# Patient Record
Sex: MALE | URBAN - METROPOLITAN AREA
[De-identification: names, ages, dates, MRNs, and addresses within clinical notes are randomized per-mention and may not be internally consistent; named-entity substitution may affect disease eponyms.]

---

## 2017-02-01 PROBLEM — IMO0002 UNCONTROLLED TYPE 2 DIABETES WITH NEUROPATHY: Status: ACTIVE | Noted: 2017-02-01

## 2017-02-01 PROBLEM — E11.65 UNCONTROLLED TYPE 2 DIABETES WITH NEUROPATHY (HCC): Status: ACTIVE | Noted: 2017-02-01

## 2017-02-01 PROBLEM — E11.40 UNCONTROLLED TYPE 2 DIABETES WITH NEUROPATHY (HCC): Status: ACTIVE | Noted: 2017-02-01

## 2017-02-09 PROCEDURE — 82570 ASSAY OF URINE CREATININE: CPT | Performed by: INTERNAL MEDICINE

## 2017-02-09 PROCEDURE — 82043 UR ALBUMIN QUANTITATIVE: CPT | Performed by: INTERNAL MEDICINE

## 2017-09-06 PROCEDURE — 82570 ASSAY OF URINE CREATININE: CPT | Performed by: INTERNAL MEDICINE

## 2017-09-06 PROCEDURE — 82043 UR ALBUMIN QUANTITATIVE: CPT | Performed by: INTERNAL MEDICINE

## 2017-09-07 PROBLEM — E11.29 CONTROLLED TYPE 2 DIABETES MELLITUS WITH MICROALBUMINURIA, WITHOUT LONG-TERM CURRENT USE OF INSULIN (HCC): Status: ACTIVE | Noted: 2017-02-01

## 2017-09-07 PROBLEM — IMO0002 UNCONTROLLED TYPE 2 DIABETES MELLITUS WITH MICROALBUMINURIA, WITHOUT LONG-TERM CURRENT USE OF INSULIN: Status: ACTIVE | Noted: 2017-02-01

## 2017-09-07 PROBLEM — R80.9 CONTROLLED TYPE 2 DIABETES MELLITUS WITH MICROALBUMINURIA, WITHOUT LONG-TERM CURRENT USE OF INSULIN (HCC): Status: ACTIVE | Noted: 2017-02-01

## 2017-09-07 PROBLEM — R80.9 UNCONTROLLED TYPE 2 DIABETES MELLITUS WITH MICROALBUMINURIA, WITHOUT LONG-TERM CURRENT USE OF INSULIN (HCC): Status: ACTIVE | Noted: 2017-02-01

## 2017-09-07 PROBLEM — R80.9 CONTROLLED TYPE 2 DIABETES MELLITUS WITH MICROALBUMINURIA, WITHOUT LONG-TERM CURRENT USE OF INSULIN: Status: ACTIVE | Noted: 2017-02-01

## 2017-09-07 PROBLEM — E11.29 CONTROLLED TYPE 2 DIABETES MELLITUS WITH MICROALBUMINURIA, WITHOUT LONG-TERM CURRENT USE OF INSULIN: Status: ACTIVE | Noted: 2017-02-01

## 2017-09-07 PROBLEM — E11.65 UNCONTROLLED TYPE 2 DIABETES MELLITUS WITH MICROALBUMINURIA, WITHOUT LONG-TERM CURRENT USE OF INSULIN (HCC): Status: ACTIVE | Noted: 2017-02-01

## 2017-09-07 PROBLEM — E11.29 UNCONTROLLED TYPE 2 DIABETES MELLITUS WITH MICROALBUMINURIA, WITHOUT LONG-TERM CURRENT USE OF INSULIN (HCC): Status: ACTIVE | Noted: 2017-02-01

## 2018-03-13 PROBLEM — E11.42 DIABETIC POLYNEUROPATHY ASSOCIATED WITH TYPE 2 DIABETES MELLITUS (HCC): Status: ACTIVE | Noted: 2018-03-13

## 2018-03-13 PROCEDURE — 81001 URINALYSIS AUTO W/SCOPE: CPT | Performed by: INTERNAL MEDICINE

## 2018-03-13 PROCEDURE — 82570 ASSAY OF URINE CREATININE: CPT | Performed by: INTERNAL MEDICINE

## 2018-03-13 PROCEDURE — 82043 UR ALBUMIN QUANTITATIVE: CPT | Performed by: INTERNAL MEDICINE

## 2018-03-14 PROBLEM — E55.9 VITAMIN D DEFICIENCY: Status: ACTIVE | Noted: 2018-03-14

## 2018-09-17 PROBLEM — E11.65 UNCONTROLLED TYPE 2 DIABETES MELLITUS WITH MICROALBUMINURIA, WITHOUT LONG-TERM CURRENT USE OF INSULIN (HCC): Status: RESOLVED | Noted: 2017-02-01 | Resolved: 2018-09-17

## 2018-09-17 PROBLEM — E11.29 UNCONTROLLED TYPE 2 DIABETES MELLITUS WITH MICROALBUMINURIA, WITHOUT LONG-TERM CURRENT USE OF INSULIN (HCC): Status: RESOLVED | Noted: 2017-02-01 | Resolved: 2018-09-17

## 2018-09-17 PROBLEM — IMO0002 UNCONTROLLED TYPE 2 DIABETES MELLITUS WITH MICROALBUMINURIA, WITHOUT LONG-TERM CURRENT USE OF INSULIN: Status: RESOLVED | Noted: 2017-02-01 | Resolved: 2018-09-17

## 2018-09-17 PROBLEM — R80.9 UNCONTROLLED TYPE 2 DIABETES MELLITUS WITH MICROALBUMINURIA, WITHOUT LONG-TERM CURRENT USE OF INSULIN (HCC): Status: RESOLVED | Noted: 2017-02-01 | Resolved: 2018-09-17

## 2018-09-18 PROCEDURE — 81001 URINALYSIS AUTO W/SCOPE: CPT | Performed by: INTERNAL MEDICINE

## 2018-09-18 PROCEDURE — 82043 UR ALBUMIN QUANTITATIVE: CPT | Performed by: INTERNAL MEDICINE

## 2018-09-18 PROCEDURE — 82570 ASSAY OF URINE CREATININE: CPT | Performed by: INTERNAL MEDICINE

## 2018-09-25 PROBLEM — E11.65: Status: ACTIVE | Noted: 2018-09-25

## 2018-11-19 PROBLEM — I10 HYPERTENSION, ESSENTIAL, BENIGN: Status: ACTIVE | Noted: 2018-11-19

## 2018-11-19 PROBLEM — E11.29 MICROALBUMINURIA DUE TO TYPE 2 DIABETES MELLITUS (HCC): Status: ACTIVE | Noted: 2018-11-19

## 2018-11-19 PROBLEM — R80.9 MICROALBUMINURIA DUE TO TYPE 2 DIABETES MELLITUS (HCC): Status: ACTIVE | Noted: 2018-11-19

## 2018-11-19 PROBLEM — IMO0002 TYPE 2 DIABETES, UNCONTROLLED, WITH NEUROPATHY: Status: ACTIVE | Noted: 2017-02-01

## 2018-11-19 PROBLEM — Z79.4 LONG-TERM INSULIN USE (HCC): Status: ACTIVE | Noted: 2018-11-19

## 2018-11-19 PROBLEM — Z79.4 UNCONTROLLED TYPE 2 DIABETES MELLITUS WITH HYPERGLYCEMIA, WITH LONG-TERM CURRENT USE OF INSULIN (HCC): Status: ACTIVE | Noted: 2018-09-25

## 2018-11-19 PROBLEM — E78.1 HYPERTRIGLYCERIDEMIA: Status: ACTIVE | Noted: 2018-11-19

## 2018-11-19 PROBLEM — E11.42 DIABETIC POLYNEUROPATHY ASSOCIATED WITH TYPE 2 DIABETES MELLITUS (HCC): Status: RESOLVED | Noted: 2018-03-13 | Resolved: 2018-11-19

## 2018-11-19 PROBLEM — E11.65 UNCONTROLLED TYPE 2 DIABETES MELLITUS WITH HYPERGLYCEMIA, WITH LONG-TERM CURRENT USE OF INSULIN (HCC): Status: ACTIVE | Noted: 2018-09-25

## 2018-11-19 PROBLEM — E66.9 OBESITY (BMI 30-39.9): Status: ACTIVE | Noted: 2018-11-19

## 2018-11-19 PROBLEM — E11.29 MICROALBUMINURIA DUE TO TYPE 2 DIABETES MELLITUS: Status: ACTIVE | Noted: 2018-11-19

## 2018-11-19 PROBLEM — R80.9 MICROALBUMINURIA DUE TO TYPE 2 DIABETES MELLITUS: Status: ACTIVE | Noted: 2018-11-19

## 2018-11-19 PROBLEM — E11.65 TYPE 2 DIABETES, UNCONTROLLED, WITH NEUROPATHY (HCC): Status: ACTIVE | Noted: 2017-02-01

## 2018-11-19 PROBLEM — E11.40 TYPE 2 DIABETES, UNCONTROLLED, WITH NEUROPATHY (HCC): Status: ACTIVE | Noted: 2017-02-01

## 2019-01-23 PROCEDURE — 82570 ASSAY OF URINE CREATININE: CPT | Performed by: INTERNAL MEDICINE

## 2019-01-23 PROCEDURE — 82043 UR ALBUMIN QUANTITATIVE: CPT | Performed by: INTERNAL MEDICINE

## 2019-01-23 PROCEDURE — 83721 ASSAY OF BLOOD LIPOPROTEIN: CPT | Performed by: INTERNAL MEDICINE

## 2019-03-18 PROBLEM — E78.1 HYPERTRIGLYCERIDEMIA: Status: RESOLVED | Noted: 2018-11-19 | Resolved: 2019-03-18

## 2019-03-18 PROCEDURE — 84681 ASSAY OF C-PEPTIDE: CPT | Performed by: INTERNAL MEDICINE

## 2019-04-13 ENCOUNTER — WALK IN (OUTPATIENT)
Dept: URGENT CARE | Age: 49
End: 2019-04-13

## 2019-04-13 VITALS
DIASTOLIC BLOOD PRESSURE: 78 MMHG | HEART RATE: 90 BPM | SYSTOLIC BLOOD PRESSURE: 118 MMHG | TEMPERATURE: 98.1 F | OXYGEN SATURATION: 98 %

## 2019-04-13 DIAGNOSIS — J01.80 OTHER ACUTE SINUSITIS, RECURRENCE NOT SPECIFIED: Primary | ICD-10-CM

## 2019-04-13 PROCEDURE — 99203 OFFICE O/P NEW LOW 30 MIN: CPT | Performed by: NURSE PRACTITIONER

## 2019-04-14 ASSESSMENT — ENCOUNTER SYMPTOMS
SINUS PAIN: 1
CONSTITUTIONAL NEGATIVE: 1
SORE THROAT: 0
EYES NEGATIVE: 1
ALLERGIC/IMMUNOLOGIC NEGATIVE: 1
COUGH: 1
SINUS PRESSURE: 1

## 2019-04-29 PROBLEM — F32.A DEPRESSION, UNSPECIFIED DEPRESSION TYPE: Status: ACTIVE | Noted: 2019-04-29

## 2019-07-31 PROBLEM — L97.521 ULCERATED, FOOT, LEFT, LIMITED TO BREAKDOWN OF SKIN (HCC): Status: ACTIVE | Noted: 2019-07-31

## 2019-10-07 PROBLEM — E78.1 HYPERTRIGLYCERIDEMIA: Status: ACTIVE | Noted: 2019-10-07

## 2019-10-08 ENCOUNTER — HOSPITAL ENCOUNTER (INPATIENT)
Facility: HOSPITAL | Age: 49
LOS: 7 days | Discharge: HOME HEALTH | DRG: 629 | End: 2019-10-15
Attending: EMERGENCY MEDICINE | Admitting: HOSPITALIST
Payer: COMMERCIAL

## 2019-10-08 ENCOUNTER — APPOINTMENT (OUTPATIENT)
Dept: GENERAL RADIOLOGY | Facility: HOSPITAL | Age: 49
DRG: 629 | End: 2019-10-08
Attending: EMERGENCY MEDICINE
Payer: COMMERCIAL

## 2019-10-08 DIAGNOSIS — Z79.4 LONG-TERM INSULIN USE (HCC): ICD-10-CM

## 2019-10-08 DIAGNOSIS — E11.65 UNCONTROLLED TYPE 2 DIABETES MELLITUS WITH HYPERGLYCEMIA, WITH LONG-TERM CURRENT USE OF INSULIN (HCC): ICD-10-CM

## 2019-10-08 DIAGNOSIS — E11.65 TYPE 2 DIABETES, UNCONTROLLED, WITH NEUROPATHY (HCC): ICD-10-CM

## 2019-10-08 DIAGNOSIS — L97.511 DIABETIC ULCER OF OTHER PART OF RIGHT FOOT ASSOCIATED WITH TYPE 2 DIABETES MELLITUS, LIMITED TO BREAKDOWN OF SKIN (HCC): Primary | ICD-10-CM

## 2019-10-08 DIAGNOSIS — Z79.4 UNCONTROLLED TYPE 2 DIABETES MELLITUS WITH HYPERGLYCEMIA, WITH LONG-TERM CURRENT USE OF INSULIN (HCC): ICD-10-CM

## 2019-10-08 DIAGNOSIS — E11.621 DIABETIC ULCER OF OTHER PART OF RIGHT FOOT ASSOCIATED WITH TYPE 2 DIABETES MELLITUS, LIMITED TO BREAKDOWN OF SKIN (HCC): Primary | ICD-10-CM

## 2019-10-08 DIAGNOSIS — E11.628 CELLULITIS IN DIABETIC FOOT (HCC): ICD-10-CM

## 2019-10-08 DIAGNOSIS — L03.119 CELLULITIS IN DIABETIC FOOT (HCC): ICD-10-CM

## 2019-10-08 DIAGNOSIS — E11.40 TYPE 2 DIABETES, UNCONTROLLED, WITH NEUROPATHY (HCC): ICD-10-CM

## 2019-10-08 PROBLEM — L97.509 DIABETIC FOOT ULCER (HCC): Status: ACTIVE | Noted: 2019-10-08

## 2019-10-08 PROCEDURE — 73630 X-RAY EXAM OF FOOT: CPT | Performed by: EMERGENCY MEDICINE

## 2019-10-08 PROCEDURE — 87040 BLOOD CULTURE FOR BACTERIA: CPT | Performed by: NURSE PRACTITIONER

## 2019-10-08 RX ORDER — GABAPENTIN 300 MG/1
300 CAPSULE ORAL 2 TIMES DAILY
Status: DISCONTINUED | OUTPATIENT
Start: 2019-10-08 | End: 2019-10-15

## 2019-10-08 RX ORDER — ROSUVASTATIN CALCIUM 10 MG/1
10 TABLET, COATED ORAL NIGHTLY
Status: DISCONTINUED | OUTPATIENT
Start: 2019-10-08 | End: 2019-10-15

## 2019-10-08 RX ORDER — MORPHINE SULFATE 2 MG/ML
2 INJECTION, SOLUTION INTRAMUSCULAR; INTRAVENOUS EVERY 2 HOUR PRN
Status: DISCONTINUED | OUTPATIENT
Start: 2019-10-08 | End: 2019-10-15

## 2019-10-08 RX ORDER — DEXTROSE MONOHYDRATE 25 G/50ML
50 INJECTION, SOLUTION INTRAVENOUS AS NEEDED
Status: DISCONTINUED | OUTPATIENT
Start: 2019-10-08 | End: 2019-10-15

## 2019-10-08 RX ORDER — HEPARIN SODIUM 5000 [USP'U]/ML
5000 INJECTION, SOLUTION INTRAVENOUS; SUBCUTANEOUS EVERY 12 HOURS SCHEDULED
Status: DISCONTINUED | OUTPATIENT
Start: 2019-10-08 | End: 2019-10-10

## 2019-10-08 RX ORDER — ONDANSETRON 2 MG/ML
4 INJECTION INTRAMUSCULAR; INTRAVENOUS EVERY 6 HOURS PRN
Status: DISCONTINUED | OUTPATIENT
Start: 2019-10-08 | End: 2019-10-15

## 2019-10-08 RX ORDER — SODIUM CHLORIDE 9 MG/ML
INJECTION, SOLUTION INTRAVENOUS CONTINUOUS
Status: DISCONTINUED | OUTPATIENT
Start: 2019-10-08 | End: 2019-10-11

## 2019-10-08 RX ORDER — METOCLOPRAMIDE HYDROCHLORIDE 5 MG/ML
10 INJECTION INTRAMUSCULAR; INTRAVENOUS EVERY 8 HOURS PRN
Status: DISCONTINUED | OUTPATIENT
Start: 2019-10-08 | End: 2019-10-15

## 2019-10-08 RX ORDER — SODIUM CHLORIDE 9 MG/ML
INJECTION, SOLUTION INTRAVENOUS CONTINUOUS
Status: ACTIVE | OUTPATIENT
Start: 2019-10-08 | End: 2019-10-09

## 2019-10-08 RX ORDER — SULFAMETHOXAZOLE AND TRIMETHOPRIM 800; 160 MG/1; MG/1
1 TABLET ORAL 2 TIMES DAILY
Status: ON HOLD | COMMUNITY
End: 2019-10-15

## 2019-10-08 RX ORDER — ACETAMINOPHEN 325 MG/1
650 TABLET ORAL EVERY 6 HOURS PRN
Status: DISCONTINUED | OUTPATIENT
Start: 2019-10-08 | End: 2019-10-15

## 2019-10-08 RX ORDER — SODIUM CHLORIDE 0.9 % (FLUSH) 0.9 %
3 SYRINGE (ML) INJECTION AS NEEDED
Status: DISCONTINUED | OUTPATIENT
Start: 2019-10-08 | End: 2019-10-15

## 2019-10-08 RX ORDER — MORPHINE SULFATE 2 MG/ML
1 INJECTION, SOLUTION INTRAMUSCULAR; INTRAVENOUS EVERY 2 HOUR PRN
Status: DISCONTINUED | OUTPATIENT
Start: 2019-10-08 | End: 2019-10-15

## 2019-10-08 RX ORDER — BUPROPION HYDROCHLORIDE 150 MG/1
300 TABLET ORAL DAILY
Status: DISCONTINUED | OUTPATIENT
Start: 2019-10-08 | End: 2019-10-15

## 2019-10-08 RX ORDER — MORPHINE SULFATE 4 MG/ML
4 INJECTION, SOLUTION INTRAMUSCULAR; INTRAVENOUS EVERY 2 HOUR PRN
Status: DISCONTINUED | OUTPATIENT
Start: 2019-10-08 | End: 2019-10-15

## 2019-10-09 ENCOUNTER — APPOINTMENT (OUTPATIENT)
Dept: MRI IMAGING | Facility: HOSPITAL | Age: 49
DRG: 629 | End: 2019-10-09
Attending: PODIATRIST
Payer: COMMERCIAL

## 2019-10-09 PROCEDURE — 99253 IP/OBS CNSLTJ NEW/EST LOW 45: CPT | Performed by: PODIATRIST

## 2019-10-09 PROCEDURE — 73718 MRI LOWER EXTREMITY W/O DYE: CPT | Performed by: PODIATRIST

## 2019-10-09 NOTE — PROGRESS NOTES
[facility] Dosing [facility]    Initial Pharmacokinetic Consult for Vancomycin Dosing     No Mathis is a 51year old male who is being treated for cellulitis. Pharmacy has been asked to dose Vancomycin by Dr. Arely Kan    He has No Known Allergies.

## 2019-10-09 NOTE — ED NOTES
Orders for admission, patient is aware of plan and ready to go upstairs.  Any questions, please call ED TEE Martinez  at extension [phone]

## 2019-10-09 NOTE — PLAN OF CARE
Problem: Patient Centered Care  Goal: Patient preferences are identified and integrated in the patient's plan of care  Description  Interventions:  - What would you like us to know as we care for you?  \"I live with my wife and child\"  - Provide timely, influences on pain and pain management  - Manage/alleviate anxiety  - Utilize distraction and/or relaxation techniques  - Monitor for opioid side effects  - Notify MD/LIP if interventions unsuccessful or patient reports new pain  - Anticipate increased boby appropriate  - Instruct patient on fluid and nutrition restrictions as appropriate  Outcome: Progressing     Problem: SKIN/TISSUE INTEGRITY - ADULT  Goal: Incision(s), wounds(s) or drain site(s) healing without S/S of infection  Description  INTERVENTIONS:

## 2019-10-09 NOTE — CONSULTS
Kisha Whitley is a 51year old male. Patient presents with:  Diabetic Foot Care      HPI:    At wound care for left foot  Developed new lesion on rt    REVIEW OF SYSTEMS:   A comprehensive 11 point review of systems was completed.   Pertinent positives Results   Component Value Date    WBC 14.5 10/09/2019    HGB 12.0 10/09/2019    HCT 35.5 10/09/2019    .0 10/09/2019    CREATSERUM 1.39 10/09/2019    BUN 28 10/09/2019     10/09/2019    K 4.3 10/09/2019     10/09/2019    CO2 24.0 10/09/2 Ratio 20.1 (H) 10.0 - 20.0    Calcium, Total 9.5 8.5 - 10.1 mg/dL    Calculated Osmolality 287 275 - 295 mOsm/kg    GFR, Non- 60 >=60    GFR, -American 69 >=60   HEMOGLOBIN A1C   Result Value Ref Range    HgbA1C 7.5 (H) <5.7 %    Est 29.1 26.0 - 34.0 pg    MCHC 33.6 31.0 - 37.0 g/dL    RDW-SD 38.9 35.1 - 46.3 fL    RDW 12.1 11.0 - 15.0 %    .0 150.0 - 450.0 10(3)uL    Neutrophil Absolute Prelim 8.55 (H) 1.50 - 7.70 x10 (3) uL    Neutrophil Absolute 8.55 (H) 1.50 - 7.70 x10(3) uL

## 2019-10-09 NOTE — CONSULTS
[facility]    Report of Consultation    Hannah Sera Patient Status:  Inpatient    1970 MRN [id number]   Location [facility] 5SW/SE Attending Kristi Oropeza MD   Hosp Day # 1 PCP Radha Mccollum MD     Date of Admission: reports that he has quit smoking. His smoking use included cigarettes. He has a 39.00 pack-year smoking history. He has never used smokeless tobacco. He reports that he drinks about 1.0 standard drinks of alcohol per week.  He reports that he does not extending towards the fourth webspace the ulceration has a clean base is very superficial and underneath the fourth metatarsal head area.   On the right foot the turgor is markedly decreased because of cellulitis there is increased erythema and edema extend neuropathy ([facility] [address]     Vitamin D deficiency     Uncontrolled type 2 diabetes mellitus with hyperglycemia, with long-term current use of insulin (HCC)     Microalbuminuria due to type 2 diabetes mellitus (HCC)     Hypertension, essential, benign     Obesity (

## 2019-10-09 NOTE — WOUND PROGRESS NOTE
WOUND CARE NOTE    History:  Past Medical History:   Diagnosis Date   • Depression    • Diabetes ([facility] [address]    • Diabetic neuropathy [facility]    • Hearing impairment    • Hyperlipidemia LDL goal <100    • Hypertension    • Hypertriglyceridemia    • Obesity (BMI Right lateral 5th toe with open draining diabetic ulcer noted moderate amount if purulent exudate, the pt's right foot and ankle are edematous. His feet are warm with palpable pedal and post tibial pulses noted.  The right foot wound was cleansed and dresse

## 2019-10-09 NOTE — ED PROVIDER NOTES
Patient Seen in: [facility] Emergency Department    History   Patient presents with:  Diabetic Foot Care    Stated Complaint: Diabetic Ulcer     HPI    46-year-old male with past medical history of dyslipidemia, hypertension, diabetes complicated by 1000 MG Oral Tab,  Take 1 tablet with breakfast and dinner   CHOLECALCIFEROL 5000 units Oral Tab,  Take 1 tablet daily   Fenofibrate 54 MG Oral Tab,  Take 1 tablet (54 mg total) by mouth daily.        Family History   Problem Relation Age of Onset   • Diabe Cooperative. Nursing note and vitals reviewed.         ED Course     Labs Reviewed   BASIC METABOLIC PANEL (8) - Abnormal; Notable for the following components:       Result Value    Glucose 190 (*)     BUN 36 (*)     Creatinine 1.62 (*)     BUN/CREA Ratio TISSUES: Soft tissue inflammation along the dorsal plantar aspects of the foot centered over the metatarsophalangeal joints. EFFUSION: None visible. OTHER: Negative. CONCLUSION:  1.   Soft tissue inflammation along the dorsal and plantar aspects o

## 2019-10-09 NOTE — ED NOTES
Pt received IV flagyl this afternoon at [address] walk in clinic for foot ulcer. Taking PO bactrium and flagyl.

## 2019-10-09 NOTE — ED NOTES
Pt alert and interactive. C/o diabetic foot ulcer since June.  Was seen at wound clinic today and was told he needed to be admitted to [facility], but patient refused to be admitted there, wanted to be admitted here at [address] to right foot draining s

## 2019-10-09 NOTE — PLAN OF CARE
Problem: Patient Centered Care  Goal: Patient preferences are identified and integrated in the patient's plan of care  Description  Interventions:  - What would you like us to know as we care for you?  My wife's 50th birthday party in on Saturday and I wo and evaluate response  - Consider cultural and social influences on pain and pain management  - Manage/alleviate anxiety  - Utilize distraction and/or relaxation techniques  - Monitor for opioid side effects  - Notify MD/LIP if interventions unsuccessful o measures as available)  - Encourage oral intake as appropriate  - Instruct patient on fluid and nutrition restrictions as appropriate  Outcome: Progressing     Problem: SKIN/TISSUE INTEGRITY - ADULT  Goal: Incision(s), wounds(s) or drain site(s) healing wi

## 2019-10-09 NOTE — ED INITIAL ASSESSMENT (HPI)
C/o of R diabetic foot ulcer since June 2019 for which he has been receiving treatments, states he has had pyrexia for about a week and then his foot has been hot, erythemic, and swollen

## 2019-10-09 NOTE — H&P
[facility] Hospitalist Team  History and Physical  Admit Date:  10/8/19    ASSESSMENT / PLAN:   50 yo male with hx HL, HTN, depression/anger, DM type II with neuropathy, right foot ulcer/wound/Cellulits -[facility] who presents with fever, worsening dariusz foot, he developed blister. He has been followed in the wound clinic at [facility]. He was was on clindamycin about 1 month ago and recently placed on bactrim and flagyl do to wound cx and drainage. For the last week he has been having fevers with tmax 101. 7 times daily.  Disp:  Rfl:    TOUJEO MAX SOLOSTAR 300 UNIT/ML Subcutaneous Solution Pen-injector Take 100 Units with breakfast and 50 Units at bedtime Disp: 18 pen Rfl: 1   lisinopril 5 MG Oral Tab TAKE 1 TABLET(5 MG) BY MOUTH DAILY (Patient taking different 11.6* 11.0* 12.0*   MCV 86.0 86.5 86.2    423.0 527.0*       Recent Labs   Lab 10/08/19  1541 10/08/19  2129 10/09/19  0550    136 137   K 4.97 4.0 4.3   CL 98 104 105   CO2 21.7* 23.0 24.0   BUN 36.0* 36* 28*   CREATSERUM 1.70* 1.62* 1.39* total)  -wound cx pending  -wound care  -vanco and zosyn x 1, defer to ID   -podiatry eval, MRI today    SHANNON  -baseline Cr 0.9-1.1 with recent outpt Cr 1.4.  Was on bactrim  -admission Cr 1.7-->1.39  -hold ACE  -UA, urine lytes  -IVF  -follow closely with a

## 2019-10-10 PROCEDURE — 99231 SBSQ HOSP IP/OBS SF/LOW 25: CPT | Performed by: PODIATRIST

## 2019-10-10 NOTE — CONSULTS
[facility]    PATIENT'S NAME: Carlosaster Sanjeev   ATTENDING PHYSICIAN: Delisa Velazquez MD   CONSULTING PHYSICIAN: Joesph Marquez.  Inderjit Kee MD   PATIENT ACCOUNT#:   [id number]    LOCATION:  [address] [place] [address] 53 #:   [id number]       DATE OF BI results of his MRI. He says there was an arterial Doppler done recently, but I do not see any results in [facility]. This will need to be checked out. If not, I would suggest an arterial Doppler.   I would anticipate IV antibiotics as an outpatient, and we a

## 2019-10-10 NOTE — PAYOR COMM NOTE
--------------  ADMISSION REVIEW     Payor: [facility]  Subscriber #:  [id number]  Authorization Number: [id number]    Admit date: 10/8/19  Admit time: 2306       Admitting Physician: Pavan Koehler MD  Attending Physician:  Warren Majano MD  Primary gabapentin 300 MG Oral Cap,  Take 1 capsule (300 mg total) by mouth 2 (two) times daily. FREESTYLE JOSE 14 DAY SENSOR Does not apply Misc,  1 each by Does not apply route every 14 (fourteen) days.  Dispense 1 sensor every 14 days   cephALEXin 500 MG Ora Temp 99.8 °F (37.7 °C)   Temp src    SpO2 98 %   O2 Device        Current:/80   Pulse 101   Temp 99.8 °F (37.7 °C)   Resp 18   Ht 190.5 cm (6' 3\")   Wt 120.2 kg   SpO2 98%   BMI 33.12 kg/m²          Physical Exam   Constitutional: No distress.    MARYCHUY Please view results for these tests on the individual orders.    RAINBOW DRAW BLUE   RAINBOW DRAW LAVENDER   RAINBOW DRAW LIGHT GREEN   RAINBOW DRAW GOLD   BLOOD CULTURE   BLOOD CULTURE   AEROBIC BACTERIAL CULTURE     Xr Foot, Complete (min 3 Views), Right Evaluation for right lateral foot wound in diabetic patient with worsening of same now with constitutional complaints - given aforementioned, will admit for IV abx and ongoing management. [facility] PCP Dr. Mitch Ang, graciously admitted to coverage Dr. Nataly Kauffman.  [facility] podi -baseline Cr 0.9-1.1 with recent outpt Cr 1.4.  Was on bactrim  -admission Cr 1.7-->1.39  -hold ACE  -UA, urine lytes  -IVF  -follow closely with abx    DM Type II with Neuropathy  -HgbA1C 10/7/19 7.3  -home regimen: metformin, toujeo 100 units am and 50 un GENERAL: no apparent distress, overweight  NEUROLOGIC: A/A;  Ox3  SKIN: no rashes  HEENT: normocephalic, normal nose, pharynx and TM's, sclera anicteric, conjunctiva normal  NECK: supple   RESPIRATORY: normal expansion; non labored, CTA   CARDIOVASCULAR: re metFORMIN HCl 1000 MG Oral Tab Take 1 tablet with breakfast and dinner (Patient taking differently: Take 1,000 mg by mouth.  Take 1 tablet with breakfast and HS ) Disp: 180 tablet Rfl: 3   CHOLECALCIFEROL 5000 units Oral Tab Take 1 tablet daily Disp:  Rfl: CONCLUSION:  1. Soft tissue inflammation along the dorsal and plantar aspects of the foot centered over the metatarsophalangeal joints suggesting cellulitis given history of wound in this region. No radiographic evidence for osteomyelitis.  2.  Nondisplac Tasia Flores MD  [facility] Hospitalist  10/9/2019  3:15 PM        Electronically signed by Mena Casarez MD on 10/9/2019  3:15 PM       Consults signed by Melina Mccoy MD at 10/9/2019  5:17 PM     Author: Melina Mccoy MD Service: Infectious D NECK:  Supple, no masses, no lymphadenopathy. LUNGS:  Clear to auscultation b/l, no rhonchi, rales, or wheezes. CARDIO: RRR C0/F6, no rubs, clicks, heaves, or murmurs. GI:  Soft NT/ND, BS present, No masses , rebound, no HSM.   EXTREMITIES:  No edema, no   Lactic Acid 1.6 0.4 - 2.0 mmol/L   CBC, PLATELET; NO DIFFERENTIAL   Result Value Ref Range     WBC 14.5 (H) 4.0 - 11.0 x10(3) uL     RBC 4.12 (L) 4.30 - 5.70 x10(6)uL     HGB 12.0 (L) 13.0 - 17.5 g/dL     HCT 35.5 (L) 39.0 - 53.0 %     MCV 86.2 80.0 - 10 Result Value Ref Range     POC Glucose  90 70 - 99   POCT GLUCOSE   Result Value Ref Range     POC Glucose  151 (H) 70 - 99   RAINBOW DRAW BLUE   Result Value Ref Range     Hold Blue Auto Resulted     RAINBOW DRAW LAVENDER   Result Value Ref Range     Hold PATIENT ACCOUNT#:   [de-identified]    LOCATION:  [facility] [address] RECORD #:   [id number]       YOB: 1970  ADMISSION DATE:       10/08/2019      CONSULT DATE:  10/09/2019     REPORT OF CONSULTATION     HISTORY OF PRESENT ILLNESS:  This IMPRESSION:  A diabetic foot infection. The current treatment is Zosyn and vancomycin pending cultures. We await the results of his MRI. He says there was an arterial Doppler done recently, but I do not see any results in [facility].   This will need to be c 10/9/2019 1113 New Bag (none) Intravenous Annette Paniagua, RN      Vancomycin HCl (VANCOCIN) 1,750 mg in sodium chloride 0.9% 500 mL IVPB     Date Action Dose Route User    10/9/2019 2230 New Bag 1750 mg Intravenous Maine Alfaro RN    10/9/2019 1112 New

## 2019-10-10 NOTE — PROGRESS NOTES
[facility] AND [facility] Infectious Disease  Progress Note    Vy Tilley Patient Status:  Inpatient    1970 MRN [id number]   Location [facility] 5SW/SE Attending Demetrius Gaxiola MD   Hosp Day # 2 PCP Sylvia Gutierrez MD     Subjective:  Chio Barber degraded examination. Antibiotics Reviewed:  Zosyn  Vancomycin    Assessment and Plan:    1.   Complicated R diabetic foot infection with deep wound  - MRI with evidence of fracture and OM  - Cultures with e.coli  - Podiatry following and ortho to give

## 2019-10-10 NOTE — PROGRESS NOTES
10/10/19  0849   BP: 139/62   Pulse: 91   Resp: 18   Temp: 98.8 °F (37.1 °C)   Patient was seen resting comfortably in bed. I came in today to discuss the MRI results.   He is not experiencing any fever or chills    Objective: Reviewed the following MRI r something like a rear foot fusion in order to prevent inversion. Had a lengthy discussion invited questions answered them all to the best of my ability. Patient understood. Plan surgery on Saturday.

## 2019-10-10 NOTE — PROGRESS NOTES
Vascular Access Notes:  Patient is entertaining visitors at this point; requesting to schedule PICC insertion tomorrow per [facility] RN.

## 2019-10-10 NOTE — PROGRESS NOTES
[facility] Hospitalist Progress Note      Assessment/Plan:     52 yo male with hx HL, HTN, depression/anger, DM type II with neuropathy, right foot ulcer/wound/Cellulits -[facility] who presents with fever, worsening foot drainage and pain, ID and pod OBJECTIVE:  Temp:  [98.3 °F (36.8 °C)-99.1 °F (37.3 °C)] 99.1 °F (37.3 °C)  Pulse:  [85-97] 91  Resp:  [18] 18  BP: (125-152)/(57-70) 152/70    Intake/Output:    Intake/Output Summary (Last 24 hours) at 10/10/2019 1747  Last data filed at 10/10/2019 06 TID CC     • sodium chloride 50 mL/hr at 10/10/19 0915     dextrose, Glucose-Vitamin C, glucose, Normal Saline Flush, acetaminophen, morphINE sulfate **OR** morphINE sulfate **OR** morphINE sulfate, ondansetron HCl, Metoclopramide HCl

## 2019-10-10 NOTE — PLAN OF CARE
Problem: Patient Centered Care  Goal: Patient preferences are identified and integrated in the patient's plan of care  Description  Interventions:  - What would you like us to know as we care for you?  Pt from home with family  - Provide timely, complete, on pain and pain management  - Manage/alleviate anxiety  - Utilize distraction and/or relaxation techniques  - Monitor for opioid side effects  - Notify MD/LIP if interventions unsuccessful or patient reports new pain  - Anticipate increased pain with acti Instruct patient on fluid and nutrition restrictions as appropriate  Outcome: Progressing     Problem: SKIN/TISSUE INTEGRITY - ADULT  Goal: Incision(s), wounds(s) or drain site(s) healing without S/S of infection  Description  INTERVENTIONS:  - Assess and

## 2019-10-10 NOTE — PLAN OF CARE
Problem: Patient Centered Care  Goal: Patient preferences are identified and integrated in the patient's plan of care  Description  Interventions:  - What would you like us to know as we care for you?   - Provide timely, complete, and accurate informatio pain goal  Description  INTERVENTIONS:  - Encourage pt to monitor pain and request assistance  - Assess pain using appropriate pain scale  - Administer analgesics based on type and severity of pain and evaluate response  - Implement non-pharmacological vlad appropriate  10/9/2019 2057 by Yuliya Herrera RN  Outcome: Progressing  10/9/2019 2043 by Yuliya Herrera RN  Outcome: Progressing  Goal: Hemodynamic stability and optimal renal function maintained  Description  INTERVENTIONS:  - Monitor labs and assess for sig

## 2019-10-10 NOTE — CM/SW NOTE
SW received MDO for Advanced Directives. SW met w/ pt to discuss eventual discharge needs. SW explained and provided forms for HCPOA.  Per pt request, copy of POLST form given to discuss w/ MD.     Akilah Patiño will follow up on 10/11 to witness & complete HCPOA

## 2019-10-11 ENCOUNTER — APPOINTMENT (OUTPATIENT)
Dept: PICC SERVICES | Facility: HOSPITAL | Age: 49
DRG: 629 | End: 2019-10-11
Attending: INTERNAL MEDICINE
Payer: COMMERCIAL

## 2019-10-11 PROCEDURE — 02HV33Z INSERTION OF INFUSION DEVICE INTO SUPERIOR VENA CAVA, PERCUTANEOUS APPROACH: ICD-10-PCS | Performed by: HOSPITALIST

## 2019-10-11 PROCEDURE — 99231 SBSQ HOSP IP/OBS SF/LOW 25: CPT | Performed by: PODIATRIST

## 2019-10-11 RX ORDER — MAGNESIUM OXIDE 400 MG (241.3 MG MAGNESIUM) TABLET
400 TABLET ONCE
Status: COMPLETED | OUTPATIENT
Start: 2019-10-11 | End: 2019-10-11

## 2019-10-11 RX ORDER — LIDOCAINE HYDROCHLORIDE 10 MG/ML
0.5 INJECTION, SOLUTION INFILTRATION; PERINEURAL ONCE AS NEEDED
Status: ACTIVE | OUTPATIENT
Start: 2019-10-11 | End: 2019-10-11

## 2019-10-11 RX ORDER — SODIUM CHLORIDE 0.9 % (FLUSH) 0.9 %
10 SYRINGE (ML) INJECTION AS NEEDED
Status: DISCONTINUED | OUTPATIENT
Start: 2019-10-11 | End: 2019-10-15

## 2019-10-11 NOTE — PROGRESS NOTES
[facility] Hospitalist Team  Progress Note    Miko New Patient Status:  Inpatient    1970 MRN [id number]   Location [facility] 5SW/SE Attending Yusef Sun, *   Hosp Day # 3 PCP Juana Parker MD     CC: Follow Up  PCP: Juana Parker MD regarding plan of care were discussed with patient. Patient agrees with plan as detailed above.  Discussed plan of care with Dr. Dulce Valentin RN, NP   [facility]  Pager [phone]   Answering Service number: [phone] 4.5 g in dextrose 5 % 100 mL ADD-vantage 4.5 g Intravenous Q8H   Vancomycin HCl (VANCOCIN) 1,750 mg in sodium chloride 0.9% 500 mL IVPB 1,750 mg Intravenous Q12H   insulin detemir (LEVEMIR) 100 UNIT/ML flextouch 20 Units 20 Units Subcutaneous Nightly   buP foot at the level of the 5th metatarsal base, which is fractured. Marrow signal changes at the base of the 5th metatarsal compatible with osteomyelitis.   Favor reactive edema  throughout the proximal and mid shaft of the 5th metatarsal versus early osteom

## 2019-10-11 NOTE — PROGRESS NOTES
[facility] AND [facility] Infectious Disease  Progress Note    Martir Herrera Patient Status:  Inpatient    1970 MRN [id number]   Location [facility] 5SW/SE Attending Iain Baez, *   Hosp Day # 3 PCP Ivan Brito MD     Subjective:  Zackary Merlin Reviewed:  Zosyn  Vancomycin     Assessment and Plan:     1.   Complicated R diabetic foot infection with deep wound  - MRI with evidence of fracture and OM  - Cultures with e.coli, corynebacterium  - Podiatry following and ortho to give a 2nd opinion marc

## 2019-10-11 NOTE — CONSULTS
[facility]    PATIENT'S NAME: Steve Cardona   ATTENDING PHYSICIAN: Fatuma Wolf.  Mina Shaver MD   CONSULTING PHYSICIAN: Jody Perez MD   PATIENT ACCOUNT#:   [id number]    LOCATION:  [address] [place] [address] #:   [id number]       DATE OF LINDSAY and ulceration plantar lateral foot base of the fifth metatarsal with fracture and marrow changes consistent with osteomyelitis. IMPRESSION:    1. Diabetic foot ulcer. 2.   Poorly controlled diabetes. 3.   Cavovarus feet.       PLAN:  I had a jd

## 2019-10-11 NOTE — HOME CARE LIAISON
Received referral from [facility], for residential home health. Met with patient at the bedside. Patient is agreeable to Residential Home Health services upon discharge. Patient given brochure and liaison card. All questions and concerns were addressed.  Will c

## 2019-10-11 NOTE — CM/SW NOTE
NP informed SW that pt will need IVAB at discharge, pending final orders. SW met w/ pt and discuss IVAB options of outpatient and home teach/train. Pt stated that he prefers to do home teach/train & feels comfortable administering the IVAB.  Pt stated t

## 2019-10-11 NOTE — PROGRESS NOTES
10/11/19  0553   BP: 126/70   Pulse: 85   Resp: 16   Temp: 98.6 °F (37 °C)   Patient was seen resting comfortably in bed.   Awaiting orthopedic consult  Objective: Dressing was changed today again MRI positive for osteomyelitis of the fifth metatarsal base

## 2019-10-11 NOTE — PAYOR COMM NOTE
--------------  CONTINUED STAY REVIEW    Payor: [employer]  Subscriber #:  [id number]  Authorization Number: [id number]    Admit date: 10/8/19  Admit time: 2306       10/11:    ID:  No new issues. Surgery planned for tomorrow.   Just had PICC placed.     O point time we will plan on debridement resection of fifth meta base tomorrow morning.   Will await Dr. Luisana Morales input              ASSESSMENT/PLAN:   50 yo male with hx HL, HTN, depression/anger, DM type II with neuropathy, right foot ulcer/wound/Cellulit Vivek Orozco RN      gabapentin (NEURONTIN) cap 300 mg     Date Action Dose Route User    10/11/2019 0916 Given 300 mg Oral Alexandra Rao RN    10/10/2019 2025 Given 300 mg Oral Reema Warren, RN      Insulin Aspart Pen (NOVOLOG) 100 UNIT/ML flexpen 1-

## 2019-10-12 ENCOUNTER — ANESTHESIA (OUTPATIENT)
Dept: SURGERY | Facility: HOSPITAL | Age: 49
DRG: 629 | End: 2019-10-12
Payer: COMMERCIAL

## 2019-10-12 ENCOUNTER — ANESTHESIA EVENT (OUTPATIENT)
Dept: SURGERY | Facility: HOSPITAL | Age: 49
DRG: 629 | End: 2019-10-12
Payer: COMMERCIAL

## 2019-10-12 ENCOUNTER — APPOINTMENT (OUTPATIENT)
Dept: GENERAL RADIOLOGY | Facility: HOSPITAL | Age: 49
DRG: 629 | End: 2019-10-12
Attending: PODIATRIST
Payer: COMMERCIAL

## 2019-10-12 PROCEDURE — 11044 DBRDMT BONE 1ST 20 SQ CM/<: CPT | Performed by: PODIATRIST

## 2019-10-12 PROCEDURE — 3E0T3BZ INTRODUCTION OF ANESTHETIC AGENT INTO PERIPHERAL NERVES AND PLEXI, PERCUTANEOUS APPROACH: ICD-10-PCS | Performed by: ANESTHESIOLOGY

## 2019-10-12 PROCEDURE — 0QBN0ZZ EXCISION OF RIGHT METATARSAL, OPEN APPROACH: ICD-10-PCS | Performed by: PODIATRIST

## 2019-10-12 PROCEDURE — BQ1LZZZ FLUOROSCOPY OF RIGHT FOOT: ICD-10-PCS | Performed by: PODIATRIST

## 2019-10-12 PROCEDURE — 76000 FLUOROSCOPY <1 HR PHYS/QHP: CPT | Performed by: PODIATRIST

## 2019-10-12 RX ORDER — HYDROCODONE BITARTRATE AND ACETAMINOPHEN 5; 325 MG/1; MG/1
2 TABLET ORAL AS NEEDED
Status: DISCONTINUED | OUTPATIENT
Start: 2019-10-12 | End: 2019-10-12 | Stop reason: HOSPADM

## 2019-10-12 RX ORDER — NALOXONE HYDROCHLORIDE 0.4 MG/ML
80 INJECTION, SOLUTION INTRAMUSCULAR; INTRAVENOUS; SUBCUTANEOUS AS NEEDED
Status: DISCONTINUED | OUTPATIENT
Start: 2019-10-12 | End: 2019-10-12 | Stop reason: HOSPADM

## 2019-10-12 RX ORDER — HYDROMORPHONE HYDROCHLORIDE 1 MG/ML
0.2 INJECTION, SOLUTION INTRAMUSCULAR; INTRAVENOUS; SUBCUTANEOUS EVERY 5 MIN PRN
Status: DISCONTINUED | OUTPATIENT
Start: 2019-10-12 | End: 2019-10-12 | Stop reason: HOSPADM

## 2019-10-12 RX ORDER — SODIUM CHLORIDE 0.9 % (FLUSH) 0.9 %
10 SYRINGE (ML) INJECTION AS NEEDED
Status: DISCONTINUED | OUTPATIENT
Start: 2019-10-12 | End: 2019-10-12 | Stop reason: HOSPADM

## 2019-10-12 RX ORDER — LIDOCAINE HYDROCHLORIDE 10 MG/ML
INJECTION, SOLUTION EPIDURAL; INFILTRATION; INTRACAUDAL; PERINEURAL AS NEEDED
Status: DISCONTINUED | OUTPATIENT
Start: 2019-10-12 | End: 2019-10-12 | Stop reason: SURG

## 2019-10-12 RX ORDER — PROCHLORPERAZINE EDISYLATE 5 MG/ML
5 INJECTION INTRAMUSCULAR; INTRAVENOUS ONCE AS NEEDED
Status: DISCONTINUED | OUTPATIENT
Start: 2019-10-12 | End: 2019-10-12 | Stop reason: HOSPADM

## 2019-10-12 RX ORDER — SODIUM CHLORIDE, SODIUM LACTATE, POTASSIUM CHLORIDE, CALCIUM CHLORIDE 600; 310; 30; 20 MG/100ML; MG/100ML; MG/100ML; MG/100ML
INJECTION, SOLUTION INTRAVENOUS CONTINUOUS
Status: DISCONTINUED | OUTPATIENT
Start: 2019-10-12 | End: 2019-10-12 | Stop reason: HOSPADM

## 2019-10-12 RX ORDER — HALOPERIDOL 5 MG/ML
0.25 INJECTION INTRAMUSCULAR ONCE AS NEEDED
Status: DISCONTINUED | OUTPATIENT
Start: 2019-10-12 | End: 2019-10-12 | Stop reason: HOSPADM

## 2019-10-12 RX ORDER — ONDANSETRON 2 MG/ML
4 INJECTION INTRAMUSCULAR; INTRAVENOUS ONCE AS NEEDED
Status: COMPLETED | OUTPATIENT
Start: 2019-10-12 | End: 2019-10-12

## 2019-10-12 RX ORDER — MORPHINE SULFATE 2 MG/ML
2 INJECTION, SOLUTION INTRAMUSCULAR; INTRAVENOUS EVERY 10 MIN PRN
Status: DISCONTINUED | OUTPATIENT
Start: 2019-10-12 | End: 2019-10-12 | Stop reason: HOSPADM

## 2019-10-12 RX ORDER — ONDANSETRON 2 MG/ML
INJECTION INTRAMUSCULAR; INTRAVENOUS AS NEEDED
Status: DISCONTINUED | OUTPATIENT
Start: 2019-10-12 | End: 2019-10-12 | Stop reason: SURG

## 2019-10-12 RX ORDER — MORPHINE SULFATE 10 MG/ML
6 INJECTION, SOLUTION INTRAMUSCULAR; INTRAVENOUS EVERY 10 MIN PRN
Status: DISCONTINUED | OUTPATIENT
Start: 2019-10-12 | End: 2019-10-12 | Stop reason: HOSPADM

## 2019-10-12 RX ORDER — DEXTROSE MONOHYDRATE 25 G/50ML
50 INJECTION, SOLUTION INTRAVENOUS
Status: DISCONTINUED | OUTPATIENT
Start: 2019-10-12 | End: 2019-10-12 | Stop reason: HOSPADM

## 2019-10-12 RX ORDER — ENOXAPARIN SODIUM 100 MG/ML
40 INJECTION SUBCUTANEOUS DAILY
Status: DISCONTINUED | OUTPATIENT
Start: 2019-10-12 | End: 2019-10-15

## 2019-10-12 RX ORDER — MORPHINE SULFATE 4 MG/ML
4 INJECTION, SOLUTION INTRAMUSCULAR; INTRAVENOUS EVERY 10 MIN PRN
Status: DISCONTINUED | OUTPATIENT
Start: 2019-10-12 | End: 2019-10-12 | Stop reason: HOSPADM

## 2019-10-12 RX ORDER — MIDAZOLAM HYDROCHLORIDE 1 MG/ML
INJECTION INTRAMUSCULAR; INTRAVENOUS AS NEEDED
Status: DISCONTINUED | OUTPATIENT
Start: 2019-10-12 | End: 2019-10-12 | Stop reason: SURG

## 2019-10-12 RX ORDER — SODIUM CHLORIDE, SODIUM LACTATE, POTASSIUM CHLORIDE, CALCIUM CHLORIDE 600; 310; 30; 20 MG/100ML; MG/100ML; MG/100ML; MG/100ML
INJECTION, SOLUTION INTRAVENOUS CONTINUOUS PRN
Status: DISCONTINUED | OUTPATIENT
Start: 2019-10-12 | End: 2019-10-12 | Stop reason: SURG

## 2019-10-12 RX ORDER — HYDROCODONE BITARTRATE AND ACETAMINOPHEN 5; 325 MG/1; MG/1
1 TABLET ORAL AS NEEDED
Status: DISCONTINUED | OUTPATIENT
Start: 2019-10-12 | End: 2019-10-12 | Stop reason: HOSPADM

## 2019-10-12 RX ORDER — PROCHLORPERAZINE EDISYLATE 5 MG/ML
10 INJECTION INTRAMUSCULAR; INTRAVENOUS EVERY 6 HOURS PRN
Status: DISCONTINUED | OUTPATIENT
Start: 2019-10-12 | End: 2019-10-15

## 2019-10-12 RX ORDER — HYDROMORPHONE HYDROCHLORIDE 1 MG/ML
0.4 INJECTION, SOLUTION INTRAMUSCULAR; INTRAVENOUS; SUBCUTANEOUS EVERY 5 MIN PRN
Status: DISCONTINUED | OUTPATIENT
Start: 2019-10-12 | End: 2019-10-12 | Stop reason: HOSPADM

## 2019-10-12 RX ORDER — HYDROMORPHONE HYDROCHLORIDE 1 MG/ML
0.6 INJECTION, SOLUTION INTRAMUSCULAR; INTRAVENOUS; SUBCUTANEOUS EVERY 5 MIN PRN
Status: DISCONTINUED | OUTPATIENT
Start: 2019-10-12 | End: 2019-10-12 | Stop reason: HOSPADM

## 2019-10-12 RX ORDER — LISINOPRIL 5 MG/1
5 TABLET ORAL NIGHTLY
Status: DISCONTINUED | OUTPATIENT
Start: 2019-10-12 | End: 2019-10-15

## 2019-10-12 RX ORDER — MAGNESIUM OXIDE 400 MG (241.3 MG MAGNESIUM) TABLET
400 TABLET ONCE
Status: COMPLETED | OUTPATIENT
Start: 2019-10-12 | End: 2019-10-12

## 2019-10-12 RX ADMIN — SODIUM CHLORIDE, SODIUM LACTATE, POTASSIUM CHLORIDE, CALCIUM CHLORIDE: 600; 310; 30; 20 INJECTION, SOLUTION INTRAVENOUS at 09:10:00

## 2019-10-12 RX ADMIN — ONDANSETRON 4 MG: 2 INJECTION INTRAMUSCULAR; INTRAVENOUS at 09:12:00

## 2019-10-12 RX ADMIN — MIDAZOLAM HYDROCHLORIDE 2 MG: 1 INJECTION INTRAMUSCULAR; INTRAVENOUS at 08:00:00

## 2019-10-12 RX ADMIN — SODIUM CHLORIDE, SODIUM LACTATE, POTASSIUM CHLORIDE, CALCIUM CHLORIDE: 600; 310; 30; 20 INJECTION, SOLUTION INTRAVENOUS at 08:00:00

## 2019-10-12 RX ADMIN — SODIUM CHLORIDE, SODIUM LACTATE, POTASSIUM CHLORIDE, CALCIUM CHLORIDE: 600; 310; 30; 20 INJECTION, SOLUTION INTRAVENOUS at 08:44:00

## 2019-10-12 RX ADMIN — LIDOCAINE HYDROCHLORIDE 50 MG: 10 INJECTION, SOLUTION EPIDURAL; INFILTRATION; INTRACAUDAL; PERINEURAL at 08:06:00

## 2019-10-12 NOTE — PROGRESS NOTES
[facility] AND [facility] Infectious Disease Progress Note    Theo Cloud Patient Status:  Inpatient    1970 MRN [id number]   Location [facility] 5SW/SE Attending Lakeshia Ribera, *   Hosp Day # 4 PCP Lori Del Angel MD     Subjective:  Pt Metoclopramide HCl (REGLAN) injection 10 mg, 10 mg, Intravenous, Q8H PRN    Physical Exam:  General: Alert, orientated x3. Cooperative. No apparent distress.   Vital Signs:  Blood pressure 149/74, pulse 74, temperature 97.3 °F (36.3 °C), temperature sour [phone].      JOHN FRENCH NP  10/12/2019  11:53 AM

## 2019-10-12 NOTE — ANESTHESIA PREPROCEDURE EVALUATION
Anesthesia PreOp Note    HPI:     Vy Tilley is a 51year old male who presents for preoperative consultation requested by: Berenice Cintron DPM    Date of Surgery: 10/8/2019 - 10/12/2019    Procedure(s):  EXTREMITY LOWER IRRIGATION & DEBRIDEMENT Hypertriglyceridemia    • Obesity (BMI 30-39. 9)     BMI 32   • Type 2 diabetes mellitus (HCC)     Dx at age 41   • Visual impairment    • Vitamin D deficiency        Past Surgical History:   Procedure Laterality Date   • WISDOM TEETH REMOVED         metRON days, Disp: 6 each, Rfl: 3, Taking  cephALEXin 500 MG Oral Cap, Take 1 capsule (500 mg total) by mouth 4 (four) times daily. , Disp: 40 capsule, Rfl: 0, Taking  FREESTYLE JOSE 14 DAY READER Does not apply Device, 1 each by Does not apply route every 14 (fo PRN, MD Lebron Villa Hold] acetaminophen (TYLENOL) tab 650 mg, 650 mg, Oral, Q6H PRN, MD Lebron Vlila Hold] morphINE sulfate (PF) 2 MG/ML injection 1 mg, 1 mg, Intravenous, Q2H PRN, Tina Jameson MD    Or  Lebron Castellanos Hold] morphINE sulfate (PF) 2 MG/M Stress: Not on file    Relationships      Social connections:        Talks on phone: Not on file        Gets together: Not on file        Attends [employer] service: Not on file        Active member of club or organization: Not on file        Attends meetin 10/08/2019    PGLU 195 (H) 10/12/2019    CA 9.1 10/12/2019    CA 9.7 10/08/2019          Vital Signs: Body mass index is 33.12 kg/m². height is 1.905 m (6' 3\") and weight is 120.2 kg (265 lb). His oral temperature is 98.6 °F (37 °C).  His blood pressure

## 2019-10-12 NOTE — OPERATIVE REPORT
[facility]    PATIENT'S NAME: Emily Abdalla   ATTENDING PHYSICIAN: Chelsea Hardwick. Rohan Mast MD   OPERATING PHYSICIAN: Karen Subramanian DPM   PATIENT ACCOUNT#:   [de-identified]    LOCATION:  [address] [place] [address] #:   [id number]       DATE OF discolored features and that was a nonunion. Specimens of that bone were sent, taken with rongeur, to Microbiology for aerobic and anaerobic culture and sensitivity.     OPERATIVE TECHNIQUE:  The patient was brought into the operating room with vital signs controlled with electrocautery, ligature as well as Gelfoam with thrombin, total 20,000 units. Compression was applied. Hemostasis was adequate.   Iodoform gauze packing was placed over that into the wound, and the wound edges were loosely reapproximated

## 2019-10-12 NOTE — BRIEF OP NOTE
Pre-Operative Diagnosis: osteomyelitis fifth metatarsal base right foot with cellulitis     Post-Operative Diagnosis: Same      Procedure Performed:   Procedure(s):  debridement of right infected foot wound with removal of 5th metatarsal base right foot

## 2019-10-12 NOTE — ANESTHESIA POSTPROCEDURE EVALUATION
Patient: Fay Gomez    Procedure Summary     Date:  10/12/19 Room / Location:  [address] [place] [address] MAIN OR 06 / [address] [place] [address] OR    Anesthesia Start:  0800 Anesthesia Stop:  6268    Procedure:  EXTREMITY LOWER IRRIGATION & DEBRIDEMENT (Right ) Diagnosis:  (osteomyelitis)

## 2019-10-12 NOTE — PROGRESS NOTES
[facility] dosing service    Follow-up Pharmacokinetic Consult for Vancomycin Dosing     Fay Gomez is a 51year old male who is being treated for cellulitis. Patient is on day 5 of Vancomycin and is currently receiving 1.75 gm IV Q 12 hours.   Go Sensitive      Meropenem <=0.25 Sensitive      Levofloxacin 1 Sensitive      Piperacillin + Tazobactam <=4 Sensitive      Trimethoprim/Sulfa >=320 Resistant        Based on the above:    1.  Continue Vancomycin at 1.75 gm IVPB Q 12 hours (based on Trough of

## 2019-10-12 NOTE — PROGRESS NOTES
[facility] Hospitalist Progress Note     PCP: Blane Monge MD    CC:  Follow up    SUBJECTIVE:  Pt sitting up in bed, family at bedside. Feeling nauseous. Otherwise, no cp/sob/f/c.  No BM    OBJECTIVE:  Temp:  [97.3 °F (36.3 °C)-98.7 °F (37.1 °C)] 97.3 °F (36.3 ° piperacillin-tazobactam  4.5 g Intravenous Q8H   • vancomycin  1,750 mg Intravenous Q12H   • insulin detemir  20 Units Subcutaneous Nightly   • buPROPion HCl ER (XL)  300 mg Oral Daily   • gabapentin  300 mg Oral BID   • Rosuvastatin Calcium  10 mg Oral Ni

## 2019-10-12 NOTE — ANESTHESIA PROCEDURE NOTES
Airway  Urgency: elective      General Information and Staff    Patient location during procedure: OR  Anesthesiologist: Vinayak Lloyd DO  Performed: anesthesiologist     Indications and Patient Condition  Indications for airway management: anesthesia  Se

## 2019-10-13 PROCEDURE — 99231 SBSQ HOSP IP/OBS SF/LOW 25: CPT | Performed by: PODIATRIST

## 2019-10-13 NOTE — PROGRESS NOTES
[facility] dosing service    Follow-up Pharmacokinetic Consult for Vancomycin Dosing     Artemio Becker is a 51year old male who is being treated for osteomyelitis.    Patient is on day 10/8/19 of Vancomycin and is currently receiving 1.75 gm IV Q 12 h Smear 1+ Gram positive cocci in pairs and clusters N/A       Susceptibility    Escherichia coli -  (no method available)     Ampicillin >=32 Resistant      Ampicillin + Sulbactam 8 Sensitive      Cefazolin <=4 Sensitive      Ciprofloxacin 1 Sensitive

## 2019-10-13 NOTE — PLAN OF CARE
S/P I&D of R MT base of foot yesterday. Tmax 99.2. Denies pain. R foot dressing remains cdi. Kept foot elevated on a pillow. Antibx given, as ordered. HS accucheck 349. Dr. Marilyn Callejas, g Hospitalist on call notified.  5 u Novolog as ordered+ Levemir 20 ordered medications to maintain glucose within target range  - Assess barriers to adequate nutritional intake and initiate nutrition consult as needed  - Instruct patient on self management of diabetes  Outcome: Not Progressing

## 2019-10-13 NOTE — PROGRESS NOTES
[facility] Hospitalist Progress Note     PCP: Jaclyn Syed MD    CC:  Follow up    SUBJECTIVE:  Pt sitting up in bed, no pain. No nausea.  Otherwise, no cp/sob/f/c.      OBJECTIVE:  Temp:  [98.2 °F (36.8 °C)-99.2 °F (37.3 °C)] 98.6 °F (37 °C)  Pulse:  [85-95] 91 188* 277*            Meds:     • Insulin Aspart Pen  5 Units Subcutaneous Once   • [START ON 10/14/2019] vancomycin  2,000 mg Intravenous Q12H   • lisinopril  5 mg Oral Nightly   • enoxaparin  40 mg Subcutaneous Daily   • piperacillin-tazobactam  4.5 g Int d/c    Questions/concerns were discussed with patient and/or family by bedside.        Thank Sheree Burkitt, MD    [facility] Hospitalist  Pager: [phone]

## 2019-10-13 NOTE — PROGRESS NOTES
Postoperative day #1   10/13/19  0748   BP: 122/64   Pulse: 91   Resp: 18   Temp: 98.6 °F (37 °C)   Patient resting comfortably in bed no complaints of pain or discomfort no complaints of fever chills had mildly elevated temperature at 99 last evening.   Ov

## 2019-10-14 PROCEDURE — 99231 SBSQ HOSP IP/OBS SF/LOW 25: CPT | Performed by: PODIATRIST

## 2019-10-14 NOTE — PAYOR COMM NOTE
--------------  CONTINUED STAY REVIEW----REQUESTING ADDITIONAL DAYS 10/12, 10/13, AND 10/14      Payor: SAEID FOX  Subscriber #:  [id number]  Authorization Number: [id number]    Admit date: 10/8/19  Admit time: 2306    Admitting Physician: Gayathri Simpson MD INDICATIONS:  This is a 52-year-old male patient who was seen in the hospital a few days ago. He was admitted with a draining sinus on the side of his foot.   X-rays, MRI positive for osteomyelitis, midshaft and distal metatarsal were thought to be reactiv OPERATIVE TECHNIQUE:  The patient was brought into the operating room with vital signs stable, placed in supine position on the operating table. Time-out was taken.   There were no additions, deletions, or concerns reported by either Anesthesia or the surg placed over that into the wound, and the wound edges were loosely reapproximated utilizing 2-0 Prolene suture. The area was dressed with Xeroform, sterile gauze followed by an ABD pad and a Kerlix roll. Four-inch Ace was applied for compression.   The pat WBC 11.2* 14.5* 8.1 9.2 10.3   HGB 11.0* 12.0* 11.8* 12.1* 11.7*   MCV 86.5 86.2 87.9 86.6 85.4   .0 527.0* 454.0* 468.0* 508.0*                 Recent Labs   Lab 10/08/19  2129 10/09/19  0550 10/10/19  0713 10/11/19  0548 10/12/19  0512    13 -s/p debridement/resection metatarsal on 10/11/19. To follow up with ortho in 4-6 weeks. Will need R foot reconstruction.  See ortho note  -pt with neuropathy so no pain     DM Type II with Neuropathy  -HgbA1C 10/7/19 7.3  -home regimen: metformin, toujeo Data Review:       Labs:              Recent Labs   Lab 10/09/19  0550 10/10/19  0713 10/11/19  0548 10/12/19  0512 10/13/19  0505   WBC 14.5* 8.1 9.2 10.3 11.1*   HGB 12.0* 11.8* 12.1* 11.7* 10.3*   MCV 86.2 87.9 86.6 85.4 85.8   .0* 454.0* 468.0* 50 yo male with hx HL, HTN, depression/anger, DM type II with neuropathy, right foot ulcer/wound/Cellulis -[facility] with fever, worsening foot drainage and pain.  Pt with note OM 5th digit based on MRI, and s/p debridement and rese Objective: Dressing was changed today remove sutures iodoform gauze packing showing only hemorrhagic exudate no purulence noted no malodor still a little erythema around the wound. Sutures were removed as well which only 3 retaining sutures.   Inside of th 10/13/2019 2139 Given 20 Units Subcutaneous (Right Upper Arm) Jannet Saunders RN      lisinopril tab 5 mg     Date Action Dose Route User    10/13/2019 2038 Given 5 mg Oral Jannet Saunders RN      Rosuvastatin Calcium (CRESTOR) tab 10 mg     Date Action

## 2019-10-14 NOTE — PROGRESS NOTES
[facility] AND [facility] Infectious Disease  Progress Note    Chris Lora Patient Status:  Inpatient    1970 MRN [id number]   Location [facility] 5SW/SE Attending Sukhjinder Davis, *   Hosp Day # 6 PCP Kirti Hodgson MD     Subjective:  Brittnee Cortes corynebacterium  - Podiatry following, now POD #2 s/p OR with resection of 5 MT  - Still with drainage, orders placed for VAC     2.  H/o L foot wound  - Has been followed by wound care for this     3.  DM II  - Needs continued tight glycemic control     4

## 2019-10-14 NOTE — PROGRESS NOTES
[facility] Hospitalist Progress Note     PCP: Maria Fernanda Perez MD    CC:  Follow up    SUBJECTIVE:    Pt seen earlier, note delayed    Pt sitting up in bed, no pain. No nausea.  Otherwise, no cp/sob/f/c.      OBJECTIVE:  Temp:  [97.6 °F (36.4 °C)-98.7 °F (37.1 °C) 10/14/19  1233   PGLU 277* 214* 316* 216* 272*            Meds:     • Insulin Aspart Pen  5 Units Subcutaneous Once   • vancomycin  2,000 mg Intravenous Q12H   • cefTRIAXone  2 g Intravenous Q24H   • lisinopril  5 mg Oral Nightly   • enoxaparin  40 mg Subc podiatry    Dispo: pending IV abx plan for d/c    Questions/concerns were discussed with patient and/or family by bedside.        Thank Deangelo Jones MD    [facility] Hospitalist  Pager: [phone]

## 2019-10-14 NOTE — PHYSICAL THERAPY NOTE
PHYSICAL THERAPY EVALUATION - INPATIENT     Room Number: 537/537-A  Evaluation Date: 10/14/2019  Type of Evaluation: Initial   Physician Order: PT Eval and Treat    Presenting Problem: Diabetic ulcer of R foot  Reason for Therapy: Mobility Dysfunction and home.  *Trial stairs prior to DC  The patient's Approx Degree of Impairment: 28.97% has been calculated based on documentation in the [facility] '6 clicks' Inpatient Basic Mobility Short Form.   Research supports that patients with this level of impairment may b Regularly Uses: None    Prior Level of [place]: Ind in ADL's and IADL's +driving +working FT    SUBJECTIVE  \"I haven't moved since last Tuesday\"    PHYSICAL THERAPY EXAMINATION     OBJECTIVE  Precautions: Limb alert - right(wound VAC RLE)  Fall Risk Scale): 50.25   CMS Modifier (G-Code): CJ    FUNCTIONAL ABILITY STATUS  Gait Assessment   Gait Assistance: Supervision  Distance (ft): 175ft  Assistive Device: (rolling knee walker)  Pattern: Within Functional Limits  Stoop/Curb Assistance: Not tested

## 2019-10-14 NOTE — PROGRESS NOTES
10/14/19  0729   BP: 139/55   Pulse: 88   Resp: 18   Temp: 97.6 °F (36.4 °C)   This pleasant patient was seen resting comfortably no complaints of pain. No complaints of fever or chills his vital signs have been stable he is been afebrile.     Objective:

## 2019-10-14 NOTE — PROGRESS NOTES
Voicemail left with PT about possibly seeing patient today. He does not feel comfortable trying to get out of bed without seeing them first. I have offered to assist patient to chair multiple times and he has declined. Will reach out to PT again .

## 2019-10-14 NOTE — WOUND PROGRESS NOTE
Pt was seen sitting in bed, agreeable to application of wound vac. Dr. Edie Haro has discussed with pt and wound services, plan for d/c to home with home vac and home health for dressing changes. Pt states he does not need pain meds for wound care.  The right

## 2019-10-14 NOTE — WOUND PROGRESS NOTE
Wound Care Services  Received a call from [facility] regarding the home vac machine, and per [facility] the pt. has a 30% co insurance co pay that he will need to accept and pay prior to the home vac machine delivery. The [facility] rep will call and speak with the pt.

## 2019-10-14 NOTE — PROGRESS NOTES
Yaquelin Stockton is a 51year old male. Patient presents with:  Diabetic Foot Care      HPI:    Looks and feels better    REVIEW OF SYSTEMS:   A comprehensive 11 point review of systems was completed.   Pertinent positives and negatives noted in the the H Dispo  -PICC in place  -anticipate outpatient IV abx  -spoke with pt     If you have any questions or concerns please call [facility] at [phone].          Lab Results   Component Value Date    WBC 11.1 10/13/2019    HGB 10.3 10/13/2019    HCT 31.4 10/13/2 28 (H) 7 - 18 mg/dL    Creatinine 1.39 (H) 0.70 - 1.30 mg/dL    BUN/CREA Ratio 20.1 (H) 10.0 - 20.0    Calcium, Total 9.5 8.5 - 10.1 mg/dL    Calculated Osmolality 287 275 - 295 mOsm/kg    GFR, Non- 60 >=60    GFR, -American 69 >=60 Calculated Osmolality 288 275 - 295 mOsm/kg    GFR, Non- 80 >=60    GFR, -American 92 >=60   MAGNESIUM   Result Value Ref Range    Magnesium 1.6 1.6 - 2.6 mg/dL   VANCOMYCIN TROUGH, SERUM   Result Value Ref Range    Vancomycin Trough Range    POC Glucose  195 (H) 70 - 99   POCT GLUCOSE   Result Value Ref Range    POC Glucose  166 (H) 70 - 99   POCT GLUCOSE   Result Value Ref Range    POC Glucose  205 (H) 70 - 99   POCT GLUCOSE   Result Value Ref Range    POC Glucose  289 (H) 70 - 99 No organisms seen    CBC W/ DIFFERENTIAL   Result Value Ref Range    WBC 11.2 (H) 4.0 - 11.0 x10(3) uL    RBC 3.78 (L) 4.30 - 5.70 x10(6)uL    HGB 11.0 (L) 13.0 - 17.5 g/dL    HCT 32.7 (L) 39.0 - 53.0 %    MCV 86.5 80.0 - 100.0 fL    MCH 29.1 26.0 - 34.0 p 80.0 - 100.0 fL    MCH 28.9 26.0 - 34.0 pg    MCHC 33.4 31.0 - 37.0 g/dL    RDW-SD 38.1 35.1 - 46.3 fL    RDW 11.9 11.0 - 15.0 %    .0 (H) 150.0 - 450.0 10(3)uL    Neutrophil Absolute Prelim 6.45 1.50 - 7.70 x10 (3) uL    Neutrophil Absolute 6.45 1. 7.70 x10 (3) uL    Neutrophil Absolute 8.23 (H) 1.50 - 7.70 x10(3) uL    Lymphocyte Absolute 1.68 1.00 - 4.00 x10(3) uL    Monocyte Absolute 0.87 0.10 - 1.00 x10(3) uL    Eosinophil Absolute 0.19 0.00 - 0.70 x10(3) uL    Basophil Absolute 0.07 0.00 - 0.20

## 2019-10-15 VITALS
HEIGHT: 75 IN | TEMPERATURE: 98 F | DIASTOLIC BLOOD PRESSURE: 66 MMHG | SYSTOLIC BLOOD PRESSURE: 130 MMHG | WEIGHT: 265 LBS | BODY MASS INDEX: 32.95 KG/M2 | HEART RATE: 89 BPM | RESPIRATION RATE: 18 BRPM | OXYGEN SATURATION: 96 %

## 2019-10-15 RX ORDER — ACETAMINOPHEN 325 MG/1
650 TABLET ORAL EVERY 6 HOURS PRN
Qty: 30 TABLET | Refills: 0 | Status: SHIPPED | COMMUNITY
Start: 2019-10-15 | End: 2020-02-25

## 2019-10-15 RX ORDER — INSULIN GLARGINE 300 U/ML
INJECTION, SOLUTION SUBCUTANEOUS
Qty: 18 PEN | Refills: 1 | Status: SHIPPED | OUTPATIENT
Start: 2019-10-15 | End: 2020-06-01

## 2019-10-15 NOTE — CM/SW NOTE
CHEKO received script for Ceftriaxone 2g q24 until 11/23/19. Script sent to [facility]. Leonardo Ling from [facility] stated that pt's home IVAB have been approved and plan for delivery today between 6-9pm. CHEKO notified Yrn Juarez from [facility] of pt's discharge today.

## 2019-10-15 NOTE — PROGRESS NOTES
Patient is dc home. PICC in place. Understands changes for insulin. Patient aware to f/u with pcp, infectious disease, barb, and pierce. Patient aware of picc line care and to keep it dry and clean.  Patient wound vac switched over to home vac by wound

## 2019-10-15 NOTE — DISCHARGE SUMMARY
[facility] Hospitalist Discharge Summary   Patient ID:  Oswaldo Valles  [id number]  20 year old  6/30/1970    Admit date: 10/8/2019  Discharge date: 10/15/2019    Primary Care Physician: Liat Crespo MD   Attending Physician: Gagandeep Weaver MD   Consults:   Eduard Heard with foul odor from his right ankle. He has been doing wound care, aquacel with ABD and gauze dressing.  He has recently started [employer] and has had improved bs    Hospital Course:   52 yo male with hx HL, HTN, depression/anger, DM type II with neurop TYLENOL     sodium chloride 0.9% SOLN 100 mL with cefTRIAXone Sodium 2 g SOLR 2 g        CHANGE how you take these medications    Fenofibrate 54 MG Tabs  Take 1 tablet (54 mg total) by mouth daily.   What changed:  when to take this     lisinopril 5 MG Tabs 300 UNIT/ML Sopn       Important follow up: Follow up with ID  Follow up with Dr Michelle Mckenzie  Follow up with Dr Anselmo Sparks  Follow up with PCP    Disposition: home  Discharged Condition: stable      Additional patient instructions:   Your dosage of insulin as be nondistended, no organomegaly, bowel sounds present  Skin: no rashes or lesions  Neuro: AO*3, motor intact, no sensory deficits  Psyc: appropriate mood and affect

## 2019-10-15 NOTE — PROGRESS NOTES
[facility] Infectious Disease  Progress Note    Neha Killer Patient Status:  Inpatient    1970 MRN [id number]   Location [facility] 5SW/SE Attending Neelam Caceres MD   Hosp Day # 7 PCP Kemar Yee MD     Subjective:  Patient see by wound care for this     3.  DM II  - Needs continued tight glycemic control     4.  Disposition - Stable for d/c from ID standpoint when ok with others. Continue IV ceftriaxone alone x 6 weeks through 11/23/19. Rx on chart for arrangements to be made.

## 2019-10-15 NOTE — PLAN OF CARE
Problem: Patient Centered Care  Goal: Patient preferences are identified and integrated in the patient's plan of care  Description  Interventions:  - What would you like us to know as we care for you?  Pt from home with family  - Provide timely, complete, including labs, urine output, blood pressure (other measures as available)  - Encourage oral intake as appropriate  - Instruct patient on fluid and nutrition restrictions as appropriate  Outcome: Progressing     Problem: SKIN/TISSUE INTEGRITY - ADULT  Goal post-hospital services based on physician/LIP order or complex needs related to functional status, cognitive ability or social support system  Outcome: Progressing

## 2019-10-15 NOTE — WOUND PROGRESS NOTE
Pt seen for wound vac dressing check. Vac is running at 125 mmHg and a patent seal is being detected. There is a small amount of serosanguinous drainage present in the canister. The [facility] home vac has been delivered to pt's room.  Bedside RN instructed on how

## 2019-10-15 NOTE — PLAN OF CARE
Problem: Patient Centered Care  Goal: Patient preferences are identified and integrated in the patient's plan of care  Description  Interventions:  - What would you like us to know as we care for you?  Pt from home with family  - Provide timely, complete, response  - Consider cultural and social influences on pain and pain management  - Manage/alleviate anxiety  - Utilize distraction and/or relaxation techniques  - Monitor for opioid side effects  - Notify MD/LIP if interventions unsuccessful or patient rep volume status, including labs, urine output, blood pressure (other measures as available)  - Encourage oral intake as appropriate  - Instruct patient on fluid and nutrition restrictions as appropriate  Outcome: Progressing     Problem: SKIN/TISSUE INTEGRIT coordinating discharge planning if the patient needs post-hospital services based on physician/LIP order or complex needs related to functional status, cognitive ability or social support system  Outcome: Progressing  Note:   Anticipated discharge today or

## 2019-10-16 ENCOUNTER — TELEPHONE (OUTPATIENT)
Dept: PODIATRY CLINIC | Facility: CLINIC | Age: 49
End: 2019-10-16

## 2019-10-16 NOTE — TELEPHONE ENCOUNTER
Spoke to pt and scheduled appt with Moris in [address] [place] [address] on 10/21/19 at 8:30AM. Pt verbalized understanding.

## 2019-10-16 NOTE — TELEPHONE ENCOUNTER
Pt had surgery on 10/12/19 and was instructed to be seen within one week. Requesting sooner than next available appointment in [address] [place] [address] office.

## 2019-10-16 NOTE — PAYOR COMM NOTE
REF: [id number]  FAXING CLINICAL UPDATE FOR 10/14/19- REQUESTING 1 ADDITIONAL DAYS-  DATE OF DISCHARGE: 10/15/19    10/14/19             Awilda Subramanian DPM   Podiatrist   Podiatry   Progress Notes       Signed     Date of Service:  10/14/2019  9:43 AM Given 300 mg Oral      10/13/2019 2038 Given 300 mg Oral                          Insulin Aspart Pen (NOVOLOG) 100 UNIT/ML flexpen 1-5 Units      Date Action Dose Route      10/14/2019 1235 Given 3 Units Subcutaneous (Left Upper Arm)      10/14/2019 0734 G

## 2019-10-18 ENCOUNTER — TELEPHONE (OUTPATIENT)
Dept: PODIATRY CLINIC | Facility: CLINIC | Age: 49
End: 2019-10-18

## 2019-10-18 NOTE — TELEPHONE ENCOUNTER
Rigo Fajardo is requesting to be called in regards to knowing who changes the patient wound dressings     F: [phone]

## 2019-10-18 NOTE — TELEPHONE ENCOUNTER
Spoke to pt and scheduled appt with Moris for tomorrow at 9:30AM in [facility] site for right foot wound vac. Pt verbalized understanding.

## 2019-10-18 NOTE — TELEPHONE ENCOUNTER
Per Dr. Jolly Mckeon, make sure pt has [facility]. Called [facility]  Madison Valencia. Per Madison Valencia, pt was d/c on 10/15/19 with [facility] and IV abx. Dr. Jolly Mckeon informed of this.

## 2019-10-18 NOTE — TELEPHONE ENCOUNTER
That should be home health otherwise have him come in Saturday when the other wound vvac patient is there

## 2019-10-18 NOTE — TELEPHONE ENCOUNTER
WMN - please advise - pt has f/u appt on 10/21 with you in [facility].  Post op hospital pt.   ___________    Charlie Barry is requesting to be called in regards to knowing who changes the patient wound dressings      F: [phone]

## 2019-10-19 ENCOUNTER — OFFICE VISIT (OUTPATIENT)
Dept: PODIATRY CLINIC | Facility: CLINIC | Age: 49
End: 2019-10-19
Payer: COMMERCIAL

## 2019-10-19 DIAGNOSIS — L97.521 ULCERATED, FOOT, LEFT, LIMITED TO BREAKDOWN OF SKIN (HCC): ICD-10-CM

## 2019-10-19 DIAGNOSIS — Z98.890 POST-OPERATIVE STATE: Primary | ICD-10-CM

## 2019-10-19 DIAGNOSIS — S91.301D OPEN WOUND OF RIGHT FOOT, SUBSEQUENT ENCOUNTER: ICD-10-CM

## 2019-10-19 PROCEDURE — 99213 OFFICE O/P EST LOW 20 MIN: CPT | Performed by: PODIATRIST

## 2019-10-21 NOTE — PROGRESS NOTES
Gaviota Ashby is a 51year old male.  Patient presents with:  Post-Op: rght foot - sx on 10/12/19 - denies any fever, chills and pain only when he gets the dressing change - denies any pain today - BS this AM 81 - last A1C on 10/7/19 for 7.3 - LOV w/ PCP and dinner (Patient taking differently: Take 1,000 mg by mouth.  Take 1 tablet with breakfast and HS ), Disp: 180 tablet, Rfl: 3  CHOLECALCIFEROL 5000 units Oral Tab, Take 1 tablet daily, Disp: , Rfl:   Fenofibrate 54 MG Oral Tab, Take 1 tablet (54 mg total lesions or rashes  RESPIRATORY: denies shortness of breath with exertion  CARDIOVASCULAR: denies chest pain on exertion  GI: denies abdominal pain and denies heartburn  NEURO: denies headaches    EXAM:   There were no vitals taken for this visit.   Physical

## 2019-11-04 ENCOUNTER — OFFICE VISIT (OUTPATIENT)
Dept: PODIATRY CLINIC | Facility: CLINIC | Age: 49
End: 2019-11-04
Payer: COMMERCIAL

## 2019-11-04 DIAGNOSIS — M79.671 FOOT PAIN, RIGHT: ICD-10-CM

## 2019-11-04 DIAGNOSIS — S91.301D OPEN WOUND OF RIGHT FOOT, SUBSEQUENT ENCOUNTER: ICD-10-CM

## 2019-11-04 DIAGNOSIS — E11.65 TYPE 2 DIABETES, UNCONTROLLED, WITH NEUROPATHY (HCC): ICD-10-CM

## 2019-11-04 DIAGNOSIS — E11.40 TYPE 2 DIABETES, UNCONTROLLED, WITH NEUROPATHY (HCC): ICD-10-CM

## 2019-11-04 DIAGNOSIS — Z98.890 POST-OPERATIVE STATE: ICD-10-CM

## 2019-11-04 DIAGNOSIS — L97.521 ULCERATED, FOOT, LEFT, LIMITED TO BREAKDOWN OF SKIN (HCC): Primary | ICD-10-CM

## 2019-11-04 PROCEDURE — 99213 OFFICE O/P EST LOW 20 MIN: CPT | Performed by: PODIATRIST

## 2019-11-04 NOTE — PROGRESS NOTES
Jolie Hahn is a 51year old male. Patient presents with:  Post-Op: LOV 10/19/19. sx 10/12/19, right foot. pt to office wearing wound vac, using crutches, wearing post op shoe, non weightbearing.  pt denies s/s infection, fever, calf pain, purulent herbert PEN NEEDLE RAFA U/F) 32G X 4 MM Does not apply Misc, Inject twice daily, Disp: 200 each, Rfl: 3  buPROPion HCl ER, XL, 300 MG Oral Tablet 24 Hr, Take 1 tablet (300 mg total) by mouth daily. , Disp: 90 tablet, Rfl: 3  metFORMIN HCl 1000 MG Oral Tab, Take 1 t Types: 1 Standard drinks or equivalent per week      Drug use: No    Other Topics      Concerns:        Seat Belt: Yes          REVIEW OF SYSTEMS:   Review of Systems  Today reviewed systens as documented below  GENERAL HEALTH: feels well otherwise  SKIN:

## 2019-11-18 ENCOUNTER — TELEPHONE (OUTPATIENT)
Dept: PODIATRY CLINIC | Facility: CLINIC | Age: 49
End: 2019-11-18

## 2019-11-18 NOTE — TELEPHONE ENCOUNTER
S/w  RN, Chloe Charles, and she states the wound vac is on and wound is closing up nicely, but there is one area of the wound that is 0.4cm x 0.1cm x 0.9cm and the depth didn't change over the weekend.  She wants to know if she can add collagen base to the wou

## 2019-11-25 ENCOUNTER — OFFICE VISIT (OUTPATIENT)
Dept: PODIATRY CLINIC | Facility: CLINIC | Age: 49
End: 2019-11-25
Payer: COMMERCIAL

## 2019-11-25 DIAGNOSIS — Z98.890 POST-OPERATIVE STATE: Primary | ICD-10-CM

## 2019-11-25 DIAGNOSIS — E11.65 TYPE 2 DIABETES, UNCONTROLLED, WITH NEUROPATHY (HCC): ICD-10-CM

## 2019-11-25 DIAGNOSIS — S91.301D OPEN WOUND OF RIGHT FOOT, SUBSEQUENT ENCOUNTER: ICD-10-CM

## 2019-11-25 DIAGNOSIS — E11.40 TYPE 2 DIABETES, UNCONTROLLED, WITH NEUROPATHY (HCC): ICD-10-CM

## 2019-11-25 DIAGNOSIS — M79.671 FOOT PAIN, RIGHT: ICD-10-CM

## 2019-11-25 DIAGNOSIS — L97.521 ULCERATED, FOOT, LEFT, LIMITED TO BREAKDOWN OF SKIN (HCC): ICD-10-CM

## 2019-11-25 PROCEDURE — 99213 OFFICE O/P EST LOW 20 MIN: CPT | Performed by: PODIATRIST

## 2019-11-27 ENCOUNTER — TELEPHONE (OUTPATIENT)
Dept: PODIATRY CLINIC | Facility: CLINIC | Age: 49
End: 2019-11-27

## 2019-11-27 NOTE — TELEPHONE ENCOUNTER
[facility] from [facility] called. Pt has no further approval from the insurance for home care. Pt does have a wound vac. Will need a alternate wound care order.

## 2019-12-01 NOTE — PROGRESS NOTES
Tanisha Bhatia is a 51year old male. Patient presents with:  Post-Op: s/p right foot -- Sx on 10/12/19. No vac on pt. Traveling nurse will replace vac at home tomorrow. Rates pain 0/10. Denies fever or calf pain.  BG was 82 this AM.         HPI:   Fernando Dispense 1 Rose Nixon 1 Device 0   • Insulin Pen Needle (BD PEN NEEDLE RAFA U/F) 32G X 4 MM Does not apply Misc Inject twice daily 200 each 3   • buPROPion HCl ER, XL, 300 MG Oral Tablet 24 Hr Take 1 tablet (300 mg total) by mouth daily.  90 tablet 3   • Types: 1 Standard drinks or equivalent per week      Drug use: No    Other Topics      Concerns:        Seat Belt: Yes          REVIEW OF SYSTEMS:   Review of Systems  Today reviewed systens as documented below  GENERAL HEALTH: feels well otherwise  SKIN:

## 2019-12-09 ENCOUNTER — APPOINTMENT (OUTPATIENT)
Dept: LAB | Facility: HOSPITAL | Age: 49
End: 2019-12-09
Attending: ORTHOPAEDIC SURGERY
Payer: COMMERCIAL

## 2019-12-09 ENCOUNTER — ORDER TRANSCRIPTION (OUTPATIENT)
Dept: WOUND CARE | Facility: HOSPITAL | Age: 49
End: 2019-12-09

## 2019-12-09 DIAGNOSIS — Z01.818 PREOPERATIVE TESTING: ICD-10-CM

## 2019-12-09 DIAGNOSIS — M21.6X1 ACQUIRED EXTERNAL ROTATION OF FOOT, RIGHT: Primary | ICD-10-CM

## 2019-12-09 DIAGNOSIS — E11.65 UNCONTROLLED TYPE 2 DIABETES MELLITUS WITH HYPERGLYCEMIA, WITH LONG-TERM CURRENT USE OF INSULIN (HCC): ICD-10-CM

## 2019-12-09 DIAGNOSIS — E11.621 DIABETIC FOOT ULCER WITH OSTEOMYELITIS (HCC): ICD-10-CM

## 2019-12-09 DIAGNOSIS — E11.69 DIABETIC FOOT ULCER WITH OSTEOMYELITIS (HCC): ICD-10-CM

## 2019-12-09 DIAGNOSIS — Z79.4 UNCONTROLLED TYPE 2 DIABETES MELLITUS WITH HYPERGLYCEMIA, WITH LONG-TERM CURRENT USE OF INSULIN (HCC): ICD-10-CM

## 2019-12-09 DIAGNOSIS — M86.9 DIABETIC FOOT ULCER WITH OSTEOMYELITIS (HCC): ICD-10-CM

## 2019-12-09 DIAGNOSIS — L97.509 DIABETIC FOOT ULCER WITH OSTEOMYELITIS (HCC): ICD-10-CM

## 2019-12-09 DIAGNOSIS — L97.511 RIGHT FOOT ULCER, LIMITED TO BREAKDOWN OF SKIN (HCC): ICD-10-CM

## 2019-12-09 PROCEDURE — 87641 MR-STAPH DNA AMP PROBE: CPT

## 2019-12-09 PROCEDURE — 93010 ELECTROCARDIOGRAM REPORT: CPT | Performed by: ORTHOPAEDIC SURGERY

## 2019-12-09 PROCEDURE — 36415 COLL VENOUS BLD VENIPUNCTURE: CPT

## 2019-12-09 PROCEDURE — 80048 BASIC METABOLIC PNL TOTAL CA: CPT

## 2019-12-09 PROCEDURE — 93005 ELECTROCARDIOGRAM TRACING: CPT

## 2019-12-13 ENCOUNTER — ANESTHESIA EVENT (OUTPATIENT)
Dept: SURGERY | Facility: HOSPITAL | Age: 49
DRG: 502 | End: 2019-12-13
Payer: COMMERCIAL

## 2019-12-13 ENCOUNTER — APPOINTMENT (OUTPATIENT)
Dept: GENERAL RADIOLOGY | Facility: HOSPITAL | Age: 49
DRG: 502 | End: 2019-12-13
Attending: ORTHOPAEDIC SURGERY
Payer: COMMERCIAL

## 2019-12-13 ENCOUNTER — ANESTHESIA (OUTPATIENT)
Dept: SURGERY | Facility: HOSPITAL | Age: 49
DRG: 502 | End: 2019-12-13
Payer: COMMERCIAL

## 2019-12-13 ENCOUNTER — HOSPITAL ENCOUNTER (INPATIENT)
Facility: HOSPITAL | Age: 49
LOS: 3 days | Discharge: HOME HEALTH | DRG: 502 | End: 2019-12-16
Attending: ORTHOPAEDIC SURGERY | Admitting: ORTHOPAEDIC SURGERY
Payer: COMMERCIAL

## 2019-12-13 DIAGNOSIS — E11.649 UNCONTROLLED TYPE 2 DIABETES MELLITUS WITH HYPOGLYCEMIA WITHOUT COMA (HCC): ICD-10-CM

## 2019-12-13 DIAGNOSIS — E11.621 DIABETIC ULCER OF RIGHT GREAT TOE (HCC): ICD-10-CM

## 2019-12-13 DIAGNOSIS — M86.071 ACUTE HEMATOGENOUS OSTEOMYELITIS, RIGHT ANKLE AND FOOT (HCC): ICD-10-CM

## 2019-12-13 DIAGNOSIS — L97.519 DIABETIC ULCER OF RIGHT GREAT TOE (HCC): ICD-10-CM

## 2019-12-13 DIAGNOSIS — M79.671 FOOT PAIN, RIGHT: ICD-10-CM

## 2019-12-13 DIAGNOSIS — Z01.818 PREOPERATIVE TESTING: Primary | ICD-10-CM

## 2019-12-13 DIAGNOSIS — M21.6X1 ACQUIRED EXTERNAL ROTATION OF FOOT, RIGHT: ICD-10-CM

## 2019-12-13 DIAGNOSIS — M79.671 RIGHT FOOT PAIN: ICD-10-CM

## 2019-12-13 PROCEDURE — 0J8Q0ZZ DIVISION OF RIGHT FOOT SUBCUTANEOUS TISSUE AND FASCIA, OPEN APPROACH: ICD-10-PCS | Performed by: ORTHOPAEDIC SURGERY

## 2019-12-13 PROCEDURE — 82962 GLUCOSE BLOOD TEST: CPT

## 2019-12-13 PROCEDURE — 0HDMXZZ EXTRACTION OF RIGHT FOOT SKIN, EXTERNAL APPROACH: ICD-10-PCS | Performed by: ORTHOPAEDIC SURGERY

## 2019-12-13 PROCEDURE — 64445 NJX AA&/STRD SCIATIC NRV IMG: CPT | Performed by: ORTHOPAEDIC SURGERY

## 2019-12-13 PROCEDURE — 0LXV0ZZ TRANSFER RIGHT FOOT TENDON, OPEN APPROACH: ICD-10-PCS | Performed by: ORTHOPAEDIC SURGERY

## 2019-12-13 PROCEDURE — 99212 OFFICE O/P EST SF 10 MIN: CPT

## 2019-12-13 PROCEDURE — 76942 ECHO GUIDE FOR BIOPSY: CPT | Performed by: ORTHOPAEDIC SURGERY

## 2019-12-13 PROCEDURE — 64447 NJX AA&/STRD FEMORAL NRV IMG: CPT | Performed by: ORTHOPAEDIC SURGERY

## 2019-12-13 PROCEDURE — 0L8N0ZZ DIVISION OF RIGHT LOWER LEG TENDON, OPEN APPROACH: ICD-10-PCS | Performed by: ORTHOPAEDIC SURGERY

## 2019-12-13 PROCEDURE — 99152 MOD SED SAME PHYS/QHP 5/>YRS: CPT | Performed by: ORTHOPAEDIC SURGERY

## 2019-12-13 PROCEDURE — 0SNP0ZZ RELEASE RIGHT TOE PHALANGEAL JOINT, OPEN APPROACH: ICD-10-PCS | Performed by: ORTHOPAEDIC SURGERY

## 2019-12-13 PROCEDURE — 76000 FLUOROSCOPY <1 HR PHYS/QHP: CPT | Performed by: ORTHOPAEDIC SURGERY

## 2019-12-13 PROCEDURE — 3E0T3BZ INTRODUCTION OF ANESTHETIC AGENT INTO PERIPHERAL NERVES AND PLEXI, PERCUTANEOUS APPROACH: ICD-10-PCS | Performed by: ANESTHESIOLOGY

## 2019-12-13 PROCEDURE — 0SGH07Z FUSION OF RIGHT TARSAL JOINT WITH AUTOLOGOUS TISSUE SUBSTITUTE, OPEN APPROACH: ICD-10-PCS | Performed by: ORTHOPAEDIC SURGERY

## 2019-12-13 DEVICE — SCREW BN 4MM 6MM 50MM LCP SS: Type: IMPLANTABLE DEVICE | Status: FUNCTIONAL

## 2019-12-13 DEVICE — PROTEIOS XL 10CC: Type: IMPLANTABLE DEVICE | Status: FUNCTIONAL

## 2019-12-13 DEVICE — IMPLANTABLE DEVICE: Type: IMPLANTABLE DEVICE | Status: FUNCTIONAL

## 2019-12-13 DEVICE — ONE (1) PACKAGE CONTAINING 2.5CC
Type: IMPLANTABLE DEVICE | Status: FUNCTIONAL
Brand: OSTEOSELECT PLUS DBM PUTTY 2.5CC

## 2019-12-13 RX ORDER — GLYCOPYRROLATE 0.2 MG/ML
INJECTION INTRAMUSCULAR; INTRAVENOUS AS NEEDED
Status: DISCONTINUED | OUTPATIENT
Start: 2019-12-13 | End: 2019-12-13 | Stop reason: SURG

## 2019-12-13 RX ORDER — HYDROCODONE BITARTRATE AND ACETAMINOPHEN 10; 325 MG/1; MG/1
2 TABLET ORAL EVERY 4 HOURS PRN
Status: DISCONTINUED | OUTPATIENT
Start: 2019-12-13 | End: 2019-12-16

## 2019-12-13 RX ORDER — ROCURONIUM BROMIDE 10 MG/ML
INJECTION, SOLUTION INTRAVENOUS AS NEEDED
Status: DISCONTINUED | OUTPATIENT
Start: 2019-12-13 | End: 2019-12-13 | Stop reason: SURG

## 2019-12-13 RX ORDER — DEXTROSE MONOHYDRATE 25 G/50ML
50 INJECTION, SOLUTION INTRAVENOUS
Status: DISCONTINUED | OUTPATIENT
Start: 2019-12-13 | End: 2019-12-13 | Stop reason: HOSPADM

## 2019-12-13 RX ORDER — MORPHINE SULFATE 4 MG/ML
4 INJECTION, SOLUTION INTRAMUSCULAR; INTRAVENOUS EVERY 10 MIN PRN
Status: DISCONTINUED | OUTPATIENT
Start: 2019-12-13 | End: 2019-12-13 | Stop reason: HOSPADM

## 2019-12-13 RX ORDER — CEFAZOLIN SODIUM/WATER 2 G/20 ML
2 SYRINGE (ML) INTRAVENOUS EVERY 8 HOURS
Status: COMPLETED | OUTPATIENT
Start: 2019-12-13 | End: 2019-12-14

## 2019-12-13 RX ORDER — ONDANSETRON 2 MG/ML
4 INJECTION INTRAMUSCULAR; INTRAVENOUS EVERY 6 HOURS PRN
Status: DISCONTINUED | OUTPATIENT
Start: 2019-12-13 | End: 2019-12-16

## 2019-12-13 RX ORDER — OXYCODONE HYDROCHLORIDE 5 MG/1
5 TABLET ORAL EVERY 4 HOURS PRN
Status: ACTIVE | OUTPATIENT
Start: 2019-12-13 | End: 2019-12-14

## 2019-12-13 RX ORDER — SODIUM CHLORIDE 0.9 % (FLUSH) 0.9 %
10 SYRINGE (ML) INJECTION AS NEEDED
Status: DISCONTINUED | OUTPATIENT
Start: 2019-12-13 | End: 2019-12-16

## 2019-12-13 RX ORDER — OXYCODONE HYDROCHLORIDE 5 MG/1
10 TABLET ORAL EVERY 4 HOURS PRN
Status: ACTIVE | OUTPATIENT
Start: 2019-12-13 | End: 2019-12-14

## 2019-12-13 RX ORDER — MIDAZOLAM HYDROCHLORIDE 1 MG/ML
INJECTION INTRAMUSCULAR; INTRAVENOUS
Status: COMPLETED | OUTPATIENT
Start: 2019-12-13 | End: 2019-12-13

## 2019-12-13 RX ORDER — SODIUM CHLORIDE, SODIUM LACTATE, POTASSIUM CHLORIDE, CALCIUM CHLORIDE 600; 310; 30; 20 MG/100ML; MG/100ML; MG/100ML; MG/100ML
INJECTION, SOLUTION INTRAVENOUS CONTINUOUS
Status: DISCONTINUED | OUTPATIENT
Start: 2019-12-13 | End: 2019-12-13

## 2019-12-13 RX ORDER — DOCUSATE SODIUM 100 MG/1
100 CAPSULE, LIQUID FILLED ORAL 2 TIMES DAILY
Status: DISCONTINUED | OUTPATIENT
Start: 2019-12-13 | End: 2019-12-16

## 2019-12-13 RX ORDER — CYCLOBENZAPRINE HCL 10 MG
10 TABLET ORAL 3 TIMES DAILY PRN
Status: DISCONTINUED | OUTPATIENT
Start: 2019-12-13 | End: 2019-12-16

## 2019-12-13 RX ORDER — MORPHINE SULFATE 15 MG/1
15 TABLET ORAL EVERY 4 HOURS PRN
Status: ACTIVE | OUTPATIENT
Start: 2019-12-13 | End: 2019-12-14

## 2019-12-13 RX ORDER — SODIUM PHOSPHATE, DIBASIC AND SODIUM PHOSPHATE, MONOBASIC 7; 19 G/133ML; G/133ML
1 ENEMA RECTAL ONCE AS NEEDED
Status: DISCONTINUED | OUTPATIENT
Start: 2019-12-13 | End: 2019-12-16

## 2019-12-13 RX ORDER — DEXTROSE MONOHYDRATE 25 G/50ML
50 INJECTION, SOLUTION INTRAVENOUS AS NEEDED
Status: DISCONTINUED | OUTPATIENT
Start: 2019-12-13 | End: 2019-12-16

## 2019-12-13 RX ORDER — MORPHINE SULFATE 2 MG/ML
2 INJECTION, SOLUTION INTRAMUSCULAR; INTRAVENOUS EVERY 2 HOUR PRN
Status: ACTIVE | OUTPATIENT
Start: 2019-12-13 | End: 2019-12-14

## 2019-12-13 RX ORDER — NALOXONE HYDROCHLORIDE 0.4 MG/ML
80 INJECTION, SOLUTION INTRAMUSCULAR; INTRAVENOUS; SUBCUTANEOUS AS NEEDED
Status: DISCONTINUED | OUTPATIENT
Start: 2019-12-13 | End: 2019-12-13 | Stop reason: HOSPADM

## 2019-12-13 RX ORDER — MORPHINE SULFATE 4 MG/ML
2 INJECTION, SOLUTION INTRAMUSCULAR; INTRAVENOUS EVERY 10 MIN PRN
Status: DISCONTINUED | OUTPATIENT
Start: 2019-12-13 | End: 2019-12-13 | Stop reason: HOSPADM

## 2019-12-13 RX ORDER — LIDOCAINE HYDROCHLORIDE 10 MG/ML
INJECTION, SOLUTION EPIDURAL; INFILTRATION; INTRACAUDAL; PERINEURAL AS NEEDED
Status: DISCONTINUED | OUTPATIENT
Start: 2019-12-13 | End: 2019-12-13 | Stop reason: SURG

## 2019-12-13 RX ORDER — GABAPENTIN 300 MG/1
300 CAPSULE ORAL 2 TIMES DAILY
Status: DISCONTINUED | OUTPATIENT
Start: 2019-12-13 | End: 2019-12-16

## 2019-12-13 RX ORDER — NEOSTIGMINE METHYLSULFATE 0.5 MG/ML
INJECTION INTRAVENOUS AS NEEDED
Status: DISCONTINUED | OUTPATIENT
Start: 2019-12-13 | End: 2019-12-13 | Stop reason: SURG

## 2019-12-13 RX ORDER — ACETAMINOPHEN 325 MG/1
650 TABLET ORAL EVERY 4 HOURS PRN
Status: DISCONTINUED | OUTPATIENT
Start: 2019-12-13 | End: 2019-12-16

## 2019-12-13 RX ORDER — HYDROCODONE BITARTRATE AND ACETAMINOPHEN 5; 325 MG/1; MG/1
2 TABLET ORAL AS NEEDED
Status: DISCONTINUED | OUTPATIENT
Start: 2019-12-13 | End: 2019-12-13 | Stop reason: HOSPADM

## 2019-12-13 RX ORDER — MORPHINE SULFATE 2 MG/ML
2 INJECTION, SOLUTION INTRAMUSCULAR; INTRAVENOUS EVERY 2 HOUR PRN
Status: DISCONTINUED | OUTPATIENT
Start: 2019-12-13 | End: 2019-12-16

## 2019-12-13 RX ORDER — DEXAMETHASONE SODIUM PHOSPHATE 10 MG/ML
INJECTION, SOLUTION INTRAMUSCULAR; INTRAVENOUS
Status: COMPLETED | OUTPATIENT
Start: 2019-12-13 | End: 2019-12-13

## 2019-12-13 RX ORDER — ROPIVACAINE HYDROCHLORIDE 5 MG/ML
INJECTION, SOLUTION EPIDURAL; INFILTRATION; PERINEURAL
Status: COMPLETED | OUTPATIENT
Start: 2019-12-13 | End: 2019-12-13

## 2019-12-13 RX ORDER — MIDAZOLAM HYDROCHLORIDE 1 MG/ML
INJECTION INTRAMUSCULAR; INTRAVENOUS AS NEEDED
Status: DISCONTINUED | OUTPATIENT
Start: 2019-12-13 | End: 2019-12-13 | Stop reason: SURG

## 2019-12-13 RX ORDER — INSULIN ASPART 100 [IU]/ML
INJECTION, SOLUTION INTRAVENOUS; SUBCUTANEOUS ONCE
Status: DISCONTINUED | OUTPATIENT
Start: 2019-12-13 | End: 2019-12-13 | Stop reason: HOSPADM

## 2019-12-13 RX ORDER — TEMAZEPAM 15 MG/1
15 CAPSULE ORAL NIGHTLY PRN
Status: DISCONTINUED | OUTPATIENT
Start: 2019-12-13 | End: 2019-12-16

## 2019-12-13 RX ORDER — MORPHINE SULFATE 10 MG/ML
6 INJECTION, SOLUTION INTRAMUSCULAR; INTRAVENOUS EVERY 10 MIN PRN
Status: DISCONTINUED | OUTPATIENT
Start: 2019-12-13 | End: 2019-12-13 | Stop reason: HOSPADM

## 2019-12-13 RX ORDER — MULTIVIT-MIN/IRON FUM/FOLIC AC 7.5 MG-4
1 TABLET ORAL DAILY
COMMUNITY

## 2019-12-13 RX ORDER — MORPHINE SULFATE 4 MG/ML
4 INJECTION, SOLUTION INTRAMUSCULAR; INTRAVENOUS EVERY 2 HOUR PRN
Status: ACTIVE | OUTPATIENT
Start: 2019-12-13 | End: 2019-12-14

## 2019-12-13 RX ORDER — CEFAZOLIN SODIUM/WATER 2 G/20 ML
2 SYRINGE (ML) INTRAVENOUS ONCE
Status: DISCONTINUED | OUTPATIENT
Start: 2019-12-13 | End: 2019-12-13 | Stop reason: HOSPADM

## 2019-12-13 RX ORDER — ACETAMINOPHEN 500 MG
1000 TABLET ORAL ONCE
Status: COMPLETED | OUTPATIENT
Start: 2019-12-13 | End: 2019-12-13

## 2019-12-13 RX ORDER — BUPROPION HYDROCHLORIDE 300 MG/1
300 TABLET ORAL DAILY
Status: DISCONTINUED | OUTPATIENT
Start: 2019-12-14 | End: 2019-12-16

## 2019-12-13 RX ORDER — SODIUM CHLORIDE, SODIUM LACTATE, POTASSIUM CHLORIDE, CALCIUM CHLORIDE 600; 310; 30; 20 MG/100ML; MG/100ML; MG/100ML; MG/100ML
INJECTION, SOLUTION INTRAVENOUS CONTINUOUS
Status: DISCONTINUED | OUTPATIENT
Start: 2019-12-13 | End: 2019-12-13 | Stop reason: HOSPADM

## 2019-12-13 RX ORDER — DEXAMETHASONE SODIUM PHOSPHATE 4 MG/ML
VIAL (ML) INJECTION AS NEEDED
Status: DISCONTINUED | OUTPATIENT
Start: 2019-12-13 | End: 2019-12-13 | Stop reason: SURG

## 2019-12-13 RX ORDER — ROSUVASTATIN CALCIUM 10 MG/1
10 TABLET, COATED ORAL NIGHTLY
Status: DISCONTINUED | OUTPATIENT
Start: 2019-12-13 | End: 2019-12-16

## 2019-12-13 RX ORDER — SODIUM CHLORIDE, SODIUM LACTATE, POTASSIUM CHLORIDE, CALCIUM CHLORIDE 600; 310; 30; 20 MG/100ML; MG/100ML; MG/100ML; MG/100ML
INJECTION, SOLUTION INTRAVENOUS CONTINUOUS
Status: DISCONTINUED | OUTPATIENT
Start: 2019-12-13 | End: 2019-12-14

## 2019-12-13 RX ORDER — MORPHINE SULFATE 2 MG/ML
1 INJECTION, SOLUTION INTRAMUSCULAR; INTRAVENOUS EVERY 2 HOUR PRN
Status: DISCONTINUED | OUTPATIENT
Start: 2019-12-13 | End: 2019-12-16

## 2019-12-13 RX ORDER — HYDROCODONE BITARTRATE AND ACETAMINOPHEN 10; 325 MG/1; MG/1
1 TABLET ORAL EVERY 4 HOURS PRN
Status: DISCONTINUED | OUTPATIENT
Start: 2019-12-13 | End: 2019-12-16

## 2019-12-13 RX ORDER — BISACODYL 10 MG
10 SUPPOSITORY, RECTAL RECTAL
Status: DISCONTINUED | OUTPATIENT
Start: 2019-12-13 | End: 2019-12-16

## 2019-12-13 RX ORDER — HYDROCODONE BITARTRATE AND ACETAMINOPHEN 5; 325 MG/1; MG/1
1 TABLET ORAL AS NEEDED
Status: DISCONTINUED | OUTPATIENT
Start: 2019-12-13 | End: 2019-12-13 | Stop reason: HOSPADM

## 2019-12-13 RX ORDER — HYDROMORPHONE HYDROCHLORIDE 1 MG/ML
0.4 INJECTION, SOLUTION INTRAMUSCULAR; INTRAVENOUS; SUBCUTANEOUS EVERY 5 MIN PRN
Status: DISCONTINUED | OUTPATIENT
Start: 2019-12-13 | End: 2019-12-13 | Stop reason: HOSPADM

## 2019-12-13 RX ORDER — MORPHINE SULFATE 4 MG/ML
4 INJECTION, SOLUTION INTRAMUSCULAR; INTRAVENOUS EVERY 2 HOUR PRN
Status: DISCONTINUED | OUTPATIENT
Start: 2019-12-13 | End: 2019-12-16

## 2019-12-13 RX ORDER — MORPHINE SULFATE 4 MG/ML
6 INJECTION, SOLUTION INTRAMUSCULAR; INTRAVENOUS EVERY 2 HOUR PRN
Status: ACTIVE | OUTPATIENT
Start: 2019-12-13 | End: 2019-12-14

## 2019-12-13 RX ORDER — ONDANSETRON 2 MG/ML
INJECTION INTRAMUSCULAR; INTRAVENOUS AS NEEDED
Status: DISCONTINUED | OUTPATIENT
Start: 2019-12-13 | End: 2019-12-13

## 2019-12-13 RX ORDER — ASPIRIN 325 MG
325 TABLET ORAL 2 TIMES DAILY
Status: DISCONTINUED | OUTPATIENT
Start: 2019-12-14 | End: 2019-12-16

## 2019-12-13 RX ORDER — POLYETHYLENE GLYCOL 3350 17 G/17G
17 POWDER, FOR SOLUTION ORAL DAILY PRN
Status: DISCONTINUED | OUTPATIENT
Start: 2019-12-13 | End: 2019-12-16

## 2019-12-13 RX ORDER — SODIUM CHLORIDE 9 MG/ML
INJECTION, SOLUTION INTRAVENOUS CONTINUOUS
Status: DISCONTINUED | OUTPATIENT
Start: 2019-12-13 | End: 2019-12-14

## 2019-12-13 RX ORDER — ONDANSETRON 2 MG/ML
4 INJECTION INTRAMUSCULAR; INTRAVENOUS ONCE AS NEEDED
Status: DISCONTINUED | OUTPATIENT
Start: 2019-12-13 | End: 2019-12-13 | Stop reason: HOSPADM

## 2019-12-13 RX ORDER — FAMOTIDINE 20 MG/1
20 TABLET ORAL ONCE
Status: DISCONTINUED | OUTPATIENT
Start: 2019-12-13 | End: 2019-12-13 | Stop reason: HOSPADM

## 2019-12-13 RX ORDER — HYDROMORPHONE HYDROCHLORIDE 1 MG/ML
0.6 INJECTION, SOLUTION INTRAMUSCULAR; INTRAVENOUS; SUBCUTANEOUS EVERY 5 MIN PRN
Status: DISCONTINUED | OUTPATIENT
Start: 2019-12-13 | End: 2019-12-13 | Stop reason: HOSPADM

## 2019-12-13 RX ORDER — PHENYLEPHRINE HCL 10 MG/ML
VIAL (ML) INJECTION AS NEEDED
Status: DISCONTINUED | OUTPATIENT
Start: 2019-12-13 | End: 2019-12-13 | Stop reason: SURG

## 2019-12-13 RX ORDER — ACETAMINOPHEN 325 MG/1
650 TABLET ORAL EVERY 6 HOURS PRN
Status: DISCONTINUED | OUTPATIENT
Start: 2019-12-13 | End: 2019-12-16

## 2019-12-13 RX ORDER — ACETAMINOPHEN 500 MG
1000 TABLET ORAL EVERY 8 HOURS
Status: DISPENSED | OUTPATIENT
Start: 2019-12-13 | End: 2019-12-14

## 2019-12-13 RX ORDER — CEFAZOLIN SODIUM/WATER 2 G/20 ML
SYRINGE (ML) INTRAVENOUS AS NEEDED
Status: DISCONTINUED | OUTPATIENT
Start: 2019-12-13 | End: 2019-12-13 | Stop reason: SURG

## 2019-12-13 RX ORDER — HYDROMORPHONE HYDROCHLORIDE 1 MG/ML
0.2 INJECTION, SOLUTION INTRAMUSCULAR; INTRAVENOUS; SUBCUTANEOUS EVERY 5 MIN PRN
Status: DISCONTINUED | OUTPATIENT
Start: 2019-12-13 | End: 2019-12-13 | Stop reason: HOSPADM

## 2019-12-13 RX ADMIN — ROPIVACAINE HYDROCHLORIDE 20 ML: 5 INJECTION, SOLUTION EPIDURAL; INFILTRATION; PERINEURAL at 07:13:00

## 2019-12-13 RX ADMIN — PHENYLEPHRINE HCL 100 MCG: 10 MG/ML VIAL (ML) INJECTION at 08:40:00

## 2019-12-13 RX ADMIN — ROPIVACAINE HYDROCHLORIDE 20 ML: 5 INJECTION, SOLUTION EPIDURAL; INFILTRATION; PERINEURAL at 07:20:00

## 2019-12-13 RX ADMIN — DEXAMETHASONE SODIUM PHOSPHATE 4 MG: 4 MG/ML VIAL (ML) INJECTION at 08:00:00

## 2019-12-13 RX ADMIN — SODIUM CHLORIDE, SODIUM LACTATE, POTASSIUM CHLORIDE, CALCIUM CHLORIDE: 600; 310; 30; 20 INJECTION, SOLUTION INTRAVENOUS at 09:01:00

## 2019-12-13 RX ADMIN — ROCURONIUM BROMIDE 50 MG: 10 INJECTION, SOLUTION INTRAVENOUS at 07:50:00

## 2019-12-13 RX ADMIN — MIDAZOLAM HYDROCHLORIDE 2 MG: 1 INJECTION INTRAMUSCULAR; INTRAVENOUS at 07:13:00

## 2019-12-13 RX ADMIN — SODIUM CHLORIDE, SODIUM LACTATE, POTASSIUM CHLORIDE, CALCIUM CHLORIDE: 600; 310; 30; 20 INJECTION, SOLUTION INTRAVENOUS at 11:35:00

## 2019-12-13 RX ADMIN — PHENYLEPHRINE HCL 100 MCG: 10 MG/ML VIAL (ML) INJECTION at 08:49:00

## 2019-12-13 RX ADMIN — PHENYLEPHRINE HCL 100 MCG: 10 MG/ML VIAL (ML) INJECTION at 08:26:00

## 2019-12-13 RX ADMIN — PHENYLEPHRINE HCL 100 MCG: 10 MG/ML VIAL (ML) INJECTION at 10:56:00

## 2019-12-13 RX ADMIN — MIDAZOLAM HYDROCHLORIDE 2 MG: 1 INJECTION INTRAMUSCULAR; INTRAVENOUS at 07:42:00

## 2019-12-13 RX ADMIN — CEFAZOLIN SODIUM/WATER 2 G: 2 G/20 ML SYRINGE (ML) INTRAVENOUS at 07:53:00

## 2019-12-13 RX ADMIN — DEXAMETHASONE SODIUM PHOSPHATE 4 MG: 10 INJECTION, SOLUTION INTRAMUSCULAR; INTRAVENOUS at 07:20:00

## 2019-12-13 RX ADMIN — GLYCOPYRROLATE 0.6 MG: 0.2 INJECTION INTRAMUSCULAR; INTRAVENOUS at 11:06:00

## 2019-12-13 RX ADMIN — LIDOCAINE HYDROCHLORIDE 50 MG: 10 INJECTION, SOLUTION EPIDURAL; INFILTRATION; INTRACAUDAL; PERINEURAL at 07:50:00

## 2019-12-13 RX ADMIN — DEXAMETHASONE SODIUM PHOSPHATE 4 MG: 10 INJECTION, SOLUTION INTRAMUSCULAR; INTRAVENOUS at 07:13:00

## 2019-12-13 RX ADMIN — NEOSTIGMINE METHYLSULFATE 3 MG: 0.5 INJECTION INTRAVENOUS at 11:06:00

## 2019-12-13 RX ADMIN — PHENYLEPHRINE HCL 100 MCG: 10 MG/ML VIAL (ML) INJECTION at 09:01:00

## 2019-12-13 NOTE — ANESTHESIA PROCEDURE NOTES
Peripheral Block  Date/Time: 12/13/2019 7:13 AM  Performed by: Milton Fan MD  Authorized by: Milton Fan MD       General Information and Staff    Start Time:  12/13/2019 7:13 AM  End Time:  12/13/2019 7:17 AM  Anesthesiologist:  Fred Howell (VERSED) 2 MG/2ML injection, 2 mg  dexamethasone PF (DECADRON) 10 MG/ML injection, 4 mg    Additional Comments

## 2019-12-13 NOTE — CM/SW NOTE
Addendum, 12/15/2019  08:33AM CHEKO recieved  [facility] orders for the pt. SW notify [facility] of orders. PLAN:DC home with [facility] once medically stable     Addendum, 12/14/2019  09:33AM CHEKO received [facility] orders for the pt.     PLAN: DC with [facility] once medically stable

## 2019-12-13 NOTE — OPERATIVE REPORT
[facility]    PATIENT'S NAME: Marino Brown   ATTENDING PHYSICIAN: James James MD   OPERATING PHYSICIAN: James James MD   PATIENT ACCOUNT#:   [id number]    LOCATION:  [address] [place] [address] #:   [id number]       DATE OF BIRTH: place his foot down and, because of this, we opted for surgical reconstruction.   Risks of surgery were explained including, but not limited to, deep infection, nonunion, malunion, failure to relieve his symptoms, potential for amputation, DVT, and pulmonar medial utility incision just posterior to the medial malleolus to the midfoot, dissected down through subcutaneous tissues, isolated the posterior tibial tendon. We performed a Z-lengthening with this.   We then made an arthrotomy in the talonavicular join for later transfer to the peroneus tertius of which we were able to find a remnant of.   We then turned our attention, made a curvilinear incision from the dorsal aspect of the second MTP joint and curved this distally to the PIP joint, dissected down throu the joint, and then we made a proximal transverse incision over the flexor crease, isolated the FDL tendon. We performed an FDL tenotomy and this corrected the toe position and then we brought this into the proximal incision.   We released the FDB tendon, was taken back to recovery room in stable condition. No complications. Please note, I was present for the duration of the procedure.   A physician assistant was necessary for aiding in patient positioning, retracting and protecting tendons and neurovasc

## 2019-12-13 NOTE — ANESTHESIA PROCEDURE NOTES
Peripheral Block  Date/Time: 12/13/2019 7:20 AM  Performed by: Edson Miner MD  Authorized by: Edson Miner MD       General Information and Staff    Start Time:  12/13/2019 7:20 AM  End Time:  12/13/2019 7:23 AM  Anesthesiologist:  Sia Quintero

## 2019-12-13 NOTE — ANESTHESIA PREPROCEDURE EVALUATION
Anesthesia PreOp Note    HPI:     Jaylan Saenz is a 51year old male who presents for preoperative consultation requested by: Fanny Granados MD    Date of Surgery: 12/13/2019    Procedure(s):  FOOT JOINT FUSION  Indication: Right foot pain [X26.959 Date Noted: 02/01/2017      Hyperlipidemia LDL goal <100         Date Noted: 08/18/2008        Past Medical History:   Diagnosis Date   • Depression    • Diabetes ([facility] [address]    • Diabetic neuropathy (HCC)    • High cholesterol    • Hyperlipidemia LDL goal <100 total) by mouth daily. , Disp: 90 tablet, Rfl: 3, 12/13/2019 at 0500  metFORMIN HCl 1000 MG Oral Tab, Take 1 tablet with breakfast and dinner (Patient taking differently: Take by mouth 2 (two) times daily with meals.  ), Disp: 180 tablet, Rfl: 3, 12/12/2019 Known Allergies    Family History   Problem Relation Age of Onset   • Diabetes Father    • Lipids Father    • Diabetes Mother         Type 2 DM and had GDM before   • Lipids Mother    • Cancer Mother         breast   • Cancer Sister         skin   • Other Occupational Exposure: Not Asked        Hobby Hazards: Not Asked        Sleep Concern: Not Asked        Stress Concern: Not Asked        Weight Concern: Not Asked        Special Diet: Not Asked        Back Care: Not Asked        Exercise: Not Asked normal exam   Cardiovascular - normal exam  (+) hypertension,     Neuro/Psych - negative ROS     GI/Hepatic/Renal      Comments: Denies EtOH use for the last few months     Endo/Other    (+) diabetes mellitus (Blood sugar 135 this morning.  His bilateral LE

## 2019-12-13 NOTE — ANESTHESIA POSTPROCEDURE EVALUATION
Patient: Leilani Lopez    Procedure Summary     Date:  12/13/19 Room / Location:  [facility] OR  / [facility] OR    Anesthesia Start:  5050 Anesthesia Stop:  1219    Procedure:  FOOT JOINT FUSION (Right ) Diagnosis:       Right foot pain      Acute hemato

## 2019-12-13 NOTE — WOUND PROGRESS NOTE
WOUND CARE NOTE    History:  Past Medical History:   Diagnosis Date   • Depression    • Diabetes ([facility] [address]    • [facility]    • High cholesterol    • Hyperlipidemia LDL goal <100    • Hypertension    • Hypertriglyceridemia    • Neuropathy post mold over the vac dressing. The vac disc appeared to be the source of the leak. Additional drape was applied and a patent seal at 125 mmHg was detected. Pt reports that he will return home, possibly Monday. Paperwork for home vac faxed to [facility].  Next wo

## 2019-12-13 NOTE — ANESTHESIA PROCEDURE NOTES
Airway  Urgency: Elective      General Information and Staff    Patient location during procedure: OR  Anesthesiologist: Lyndsey Rangel MD  Resident/CRNA: Bobby Lombardi CRNA  Performed: CRNA     Indications and Patient Condition  Indications for airwa

## 2019-12-13 NOTE — H&P
History & Physical Examination    Patient Name: Leia Nyhan  MRN: [id number]  [facility]: [id number]  YOB: 1970    Diagnosis: right cavovarus foot    Present Illness: 50 y/o male with right cavovarus foot, lateral foot ulcer, DM    Scopolamine apply Misc, 1 each by Does not apply route every 14 (fourteen) days. Dispense 1 sensor every 14 days, Disp: 6 each, Rfl: 3, Taking  FREESTYLE JOSE 14 DAY READER Does not apply Device, 1 each by Does not apply route every 14 (fourteen) days.  Dispense 1 Allegra Check if Review is Normal Check if Physical Exam is Normal If not normal, please explain:   EXTREMITIES [ ] [ ] Right cavovarus foot   Heart [X] [ ]    Lungs [X] [ ]    Other [ ] [ ]      [ x ] I have discussed the risks and benefits and alternatives with

## 2019-12-13 NOTE — CONSULTS
[address]    History & Physical (or consult)    Fay Gomez Patient Status:  Surgery Admit - Inpt    1970 MRN [id number]   Location [facility] UNIT Attending Aly Syed Mother         breast   • Cancer Sister         skin   • Other (autistic) Son       Social History:  Social History    Tobacco Use      Smoking status: Former Smoker        Packs/day: 1.50        Years: 26.00        Pack years: 39        Types: Cigarettes RAFA U/F) 32G X 4 MM Does not apply Misc, Inject twice daily        Review of Systems:   Pertinent positives and negatives noted above in HPI, otherwise full 10 point ROS performed and negative     Physical Exam:   Vital Signs:  Blood pressure 117/71, puls CK in the last 168 hours. Xr Fluoroscopy C-arm Time <1 Hour  (cpt=76000)    Result Date: 12/13/2019  CONCLUSION:  1. Fluoroscopic guidance utilized during hindfoot arthrodesis procedure. 2. Fifth metatarsal osteotomy visualized.   First metatarsal osteot

## 2019-12-14 PROCEDURE — 97116 GAIT TRAINING THERAPY: CPT

## 2019-12-14 PROCEDURE — 82962 GLUCOSE BLOOD TEST: CPT

## 2019-12-14 PROCEDURE — 97162 PT EVAL MOD COMPLEX 30 MIN: CPT

## 2019-12-14 PROCEDURE — 80048 BASIC METABOLIC PNL TOTAL CA: CPT | Performed by: HOSPITALIST

## 2019-12-14 NOTE — ANESTHESIA POST-OP FOLLOW-UP NOTE
Pt s/p R foot joint fusion with SS saph/pop block POD1. Pt doing well; pain well controlled; 0/10. Pt reports still having RLL numbness.   Plan: consult complete

## 2019-12-14 NOTE — PHYSICAL THERAPY NOTE
PHYSICAL THERAPY EVALUATION - INPATIENT     Room Number: 432/063-U  Evaluation Date: 12/14/2019  Type of Evaluation: Initial   Physician Order: PT Eval and Treat    Presenting Problem: s/p fusion and complicated R foot reconstruction  Reason for Therapy: (Obs): Daily       PHYSICAL THERAPY MEDICAL/SOCIAL HISTORY     History related to current admission: pt reports this all started with a sprained R ankle back in May this year    Problem List  Active Problems:    Preoperative testing      Past Medical Histo ASSESSMENT  Ratin  Location: R ankle/foot  Management Techniques: Activity promotion; Body mechanics; Relaxation;Repositioning    COGNITION  · Overall Cognitive Status:  WFL - within functional limits    RANGE OF MOTION AND STRENGTH ASSESSMENT  Upper ext conservation  Functional activity tolerated  Gait training  Posture  Transfer training    Patient End of Session: In bed; With [facility] staff;Needs met;Call light within reach;RN aware of session/findings; All patient questions and concerns addressed; Alarm set

## 2019-12-14 NOTE — PLAN OF CARE
Problem: Patient Centered Care  Goal: Patient preferences are identified and integrated in the patient's plan of care  Description  Interventions:  - What would you like us to know as we care for you?  I'M DOING WELL  - Provide timely, complete, and accur AVAILABLE. Problem: SAFETY ADULT - FALL  Goal: Free from fall injury  Description  INTERVENTIONS:  - Assess pt frequently for physical needs  - Identify cognitive and physical deficits and behaviors that affect risk of falls.   - [place] fall precaut pressure ulcer development  - Assess and document skin integrity  - Assess and document dressing/incision, wound bed, drain sites and surrounding tissue  - Implement wound care per orders  - Initiate isolation precautions as appropriate  - Initiate Pressur

## 2019-12-14 NOTE — PLAN OF CARE
Up with crutches with  RLE NWB maintained. Pain being managed with oral pain meds, breakthrough meds available. DC with Sofia Butcher Monday likely after wound vac change, CAM Boot from [place] is delivered Monday (see RN note).    Problem: Patient Centered Care  Goal unsuccessful or patient reports new pain  - Anticipate increased pain with activity and pre-medicate as appropriate  Outcome: Progressing     Problem: SAFETY ADULT - FALL  Goal: Free from fall injury  Description  INTERVENTIONS:  - Assess pt frequently for dressing/incision, wound bed, drain sites and surrounding tissue  - Implement wound care per orders  - Initiate isolation precautions as appropriate  - Initiate Pressure Ulcer prevention bundle as indicated  Outcome: Progressing     Problem: Mandie Navarro

## 2019-12-14 NOTE — PROGRESS NOTES
[address] Patient Status:  Inpatient    1970 MRN [id number]   Location [facility] 4W/SW/SE Attending Omar Hoffman MD   Hosp Day # 1 PCP Conrad Botts, MD Sharyle Emerald is a 51year old male patient Dx at age 41   • Visual impairment     eyeglasses   • Vitamin D deficiency        No current outpatient medications on file.        No Known Allergies    Active Problems:    Preoperative testing      Blood pressure 116/65, pulse 82, temperature 98.4 °F

## 2019-12-14 NOTE — PROGRESS NOTES
[facility] Hospitalist Progress Note     PCP: Lizabeth Elias MD    Chief Complaint: follow-up    SUBJECTIVE:  Pain controlled, denies any focal sx     OBJECTIVE:  Temp:  [98.4 °F (36.9 °C)-98.7 °F (37.1 °C)] 98.4 °F (36.9 °C)  Pulse:  [81-93] 82  Resp:  [20] 20  BP: Subcutaneous Q12H     • lactated ringers Stopped (12/14/19 0600)   • sodium chloride Stopped (12/14/19 0600)     Normal Saline Flush, PEG 3350, magnesium hydroxide, bisacodyl, FLEET ENEMA, acetaminophen **OR** HYDROcodone-acetaminophen **OR** HYDROcodone-a

## 2019-12-15 PROCEDURE — 82962 GLUCOSE BLOOD TEST: CPT

## 2019-12-15 PROCEDURE — 97530 THERAPEUTIC ACTIVITIES: CPT

## 2019-12-15 PROCEDURE — 97165 OT EVAL LOW COMPLEX 30 MIN: CPT

## 2019-12-15 PROCEDURE — 97116 GAIT TRAINING THERAPY: CPT

## 2019-12-15 NOTE — PROGRESS NOTES
[facility] Hospitalist Progress Note     PCP: Emily Mock MD    Chief Complaint: follow-up    SUBJECTIVE:  No acute events    OBJECTIVE:  Temp:  [98.3 °F (36.8 °C)-98.8 °F (37.1 °C)] 98.8 °F (37.1 °C)  Pulse:  [81-86] 86  Resp:  [20] 20  BP: (104-127)/(57-83) 127/ Saline Flush, PEG 3350, magnesium hydroxide, bisacodyl, FLEET ENEMA, acetaminophen **OR** HYDROcodone-acetaminophen **OR** HYDROcodone-acetaminophen, morphINE sulfate **OR** morphINE sulfate **OR** morphINE sulfate, temazepam, ondansetron HCl, cyclobenzapr

## 2019-12-15 NOTE — PLAN OF CARE
Up with NWB RLE maintained with wound vac in place, ACE and Post Mold over dressing. Norco for pain. Monitor accuchecks. Tolerating PO well.  DC plan is to return home tomorrow after  boot applied and after new wound vac dressing change and portable w relaxation techniques  - Monitor for opioid side effects  - Notify MD/LIP if interventions unsuccessful or patient reports new pain  - Anticipate increased pain with activity and pre-medicate as appropriate  Outcome: Progressing     Problem: SAFETY ADULT - pressure ulcer development  - Assess and document skin integrity  - Assess and document dressing/incision, wound bed, drain sites and surrounding tissue  - Implement wound care per orders  - Initiate isolation precautions as appropriate  - Initiate Pressur medication administration  - Ensure safe mobilization of patient  - Hold pressure on venipuncture sites to achieve adequate hemostasis  - Assess for signs and symptoms of internal bleeding  - Monitor lab trends  - Patient is to report abnormal signs of ble

## 2019-12-15 NOTE — PLAN OF CARE
Alert and oriented x 4. Glasses. Hx of Neuropathy to BLE. Room air. Encouraged incentive 10x/hr while awake. Non pitting edema right toes. LBM 12/12/19, tolerated diet. Voiding freely per urinal.  OOB X SBA with crutches and NWB RLE.  Fall precautions disc non-pharmacological measures as appropriate and evaluate response  - Consider cultural and social influences on pain and pain management  - Manage/alleviate anxiety  - Utilize distraction and/or relaxation techniques  - Monitor for opioid side effects  - N Progressing     Problem: SKIN/TISSUE INTEGRITY - ADULT  Goal: Incision(s), wounds(s) or drain site(s) healing without S/S of infection  Description  INTERVENTIONS:  - Assess and document risk factors for pressure ulcer development  - Assess and document sk ordered and appropriate  Outcome: Progressing  Goal: Free from bleeding injury  Description  (Example usage: patient with low platelets)  INTERVENTIONS:  - Avoid intramuscular injections, enemas and rectal medication administration  - Ensure safe mobilizat

## 2019-12-15 NOTE — PHYSICAL THERAPY NOTE
PHYSICAL THERAPY TREATMENT NOTE - INPATIENT     Room Number: 266/449-H       Presenting Problem: s/p fusion and complicated R foot reconstruction    Problem List  Active Problems:    Preoperative testing      PHYSICAL THERAPY ASSESSMENT   Pt is IND with al Fair    ACTIVITY TOLERANCE                         O2 WALK                  AM-PAC '6-Clicks' INPATIENT SHORT FORM - BASIC MOBILITY  How much difficulty does the patient currently have. ..  -   Turning over in bed (including adjusting bedclothes, sheets and bottom up/down stairs to bedroom)   Goal #4   Current Status NT   Goal #5 Patient to demonstrate independence with home activity/exercise instructions provided to patient in preparation for discharge.    Goal #5   Current Status IN PROGRESS   Goal #6    [facility]

## 2019-12-15 NOTE — PROGRESS NOTES
[address] Patient Status:  Inpatient    1970 MRN [id number]   Location [facility] 4W/SW/SE Attending Selene Ching MD   Hosp Day # 2 PCP MD Tammy Mujica Ivett is a 51year old male patient Dx at age 41   • Visual impairment     eyeglasses   • Vitamin D deficiency        No current outpatient medications on file.        No Known Allergies    Active Problems:    Preoperative testing      Blood pressure 104/57, pulse 82, temperature 98.8 °F

## 2019-12-15 NOTE — OCCUPATIONAL THERAPY NOTE
OCCUPATIONAL THERAPY EVALUATION - INPATIENT     Room Number: 154/483-Z  Evaluation Date: 12/15/2019  Type of Evaluation: Quick Eval  Presenting Problem: (s/p R foot reconstruction )    Physician Order: IP Consult to Occupational Therapy  Reason for Thera admission.     OCCUPATIONAL THERAPY MEDICAL/SOCIAL HISTORY     Problem List  Active Problems:    Preoperative testing      Past Medical History  Past Medical History:   Diagnosis Date   • Depression    • Diabetes ([facility] [address]    • [facility]    • Hi limits    SENSATION  reports diabetic neuropathy at baseline    Communication: Receptive and expressive language intact    Behavioral/Emotional/Social:  Patient was pleasant and cooperative    RANGE OF MOTION   Upper extremity ROM is within functional limi [id number] [facility] OTR/L  [facility]   #[id number]

## 2019-12-16 VITALS
DIASTOLIC BLOOD PRESSURE: 83 MMHG | OXYGEN SATURATION: 96 % | HEIGHT: 75 IN | TEMPERATURE: 99 F | WEIGHT: 262 LBS | RESPIRATION RATE: 20 BRPM | HEART RATE: 83 BPM | SYSTOLIC BLOOD PRESSURE: 130 MMHG | BODY MASS INDEX: 32.58 KG/M2

## 2019-12-16 PROBLEM — M21.6X1: Status: ACTIVE | Noted: 2019-12-16

## 2019-12-16 PROCEDURE — 82962 GLUCOSE BLOOD TEST: CPT

## 2019-12-16 PROCEDURE — 97607 NEG PRS WND THR NDME<=50SQCM: CPT

## 2019-12-16 PROCEDURE — 97116 GAIT TRAINING THERAPY: CPT

## 2019-12-16 PROCEDURE — 97530 THERAPEUTIC ACTIVITIES: CPT

## 2019-12-16 RX ORDER — ASPIRIN 325 MG
325 TABLET ORAL 2 TIMES DAILY
Qty: 60 TABLET | Refills: 0 | Status: SHIPPED | OUTPATIENT
Start: 2019-12-16 | End: 2020-02-25

## 2019-12-16 NOTE — WOUND PROGRESS NOTE
Wound Care Services  Routine vac dressing change to the pt's right lateral foot wound. The pt. is s/p Right complex realignment of subtalar and talonavicular joint with arthrodesis. Right posterior tibial tendon lengthening. Right transfer extensor digitoru

## 2019-12-16 NOTE — CM/SW NOTE
MD order received regarding CHEKO luna. Plan is for discharge home today 12/16 with [facility] and home wound vac. CHEKO notified Sharon with [facility] of the plan. Pt. To have home vac applied prior to discharge.       Sofia Butcher orders/face to face have been complete

## 2019-12-16 NOTE — DISCHARGE SUMMARY
[facility] Internal Medicine Discharge Summary   Patient ID:  Hannah Blanco  [id number]  4 year old  6/30/1970    Admit date: 12/13/2019    Discharge date and time: 12/16/2019     Attending Physician: Tasia Goldsmith MD     Primary Care Physician: aRdha Mccollum BP:    Pulse: 83   Resp: 20   Temp: 99 °F (37.2 °C)       Exam on day of discharge:  Gen: No acute distress  CV: RRR  Lungs: CTAB, good respiratory effort  Abdomen: s/nt/nd  Neuro: no focal deficits      Discharge meds     Medication List      CHANGE how CONSULT TO HOSPITALIST  IP CONSULT TO CASE MANAGEMENT  CONSULT TO WOUND OSTOMY  IP CONSULT TO SOCIAL WORK  IP CONSULT TO SOCIAL WORK  IP CONSULT TO SOCIAL WORK  IP CONSULT TO SOCIAL WORK  IP CONSULT TO SOCIAL WORK    Radiology: Xr Fluoroscopy C-arm Time <1

## 2019-12-16 NOTE — PHYSICAL THERAPY NOTE
PHYSICAL THERAPY TREATMENT NOTE - INPATIENT     Room Number: 536/576-T       Presenting Problem: s/p fusion and complicated R foot reconstruction    Problem List  Active Problems:    Preoperative testing      PHYSICAL THERAPY ASSESSMENT   Pt is received in mechanics; Relaxation;Repositioning    BALANCE                                                                                                                     Static Sitting: Good  Dynamic Sitting: Good           Static Standing: Fair +  Dynamic [place] Goal #3 Patient is able to ambulate 75 feet with assist device: crutches (axillary) at assistance level: modified independent   Goal #3   Current Status 200' with the [facility] SBA   Goal #4 Patient will negotiate 2 stairs/one curb w/ assistive device and super

## 2019-12-16 NOTE — PLAN OF CARE
Alert and oriented x 4. Glasses. Hx of Neuropathy to BLE. ASA therapy. Room air. Encouraged incentive 10x/hr while awake. Non pitting edema right toes. LBM 12/12/19, tolerated diet. Voiding freely per urinal.  OOB X SBA with crutches and NWB RLE.  Fall pre Consider cultural and social influences on pain and pain management  - Manage/alleviate anxiety  - Utilize distraction and/or relaxation techniques  - Monitor for opioid side effects  - Notify MD/LIP if interventions unsuccessful or patient reports new boby wounds(s) or drain site(s) healing without S/S of infection  Description  INTERVENTIONS:  - Assess and document risk factors for pressure ulcer development  - Assess and document skin integrity  - Assess and document dressing/incision, wound bed, drain sit injury  Description  (Example usage: patient with low platelets)  INTERVENTIONS:  - Avoid intramuscular injections, enemas and rectal medication administration  - Ensure safe mobilization of patient  - Hold pressure on venipuncture sites to achieve adequat

## 2019-12-16 NOTE — PAYOR COMM NOTE
--------------  CONTINUED STAY REVIEW------REQUESTING ADDITIONAL DAY 12/14, 12/15      Payor: Riccardo Hamman PPO  Subscriber #:  [id number]  Authorization Number: [id number]    Admit date: 12/13/19  Admit time: 46    Admitting Physician: MD ENRIQUE Blum Recent Labs   Lab 12/13/19  1402 12/13/19  1735 12/13/19  2128 12/14/19  0730 12/14/19  1240   PGLU 171* 261* 234* 146* 117*         No results for input(s): TROP in the last 168 hours.        Meds:      • docusate sodium  100 mg Oral BID   • aspirin  325 -repeat BMP in AM.  Hydrate with NS overnight.  Likely related to mild dehydration but if hypercalcemia worsens will need further workup outpatient     DVT ppx:  Per surgeon  Code Status/Advanced directives:  Full code   Dispo/Anticipated Discharge DateB 1 2027 12/15/19  0803 12/15/19  1312   PGLU 117* 239* 255* 79 225*         No results for input(s): TROP in the last 168 hours.        Meds:      • docusate sodium  100 mg Oral BID   • aspirin  325 mg Oral BID   • gabapentin  300 mg Oral BID   • Rosuvastatin DVT ppx:  Per surgeon  Code Status/Advanced directives:  Full code   Dispo/Anticipated Discharge DateB Ronnie 200, MD  [facility] Hospitalist                    MEDICATIONS ADMINISTERED IN LAST 1 DAY:  aspirin tab 325 mg     Date Action Dose Route User 12/15/2019 2025 Given 40 Units Subcutaneous (Right Upper Arm) Caryn Romano, TEE      Rosuvastatin Calcium (CRESTOR) tab 10 mg     Date Action Dose Route User    12/15/2019 2022 Given 10 mg Oral Avani Peterson RN          Procedures:      Plan:

## 2019-12-16 NOTE — PLAN OF CARE
Problem: Patient Centered Care  Goal: Patient preferences are identified and integrated in the patient's plan of care  Description  Interventions:  - What would you like us to know as we care for you?  I've been in this situation for quite a while now so pre-medicate as appropriate  Outcome: Adequate for Discharge     Problem: SAFETY ADULT - FALL  Goal: Free from fall injury  Description  INTERVENTIONS:  - Assess pt frequently for physical needs  - Identify cognitive and physical deficits and behaviors zara tissue  - Implement wound care per orders  - Initiate isolation precautions as appropriate  - Initiate Pressure Ulcer prevention bundle as indicated  Outcome: Adequate for Discharge     Problem: MUSCULOSKELETAL - ADULT  Goal: Return mobility to safest leve and stability  - Promote increasing activity/tolerance for mobility and gait  - Educate and engage patient/family in tolerated activity level and precautions  - Elevate right lower extremity  Outcome: Adequate for Discharge     Problem: Diabetes/Glucose Co

## 2019-12-18 NOTE — PAYOR COMM NOTE
REF: [id number]  APPROVED 12/13/19-12/14/19- CLINICAL UPDATE TO 12/15/19 FAXED ON 12/16/19    DATE OF DISCHARGE: 12/16/19

## 2019-12-20 NOTE — PAYOR COMM NOTE
--------------  DISCHARGE REVIEW    Payor: SAEID FOX  Subscriber #:  [id number]  Authorization Number: [id number]    Admit date: 12/13/19  Admit time:  0471  Discharge Date: 12/16/2019  3:48 PM     Admitting Physician: Bettina Cardoso MD  Attending Christopher soon as possible for a visit in 2 weeks          Please refer to prior H&P on admission date.     Hospital Course:   post op  -pain control per surgeon. Oris Mates need wound vac and cam boot before dc (12-16)- per ortho     #DM II  -on ~150 units daily insulin [place]     FREEYLE ROSE 14 DAY SENSOR Misc  1 each by Does not apply route every 14 (fourteen) days.  Dispense 1 sensor every 14 days     gabapentin 300 MG Caps  Commonly known as:  NEURONTIN  Take 1 capsule (300 mg total) by mouth 2 (two) times da osteotomy performed    Dictated by (CST): Alberto Bach MD on 12/13/2019 at 12:23     Approved by (CST): Alberto Bach MD on 12/13/2019 at 12:27             Operative Procedures: Procedure(s) (LRB):  FOOT JOINT FUSION (Right)       Total Time C

## 2019-12-27 ENCOUNTER — OFFICE VISIT (OUTPATIENT)
Dept: WOUND CARE | Facility: HOSPITAL | Age: 49
End: 2019-12-27
Attending: NURSE PRACTITIONER
Payer: COMMERCIAL

## 2019-12-27 DIAGNOSIS — L97.521 ULCERATED, FOOT, LEFT, LIMITED TO BREAKDOWN OF SKIN (HCC): ICD-10-CM

## 2019-12-27 DIAGNOSIS — E11.621 DIABETIC FOOT ULCER WITH OSTEOMYELITIS (HCC): ICD-10-CM

## 2019-12-27 DIAGNOSIS — E11.621 DIABETIC ULCER OF OTHER PART OF RIGHT FOOT ASSOCIATED WITH TYPE 2 DIABETES MELLITUS, LIMITED TO BREAKDOWN OF SKIN (HCC): ICD-10-CM

## 2019-12-27 DIAGNOSIS — M21.6X1 ACQUIRED EXTERNAL ROTATION OF FOOT, RIGHT: Primary | ICD-10-CM

## 2019-12-27 DIAGNOSIS — E11.69 DIABETIC FOOT ULCER WITH OSTEOMYELITIS (HCC): ICD-10-CM

## 2019-12-27 DIAGNOSIS — L97.509 DIABETIC FOOT ULCER WITH OSTEOMYELITIS (HCC): ICD-10-CM

## 2019-12-27 DIAGNOSIS — M86.9 DIABETIC FOOT ULCER WITH OSTEOMYELITIS (HCC): ICD-10-CM

## 2019-12-27 DIAGNOSIS — L97.511 DIABETIC ULCER OF OTHER PART OF RIGHT FOOT ASSOCIATED WITH TYPE 2 DIABETES MELLITUS, LIMITED TO BREAKDOWN OF SKIN (HCC): ICD-10-CM

## 2019-12-27 PROCEDURE — 97605 NEG PRS WND THER DME<=50SQCM: CPT

## 2019-12-27 PROCEDURE — 99215 OFFICE O/P EST HI 40 MIN: CPT

## 2019-12-27 NOTE — PROGRESS NOTES
Subjective    Chief Complaint  This information was obtained from the patient  The patient is new to the [address] [facility] here for an initial visit for the evaluation and management of non-healing right foot wounds.     Allergies  No known allergies    HPI  Thi Former smoker - 1.5 packs of cigarettes/day for 26 years, Smokeless Tobacco (deprecated) - never used, Illicit Drug Use - none, Lives in - home, Lives with - wife, No Signs/Symptoms of Abuse, Suicide Risk: Patient denies suicidal ideation    Medical Histor Wound #1 Right, Lateral Foot is a Alvarenga Grade 1 Diabetic Ulcer and has received a status of Not Healed. Initial wound encounter measurements are 1cm length x 0.5cm width x 1cm depth, with an area of 0.5 sq cm and a volume of 0.5 cubic cm.  No tunneling has The periwound skin exhibited: Edema, Dry/Scaly.  The periwound skin did not exhibit: Brawny Induration, Excoriation, Induration, Callus, Crepitus, Fluctuance, Friable, Rash, Moist, Maceration, Atrophie Sheron, Cyanosis, Ecchymosis, Erythema, Hemosiderosis, incision is well approximated, sutures dry and intact    Wound #6 Right Second Toe is a Alvarenga Grade 0 Diabetic Ulcer and has received a status of Not Healed. Initial wound encounter measurements are 5cm length No tunneling has been noted.  No sinus tract h The periwound skin exhibited: Callus, Dry/Scaly, Maceration.  The periwound skin did not exhibit: Brawny Induration, Edema, Excoriation, Induration, Crepitus, Fluctuance, Friable, Rash, Moist, Atrophie Sheron, Cyanosis, Ecchymosis, Erythema, Hemosiderosis, Lipodermatosclerosis: No  Right Extremity colors, hair growth, and conditions:  Extremity Color: WNL  Hair Growth on Extremity: Yes  Temperature of Extremity: Warm  Capillary Refill: < 3 seconds  Erythema: No  Dependent Rubor: No  Hyperpigmentation: No  Domenic Sour Discussed pressure injury prevention, especially heel ulcer prevention as patient is at high risk for developing heel ulcer due to lack of activity and pressure on heels while sitting or sleeping.   Discussed nutrition for wound healing and encourage reduce Change dressing three times per week. Wound #7 Right, Medial Ankle foot    Wound Cleansing & Dressings  Clean wound with Normal Saline or Wound Cleanser. Xeroform  Cover dressing and wrap with diony. Do not put tape directly on the skin.   Change dressi 12/27/2019 2:48:21 PM Version Signed By: Date:  Sam Osuna 12/27/2019 2:48:41 PM    Entered By: Sam Osuna on 12/27/2019 2:52:40 PM  Treatment Notes Summary  Wound #1 (Right, Lateral Foot)  . Wound Treatment Note  Assessed patient’s pain status and ef Dressing secured with non-allergenic tape/stockinet/wrap. using Kerlix (1), Silk tape (1)  Compression wrapping  Stockinette applied using 4 (1)  Compression applied to: using Toe bases to proximal ankle (1)  Compression used: using Ace wrap 4 (1), Artifle Stockinette applied using 4 (1)  Compression applied to: using Toe bases to proximal ankle (1)  Compression used: using Ace wrap 4 (1), Artiflex 10 cm (1)  Wound #8 (Right, Medial Heel)  . Wound Treatment Note  Assessed patient’s pain status and effectivene

## 2020-01-02 ENCOUNTER — OFFICE VISIT (OUTPATIENT)
Dept: WOUND CARE | Facility: HOSPITAL | Age: 50
End: 2020-01-02
Attending: NURSE PRACTITIONER
Payer: COMMERCIAL

## 2020-01-02 DIAGNOSIS — L97.511 DIABETIC ULCER OF OTHER PART OF RIGHT FOOT ASSOCIATED WITH TYPE 2 DIABETES MELLITUS, LIMITED TO BREAKDOWN OF SKIN (HCC): Primary | ICD-10-CM

## 2020-01-02 DIAGNOSIS — L97.521 ULCERATED, FOOT, LEFT, LIMITED TO BREAKDOWN OF SKIN (HCC): ICD-10-CM

## 2020-01-02 DIAGNOSIS — E11.621 DIABETIC ULCER OF OTHER PART OF RIGHT FOOT ASSOCIATED WITH TYPE 2 DIABETES MELLITUS, LIMITED TO BREAKDOWN OF SKIN (HCC): Primary | ICD-10-CM

## 2020-01-02 PROCEDURE — 97605 NEG PRS WND THER DME<=50SQCM: CPT

## 2020-01-02 PROCEDURE — 11055 PARING/CUTG B9 HYPRKER LES 1: CPT

## 2020-01-02 NOTE — PROGRESS NOTES
Subjective    Chief Complaint  This information was obtained from the patient  The patient was seen today for follow up and management of difficult to heal right foot wounds.     Allergies  No known allergies    HPI  This information was obtained from the p Objective    Wound Assessment(s)  Wound #1 Right, Lateral Foot is a Alvarenga Grade 1 Diabetic Ulcer and has received a status of Not Healed.  Subsequent wound encounter measurements are 0.5cm length x 0.2cm width x 0.8cm depth, with an area of 0.1 sq cm and a The periwound skin exhibited: Edema, Dry/Scaly.  The periwound skin did not exhibit: Brawny Induration, Excoriation, Induration, Callus, Crepitus, Fluctuance, Friable, Rash, Moist, Maceration, Atrophie Sheron, Cyanosis, Ecchymosis, Erythema, Hemosiderosis, Wound #9 Right, Posterior Heel is a Alvarenga Grade 1 Diabetic Ulcer and has received an outcome of Resolved. Subsequent wound encounter measurements are 0cm length x 0cm width with no measurable depth, with an area of 0 sq cm . There was no drainage noted.  Joel Sanchez right foot surgical wounds and left foot diabetic ulcer. +1 edema, no erythema. Psychiatric:  Appropriate judgement and insight. Oriented to time, place and person. Appropriate mood and affect.         Assessment    Active Problems    ICD-10  (Encounter Wound Orders:  Wound #1 Right, Lateral Foot    Wound Cleansing & Dressings  Clean wound with Normal Saline or Wound Cleanser. NPWT Orders  KCI VAC therapy, black foam at 125 mmHg continuous. RN to change dressing 3x/week, unless noted below.  - May do - Assess wound pain every visit, before and after procedures and after pain relief interventions. Refer non-wound related pain management to PCP or per facility policy.   Status: Initiated Date: 12/27/2019  - Pulses bilaterally on admission and per facility - If no evidence of healing within 2-4 weeks, re-evaluate plan of care and patient adherence. Status: Continued Date: 1/2/2020  - Set treatment goals according to the patient and caregiver’s wishes.   Status: Continued Date: 1/2/2020      Follow-Up Appoint Limb cleansed using Betasept and water (1), Soap and water (1)  Cleansed wound and periwound with non-cytotoxic agent. using Wound Cleanser Spray (1)  Applied Primary Wound Dressing.  using Other - see notes (1)  Dressing secured with non-allergenic tape/st Patient YOB: 1970  Date: 1/2/2020  RN: Raelene Siemens  CNA/CHT/CMA: Faisal Santoyo  Physician / Extender: Taniya Bruce  Procedure Performed for: Wound #10 Left, Plantar Foot  Performed By: Physician Taniya Bruce, FNP-C  Procedural Pain: 0  P

## 2020-01-10 ENCOUNTER — OFFICE VISIT (OUTPATIENT)
Dept: WOUND CARE | Facility: HOSPITAL | Age: 50
End: 2020-01-10
Attending: NURSE PRACTITIONER
Payer: COMMERCIAL

## 2020-01-10 DIAGNOSIS — L97.511 DIABETIC ULCER OF OTHER PART OF RIGHT FOOT ASSOCIATED WITH TYPE 2 DIABETES MELLITUS, LIMITED TO BREAKDOWN OF SKIN (HCC): Primary | ICD-10-CM

## 2020-01-10 DIAGNOSIS — E11.621 DIABETIC ULCER OF OTHER PART OF RIGHT FOOT ASSOCIATED WITH TYPE 2 DIABETES MELLITUS, LIMITED TO BREAKDOWN OF SKIN (HCC): Primary | ICD-10-CM

## 2020-01-10 PROCEDURE — 97597 DBRDMT OPN WND 1ST 20 CM/<: CPT

## 2020-01-10 NOTE — PROGRESS NOTES
Subjective    Chief Complaint  This information was obtained from the patient  The patient was seen today for follow up and management of difficult to heal right and left foot wounds.     Allergies  No known allergies    HPI  This information was obtained f Wound #1 Right, Lateral Foot is a Diabetic Ulcer and has received an outcome of Resolved. Measurements are 0cm length x 0cm width x 0cm depth, with an area of 0 sq cm and a volume of 0 cubic cm. Wound bed has % epithelialization.   The periwound skin Wound #10 Left, Plantar Foot is a Alvarenga Grade 1 Diabetic Ulcer and has received a status of Not Healed. Subsequent wound encounter measurements are 0.1cm length x 0.1cm width x 0.2cm depth, with an area of 0.01 sq cm and a volume of 0.002 cubic cm.  No jordi S91.301D: Unspecified open wound, right foot, subsequent encounter  S91.302D: Unspecified open wound, left foot, subsequent encounter        Right lateral wound:  100% epithelialized  surrounding skin with maceration, added alginate dressing to dry tissue Change dressing two times per week. Offloading  Pressure relief shoe / inserts / foams  Other: - 1 offloading felt    Additional Orders: Follow-Up Appointments  Return Appointment in 1 week. - 30 min APN with wound vac    Compression Therapy:   Ace wr Cleansed wound and periwound with non-cytotoxic agent. using Wound Cleanser Spray (1)  Applied Primary Wound Dressing.  using Fibracol (1), Hydrofera ready 4x5 (1)  Dressing secured with non-allergenic tape/stockinet/wrap. using Medipore (1)  Compression wr

## 2020-01-17 ENCOUNTER — OFFICE VISIT (OUTPATIENT)
Dept: WOUND CARE | Facility: HOSPITAL | Age: 50
End: 2020-01-17
Attending: NURSE PRACTITIONER
Payer: COMMERCIAL

## 2020-01-17 DIAGNOSIS — L97.521 ULCERATED, FOOT, LEFT, LIMITED TO BREAKDOWN OF SKIN (HCC): ICD-10-CM

## 2020-01-17 DIAGNOSIS — L97.511 DIABETIC ULCER OF OTHER PART OF RIGHT FOOT ASSOCIATED WITH TYPE 2 DIABETES MELLITUS, LIMITED TO BREAKDOWN OF SKIN (HCC): Primary | ICD-10-CM

## 2020-01-17 DIAGNOSIS — E11.621 DIABETIC ULCER OF OTHER PART OF RIGHT FOOT ASSOCIATED WITH TYPE 2 DIABETES MELLITUS, LIMITED TO BREAKDOWN OF SKIN (HCC): Primary | ICD-10-CM

## 2020-01-17 PROCEDURE — 99213 OFFICE O/P EST LOW 20 MIN: CPT

## 2020-01-17 NOTE — PROGRESS NOTES
Subjective    Chief Complaint  This information was obtained from the patient  The patient was seen today for follow up and management of difficult to heal right and left foot wounds.     Allergies  No known allergies    HPI  This information was obtained f Neurological: Abnormal Gait (using CAM boot for right leg/foot)    Patient denies complaints or symptoms related to:  Constitutional Symptoms (General Health): Chills, Fatigue, Fever, Loss of Appetite  Respiratory: Cough, Shortness of Breath  Cardiovascula The periwound skin exhibited: Callus, Dry/Scaly.  The periwound skin did not exhibit: Brawny Induration, Edema, Excoriation, Induration, Crepitus, Fluctuance, Friable, Rash, Moist, Maceration, Atrophie Sheron, Cyanosis, Ecchymosis, Erythema, Hemosiderosis, Wound #8 Right, Medial Heel    Wound Cleansing & Dressings  Clean wound with Normal Saline or Wound Cleanser. Hydrofera ready  Other: - secure with tape  Change dressing two times per week.     Wound #10 Left, Plantar Foot    Wound Cleansing & Dressings  C Applied Offloading device.  using 1/4\" thick adhesive felt applied to periwound (2)

## 2020-01-20 PROBLEM — E11.628 CELLULITIS IN DIABETIC FOOT: Status: RESOLVED | Noted: 2019-10-08 | Resolved: 2020-01-20

## 2020-01-20 PROBLEM — Z01.818 PREOPERATIVE TESTING: Status: RESOLVED | Noted: 2019-12-13 | Resolved: 2020-01-20

## 2020-01-20 PROBLEM — E11.628 CELLULITIS IN DIABETIC FOOT (HCC): Status: RESOLVED | Noted: 2019-10-08 | Resolved: 2020-01-20

## 2020-01-20 PROBLEM — L03.119 CELLULITIS IN DIABETIC FOOT (HCC): Status: RESOLVED | Noted: 2019-10-08 | Resolved: 2020-01-20

## 2020-01-20 PROBLEM — L03.119 CELLULITIS IN DIABETIC FOOT: Status: RESOLVED | Noted: 2019-10-08 | Resolved: 2020-01-20

## 2020-01-24 ENCOUNTER — OFFICE VISIT (OUTPATIENT)
Dept: WOUND CARE | Facility: HOSPITAL | Age: 50
End: 2020-01-24
Attending: NURSE PRACTITIONER
Payer: COMMERCIAL

## 2020-01-24 DIAGNOSIS — L97.521 ULCERATED, FOOT, LEFT, LIMITED TO BREAKDOWN OF SKIN (HCC): ICD-10-CM

## 2020-01-24 DIAGNOSIS — L97.511 DIABETIC ULCER OF OTHER PART OF RIGHT FOOT ASSOCIATED WITH TYPE 2 DIABETES MELLITUS, LIMITED TO BREAKDOWN OF SKIN (HCC): Primary | ICD-10-CM

## 2020-01-24 DIAGNOSIS — E11.621 DIABETIC ULCER OF OTHER PART OF RIGHT FOOT ASSOCIATED WITH TYPE 2 DIABETES MELLITUS, LIMITED TO BREAKDOWN OF SKIN (HCC): Primary | ICD-10-CM

## 2020-01-24 PROCEDURE — 97597 DBRDMT OPN WND 1ST 20 CM/<: CPT

## 2020-01-24 NOTE — PROGRESS NOTES
Subjective    Chief Complaint  This information was obtained from the patient  The patient was seen today for follow up and management of difficult to heal right and left foot wounds.     Allergies  No known allergies    HPI  This information was obtained f This information was obtained from the patient    Complaints and Symptoms  Patient denies complaints or symptoms related to:  Constitutional Symptoms (General Health): Chills, Fatigue, Fever, Loss of Appetite        Objective    Wound Assessment(s)  Wound Height/Length: 75 in (190.5 cm), Weight: 260 lbs (118.18 kgs), BMI: 32.5, (36.11 °C). Vitals Signs Notes: 1/24/20 b/s was 94 this am.    Physical Exam  Constitutional  well developed, no acute distress. Musculoskeletal:  no clubbing, no cyanosis.     In Wound #10 (Diabetic Ulcer) is located on the left, plantar foot. A selective debridement with a total area debrided of 0.02 sq cm was performed by TONI Campbell to remove devitalized tissue: biofilm, callus, and fibrin.  The following instrument(s) Applied Offloading device.  using 1/4\" thick adhesive felt applied to periwound (2)    Header Image    Debridement Details  Patient Name: Melina Gary  Patient Number: [id number]  Patient YOB: 1970  Date: 1/24/2020  RN: Binta Geronimo

## 2020-01-31 ENCOUNTER — OFFICE VISIT (OUTPATIENT)
Dept: WOUND CARE | Facility: HOSPITAL | Age: 50
End: 2020-01-31
Attending: NURSE PRACTITIONER
Payer: COMMERCIAL

## 2020-01-31 DIAGNOSIS — E11.621 DIABETIC ULCER OF OTHER PART OF RIGHT FOOT ASSOCIATED WITH TYPE 2 DIABETES MELLITUS, LIMITED TO BREAKDOWN OF SKIN (HCC): Primary | ICD-10-CM

## 2020-01-31 DIAGNOSIS — L97.511 DIABETIC ULCER OF OTHER PART OF RIGHT FOOT ASSOCIATED WITH TYPE 2 DIABETES MELLITUS, LIMITED TO BREAKDOWN OF SKIN (HCC): Primary | ICD-10-CM

## 2020-01-31 PROCEDURE — 99212 OFFICE O/P EST SF 10 MIN: CPT

## 2020-01-31 NOTE — PROGRESS NOTES
Subjective    Chief Complaint  This information was obtained from the patient  The patient was seen today for follow up and management of difficult to heal left foot wound.     Allergies  No known allergies    HPI  This information was obtained from the pat Complaints and Symptoms  Patient complains of:  Integumentary (Hair/Skin/Nails): Dryness, Calluses/Corns, Prone to Skin Tears  Neurological: Abnormal Gait (using CAM boot on right leg/foot)    Patient denies complaints or symptoms related to:  Constitution E11.621: Type 2 diabetes mellitus with foot ulcer  S91.301D: Unspecified open wound, right foot, subsequent encounter  S91.302D: Unspecified open wound, left foot, subsequent encounter        Right foot remained with no complaint according to patient.   Lef

## 2020-02-07 ENCOUNTER — APPOINTMENT (OUTPATIENT)
Dept: WOUND CARE | Facility: HOSPITAL | Age: 50
End: 2020-02-07
Attending: NURSE PRACTITIONER
Payer: COMMERCIAL

## 2020-02-14 ENCOUNTER — APPOINTMENT (OUTPATIENT)
Dept: WOUND CARE | Facility: HOSPITAL | Age: 50
End: 2020-02-14
Attending: NURSE PRACTITIONER
Payer: COMMERCIAL

## 2020-02-25 ENCOUNTER — OFFICE VISIT (OUTPATIENT)
Dept: PODIATRY CLINIC | Facility: CLINIC | Age: 50
End: 2020-02-25
Payer: COMMERCIAL

## 2020-02-25 DIAGNOSIS — L97.521 ULCERATED, FOOT, LEFT, LIMITED TO BREAKDOWN OF SKIN (HCC): Primary | ICD-10-CM

## 2020-02-25 DIAGNOSIS — E11.65 TYPE 2 DIABETES, UNCONTROLLED, WITH NEUROPATHY (HCC): ICD-10-CM

## 2020-02-25 DIAGNOSIS — E11.40 TYPE 2 DIABETES, UNCONTROLLED, WITH NEUROPATHY (HCC): ICD-10-CM

## 2020-02-25 PROCEDURE — 99213 OFFICE O/P EST LOW 20 MIN: CPT | Performed by: PODIATRIST

## 2020-02-25 RX ORDER — AMOXICILLIN AND CLAVULANATE POTASSIUM 875; 125 MG/1; MG/1
1 TABLET, FILM COATED ORAL 2 TIMES DAILY
Qty: 20 TABLET | Refills: 0 | Status: SHIPPED | OUTPATIENT
Start: 2020-02-25 | End: 2020-03-16 | Stop reason: ALTCHOICE

## 2020-02-27 NOTE — PROGRESS NOTES
Theo Cloud is a 51year old male. Patient presents with: Follow - Up: LOV 11/25/19. Dr Luis Enrique Gonzales did surgery in december on right foot. pt to office wearing cam boot. pt thinks he caught his great hallux nail on pants and now it's coming off.  pt woul 3   • gabapentin 300 MG Oral Cap Take 1 capsule (300 mg total) by mouth 2 (two) times daily. 180 capsule 3   • FREESTYLE JOSE 14 DAY SENSOR Does not apply Misc 1 each by Does not apply route every 14 (fourteen) days.  Dispense 1 sensor every 14 days 6 each • Cancer Sister         skin   • Other (autistic) Son       Social History    Socioeconomic History      Marital status:       Spouse name: Not on file      Number of children: Not on file      Years of education: Not on file      Highest educatio pulses   3. Neurologic: Patient has diminished pain sensation longstanding history of diabetic peripheral neuropathy   4. Musculoskeletal: Patient has good muscle strength.   He has no further inversion or eversion of the right foot but has dorsiflexion omaira

## 2020-03-10 ENCOUNTER — OFFICE VISIT (OUTPATIENT)
Dept: PODIATRY CLINIC | Facility: CLINIC | Age: 50
End: 2020-03-10
Payer: COMMERCIAL

## 2020-03-10 DIAGNOSIS — M20.42 HAMMER TOE OF LEFT FOOT: ICD-10-CM

## 2020-03-10 DIAGNOSIS — Z98.890 POST-OPERATIVE STATE: ICD-10-CM

## 2020-03-10 DIAGNOSIS — L97.521 ULCERATED, FOOT, LEFT, LIMITED TO BREAKDOWN OF SKIN (HCC): Primary | ICD-10-CM

## 2020-03-10 DIAGNOSIS — E11.40 TYPE 2 DIABETES, UNCONTROLLED, WITH NEUROPATHY (HCC): ICD-10-CM

## 2020-03-10 DIAGNOSIS — E11.65 TYPE 2 DIABETES, UNCONTROLLED, WITH NEUROPATHY (HCC): ICD-10-CM

## 2020-03-10 PROCEDURE — 99213 OFFICE O/P EST LOW 20 MIN: CPT | Performed by: PODIATRIST

## 2020-03-10 NOTE — PROGRESS NOTES
Oswaldo Valles is a 51year old male. Patient presents with:  Diabetic Ulcer: BS this AM 92 - left foot ulcer doing good - right hallux has some redness, but no pain.          HPI:   Patient returns to the clinic stating he thinks his wound is healing jayjay Does not apply Misc Inject twice daily 200 each 3   • buPROPion HCl ER, XL, 300 MG Oral Tablet 24 Hr Take 1 tablet (300 mg total) by mouth daily.  90 tablet 3   • metFORMIN HCl 1000 MG Oral Tab Take 1 tablet with breakfast and dinner (Patient taking differe and Sexual Activity      Alcohol use: Not Currently        Alcohol/week: 1.0 standard drinks        Types: 1 Standard drinks or equivalent per week      Drug use: No    Other Topics      Concerns:        Seat Belt: Yes          REVIEW OF SYSTEMS:   Today r room for evaluation. Patient was told to stay off his foot. The patient indicates understanding of these issues and agrees to the plan.     Charlene Dumont DPM

## 2020-03-24 ENCOUNTER — OFFICE VISIT (OUTPATIENT)
Dept: PODIATRY CLINIC | Facility: CLINIC | Age: 50
End: 2020-03-24
Payer: COMMERCIAL

## 2020-03-24 DIAGNOSIS — E11.40 TYPE 2 DIABETES, UNCONTROLLED, WITH NEUROPATHY (HCC): ICD-10-CM

## 2020-03-24 DIAGNOSIS — E11.65 TYPE 2 DIABETES, UNCONTROLLED, WITH NEUROPATHY (HCC): ICD-10-CM

## 2020-03-24 DIAGNOSIS — L97.521 ULCERATED, FOOT, LEFT, LIMITED TO BREAKDOWN OF SKIN (HCC): Primary | ICD-10-CM

## 2020-03-24 PROCEDURE — 99213 OFFICE O/P EST LOW 20 MIN: CPT | Performed by: PODIATRIST

## 2020-03-24 NOTE — PROGRESS NOTES
Chris Lora is a 51year old male. Patient presents with:  Diabetic Ulcer: left foot ulcer-BS was 144 fasting today. pt denies any pain. HPI:   Patient returns to the clinic predominantly for checkup on the ulceration of his left foot.   He has HCl 1000 MG Oral Tab Take 1 tablet with breakfast and dinner (Patient taking differently: Take by mouth 2 (two) times daily with meals.   ) 180 tablet 3   • CHOLECALCIFEROL 5000 units Oral Tab Take 1 tablet daily        Past Medical History:   Diagnosis Edvin Topics      Concerns:        Seat Belt: Yes          REVIEW OF SYSTEMS:   Today reviewed systens as documented below  GENERAL HEALTH: feels well otherwise  SKIN: Refer to exam below  RESPIRATORY: denies shortness of breath with exertion  CARDIOVASCULAR: de

## 2020-06-04 ENCOUNTER — OFFICE VISIT (OUTPATIENT)
Dept: PODIATRY CLINIC | Facility: CLINIC | Age: 50
End: 2020-06-04
Payer: COMMERCIAL

## 2020-06-04 ENCOUNTER — HOSPITAL ENCOUNTER (INPATIENT)
Facility: HOSPITAL | Age: 50
LOS: 6 days | Discharge: HOME | DRG: 617 | End: 2020-06-10
Attending: INTERNAL MEDICINE | Admitting: INTERNAL MEDICINE
Payer: COMMERCIAL

## 2020-06-04 ENCOUNTER — HOSPITAL ENCOUNTER (OUTPATIENT)
Dept: GENERAL RADIOLOGY | Facility: HOSPITAL | Age: 50
Discharge: HOME | End: 2020-06-04
Attending: PODIATRIST

## 2020-06-04 ENCOUNTER — APPOINTMENT (OUTPATIENT)
Dept: ULTRASOUND IMAGING | Facility: HOSPITAL | Age: 50
DRG: 617 | End: 2020-06-04
Attending: NURSE PRACTITIONER
Payer: COMMERCIAL

## 2020-06-04 ENCOUNTER — APPOINTMENT (OUTPATIENT)
Dept: MRI IMAGING | Facility: HOSPITAL | Age: 50
DRG: 617 | End: 2020-06-04
Attending: NURSE PRACTITIONER
Payer: COMMERCIAL

## 2020-06-04 DIAGNOSIS — E11.40 TYPE 2 DIABETES, UNCONTROLLED, WITH NEUROPATHY (HCC): ICD-10-CM

## 2020-06-04 DIAGNOSIS — L97.428 DIABETIC ULCER OF LEFT MIDFOOT ASSOCIATED WITH TYPE 2 DIABETES MELLITUS, WITH OTHER ULCER SEVERITY (HCC): ICD-10-CM

## 2020-06-04 DIAGNOSIS — E13.621 DIABETIC ULCER OF LEFT FOOT ASSOCIATED WITH OTHER SPECIFIED DIABETES MELLITUS, UNSPECIFIED PART OF FOOT, UNSPECIFIED ULCER STAGE (HCC): ICD-10-CM

## 2020-06-04 DIAGNOSIS — M86.9 DIABETIC FOOT ULCER WITH OSTEOMYELITIS (HCC): Primary | ICD-10-CM

## 2020-06-04 DIAGNOSIS — E11.65 TYPE 2 DIABETES, UNCONTROLLED, WITH NEUROPATHY (HCC): ICD-10-CM

## 2020-06-04 DIAGNOSIS — E11.621 DIABETIC ULCER OF LEFT MIDFOOT ASSOCIATED WITH TYPE 2 DIABETES MELLITUS, WITH OTHER ULCER SEVERITY (HCC): ICD-10-CM

## 2020-06-04 DIAGNOSIS — E11.621 DIABETIC FOOT ULCER WITH OSTEOMYELITIS (HCC): Primary | ICD-10-CM

## 2020-06-04 DIAGNOSIS — Z79.4 UNCONTROLLED TYPE 2 DIABETES MELLITUS WITH HYPERGLYCEMIA, WITH LONG-TERM CURRENT USE OF INSULIN (HCC): ICD-10-CM

## 2020-06-04 DIAGNOSIS — L97.529 DIABETIC ULCER OF LEFT FOOT ASSOCIATED WITH OTHER SPECIFIED DIABETES MELLITUS, UNSPECIFIED PART OF FOOT, UNSPECIFIED ULCER STAGE (HCC): ICD-10-CM

## 2020-06-04 DIAGNOSIS — M86.9 OSTEOMYELITIS (HCC): ICD-10-CM

## 2020-06-04 DIAGNOSIS — L97.509 DIABETIC FOOT ULCER WITH OSTEOMYELITIS (HCC): Primary | ICD-10-CM

## 2020-06-04 DIAGNOSIS — E11.65 UNCONTROLLED TYPE 2 DIABETES MELLITUS WITH HYPERGLYCEMIA, WITH LONG-TERM CURRENT USE OF INSULIN (HCC): ICD-10-CM

## 2020-06-04 DIAGNOSIS — L03.116 CELLULITIS OF FOOT, LEFT: ICD-10-CM

## 2020-06-04 DIAGNOSIS — L97.521 ULCERATED, FOOT, LEFT, LIMITED TO BREAKDOWN OF SKIN (HCC): Primary | ICD-10-CM

## 2020-06-04 DIAGNOSIS — E11.69 DIABETIC FOOT ULCER WITH OSTEOMYELITIS (HCC): Primary | ICD-10-CM

## 2020-06-04 DIAGNOSIS — L97.521 ULCERATED, FOOT, LEFT, LIMITED TO BREAKDOWN OF SKIN (HCC): ICD-10-CM

## 2020-06-04 DIAGNOSIS — M20.42 HAMMER TOE OF LEFT FOOT: ICD-10-CM

## 2020-06-04 DIAGNOSIS — L03.116 CELLULITIS OF FOOT WITHOUT TOES, LEFT: ICD-10-CM

## 2020-06-04 DIAGNOSIS — Z79.4 LONG-TERM INSULIN USE (HCC): ICD-10-CM

## 2020-06-04 PROCEDURE — 99213 OFFICE O/P EST LOW 20 MIN: CPT | Performed by: PODIATRIST

## 2020-06-04 PROCEDURE — 73718 MRI LOWER EXTREMITY W/O DYE: CPT | Performed by: NURSE PRACTITIONER

## 2020-06-04 PROCEDURE — 73630 X-RAY EXAM OF FOOT: CPT | Performed by: PODIATRIST

## 2020-06-04 PROCEDURE — 93971 EXTREMITY STUDY: CPT | Performed by: NURSE PRACTITIONER

## 2020-06-04 RX ORDER — GABAPENTIN 300 MG/1
300 CAPSULE ORAL 2 TIMES DAILY
Status: DISCONTINUED | OUTPATIENT
Start: 2020-06-04 | End: 2020-06-10

## 2020-06-04 RX ORDER — SODIUM CHLORIDE 0.9 % (FLUSH) 0.9 %
3 SYRINGE (ML) INJECTION AS NEEDED
Status: DISCONTINUED | OUTPATIENT
Start: 2020-06-04 | End: 2020-06-10

## 2020-06-04 RX ORDER — DEXTROSE MONOHYDRATE 25 G/50ML
50 INJECTION, SOLUTION INTRAVENOUS
Status: DISCONTINUED | OUTPATIENT
Start: 2020-06-04 | End: 2020-06-10

## 2020-06-04 RX ORDER — SODIUM CHLORIDE 9 MG/ML
INJECTION, SOLUTION INTRAVENOUS CONTINUOUS
Status: DISCONTINUED | OUTPATIENT
Start: 2020-06-04 | End: 2020-06-05

## 2020-06-04 RX ORDER — VANCOMYCIN 2 GRAM/500 ML IN 0.9 % SODIUM CHLORIDE INTRAVENOUS
25 ONCE
Status: COMPLETED | OUTPATIENT
Start: 2020-06-04 | End: 2020-06-04

## 2020-06-04 RX ORDER — IBUPROFEN 400 MG/1
800 TABLET ORAL ONCE
Status: COMPLETED | OUTPATIENT
Start: 2020-06-04 | End: 2020-06-04

## 2020-06-04 RX ORDER — VANCOMYCIN 2 GRAM/500 ML IN 0.9 % SODIUM CHLORIDE INTRAVENOUS
15 EVERY 12 HOURS
Status: DISCONTINUED | OUTPATIENT
Start: 2020-06-04 | End: 2020-06-04

## 2020-06-04 RX ORDER — BUPROPION HYDROCHLORIDE 300 MG/1
300 TABLET ORAL DAILY
Status: DISCONTINUED | OUTPATIENT
Start: 2020-06-05 | End: 2020-06-10

## 2020-06-04 RX ORDER — ROSUVASTATIN CALCIUM 10 MG/1
10 TABLET, COATED ORAL NIGHTLY
Status: DISCONTINUED | OUTPATIENT
Start: 2020-06-04 | End: 2020-06-10

## 2020-06-04 RX ORDER — ACETAMINOPHEN 325 MG/1
650 TABLET ORAL EVERY 4 HOURS PRN
Status: DISCONTINUED | OUTPATIENT
Start: 2020-06-04 | End: 2020-06-10

## 2020-06-04 RX ORDER — FENOFIBRATE 54 MG/1
1 TABLET ORAL DAILY
Status: DISCONTINUED | OUTPATIENT
Start: 2020-06-05 | End: 2020-06-04 | Stop reason: RX

## 2020-06-04 RX ORDER — FENOFIBRATE 67 MG/1
67 CAPSULE ORAL
Status: DISCONTINUED | OUTPATIENT
Start: 2020-06-05 | End: 2020-06-10

## 2020-06-04 RX ORDER — HYDROCODONE BITARTRATE AND ACETAMINOPHEN 5; 325 MG/1; MG/1
1 TABLET ORAL EVERY 4 HOURS PRN
Status: DISCONTINUED | OUTPATIENT
Start: 2020-06-04 | End: 2020-06-10

## 2020-06-04 RX ORDER — LISINOPRIL 5 MG/1
5 TABLET ORAL DAILY
Status: DISCONTINUED | OUTPATIENT
Start: 2020-06-05 | End: 2020-06-04

## 2020-06-04 RX ORDER — HEPARIN SODIUM 5000 [USP'U]/ML
5000 INJECTION, SOLUTION INTRAVENOUS; SUBCUTANEOUS EVERY 8 HOURS SCHEDULED
Status: DISCONTINUED | OUTPATIENT
Start: 2020-06-04 | End: 2020-06-10

## 2020-06-04 RX ORDER — HYDROCODONE BITARTRATE AND ACETAMINOPHEN 5; 325 MG/1; MG/1
2 TABLET ORAL EVERY 4 HOURS PRN
Status: DISCONTINUED | OUTPATIENT
Start: 2020-06-04 | End: 2020-06-10

## 2020-06-04 RX ORDER — VANCOMYCIN HYDROCHLORIDE
15 EVERY 12 HOURS
Status: DISCONTINUED | OUTPATIENT
Start: 2020-06-05 | End: 2020-06-05

## 2020-06-04 NOTE — PROGRESS NOTES
[facility] Pharmacy Note: Antimicrobial Weight Based Dose Adjustment for: piperacillin/tazobactam (Peg Deem)    Leia Nyhan is a 51year old male who has been prescribed piperacillin/tazobactam (ZOSYN) 3.375g every 8 hours.       Estimated Creatinine Clearance:

## 2020-06-04 NOTE — CONSULTS
[facility] AND [facility] Infectious Disease  Report of Consultation    Theo Cloud Patient Status:  Emergency    1970 MRN [id number]   Location [address] Attending No att. providers found   Hosp Day # 0 PCP Perry Perry 12/13/2019    foot surgery   • WISDOM TEETH REMOVED       Family History   Problem Relation Age of Onset   • Diabetes Father    • Lipids Father    • Diabetes Mother         Type 2 DM and had GDM before   • Lipids Mother    • Cancer Mother         breast (37.3 °C) Temporal 108 16 99 % 6' 3\" (1.905 m) 270 lb (122.5 kg)       Intake/Output:  I/O this shift:  In: 100 [IV PIGGYBACK:100]  Out: -     Physical Exam:   General: Awake, alert, non-tox and in NAD.    Head: Normocephalic, without obvious abnormality, IV Rx  - Cultures pending  - IV vancomycin and zosyn started    2.  Recent admission in the fall of 1146 for complicated R diabetic foot infection with deep wound  - MRI with evidence of fracture and OM at that time  - Cultures with e.coli, corynebacterium

## 2020-06-04 NOTE — PROGRESS NOTES
Therapeutic Substitution Note    Fenofibrate 54 mg is non-formulary and was auto-substituted to Fenofibrate 67 mg per [employer]change Policy.     Armando Stephens, 06/04/20, 4:17 PM

## 2020-06-04 NOTE — ED PROVIDER NOTES
Patient Seen in: [facility] AND [facility] Emergency Department      History   Patient presents with:  Wound    Stated Complaint: sent by dr. Nori Saenz, diabetic ulcer on left foot      HPI    50-year-old male, with a history of peripheral neuropathy, obesity, hig Systems    Positive for stated complaint: sent by dr. Светлана Stafford, diabetic ulcer on left foot    Other systems are as noted in HPI. Constitutional and vital signs reviewed. All other systems reviewed and negative except as noted above.     Physical Exam (*)     Sodium 135 (*)     BUN 34 (*)     Creatinine 1.61 (*)     BUN/CREA Ratio 21.1 (*)     Calcium, Total 10.2 (*)     GFR, Non- 50 (*)     GFR, -American 58 (*)     All other components within normal limits   MANUAL DIFFERENTIAL

## 2020-06-04 NOTE — PROGRESS NOTES
Samira Tripp is a 51year old male. Patient presents with:   Follow - Up: BS this morning 90, last HGB A1C 10-9-19 7.5, Per patient ulcer on bottom of left foot is worse, draining clear to yellow discharge, denies bleeding, pain from left ankle up to le apply Device 1 each by Does not apply route every 14 (fourteen) days.  Dispense 1 [place] 1 Device 0   • Insulin Pen Needle (BD PEN NEEDLE RAFA U/F) 32G X 4 MM Does not apply Misc Inject twice daily 200 each 3   • CHOLECALCIFEROL 5000 units Oral Tab Ta 26.00        Pack years: 39        Types: Cigarettes      Smokeless tobacco: Never Used    Substance and Sexual Activity      Alcohol use: Not Currently        Alcohol/week: 1.0 standard drinks        Types: 1 Standard drinks or equivalent per week       XR FOOT, COMPLETE (MIN 3 VIEWS), LEFT (CPT=73210); Future    Hammer toe of left foot  -     XR FOOT, COMPLETE (MIN 3 VIEWS), LEFT (CPT=73630);  Future        Plan: Rays were taken I did not notice any significant changes to the third or fourth metatarsa

## 2020-06-04 NOTE — PROGRESS NOTES
[facility] Dosing Service    Initial Pharmacokinetic Consult for Vancomycin Dosing     Delilah Huerta is a 51year old male who is being treated for osteomyelitis.   Pharmacy has been asked to dose Vancomycin by Dr. Connors [place]    He has No Known Allergies

## 2020-06-04 NOTE — H&P
[facility] Hospitalist Team  History and Physical  Admit Date:      ASSESSMENT / PLAN:   52 yo male HTN, HL/Hypertriglyceridemia, DM type II with neuropathy, obesity-BMI 35, depression who presents with left DM foot ulcer/cellulitis, podiatry and ID consulted, se He went in to see the podiatrist today who recommended admission for MRI and IV antibiotics. He denies chest pain, shortness of breath, abdominal pain, nausea, vomiting, diarrhea, dysuria. He has chronic neuropathy to the feet.   He has had a previous • WISDOM TEETH REMOVED          ALL:  No Known Allergies     Home Medications:  TOUJEO MAX SOLOSTAR 300 UNIT/ML Subcutaneous Solution Pen-injector, INJECT 100 UNITS WITH BREAKFAST AND 50 UNITS AT BEDTIME, Disp: 45 mL, Rfl: 0  FENOFIBRATE 54 MG Oral Tab, Not Currently      Alcohol/week: 1.0 standard drinks      Types: 1 Standard drinks or equivalent per week       Fam Hx  Family History   Problem Relation Age of Onset   • Diabetes Father    • Lipids Father    • Diabetes Mother         Type 2 DM and had GDM 6/4/2020    Chief Complaint:   Patient presents with:  Wound      HPI:   Riccardo Montero is a(n) 51year old male w/ PMHx of uncontrolled DM2 c/b neuropathy, DFU w/ hx of resection of R 5th metatarsal head, who presented w/ left foot wound w/ drainage, lew SOLOSTAR 300 UNIT/ML Subcutaneous Solution Pen-injector, INJECT 100 UNITS WITH BREAKFAST AND 50 UNITS AT BEDTIME  FENOFIBRATE 54 MG Oral Tab, TAKE 1 TABLET(54 MG) BY MOUTH DAILY  metFORMIN HCl 1000 MG Oral Tab, Take 1 tablet (1,000 mg total) by mouth 2 (tw Abdomen: Soft, nontender, nondistended. Positive bowel sounds. No rebound tenderness  Neurologic: No focal neurological deficits. Musculoskeletal: LLE w/ edema, erythema, foul smelling left foot wound that is deep  Integument: No lesions. No erythema. 3. Stable mild 1st MTP joint and hallux/sesamoid complex osteoarthritis. 4. Second through fifth hammertoe deformities.     Dictated by (CST): Sandie Chiang MD on 6/04/2020 at 3:28 PM     Finalized by (CST): Sandie Chiang MD on 6/04/2020 at 3:36 P

## 2020-06-05 PROCEDURE — 99253 IP/OBS CNSLTJ NEW/EST LOW 45: CPT | Performed by: PODIATRIST

## 2020-06-05 RX ORDER — HYDRALAZINE HYDROCHLORIDE 25 MG/1
25 TABLET, FILM COATED ORAL EVERY 8 HOURS PRN
Status: DISCONTINUED | OUTPATIENT
Start: 2020-06-05 | End: 2020-06-10

## 2020-06-05 NOTE — HOME CARE LIAISON
Received referral from [facility]. Met with patient at the bedside and provided choice. Pt has hx of [facility]. Patient is agreeable to [facility]. All questions addressed and answered.

## 2020-06-05 NOTE — WOUND PROGRESS NOTE
WOUND CARE NOTE    History:  Past Medical History:   Diagnosis Date   • Depression    • Diabetes ([facility]    • Diabetic neuropathy ([facility]    • Hyperlipidemia LDL goal <100    • Hypertension    • Hypertriglyceridemia    • Neuropathy     FEET   • Obesity (BMI Discharge Recommendations:   May need assist after discharge with dressings and antibiotics    Dulce Grover MD Consult new left foot wound      ASSESSMENT   Matt Score:  Matt Scale Score: 21    Chart Reviewed: yes    Wound(s): found for: PREALBUMIN      Time Spent 15 Minutes.     Cat Rascon RN  [facility] [facility]  [phone]

## 2020-06-05 NOTE — PHYSICAL THERAPY NOTE
PHYSICAL THERAPY EVALUATION - INPATIENT    Room Number: 103/103-A  Evaluation Date: 6/5/2020  Presenting Problem: L foot diabetic ulcer/cellulitis; MRI consistent with 4th metatarsal head osteomyelitis on L foot  Physician Order: PT Eval and Treat    Pro L Lower Extremity: Non-Weight Bearing                    AM-PAC '6-Clicks' INPATIENT SHORT FORM - BASIC MOBILITY  How much difficulty does the patient currently have. ..  -   Turning over in bed (including adjusting bedclothes, sheets and blankets)?: None transfers w/ supervision. Pt was educated on LLE NWB status. Pt amb 100 ft w/ RW & supervision demonstrated understanding of LLE NWB status. Pt stated that he prefers ambulating w/ crutches which is what he is used to at home.  Crutches were left in him rodriguez

## 2020-06-05 NOTE — CM/SW NOTE
6/5 339pm: Infusion companies cannot verify the pt's benefit until insurance becomes active again. They will rerun benefits on Monday.  ----------------  6/5 220pm: [employer] met with the pt. At bedside. The pt.  Lives with his wife and son in a 2 level home wit

## 2020-06-05 NOTE — OCCUPATIONAL THERAPY NOTE
OCCUPATIONAL THERAPY EVALUATION - INPATIENT     Room Number: 103/103-A  Evaluation Date: 6/5/2020  Type of Evaluation: Initial  Presenting Problem: (L foot ulcer)    Physician Order: IP Consult to Occupational Therapy  Reason for Therapy: ADL/IADL Dysfun ([facility]    • Diabetic neuropathy ([facility]    • Hyperlipidemia LDL goal <100    • Hypertension    • Hypertriglyceridemia    • Neuropathy     FEET   • Obesity (BMI 30-39. 9)     BMI 32   • Osteomyelitis ([facility] 2019    right foot   • PONV (postoperative nausea and v on and taking off regular lower body clothing?: None  -   Bathing (including washing, rinsing, drying)?: None  -   Toileting, which includes using toilet, bedpan or urinal? : None  -   Putting on and taking off regular upper body clothing?: None  -   [place]

## 2020-06-05 NOTE — CONSULTS
[facility]    Report of Consultation    Miko New Patient Status:  Inpatient    1970 MRN [id number]   Location [facility] 1W Attending Matthew Aparicio MD   Hosp Day # 1 PCP Juana Parker MD     Date of Admission:  2020  Date smoking. His smoking use included cigarettes. He has a 39.00 pack-year smoking history. He has never used smokeless tobacco. He reports previous alcohol use of about 1.0 standard drinks of alcohol per week. He reports that he does not use drugs.     Tala Caceres flexpen 1-11 Units, 1-11 Units, Subcutaneous, TID CC    Review of Systems:  Musculoskeletal:negative, patient is currently ambulatory walking on it feels no pain    Physical Exam:     1.  Integument: The skin on both feet was examined is warm and dry deng subcutaneous abscess. 2. Diffuse T2 hyperintensity throughout the subcutaneous fat of the dorsal aspect of the left forefoot, which could relate to edema or cellulitis; correlate clinically.  3. Stable mild 1st MTP joint and hallux/sesamoid complex osteoart unspecified part of foot, unspecified ulcer stage ([address]     Diabetic foot ulcer with osteomyelitis ([address]      Today discussed with the patient I did not notice any erosive changes on the MRI or on the x-ray even though there was osteopenia of the fourth me

## 2020-06-05 NOTE — PROGRESS NOTES
[facility] AND [facility] Infectious Disease  Progress Note    Chris Lora Patient Status:  Inpatient    1970 MRN [id number]   Location [facility] 1W Attending Leandro Shannon MD   Hosp Day # 1 PCP Kirti Hodgson MD     Subjective:  Patient seen/exam hallux/sesamoid complex osteoarthritis. 4. Second through fifth hammertoe deformities.     Assessment and Plan:     1.   Complicated L diabetic foot infection with necrosis and foul smell  - MRI c/w OM of the 4th metatarsal head  - Dr. Sergio Velasquez following -

## 2020-06-05 NOTE — PLAN OF CARE
Report called to [facility] RN, pt updated with POC, aware of transfer to room 556

## 2020-06-05 NOTE — PROGRESS NOTES
[facility] Hospitalist Team  Progress Note    Kisha Whitley Patient Status:  Inpatient    1970 MRN [id number]   Location [facility] 1W Attending Lindsey Gutierrez MD   Hosp Day # 1 PCP Edvin Forte MD     CC: Follow Up  PCP: Edvin Forte MD    SEE ATTENDING NO Group Hospitalist Team  Pager [phone]  Answering Service number: [phone]  6/5/2020    SUBJECTIVE:   Fever up to 102. 7. works as . Has been using mask. Will check rapid COVID. Plans for PICC line.  Per pt no plans for surgery per Min PRN    Or  dextrose 50 % injection 50 mL, 50 mL, Intravenous, Q15 Min PRN    Or  glucose (DEX4) oral liquid 30 g, 30 g, Oral, Q15 Min PRN    Or  Glucose-Vitamin C (DEX-4) chewable tab 8 tablet, 8 tablet, Oral, Q15 Min PRN  buPROPion HCl ER (XL) [facility] Stable mild 1st MTP joint and hallux/sesamoid complex osteoarthritis. 4. Second through fifth hammertoe deformities.     Dictated by (CST): Peter Ocampo MD on 6/04/2020 at 3:28 PM     Finalized by (CST): Peter Ocampo MD on 6/04/2020 at 3:36 PM    SHANNON-improving   -baseline Cr ~1-1.2  -admission Cr 1.6-->1.23  -IVF      DM Type II with Neuropathy  -HgbA1C 9.4  -home regimen: toujeo 100 units with breakfast and 50 units, metformin   -hold home oral medications, insulin-levemir decreased to 35 uni

## 2020-06-06 RX ORDER — VANCOMYCIN HYDROCHLORIDE
15 EVERY 8 HOURS
Status: DISCONTINUED | OUTPATIENT
Start: 2020-06-06 | End: 2020-06-06

## 2020-06-06 NOTE — PLAN OF CARE
Problem: Diabetes/Glucose Control  Goal: Glucose maintained within prescribed range  Description  INTERVENTIONS:  - Monitor Blood Glucose as ordered  - Assess for signs and symptoms of hyperglycemia and hypoglycemia  - Administer ordered medications to m ADULT  Goal: Incision(s), wounds(s) or drain site(s) healing without S/S of infection  Description  INTERVENTIONS:  - Assess and document risk factors for pressure ulcer development  - Assess and document skin integrity  - Assess and document dressing/inci

## 2020-06-06 NOTE — PROGRESS NOTES
[facility] dosing service    Follow-up Pharmacokinetic Consult for Vancomycin Dosing     Kisha Whitley is a 51year old male who is being treated for osteomyelitis. Patient is on day 2 of Vancomycin and is currently receiving 1.5 gm IV Q 12 hours. <=4 Sensitive      Ciprofloxacin 0.5 Sensitive      Gentamicin <=1 Sensitive      Meropenem <=0.25 Sensitive      Levofloxacin 1 Sensitive      Piperacillin + Tazobactam <=4 Sensitive      Trimethoprim/Sulfa >=320 Resistant        Based on the above:    1.

## 2020-06-06 NOTE — PROGRESS NOTES
Patient was seen resting comfortably in bed. His vital signs were reviewed as follows:   06/06/20  0753   BP: 127/69   Pulse: 90   Resp: 18   Temp: 98.6 °F (37 °C)   Currently stable and afebrile but has been febrile almost the whole time while here.     O

## 2020-06-06 NOTE — PLAN OF CARE
Pt remains febrile, lianna bailey and noa aware, continue with administration of tylenol as ordered, pt denies pain at present time, ,seen by wound care team, dressing in place,   Problem: Diabetes/Glucose Control  Goal: Glucose maintained within prescribed [facility] patient and family perspectives and choices   Outcome: Progressing

## 2020-06-06 NOTE — PROGRESS NOTES
[facility] Hospitalist Team  Progress Note    Tanisha Bhatia Patient Status:  Inpatient    1970 MRN [id number]   Location [facility] 1W Attending Bryon Manning MD   Hosp Day # 2 PCP Rafa Hill MD     CC: Follow Up  PCP: Rafa Hill MD         ASSESSMENT/ chest pain or sob    OBJECTIVE:   Blood pressure 127/69, pulse 90, temperature 98.6 °F (37 °C), temperature source Oral, resp. rate 18, height 6' 3\" (1.905 m), weight 279 lb (126.6 kg), SpO2 99 %.     GENERAL: no apparent distress, overweight  NEURO: A/A O tablet, 4 tablet, Oral, Q15 Min PRN    Or  dextrose 50 % injection 50 mL, 50 mL, Intravenous, Q15 Min PRN    Or  glucose (DEX4) oral liquid 30 g, 30 g, Oral, Q15 Min PRN    Or  Glucose-Vitamin C (DEX-4) chewable tab 8 tablet, 8 tablet, Oral, Q15 Min PRN  b deformities. Dictated by (CST): Beryle Queen, MD on 6/04/2020 at 3:28 PM     Finalized by (CST): Beryle Queen, MD on 6/04/2020 at 3:36 PM          Us Venous Doppler Leg Left - Diag Img (cpt=93971)    Result Date: 6/4/2020  CONCLUSION:  1.  Norm

## 2020-06-06 NOTE — PROGRESS NOTES
[facility] AND [facility] Infectious Disease Progress Note    Artemio Becker Patient Status:  Inpatient    1970 MRN [id number]   Location [facility] 5SW/SE Attending Behzad Lowe MD   Hosp Day # 2 PCP Pam Feng MD     Subjective:  Pt with no new Aspart Pen (NOVOLOG) 100 UNIT/ML flexpen 1-11 Units, 1-11 Units, Subcutaneous, TID CC    Physical Exam:  General: Alert, orientated x3. Cooperative. No apparent distress.   Vital Signs:  Blood pressure 127/69, pulse 90, temperature 98.6 °F (37 °C), temper ordered  -anticipate outpatient IV abx    If you have any questions or concerns please call [facility] at [phone].      JOHN FRENCH NP  6/6/2020  9:34 AM

## 2020-06-07 ENCOUNTER — ANESTHESIA (OUTPATIENT)
Dept: SURGERY | Facility: HOSPITAL | Age: 50
DRG: 617 | End: 2020-06-07
Payer: COMMERCIAL

## 2020-06-07 ENCOUNTER — ANESTHESIA EVENT (OUTPATIENT)
Dept: SURGERY | Facility: HOSPITAL | Age: 50
DRG: 617 | End: 2020-06-07
Payer: COMMERCIAL

## 2020-06-07 PROCEDURE — 0Y6N0ZD DETACHMENT AT LEFT FOOT, PARTIAL 4TH RAY, OPEN APPROACH: ICD-10-PCS | Performed by: PODIATRIST

## 2020-06-07 PROCEDURE — 28810 AMPUTATION TOE & METATARSAL: CPT | Performed by: PODIATRIST

## 2020-06-07 RX ORDER — HYDROMORPHONE HYDROCHLORIDE 1 MG/ML
0.2 INJECTION, SOLUTION INTRAMUSCULAR; INTRAVENOUS; SUBCUTANEOUS EVERY 5 MIN PRN
Status: DISCONTINUED | OUTPATIENT
Start: 2020-06-07 | End: 2020-06-07 | Stop reason: HOSPADM

## 2020-06-07 RX ORDER — LIDOCAINE HYDROCHLORIDE 10 MG/ML
INJECTION, SOLUTION EPIDURAL; INFILTRATION; INTRACAUDAL; PERINEURAL AS NEEDED
Status: DISCONTINUED | OUTPATIENT
Start: 2020-06-07 | End: 2020-06-07 | Stop reason: SURG

## 2020-06-07 RX ORDER — MORPHINE SULFATE 4 MG/ML
4 INJECTION, SOLUTION INTRAMUSCULAR; INTRAVENOUS EVERY 10 MIN PRN
Status: DISCONTINUED | OUTPATIENT
Start: 2020-06-07 | End: 2020-06-07 | Stop reason: HOSPADM

## 2020-06-07 RX ORDER — SODIUM CHLORIDE, SODIUM LACTATE, POTASSIUM CHLORIDE, CALCIUM CHLORIDE 600; 310; 30; 20 MG/100ML; MG/100ML; MG/100ML; MG/100ML
INJECTION, SOLUTION INTRAVENOUS CONTINUOUS PRN
Status: DISCONTINUED | OUTPATIENT
Start: 2020-06-07 | End: 2020-06-07 | Stop reason: SURG

## 2020-06-07 RX ORDER — HYDROCODONE BITARTRATE AND ACETAMINOPHEN 5; 325 MG/1; MG/1
2 TABLET ORAL AS NEEDED
Status: DISCONTINUED | OUTPATIENT
Start: 2020-06-07 | End: 2020-06-07 | Stop reason: HOSPADM

## 2020-06-07 RX ORDER — MORPHINE SULFATE 10 MG/ML
6 INJECTION, SOLUTION INTRAMUSCULAR; INTRAVENOUS EVERY 10 MIN PRN
Status: DISCONTINUED | OUTPATIENT
Start: 2020-06-07 | End: 2020-06-07 | Stop reason: HOSPADM

## 2020-06-07 RX ORDER — NALOXONE HYDROCHLORIDE 0.4 MG/ML
80 INJECTION, SOLUTION INTRAMUSCULAR; INTRAVENOUS; SUBCUTANEOUS AS NEEDED
Status: DISCONTINUED | OUTPATIENT
Start: 2020-06-07 | End: 2020-06-07 | Stop reason: HOSPADM

## 2020-06-07 RX ORDER — HALOPERIDOL 5 MG/ML
0.25 INJECTION INTRAMUSCULAR ONCE AS NEEDED
Status: DISCONTINUED | OUTPATIENT
Start: 2020-06-07 | End: 2020-06-07 | Stop reason: HOSPADM

## 2020-06-07 RX ORDER — ONDANSETRON 2 MG/ML
4 INJECTION INTRAMUSCULAR; INTRAVENOUS ONCE AS NEEDED
Status: DISCONTINUED | OUTPATIENT
Start: 2020-06-07 | End: 2020-06-07 | Stop reason: HOSPADM

## 2020-06-07 RX ORDER — ONDANSETRON 2 MG/ML
INJECTION INTRAMUSCULAR; INTRAVENOUS AS NEEDED
Status: DISCONTINUED | OUTPATIENT
Start: 2020-06-07 | End: 2020-06-07 | Stop reason: SURG

## 2020-06-07 RX ORDER — SCOLOPAMINE TRANSDERMAL SYSTEM 1 MG/1
1 PATCH, EXTENDED RELEASE TRANSDERMAL ONCE
Status: DISCONTINUED | OUTPATIENT
Start: 2020-06-07 | End: 2020-06-07 | Stop reason: HOSPADM

## 2020-06-07 RX ORDER — PROCHLORPERAZINE EDISYLATE 5 MG/ML
5 INJECTION INTRAMUSCULAR; INTRAVENOUS ONCE AS NEEDED
Status: DISCONTINUED | OUTPATIENT
Start: 2020-06-07 | End: 2020-06-07 | Stop reason: HOSPADM

## 2020-06-07 RX ORDER — SODIUM CHLORIDE, SODIUM LACTATE, POTASSIUM CHLORIDE, CALCIUM CHLORIDE 600; 310; 30; 20 MG/100ML; MG/100ML; MG/100ML; MG/100ML
INJECTION, SOLUTION INTRAVENOUS CONTINUOUS
Status: DISCONTINUED | OUTPATIENT
Start: 2020-06-07 | End: 2020-06-07 | Stop reason: HOSPADM

## 2020-06-07 RX ORDER — DEXTROSE MONOHYDRATE 25 G/50ML
50 INJECTION, SOLUTION INTRAVENOUS
Status: DISCONTINUED | OUTPATIENT
Start: 2020-06-07 | End: 2020-06-07 | Stop reason: HOSPADM

## 2020-06-07 RX ORDER — HYDROCODONE BITARTRATE AND ACETAMINOPHEN 5; 325 MG/1; MG/1
1 TABLET ORAL AS NEEDED
Status: DISCONTINUED | OUTPATIENT
Start: 2020-06-07 | End: 2020-06-07 | Stop reason: HOSPADM

## 2020-06-07 RX ORDER — SODIUM CHLORIDE 0.9 % (FLUSH) 0.9 %
10 SYRINGE (ML) INJECTION AS NEEDED
Status: DISCONTINUED | OUTPATIENT
Start: 2020-06-07 | End: 2020-06-07 | Stop reason: HOSPADM

## 2020-06-07 RX ORDER — MORPHINE SULFATE 4 MG/ML
2 INJECTION, SOLUTION INTRAMUSCULAR; INTRAVENOUS EVERY 10 MIN PRN
Status: DISCONTINUED | OUTPATIENT
Start: 2020-06-07 | End: 2020-06-07 | Stop reason: HOSPADM

## 2020-06-07 RX ORDER — HYDROMORPHONE HYDROCHLORIDE 1 MG/ML
0.4 INJECTION, SOLUTION INTRAMUSCULAR; INTRAVENOUS; SUBCUTANEOUS EVERY 5 MIN PRN
Status: DISCONTINUED | OUTPATIENT
Start: 2020-06-07 | End: 2020-06-07 | Stop reason: HOSPADM

## 2020-06-07 RX ORDER — HYDROMORPHONE HYDROCHLORIDE 1 MG/ML
0.6 INJECTION, SOLUTION INTRAMUSCULAR; INTRAVENOUS; SUBCUTANEOUS EVERY 5 MIN PRN
Status: DISCONTINUED | OUTPATIENT
Start: 2020-06-07 | End: 2020-06-07 | Stop reason: HOSPADM

## 2020-06-07 RX ADMIN — ONDANSETRON 4 MG: 2 INJECTION INTRAMUSCULAR; INTRAVENOUS at 07:44:00

## 2020-06-07 RX ADMIN — SODIUM CHLORIDE, SODIUM LACTATE, POTASSIUM CHLORIDE, CALCIUM CHLORIDE: 600; 310; 30; 20 INJECTION, SOLUTION INTRAVENOUS at 08:27:00

## 2020-06-07 RX ADMIN — LIDOCAINE HYDROCHLORIDE 50 MG: 10 INJECTION, SOLUTION EPIDURAL; INFILTRATION; INTRACAUDAL; PERINEURAL at 07:33:00

## 2020-06-07 RX ADMIN — SODIUM CHLORIDE, SODIUM LACTATE, POTASSIUM CHLORIDE, CALCIUM CHLORIDE: 600; 310; 30; 20 INJECTION, SOLUTION INTRAVENOUS at 07:44:00

## 2020-06-07 NOTE — PLAN OF CARE
Problem: Patient/Family Goals  Goal: Patient/Family Long Term Goal  Description  Patient's Long Term Goal: get back home and back to work    Interventions:  - meds as ordered    - See additional Care Plan goals for specific interventions   Outcome: Progr ADULT  Goal: Incision(s), wounds(s) or drain site(s) healing without S/S of infection  Description  INTERVENTIONS:  - Assess and document risk factors for pressure ulcer development  - Assess and document skin integrity  - Assess and document dressing/inci

## 2020-06-07 NOTE — OPERATIVE REPORT
[facility]    PATIENT'S NAME: Toi Townsend   ATTENDING PHYSICIAN: Bobby Wills MD   OPERATING PHYSICIAN: Bob Subramanian DPM   PATIENT ACCOUNT#:   [de-identified]    LOCATION:  [address] [place] [address] #:   [id number]       DATE OF BIRTH: brought into the operating room with vital signs stable and placed in a supine position on the operating table. Time-out was taken with all personnel present. There were no additions, deletions, or concerns reported.   The patient was placed under general gauze, 4 x 4 gauze sponges, ABD pads, Kerlix rolls as well as an Ace wrap.   The patient tolerated the above anesthesia and procedure well, left the operating room with vital signs stable and vascular status of his left foot intact to recovery room via cart

## 2020-06-07 NOTE — ANESTHESIA POSTPROCEDURE EVALUATION
Patient: Hannah Blanco    Procedure Summary     Date:  06/07/20 Room / Location:  [address] [place] [address] MAIN OR 04 / [address] [place] [address] MAIN OR    Anesthesia Start:  4703 Anesthesia Stop:      Procedure:  TOE AMPUTATION (Left ) Diagnosis:       Osteomyelitis (HCC)      Cellulitis of fo

## 2020-06-07 NOTE — ANESTHESIA PROCEDURE NOTES
Airway  Date/Time: 6/7/2020 7:44 AM  Urgency: Elective    Airway not difficult    General Information and Staff    Patient location during procedure: OR  Anesthesiologist: Misti Jarquin MD  Performed: anesthesiologist     Indications and Patient Conditio

## 2020-06-07 NOTE — PROGRESS NOTES
[facility] AND [facility] Infectious Disease Progress Note    Hannah Sera Patient Status:  Inpatient    1970 MRN [id number]   Location [facility] 5SW/SE Attending Krish Pascual MD   Hosp Day # 3 PCP Radha Mccollum MD     Subjective:  Pt seen bedsid breakfast  •  Insulin Aspart Pen (NOVOLOG) 100 UNIT/ML flexpen 5 Units, 5 Units, Subcutaneous, TID CC  •  Insulin Aspart Pen (NOVOLOG) 100 UNIT/ML flexpen 1-11 Units, 1-11 Units, Subcutaneous, TID CC    Physical Exam:  General: Alert, orientated x3.   Coope R DFU with OM  -hx of E Coli and Corynebacterium  -s/p OR resection and IV abx    4. DMII  -needs strict glycemic control for optimal healing    DISPO:  Cont IV Vanco and Zosyn today. PICC line tomorrow. Follow up OR cultures, trend temps and WBC.       If

## 2020-06-07 NOTE — PROGRESS NOTES
[facility] Hospitalist Team  Progress Note    Miko New Patient Status:  Inpatient    1970 MRN [id number]   Location [facility] 1W Attending Matthew Aparicio MD   Hosp Day # 3 PCP Juana Parker MD     CC: Follow Up  PCP: Juana Parker MD       ASSESSMENT/PL SUBJECTIVE:      Feeling better, fever curve improving, no chest pain or sob    OBJECTIVE:   Blood pressure 114/67, pulse 84, temperature 98.5 °F (36.9 °C), temperature source Oral, resp.  rate 20, height 6' 3\" (1.905 m), weight 279 lb (126.6 kg), SpO2 PRN    Or  HYDROcodone-acetaminophen (NORCO) 5-325 MG per tab 2 tablet, 2 tablet, Oral, Q4H PRN  glucose (DEX4) oral liquid 15 g, 15 g, Oral, Q15 Min PRN    Or  Glucose-Vitamin C (DEX-4) chewable tab 4 tablet, 4 tablet, Oral, Q15 Min PRN    Or  dextrose 50 reactive lymph node left inguinal region    Dictated by (CST): Chelsi Phoenix MD on 6/04/2020 at 4:58 PM     Finalized by (CST): Chelsi Phoenix MD on 6/04/2020 at 5:00 PM

## 2020-06-07 NOTE — BRIEF OP NOTE
Pre-Operative Diagnosis: Osteomyelitis of the fourth metatarsal head with surrounding purulent infection as well as necrosis of the plantar foot (pregangrenous changes)     Post-Operative Diagnosis: Same     Procedure Performed:   Procedure(s):  PARTIAL FO

## 2020-06-07 NOTE — PROGRESS NOTES
Consents  Pt signed consent for surgery & consent for amputation form. Anesthesia consent on chart still to be signed. Npo after midnoc, pt aware. u

## 2020-06-07 NOTE — ANESTHESIA PREPROCEDURE EVALUATION
Anesthesia PreOp Note    HPI:     Jolie Hahn is a 51year old male who presents for preoperative consultation requested by: Radha Akbar DPM    Date of Surgery: 6/4/2020 - 6/7/2020    Procedure(s):  TOE AMPUTATION  Indication: Osteomyelitis (H <100         Date Noted: 08/18/2008        Past Medical History:   Diagnosis Date   • Depression    • Diabetes ([facility]    • Di[facility]    • Hyperlipidemia LDL goal <100    • Hypertension    • Hypertriglyceridemia    • Neuropathy     FEET   • O Vitamins-Minerals (MULTI-VITAMIN/MINERALS) Oral Tab, Take 1 tablet by mouth daily. , Disp: , Rfl: , Taking  TURMERIC OR, Take 1 capsule by mouth daily with dinner.  , Disp: , Rfl: , Taking  lisinopril 5 MG Oral Tab, TAKE 1 TABLET(5 MG) BY MOUTH DAILY (Denicee at 06/06/20 0455    Or  HYDROcodone-acetaminophen (NORCO) 5-325 MG per tab 1 tablet, 1 tablet, Oral, Q4H PRN, Arianna Cintron NP    Or  HYDROcodone-acetaminophen (NORCO) 5-325 MG per tab 2 tablet, 2 tablet, Oral, Q4H PRN, Arianna Cintron NP  glucose (DEX Mother         atrial fib--resolved after cardioversion   • Cancer Sister         skin   • Other (autistic) Son      Social History    Socioeconomic History      Marital status:       Spouse name: Not on file      Number of children: Not on file Asked        Special Diet: Not Asked        Back Care: Not Asked        Exercise: Not Asked        Bike Helmet: Not Asked        Seat Belt: Yes        Self-Exams: Not Asked    Social History Narrative      Not on file      Available pre-op labs reviewed. normal exam               Anesthesia Plan:   ASA:  3  Plan:   General  Airway:  LMA  Post-op Pain Management: IV analgesics  Informed Consent Plan and Risks Discussed With:  Patient      I have informed Christensen Sluder and/or legal guardian or family memb

## 2020-06-08 ENCOUNTER — APPOINTMENT (OUTPATIENT)
Dept: PICC SERVICES | Facility: HOSPITAL | Age: 50
DRG: 617 | End: 2020-06-08
Attending: PODIATRIST
Payer: COMMERCIAL

## 2020-06-08 ENCOUNTER — APPOINTMENT (OUTPATIENT)
Dept: GENERAL RADIOLOGY | Facility: HOSPITAL | Age: 50
DRG: 617 | End: 2020-06-08
Attending: PODIATRIST
Payer: COMMERCIAL

## 2020-06-08 PROCEDURE — 71045 X-RAY EXAM CHEST 1 VIEW: CPT | Performed by: PODIATRIST

## 2020-06-08 PROCEDURE — 02HV33Z INSERTION OF INFUSION DEVICE INTO SUPERIOR VENA CAVA, PERCUTANEOUS APPROACH: ICD-10-PCS | Performed by: HOSPITALIST

## 2020-06-08 RX ORDER — LISINOPRIL 5 MG/1
5 TABLET ORAL DAILY
Status: DISCONTINUED | OUTPATIENT
Start: 2020-06-09 | End: 2020-06-10

## 2020-06-08 NOTE — PROGRESS NOTES
[facility] Hospitalist Team  Progress Note    Misti Session Patient Status:  Inpatient    1970 MRN [id number]   Location [facility] 5SW/SE Attending Valentin Lomeli MD   Hosp Day # 4 PCP Kristina Clements MD     CC: Follow Up  PCP: Kristina Cleemnts MD    SEE [facility] MD       Concerns regarding plan of care were discussed with patient. Patient agrees with plan as detailed above.  Discussed plan of care with Dr. Mike Ellis RN, NP   [facility] Hospitalist Team  Pager [phone]   Answering Service nu injection 3 mL, 3 mL, Intravenous, PRN  Heparin Sodium (Porcine) 5000 UNIT/ML injection 5,000 Units, 5,000 Units, Subcutaneous, Q8H SAM  acetaminophen (TYLENOL) tab 650 mg, 650 mg, Oral, Q4H PRN    Or  HYDROcodone-acetaminophen (NORCO) 5-325 MG per tab 1 t vac in place  Skin: no rashes or lesions, well perfused  Psych: mood stable, cooperative  Neuro: no focal deficits    Assessment and Plan  DM Left Foot Ulcer/Cellulitis/OM  -hx of right OM, cx grew e coli and coryne, d/c on ceftriaxone 2019  -WBC  Now norm

## 2020-06-08 NOTE — PROGRESS NOTES
[facility] Infectious Disease  Progress Note    [facility]:  [facility]    1970 MRN [id number]   Location [facility] 5SW/SE Attending Elmyra Nageotte, MD   Hosp Day # 4 PCP Lise Whaley MD     Subjective:  Patient seen/ Second through fifth hammertoe deformities.     Assessment and Plan:     1.  Complicated L diabetic foot infection with necrosis and foul smell  - MRI c/w OM of the 4th metatarsal head  - Dr. Newt Fleischer following - suspect surgery + long course IV Rx  - Cultu

## 2020-06-08 NOTE — CM/SW NOTE
Addendum 245:  Per Krissy Ambrose at [place] pt plan is not active since 4/1/20 per Chris Hargrove. @ . [address] Case reference # [id number]. SW informed pt.      CHEKO met w/ pt bedside to inform him that insurance is still inactive, and to f/u on payment for d/c plan of IV abx

## 2020-06-08 NOTE — PROGRESS NOTES
[facility] dosing service    Follow-up Pharmacokinetic Consult for Vancomycin Dosing     Misti Elias is a 51year old male who is being treated for osteomyelitis. Patient is on day 1 of Vancomycin and is currently receiving 1.5 gm IV Q 8 hours.   G 4+ Gram positive cocci in clusters N/A    Aerobic Smear 4+ Gram Negative Rods N/A       Susceptibility    Escherichia coli -  (no method available)     Ampicillin >=32 Resistant      Ampicillin + Sulbactam 8 Sensitive      Cefazolin <=4 Sensitive      Cipr

## 2020-06-08 NOTE — WOUND PROGRESS NOTE
WOUND CARE NOTE    History:  Past Medical History:   Diagnosis Date   • Depression    • Diabetes ([facility]    • Di[facility]    • Hyperlipidemia LDL goal <100    • Hypertension    • Hypertriglyceridemia    • Neuropathy     FEET   • Obesity (BMI sutrues were removed from the dorsal foot, 2 sutures between the toes were left intact. The wound was cleansed with saline, skin protectant applied. The wound was lined with mepitel one.  2 pcs black vac sponge were used, disc bridge up the lateral lower le

## 2020-06-08 NOTE — PROGRESS NOTES
06/08/20  0412   BP: 133/76   Pulse: 88   Resp: 20   Temp: 99.2 °F (37.3 °C)   Patient was seen resting comfortably in bed temperature slightly elevated at 99.2. Objective: Tissue culture so far negative.   Dressing was changed in anticipation of using

## 2020-06-09 RX ORDER — INSULIN GLARGINE 300 U/ML
INJECTION, SOLUTION SUBCUTANEOUS
Qty: 45 ML | Refills: 0 | Status: SHIPPED | OUTPATIENT
Start: 2020-06-09 | End: 2020-07-13

## 2020-06-09 NOTE — PROGRESS NOTES
[facility] Hospitalist Team  Progress Note    Artemio Becker Patient Status:  Inpatient    1970 MRN [id number]   Location [facility] 5SW/SE Attending Behzad Lowe MD   Hosp Day # 5 PCP Pam Feng MD     CC: Follow Up  PCP: Pam Feng MD    SEE Milton Kim MD       Concerns regarding plan of care were discussed with patient. Patient agrees with plan as detailed above.  Discussed plan of care with Dr. Gertrude Bhagat RN, NP   [facility] Hospitalist Team  Pager [phone]   Answering Service nu Daily  hydrALAzine HCl (APRESOLINE) tab 25 mg, 25 mg, Oral, Q8H PRN  Normal Saline Flush 0.9 % injection 3 mL, 3 mL, Intravenous, PRN  Heparin Sodium (Porcine) 5000 UNIT/ML injection 5,000 Units, 5,000 Units, Subcutaneous, Q8H SAM  acetaminophen (TYLENOL) 12:53 PM              SEE ATTENDING NOTE BELOW:     Patient seen and examined independently. Discussed with APN and agree with note above.       S: feeling well, no pain, ready to go    objective  /74 (BP Location: Right arm)   Pulse 88   Temp 98 °F

## 2020-06-09 NOTE — HOME CARE LIAISON
Addendum:  Received voicemail from AALIYAH Marte regarding home health services for this patient.  Spoke to Stephanie in [address] [place] [address] wound clinic and notified her that [facility] was unable to accept this patient while he was admitted at [address] [place] [address] due to [address]

## 2020-06-09 NOTE — WOUND PROGRESS NOTE
Pt seen for wound vac check. Vac is running at 125 mmHg and a patent seal is being detected. There is a scant amount of sanguinous drainage in canister. Pt stated that he called his insurance yesterday and \"hopefully straightened it all out\".  When ID # f

## 2020-06-09 NOTE — CM/SW NOTE
Addendum 7188:   Per Pamela Mcduffie at [facility], pt's insurance remains inactive and there is no record of pt f/u per [facility] reference #[id number].     Pt has wc appointment scheduled for 6/15 @ 8am.      Per [facility], pt is on a do not admit d/t large balance

## 2020-06-09 NOTE — PROGRESS NOTES
[facility] AND [facility] Infectious Disease Progress Note    [facility] Patient Status:  Inpatient    1970 MRN [id number]   Location [facility] 5SW/SE Attending Elmyra Nageotte, MD   Hosp Day # 5 PCP Lise Whaley MD     Subjective:  Pt seen bedsid 5 Units, 5 Units, Subcutaneous, TID CC  •  Insulin Aspart Pen (NOVOLOG) 100 UNIT/ML flexpen 1-11 Units, 1-11 Units, Subcutaneous, TID CC    Physical Exam:  General: Alert, orientated x3. Cooperative. No apparent distress.   Vital Signs:  Blood pressure 14 with Corynebacterium, but initial presentation worrisome for anaerobes as well. Will discharge on IV Invanz for 6 weeks through 7/20/20. Weekly CBC, CMP, CRP. Wound care with vac.    Hard Rx left in chart and orders entered thru cancer center as well by

## 2020-06-09 NOTE — DISCHARGE SUMMARY
[facility] Hospitalist Discharge Summary   Patient ID:  Artemio Becker  [id number]  89 year old  6/30/1970    Admit date: 6/4/2020  Discharge date: 6/10/2020    Primary Care Physician: Pam Feng MD   Attending Physician: Behzad Lowe MD   Consults:   Consultants amputation with debridement of left foot   -US left leg negative for DVT  -wound cx ecoli and strep   -wound vac unable to be covered as insurance could not be confirmed, local wound care as per wound team, wound clinic follow up on 6/15  -invanz until 7/2 to take this     lisinopril 5 MG Tabs  TAKE 1 TABLET(5 MG) BY MOUTH DAILY  What changed:    · how much to take  · how to take this  · when to take this        CONTINUE taking these medications    buPROPion HCl ER (XL) 300 MG Tb24  Commonly known as:  Han Mg up  Contact information:  [address]   Paz Shannon MD.    Specialty:  Internal Medicine  Why:  hospital follow up  Contact information:  [address] [facility].   [address] [facility] with OM, no drain able abcess and dorsum of the foot with edema or cellultis   -6/7 S/P  I&D and 4th ray amputation with debridement of left foot   -US left leg negative for DVT  -wound cx ecoli and strep   -wound vac unable to be covered as insurance coul

## 2020-06-10 VITALS
OXYGEN SATURATION: 98 % | RESPIRATION RATE: 18 BRPM | BODY MASS INDEX: 34.69 KG/M2 | SYSTOLIC BLOOD PRESSURE: 145 MMHG | HEIGHT: 75 IN | WEIGHT: 279 LBS | TEMPERATURE: 99 F | DIASTOLIC BLOOD PRESSURE: 93 MMHG | HEART RATE: 94 BPM

## 2020-06-10 NOTE — CM/SW NOTE
SW spoke with pt and requested reference number or contact to confirm pt's insurance is active. Pt stated he spoke to his agent, and she will get back to him. Pt aware that he will likely receive op IV Abx at the [facility] and he is agreeable.

## 2020-06-10 NOTE — DOWNTIME EVENT NOTE
The EMR was down for 2.5 hours on 6/10/2020. [facility] was responsible for completing the paper charting during this time period.      The following information was re-entered into the system by [facility]: n/a    The following information will remain in

## 2020-06-10 NOTE — WOUND PROGRESS NOTE
Pt was seen for wound vac removal from left foot surgical wound. Per Dr. Magnus Olszewski, dressings will be daily: silver wound gel to 4x4 gauze, gently pack the left foot wound, cover with dry 4x4, ABD pad, secure with kerlix roll and ace.  This dressing was demon

## 2020-06-11 ENCOUNTER — OFFICE VISIT (OUTPATIENT)
Dept: HEMATOLOGY/ONCOLOGY | Facility: HOSPITAL | Age: 50
End: 2020-06-11
Attending: INTERNAL MEDICINE
Payer: COMMERCIAL

## 2020-06-11 VITALS
RESPIRATION RATE: 18 BRPM | HEART RATE: 94 BPM | OXYGEN SATURATION: 97 % | DIASTOLIC BLOOD PRESSURE: 61 MMHG | SYSTOLIC BLOOD PRESSURE: 109 MMHG | TEMPERATURE: 98 F

## 2020-06-11 DIAGNOSIS — L97.509 DIABETIC FOOT ULCER WITH OSTEOMYELITIS (HCC): Primary | ICD-10-CM

## 2020-06-11 DIAGNOSIS — M86.9 DIABETIC FOOT ULCER WITH OSTEOMYELITIS (HCC): Primary | ICD-10-CM

## 2020-06-11 DIAGNOSIS — E11.621 DIABETIC FOOT ULCER WITH OSTEOMYELITIS (HCC): Primary | ICD-10-CM

## 2020-06-11 DIAGNOSIS — E11.69 DIABETIC FOOT ULCER WITH OSTEOMYELITIS (HCC): Primary | ICD-10-CM

## 2020-06-11 PROCEDURE — 96365 THER/PROPH/DIAG IV INF INIT: CPT

## 2020-06-11 NOTE — PROGRESS NOTES
Patient to center to receive Caresse Lax for  Diabetic foot ulcer with osteomyelitis   Ger Songwilfredo arrived ambulatory using crutches, his left foot is wrapped. Dressing is clean dry and intact. He denies any pain, fever or chills.   He did receive Caresse Lax in [facility]

## 2020-06-12 ENCOUNTER — OFFICE VISIT (OUTPATIENT)
Dept: HEMATOLOGY/ONCOLOGY | Facility: HOSPITAL | Age: 50
End: 2020-06-12
Attending: INTERNAL MEDICINE
Payer: COMMERCIAL

## 2020-06-12 VITALS
OXYGEN SATURATION: 99 % | TEMPERATURE: 98 F | DIASTOLIC BLOOD PRESSURE: 68 MMHG | RESPIRATION RATE: 18 BRPM | SYSTOLIC BLOOD PRESSURE: 106 MMHG | HEART RATE: 87 BPM

## 2020-06-12 DIAGNOSIS — E11.621 DIABETIC FOOT ULCER WITH OSTEOMYELITIS (HCC): Primary | ICD-10-CM

## 2020-06-12 DIAGNOSIS — L97.509 DIABETIC FOOT ULCER WITH OSTEOMYELITIS (HCC): Primary | ICD-10-CM

## 2020-06-12 DIAGNOSIS — E11.69 DIABETIC FOOT ULCER WITH OSTEOMYELITIS (HCC): Primary | ICD-10-CM

## 2020-06-12 DIAGNOSIS — M86.9 DIABETIC FOOT ULCER WITH OSTEOMYELITIS (HCC): Primary | ICD-10-CM

## 2020-06-12 PROCEDURE — 96365 THER/PROPH/DIAG IV INF INIT: CPT

## 2020-06-12 NOTE — PROGRESS NOTES
Patient to center to receive Max Corbett for  Diabetic foot ulcer with osteomyelitis   [place] Lieu arrived per wheelchair, his left foot is wrapped. Dressing is clean dry and intact. He denies any pain, fever or chills.      PICC dressing C/D/I , line flushed well

## 2020-06-13 ENCOUNTER — OFFICE VISIT (OUTPATIENT)
Dept: HEMATOLOGY/ONCOLOGY | Facility: HOSPITAL | Age: 50
End: 2020-06-13
Attending: INTERNAL MEDICINE
Payer: COMMERCIAL

## 2020-06-13 VITALS
OXYGEN SATURATION: 99 % | DIASTOLIC BLOOD PRESSURE: 65 MMHG | SYSTOLIC BLOOD PRESSURE: 111 MMHG | TEMPERATURE: 98 F | HEART RATE: 96 BPM | RESPIRATION RATE: 18 BRPM

## 2020-06-13 DIAGNOSIS — M86.9 DIABETIC FOOT ULCER WITH OSTEOMYELITIS (HCC): Primary | ICD-10-CM

## 2020-06-13 DIAGNOSIS — E11.621 DIABETIC FOOT ULCER WITH OSTEOMYELITIS (HCC): Primary | ICD-10-CM

## 2020-06-13 DIAGNOSIS — L97.509 DIABETIC FOOT ULCER WITH OSTEOMYELITIS (HCC): Primary | ICD-10-CM

## 2020-06-13 DIAGNOSIS — E11.69 DIABETIC FOOT ULCER WITH OSTEOMYELITIS (HCC): Primary | ICD-10-CM

## 2020-06-13 PROCEDURE — 96365 THER/PROPH/DIAG IV INF INIT: CPT

## 2020-06-13 NOTE — PROGRESS NOTES
Patient to center to receive Keyon Duncan for  Diabetic foot ulcer with osteomyelitis   Ger Hoswilfredo arrived with crutches his left foot is wrapped. Dressing is clean dry and intact. He denies any pain, fever or chills.      PICC dressing C/D/I , line flushed well w

## 2020-06-14 ENCOUNTER — OFFICE VISIT (OUTPATIENT)
Dept: HEMATOLOGY/ONCOLOGY | Facility: HOSPITAL | Age: 50
End: 2020-06-14
Attending: INTERNAL MEDICINE
Payer: COMMERCIAL

## 2020-06-14 VITALS
OXYGEN SATURATION: 100 % | TEMPERATURE: 98 F | HEART RATE: 89 BPM | SYSTOLIC BLOOD PRESSURE: 130 MMHG | DIASTOLIC BLOOD PRESSURE: 66 MMHG | RESPIRATION RATE: 16 BRPM

## 2020-06-14 DIAGNOSIS — L97.509 DIABETIC FOOT ULCER WITH OSTEOMYELITIS (HCC): Primary | ICD-10-CM

## 2020-06-14 DIAGNOSIS — E11.621 DIABETIC FOOT ULCER WITH OSTEOMYELITIS (HCC): Primary | ICD-10-CM

## 2020-06-14 DIAGNOSIS — M86.9 DIABETIC FOOT ULCER WITH OSTEOMYELITIS (HCC): Primary | ICD-10-CM

## 2020-06-14 DIAGNOSIS — E11.69 DIABETIC FOOT ULCER WITH OSTEOMYELITIS (HCC): Primary | ICD-10-CM

## 2020-06-14 PROCEDURE — 96365 THER/PROPH/DIAG IV INF INIT: CPT

## 2020-06-14 NOTE — PROGRESS NOTES
Patient to center to receive Maverick All for  Diabetic foot ulcer with osteomyelitis. Arrives ambulatory using crutches. Dressing is clean dry and intact to left foot.   He has an appointment with wound services tomorrow and is anticipating receiving a wound

## 2020-06-15 ENCOUNTER — OFFICE VISIT (OUTPATIENT)
Dept: HEMATOLOGY/ONCOLOGY | Facility: HOSPITAL | Age: 50
End: 2020-06-15
Attending: INTERNAL MEDICINE
Payer: COMMERCIAL

## 2020-06-15 ENCOUNTER — OFFICE VISIT (OUTPATIENT)
Dept: WOUND CARE | Facility: HOSPITAL | Age: 50
End: 2020-06-15
Attending: CLINICAL NURSE SPECIALIST
Payer: COMMERCIAL

## 2020-06-15 VITALS
HEART RATE: 90 BPM | DIASTOLIC BLOOD PRESSURE: 69 MMHG | SYSTOLIC BLOOD PRESSURE: 120 MMHG | OXYGEN SATURATION: 100 % | RESPIRATION RATE: 16 BRPM | TEMPERATURE: 98 F

## 2020-06-15 DIAGNOSIS — L97.509 DIABETIC FOOT ULCER WITH OSTEOMYELITIS (HCC): Primary | ICD-10-CM

## 2020-06-15 DIAGNOSIS — E11.621 DIABETIC FOOT ULCER WITH OSTEOMYELITIS (HCC): Primary | ICD-10-CM

## 2020-06-15 DIAGNOSIS — M21.6X2 ACQUIRED CAVOVARUS FOOT DEFORMITY, LEFT: ICD-10-CM

## 2020-06-15 DIAGNOSIS — L97.529 DIABETIC ULCER OF LEFT FOOT ASSOCIATED WITH OTHER SPECIFIED DIABETES MELLITUS, UNSPECIFIED PART OF FOOT, UNSPECIFIED ULCER STAGE (HCC): ICD-10-CM

## 2020-06-15 DIAGNOSIS — E13.621 DIABETIC ULCER OF LEFT FOOT ASSOCIATED WITH OTHER SPECIFIED DIABETES MELLITUS, UNSPECIFIED PART OF FOOT, UNSPECIFIED ULCER STAGE (HCC): ICD-10-CM

## 2020-06-15 DIAGNOSIS — M86.9 DIABETIC FOOT ULCER WITH OSTEOMYELITIS (HCC): Primary | ICD-10-CM

## 2020-06-15 DIAGNOSIS — E11.69 DIABETIC FOOT ULCER WITH OSTEOMYELITIS (HCC): Primary | ICD-10-CM

## 2020-06-15 DIAGNOSIS — L97.529 ULCER OF LEFT FOOT, UNSPECIFIED ULCER STAGE (HCC): ICD-10-CM

## 2020-06-15 PROCEDURE — 87205 SMEAR GRAM STAIN: CPT | Performed by: CLINICAL NURSE SPECIALIST

## 2020-06-15 PROCEDURE — 85025 COMPLETE CBC W/AUTO DIFF WBC: CPT

## 2020-06-15 PROCEDURE — 86140 C-REACTIVE PROTEIN: CPT

## 2020-06-15 PROCEDURE — 87070 CULTURE OTHR SPECIMN AEROBIC: CPT | Performed by: CLINICAL NURSE SPECIALIST

## 2020-06-15 PROCEDURE — 87077 CULTURE AEROBIC IDENTIFY: CPT | Performed by: CLINICAL NURSE SPECIALIST

## 2020-06-15 PROCEDURE — 96365 THER/PROPH/DIAG IV INF INIT: CPT

## 2020-06-15 PROCEDURE — 80053 COMPREHEN METABOLIC PANEL: CPT

## 2020-06-15 PROCEDURE — 99214 OFFICE O/P EST MOD 30 MIN: CPT

## 2020-06-15 NOTE — PROGRESS NOTES
Patient to center to receive Tanisha Jackson for  Diabetic foot ulcer with osteomyelitis   Kody Kaminski arrived with crutches his left foot is wrapped. Dressing is clean dry and intact. He denies any pain, fever or chills.      PICC dressing changed , labs collected pe

## 2020-06-15 NOTE — PROGRESS NOTES
Subjective    Chief Complaint  This information was obtained from the patient  The patient is new to the [address] [facility] here for an initial visit for the evaluation and management of non-healing wound(s).     Allergies  No known allergies    HPI  This informa 6/15/2020: Patient has HX: Type 2 DM, with neuropathy, Hyperlipidemia, HTN, Uncontrolled Type 2 DM with Hyperglycemia with long term current use of insulin, left Diabetic ulcer with osteomyelitis.  Patient was hospitalized for DM foot ulcer/cellulitis and t 3. Nonenlarged reactive lymph node left inguinal region    Family History  This information was obtained from the patient  Cancer - Mother, Sibling: sister, Diabetes - Mother, Father, Other - Child: Autistic    Social History  This information was obtained Respiratory: Cough (denies), Shortness of Breath (denies), Wheezing (denies)  Cardiovascular (Central/Peripheral): Chest Pain (denies)  Gastrointestinal (GI): Change in Bowel Habits (denies)  Genitourinary (): Nocturia (denies)  Musculoskeletal: Joint Pa aspirin - oral 81 mg tablet,chewable once daily        Objective    Wound Assessment(s)  Wound #11 Left Foot is a Diabetic Ulcer and has received a status of Not Healed.  Initial wound encounter measurements are 4cm length x 3cm width x 3.7cm depth, with an (Encounter Diagnosis) M21.6X2 - Other acquired deformities of left foot    Diagnoses    ICD-10  E11.621: Type 2 diabetes mellitus with foot ulcer  E11.69: Type 2 diabetes mellitus with other specified complication  A99.557: Non-pressure chronic ulcer of ot Return Appointment in 1 week. - 30 minute visit    Wound Cleansing & Dressings  Clean wound with Normal Saline or Wound Cleanser. May not shower. Silver alginate  ABD pad  Cover dressing and wrap with diony. Do not put tape directly on the skin.  - kerlix

## 2020-06-16 ENCOUNTER — OFFICE VISIT (OUTPATIENT)
Dept: HEMATOLOGY/ONCOLOGY | Facility: HOSPITAL | Age: 50
End: 2020-06-16
Attending: INTERNAL MEDICINE
Payer: COMMERCIAL

## 2020-06-16 VITALS
HEART RATE: 91 BPM | RESPIRATION RATE: 16 BRPM | TEMPERATURE: 98 F | DIASTOLIC BLOOD PRESSURE: 69 MMHG | OXYGEN SATURATION: 99 % | SYSTOLIC BLOOD PRESSURE: 129 MMHG

## 2020-06-16 DIAGNOSIS — M86.9 DIABETIC FOOT ULCER WITH OSTEOMYELITIS (HCC): Primary | ICD-10-CM

## 2020-06-16 DIAGNOSIS — E11.621 DIABETIC FOOT ULCER WITH OSTEOMYELITIS (HCC): Primary | ICD-10-CM

## 2020-06-16 DIAGNOSIS — L97.509 DIABETIC FOOT ULCER WITH OSTEOMYELITIS (HCC): Primary | ICD-10-CM

## 2020-06-16 DIAGNOSIS — E11.69 DIABETIC FOOT ULCER WITH OSTEOMYELITIS (HCC): Primary | ICD-10-CM

## 2020-06-16 PROCEDURE — 96365 THER/PROPH/DIAG IV INF INIT: CPT

## 2020-06-16 NOTE — PROGRESS NOTES
Patient to center to receive Maverick All for  Diabetic foot ulcer with osteomyelitis   Arrived with crutches and his left foot is wrapped. Dressing is clean dry and intact. He denies any pain, fever or chills.      PICC dressing C/D/I , line flushed well with g

## 2020-06-16 NOTE — PAYOR COMM NOTE
--------------  ADMISSION REVIEW     Payor: SAEID FOX  Subscriber #:  [id number]  Authorization Number: [id number]    Admit date: 6/4/20  Admit time: 1       Admitting Physician: Amy Hawkins DO  Attending Physician:  No att. providers found  Prim except as noted above.     Physical Exam     ED Triage Vitals [06/04/20 1234]   /61   Pulse 108   Resp 16   Temp 99.2 °F (37.3 °C)   Temp src Temporal   SpO2 99 %   O2 Device None (Room air)       Current:/61   Pulse 108   Temp 99.2 °F (37.3 °C) within normal limits   MANUAL DIFFERENTIAL - Abnormal; Notable for the following components:    Neutrophil Absolute Manual 15.79 (*)     Monocyte Absolute Manual 1.13 (*)     All other components within normal limits   CBC W/ DIFFERENTIAL - Abnormal; Notab foot associated with other specified diabetes mellitus, unspecified part of foot, unspecified ulcer stage ([facility] [address]  Osteomyelitis ([facility] [address]  Cellulitis of foot, left  Type 2 diabetes, uncontrolled, with neuropathy ([address]  Long-term insulin use ([facility] [address]  Uncontrolled BP    Depression  -continue wellbutrin    FEN  -lytes in am  -diet-ADA    Prophy  -SCD  -heparin    Dispo  -pending clinical course  PCP: Jessica Barton MD      Concerns regarding plan of care were discussed with patient.  Patient agrees with plan as detailed abo ulceration with noted bloody drainage and odor     PMH  Past Medical History:   Diagnosis Date   • Depression    • Diabetes ([facility] [address]    • Diabetic neuropathy ([facility] [address]    • Hyperlipidemia LDL goal <100    • Hypertension    • Hypertriglyceridemia    • Neuropathy 90 tablet, Rfl: 3  gabapentin 300 MG Oral Cap, Take 1 capsule (300 mg total) by mouth 2 (two) times daily. , Disp: 180 capsule, Rfl: 3  FREESTYLE JOSE 14 DAY SENSOR Does not apply Misc, 1 each by Does not apply route every 14 (fourteen) days.  Dispense 1 se Left (cpt=73630)    Result Date: 6/4/2020  CONCLUSION: 1. Mild osteoarthritis arthritis first metatarsophalangeal joint. 2.                 Calcaneal  enthesophytes. 3.                             Hallux valgus.  4.  Demineralization of the OTHER      right foot surgery   • OTHER SURGICAL HISTORY Right 12/13/2019    foot surgery   • WISDOM TEETH REMOVED       Family History   Problem Relation Age of Onset   • Diabetes Father    • Lipids Father    • Diabetes Mother         Type 2 DM and had GD each by Does not apply route every 14 (fourteen) days.  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 2.                 Calcaneal  enthesophytes. 3.                             Hallux valgus. 4.  Demineralization of the head of the 4th metatarsal, early osteomyelitis? 5. Pes cavus.  6.  Elta Begun fluids  - hold ACEi   - avoid nsaids  - he reports not eating/drinking much while working outdoors this week    Remaining plan as above     Dispo: inpatient     Alejandro Rizvi DO  [facility] Hospitalist     6/5  Date of Admission:  6/4/2020  Date of Consult: 6/ (autistic) Son         reports that he has quit smoking. His smoking use included cigarettes. He has a 39.00 pack-year smoking history. He has never used smokeless tobacco. He reports previous alcohol use of about 1.0 standard drinks of alcohol per week.  H CC  •  Insulin Aspart Pen (NOVOLOG) 100 UNIT/ML flexpen 1-11 Units, 1-11 Units, Subcutaneous, TID CC     Review of Systems:  Musculoskeletal:negative, patient is currently ambulatory walking on it feels no pain     Physical Exam:                  1. Integu evidence of active osteomyelitis involving the head of the 4th metatarsal.  No drainable subcutaneous abscess.  2. Diffuse T2 hyperintensity throughout the subcutaneous fat of the dorsal aspect of the left forefoot, which could relate to edema or cellulitis 3/12/20     Diabetic ulcer of left foot associated with other specified diabetes mellitus, unspecified part of foot, unspecified ulcer stage ([address]     Diabetic foot ulcer with osteomyelitis ([facility] [address]        Today discussed with the patient I did not notice any of the surgery which would be, partial fourth ray amputation left foot the wound will be left open was explained in great detail. The pre-, erin-and postoperative management was discussed, along with changes in bathing habits as well.   The necessity for r Partial fourth ray for osteomyelitis, checking the proximal margin for clearance.     DRAINS:  A 1/2-inch iodoform gauze packing.     COMPLICATIONS:  None.      FINDINGS:  The left foot was erythematous and edematous.   There was a malodorous necrotic ul pneumatic ankle tourniquet was released so that nonviable tissue could be more easily identified.   Utilizing a #15 blade, forceps, scissors, and a Versajet water debrider, excisional debridement of the necrotic tissue was carried out until healthy bleeding

## 2020-06-16 NOTE — PAYOR COMM NOTE
--------------  DISCHARGE REVIEW    Payor: SAEID FOX  Subscriber #:  [id number]  Authorization Number: [id number]    Admit date: 6/4/20  Admit time:  0549  Discharge Date: 6/10/2020  2:41 PM     Admitting Physician: Mary Lerma DO  Attending Physici 52 yo male HTN, HL/Hypertriglyceridemia, DM type II with neuropathy, obesity-BMI 33, depression who presents with left DM foot ulcer/cellulitis, MRI with OM 4th metatarsal, on zosyn and vanco. Pt is S/P  I&D and 4th ray amputation with debridement of left TOE AMPUTATION (Left)  Radiology:   Xr Chest Ap Portable  (cpt=71045)    Addendum Date: 6/8/2020    ADDENDUM:  The left subclavian PICC line tip is in the SVC. No pneumothorax.    Dictated by (CST): Ricco Tucker MD on 6/08/2020 at 3:17 PM     Finalized b Commonly known as:  CRESTOR  TAKE 1 TABLET(10 MG) BY MOUTH EVERY NIGHT     Ni Fitzpatrickar 300 UNIT/ML Sopn  Generic drug:  Insulin Glargine (2 Unit Dial)  INJECT 100 UNITS WITH BREAKFAST AND 60   UNITS AT BEDTIME     TURMERIC OR        STOP taking the Mr. Tom Bender is a 52 yo male HTN, HL/Hypertriglyceridemia, DM type II with neuropathy, obesity-BMI 33, depression who presents with left DM foot ulcer/cellulitis, MRI with OM 4th metatarsal, on zosyn and vanco. Pt is S/P  I&D and 4th ray amputation with debri -follow with endo Dr Kallie Jerez      Hypertriglyceridemia  HL  -continue fenofibrate and crestor     HTN  -continue home meds-lisinopril 5 mg daily  -follow BP     Depression  -continue wellbutrin    Rest as above.     Avel Garíca MD  [facility] hospitalist •  fenofibrate micronized (LOFIBRA) cap 67 mg, 67 mg, Oral, Daily with breakfast  •  Insulin Aspart Pen (NOVOLOG) 100 UNIT/ML flexpen 5 Units, 5 Units, Subcutaneous, TID CC  •  Insulin Aspart Pen (NOVOLOG) 100 UNIT/ML flexpen 1-11 Units, 1-11 Units, Subcut 3. Hx of R DFU with OM  -hx of E Coli and Corynebacterium  -s/p OR resection and IV abx     4.  DMII  -needs strict glycemic control for optimal healing     DISPO:   Cultures from OR with Corynebacterium, but initial presentation worrisome for anaerobes as Plan: We will remove the dorsal sutures today because there is erythema edema in that area patient has mildly elevated temperature will allow the infection to drain and treated with open wound care using a wound VAC. Patient was made aware.   I will see th

## 2020-06-17 ENCOUNTER — APPOINTMENT (OUTPATIENT)
Dept: HEMATOLOGY/ONCOLOGY | Facility: HOSPITAL | Age: 50
End: 2020-06-17
Attending: INTERNAL MEDICINE
Payer: COMMERCIAL

## 2020-06-18 ENCOUNTER — APPOINTMENT (OUTPATIENT)
Dept: HEMATOLOGY/ONCOLOGY | Facility: HOSPITAL | Age: 50
End: 2020-06-18
Attending: INTERNAL MEDICINE
Payer: COMMERCIAL

## 2020-06-19 ENCOUNTER — APPOINTMENT (OUTPATIENT)
Dept: HEMATOLOGY/ONCOLOGY | Facility: HOSPITAL | Age: 50
End: 2020-06-19
Attending: INTERNAL MEDICINE
Payer: COMMERCIAL

## 2020-06-19 ENCOUNTER — OFFICE VISIT (OUTPATIENT)
Dept: WOUND CARE | Facility: HOSPITAL | Age: 50
End: 2020-06-19
Attending: CLINICAL NURSE SPECIALIST
Payer: COMMERCIAL

## 2020-06-19 DIAGNOSIS — M21.6X2 ACQUIRED CAVOVARUS FOOT DEFORMITY, LEFT: ICD-10-CM

## 2020-06-19 DIAGNOSIS — L97.529 DIABETIC ULCER OF LEFT FOOT ASSOCIATED WITH OTHER SPECIFIED DIABETES MELLITUS, UNSPECIFIED PART OF FOOT, UNSPECIFIED ULCER STAGE (HCC): Primary | ICD-10-CM

## 2020-06-19 DIAGNOSIS — E13.621 DIABETIC ULCER OF LEFT FOOT ASSOCIATED WITH OTHER SPECIFIED DIABETES MELLITUS, UNSPECIFIED PART OF FOOT, UNSPECIFIED ULCER STAGE (HCC): Primary | ICD-10-CM

## 2020-06-19 PROCEDURE — 97605 NEG PRS WND THER DME<=50SQCM: CPT

## 2020-06-19 NOTE — PROGRESS NOTES
Subjective    Chief Complaint  This information was obtained from the patient  The patient was seen today for follow up and management of difficult to heal wound(s). No additional concerns for today.     Allergies  No known allergies    HPI  This informatio 6/15/2020: Patient has HX: Type 2 DM, with neuropathy, Hyperlipidemia, HTN, Uncontrolled Type 2 DM with Hyperglycemia with long term current use of insulin, left Diabetic ulcer with osteomyelitis.  Patient was hospiitalized for DM foot ulcer/cellulitis and 3. Nonenlarged reactive lymph node left inguinal region    Review of Systems (ROS)  This information was obtained from the patient    Complaints and Symptoms  Patient complains of:  Eyes: Other (wears glasses)  Cardiovascular (Central/Peripheral): Edema (1 General Notes:  2 sutures in place    Vitals  Height/Length: 75 in (190.5 cm), Weight: 270 lbs (122.73 kgs), BMI: 33.7, (36.11 °C).   Vitals Signs Notes: blood sugar this am - 103 Has stick on monitor in place    Physical Exam  Respiratory:  Respiration reg Dorsum linear wound granular, no signs and symptoms of infection noted. Zinc paste to periwound area to repel moisture off of skin preventing skin breakdown.   Hydrofera Ready dressing to decrease bioburden to wound and manage drainage secured with Medipor Other: - keep dressings clean and dry    Follow-Up Appointments:  A follow-up appointment should be scheduled.             Entered By: Marissa Payne on 06/19/2020 12:29:05 PM  Signature(s): Date(s):         Header Image    Negative Pressure Wound Therapy Ap

## 2020-06-20 ENCOUNTER — APPOINTMENT (OUTPATIENT)
Dept: HEMATOLOGY/ONCOLOGY | Facility: HOSPITAL | Age: 50
End: 2020-06-20
Attending: INTERNAL MEDICINE
Payer: COMMERCIAL

## 2020-06-21 ENCOUNTER — APPOINTMENT (OUTPATIENT)
Dept: HEMATOLOGY/ONCOLOGY | Facility: HOSPITAL | Age: 50
End: 2020-06-21
Attending: INTERNAL MEDICINE
Payer: COMMERCIAL

## 2020-06-22 ENCOUNTER — OFFICE VISIT (OUTPATIENT)
Dept: WOUND CARE | Facility: HOSPITAL | Age: 50
End: 2020-06-22
Attending: CLINICAL NURSE SPECIALIST
Payer: COMMERCIAL

## 2020-06-22 DIAGNOSIS — L97.509 DIABETIC FOOT ULCER WITH OSTEOMYELITIS (HCC): ICD-10-CM

## 2020-06-22 DIAGNOSIS — L97.428 DIABETIC ULCER OF LEFT MIDFOOT ASSOCIATED WITH TYPE 2 DIABETES MELLITUS, WITH OTHER ULCER SEVERITY (HCC): ICD-10-CM

## 2020-06-22 DIAGNOSIS — E11.621 DIABETIC ULCER OF LEFT MIDFOOT ASSOCIATED WITH TYPE 2 DIABETES MELLITUS, WITH OTHER ULCER SEVERITY (HCC): ICD-10-CM

## 2020-06-22 DIAGNOSIS — E11.69 DIABETIC FOOT ULCER WITH OSTEOMYELITIS (HCC): ICD-10-CM

## 2020-06-22 DIAGNOSIS — E11.621 DIABETIC FOOT ULCER WITH OSTEOMYELITIS (HCC): ICD-10-CM

## 2020-06-22 DIAGNOSIS — M86.9 DIABETIC FOOT ULCER WITH OSTEOMYELITIS (HCC): ICD-10-CM

## 2020-06-22 PROCEDURE — 97605 NEG PRS WND THER DME<=50SQCM: CPT

## 2020-06-22 NOTE — PROGRESS NOTES
Subjective    Chief Complaint  This information was obtained from the patient  The patient was seen today for follow up and management of difficult to heal wound(s). No additional concerns for today.  6/22/20 no additional concerns    Allergies  No known al 6/15/2020: Patient has HX: Type 2 DM, with neuropathy, Hyperlipidemia, HTN, Uncontrolled Type 2 DM with Hyperglycemia with long term current use of insulin, left Diabetic ulcer with osteomyelitis.  Patient was hospiitalized for DM foot ulcer/cellulitis and 3. Nonenlarged reactive lymph node left inguinal region    Review of Systems (ROS)  This information was obtained from the patient    Complaints and Symptoms  Patient complains of:  Eyes: Other (wears glasses)  Cardiovascular (Central/Peripheral): Edema (1 The periwound skin exhibited: Edema, Moist. The periwound skin did not exhibit: Brawny Induration, Excoriation, Induration, Callus, Crepitus, Fluctuance, Friable, Rash, Dry/Scaly, Maceration, Atrophie Sheron, Cyanosis, Ecchymosis, Erythema, Hemosiderosis, Left dorsum wound healing with epitheliaization. Wound granular. No s/s of infection noted. Continue Hydrofera Ready dressing to decrease bioburden to wound and manage drainage. Patient to follow up in outpatient wound clinic weekly.   C to follow up wi S/S of infection - monitor for s/s of infection  Other: - keep dressings clean and dry, maintain glycemic control within normal range            Entered By: Memo Khan on 06/22/2020 8:40:49 AM  Signature(s): Date(s):         Header Image    Negative Pre

## 2020-06-24 ENCOUNTER — TELEPHONE (OUTPATIENT)
Dept: PODIATRY CLINIC | Facility: CLINIC | Age: 50
End: 2020-06-24

## 2020-06-24 ENCOUNTER — LAB REQUISITION (OUTPATIENT)
Dept: LAB | Facility: HOSPITAL | Age: 50
End: 2020-06-24
Payer: COMMERCIAL

## 2020-06-24 DIAGNOSIS — M86.9 OSTEOMYELITIS, UNSPECIFIED (HCC): ICD-10-CM

## 2020-06-24 PROCEDURE — 86140 C-REACTIVE PROTEIN: CPT | Performed by: INTERNAL MEDICINE

## 2020-06-24 PROCEDURE — 80053 COMPREHEN METABOLIC PANEL: CPT | Performed by: INTERNAL MEDICINE

## 2020-06-24 PROCEDURE — 85025 COMPLETE CBC W/AUTO DIFF WBC: CPT | Performed by: INTERNAL MEDICINE

## 2020-06-26 ENCOUNTER — OFFICE VISIT (OUTPATIENT)
Dept: PODIATRY CLINIC | Facility: CLINIC | Age: 50
End: 2020-06-26
Payer: COMMERCIAL

## 2020-06-26 VITALS — TEMPERATURE: 98 F

## 2020-06-26 DIAGNOSIS — S91.302D OPEN WOUND OF LEFT FOOT, SUBSEQUENT ENCOUNTER: ICD-10-CM

## 2020-06-26 DIAGNOSIS — Z98.890 STATUS POST FOOT SURGERY: Primary | ICD-10-CM

## 2020-06-26 PROCEDURE — 99024 POSTOP FOLLOW-UP VISIT: CPT | Performed by: PODIATRIST

## 2020-06-28 NOTE — PROGRESS NOTES
Leia Nyhan is a 51year old male. Patient presents with:   Follow - Up: pt in for f/u on Ulcer left foot, denies any pain        HPI:   Patient returns to the clinic for follow-up on wound VAC therapy for the arterial end of his left foot he had surge mouth daily. Take 1 tablet daily      • FREESTYLE JOSE 14 DAY SENSOR Does not apply Misc 1 each by Does not apply route every 14 (fourteen) days.  Dispense 1 sensor every 14 days 6 each 3   • Insulin Pen Needle (BD PEN NEEDLE RAFA U/F) 32G X 4 MM Does not Used    Substance and Sexual Activity      Alcohol use: Not Currently        Alcohol/week: 1.0 standard drinks        Types: 1 Standard drinks or equivalent per week      Drug use: No    Other Topics      Concerns:        Seat Belt: Yes          REVIEW OF

## 2020-06-29 ENCOUNTER — APPOINTMENT (OUTPATIENT)
Dept: WOUND CARE | Facility: HOSPITAL | Age: 50
End: 2020-06-29
Attending: CLINICAL NURSE SPECIALIST
Payer: COMMERCIAL

## 2020-06-29 DIAGNOSIS — L97.428 DIABETIC ULCER OF LEFT MIDFOOT ASSOCIATED WITH TYPE 2 DIABETES MELLITUS, WITH OTHER ULCER SEVERITY (HCC): Primary | ICD-10-CM

## 2020-06-29 DIAGNOSIS — E11.621 DIABETIC ULCER OF LEFT MIDFOOT ASSOCIATED WITH TYPE 2 DIABETES MELLITUS, WITH OTHER ULCER SEVERITY (HCC): Primary | ICD-10-CM

## 2020-06-29 DIAGNOSIS — M21.6X2 ACQUIRED CAVOVARUS FOOT DEFORMITY, LEFT: ICD-10-CM

## 2020-06-29 PROCEDURE — 97605 NEG PRS WND THER DME<=50SQCM: CPT

## 2020-06-29 NOTE — PROGRESS NOTES
Subjective    Chief Complaint  This information was obtained from the patient  The patient was seen today for follow up and management of difficult to heal left plantar foot wound. No additional concerns for today.     Allergies  No known allergies    HPI 6/15/2020: Patient has HX: Type 2 DM, with neuropathy, Hyperlipidemia, HTN, Uncontrolled Type 2 DM with Hyperglycemia with long term current use of insulin, left Diabetic ulcer with osteomyelitis.  Patient was hospitalized for DM foot ulcer/cellulitis and t 3. Nonenlarged reactive lymph node left inguinal region  6/29/2020: Patient seen Dr. Raiza Hendrickson on Friday and had suture removed. Patient to follow up with podiatrist next week.       Review of Systems (ROS)  This information was obtained from the patient    C The periwound skin exhibited: Edema, Moist. The periwound skin did not exhibit: Brawny Induration, Excoriation, Induration, Callus, Crepitus, Fluctuance, Friable, Rash, Dry/Scaly, Maceration, Atrophie Sheron, Cyanosis, Ecchymosis, Erythema, Hemosiderosis, Patient's left plantar wound granular. No s/s of infection noted. Periwound area macerated. Thin callus easily removed using forceps. Not significant to charge. Skin prep and petey cohesive seal to reduce maceration.  Fibracol to wound bed to enhance colla [employer] VAC therapy, black foam at 125 mmHg continuous. RN to change dressing 3x/week, unless noted below. - patient to have wound vac changed at [facility] [address] 1X per week and by Sofia Butcher 2X per week. Misc / Additional orders  Home health care nurse for wound care.  -

## 2020-07-01 ENCOUNTER — LAB REQUISITION (OUTPATIENT)
Dept: LAB | Facility: HOSPITAL | Age: 50
End: 2020-07-01
Payer: COMMERCIAL

## 2020-07-01 DIAGNOSIS — M86.9 OSTEOMYELITIS, UNSPECIFIED (HCC): ICD-10-CM

## 2020-07-01 LAB
ALBUMIN SERPL-MCNC: 3.6 G/DL (ref 3.4–5)
ALBUMIN/GLOB SERPL: 0.9 {RATIO} (ref 1–2)
ALP LIVER SERPL-CCNC: 101 U/L (ref 45–117)
ALT SERPL-CCNC: 45 U/L (ref 16–61)
ANION GAP SERPL CALC-SCNC: 8 MMOL/L (ref 0–18)
AST SERPL-CCNC: 26 U/L (ref 15–37)
BILIRUB SERPL-MCNC: 0.4 MG/DL (ref 0.1–2)
BUN BLD-MCNC: 22 MG/DL (ref 7–18)
BUN/CREAT SERPL: 24.2 (ref 10–20)
CALCIUM BLD-MCNC: 9.7 MG/DL (ref 8.5–10.1)
CHLORIDE SERPL-SCNC: 105 MMOL/L (ref 98–112)
CO2 SERPL-SCNC: 26 MMOL/L (ref 21–32)
CREAT BLD-MCNC: 0.91 MG/DL (ref 0.7–1.3)
CRP SERPL-MCNC: 0.79 MG/DL (ref ?–0.3)
DEPRECATED RDW RBC AUTO: 39 FL (ref 35.1–46.3)
ERYTHROCYTE [DISTWIDTH] IN BLOOD BY AUTOMATED COUNT: 12.9 % (ref 11–15)
GLOBULIN PLAS-MCNC: 4.1 G/DL (ref 2.8–4.4)
GLUCOSE BLD-MCNC: 75 MG/DL (ref 70–99)
HCT VFR BLD AUTO: 38 % (ref 39–53)
HGB BLD-MCNC: 12.8 G/DL (ref 13–17.5)
M PROTEIN MFR SERPL ELPH: 7.7 G/DL (ref 6.4–8.2)
MCH RBC QN AUTO: 28.3 PG (ref 26–34)
MCHC RBC AUTO-ENTMCNC: 33.7 G/DL (ref 31–37)
MCV RBC AUTO: 83.9 FL (ref 80–100)
OSMOLALITY SERPL CALC.SUM OF ELEC: 290 MOSM/KG (ref 275–295)
PLATELET # BLD AUTO: 292 10(3)UL (ref 150–450)
POTASSIUM SERPL-SCNC: 4 MMOL/L (ref 3.5–5.1)
RBC # BLD AUTO: 4.53 X10(6)UL (ref 4.3–5.7)
SODIUM SERPL-SCNC: 139 MMOL/L (ref 136–145)
WBC # BLD AUTO: 7.6 X10(3) UL (ref 4–11)

## 2020-07-01 PROCEDURE — 80053 COMPREHEN METABOLIC PANEL: CPT | Performed by: INTERNAL MEDICINE

## 2020-07-01 PROCEDURE — 85027 COMPLETE CBC AUTOMATED: CPT | Performed by: INTERNAL MEDICINE

## 2020-07-01 PROCEDURE — 86140 C-REACTIVE PROTEIN: CPT | Performed by: INTERNAL MEDICINE

## 2020-07-06 ENCOUNTER — OFFICE VISIT (OUTPATIENT)
Dept: WOUND CARE | Facility: HOSPITAL | Age: 50
End: 2020-07-06
Attending: CLINICAL NURSE SPECIALIST
Payer: COMMERCIAL

## 2020-07-06 DIAGNOSIS — R21 RASH: Primary | ICD-10-CM

## 2020-07-06 DIAGNOSIS — M86.9 DIABETIC FOOT ULCER WITH OSTEOMYELITIS (HCC): ICD-10-CM

## 2020-07-06 DIAGNOSIS — M21.6X2 ACQUIRED CAVOVARUS FOOT DEFORMITY, LEFT: ICD-10-CM

## 2020-07-06 DIAGNOSIS — E11.621 DIABETIC FOOT ULCER WITH OSTEOMYELITIS (HCC): ICD-10-CM

## 2020-07-06 DIAGNOSIS — L97.522 DIABETIC ULCER OF LEFT FOOT ASSOCIATED WITH TYPE 2 DIABETES MELLITUS, WITH FAT LAYER EXPOSED, UNSPECIFIED PART OF FOOT (HCC): ICD-10-CM

## 2020-07-06 DIAGNOSIS — L97.509 DIABETIC FOOT ULCER WITH OSTEOMYELITIS (HCC): ICD-10-CM

## 2020-07-06 DIAGNOSIS — E11.621 DIABETIC ULCER OF LEFT FOOT ASSOCIATED WITH TYPE 2 DIABETES MELLITUS, WITH FAT LAYER EXPOSED, UNSPECIFIED PART OF FOOT (HCC): ICD-10-CM

## 2020-07-06 DIAGNOSIS — E11.69 DIABETIC FOOT ULCER WITH OSTEOMYELITIS (HCC): ICD-10-CM

## 2020-07-06 PROCEDURE — 97597 DBRDMT OPN WND 1ST 20 CM/<: CPT

## 2020-07-06 RX ORDER — BETAMETHASONE DIPROPIONATE 0.5 MG/G
1 CREAM TOPICAL 2 TIMES DAILY
Qty: 1 TUBE | Refills: 1 | Status: SHIPPED | OUTPATIENT
Start: 2020-07-06 | End: 2020-11-04

## 2020-07-06 NOTE — PROGRESS NOTES
Subjective    Chief Complaint  This information was obtained from the patient  The patient was seen today for follow up and management of difficult to heal left plantar foot wound. 7/6/20 No additional concerns for today.     Allergies  No known allergies 6/15/2020: Patient has HX: Type 2 DM, with neuropathy, Hyperlipidemia, HTN, Uncontrolled Type 2 DM with Hyperglycemia with long term current use of insulin, left Diabetic ulcer with osteomyelitis.  Patient was hospiitalized for DM foot ulcer/cellulitis and 3. Nonenlarged reactive lymph node left inguinal region  6/29/2020: Patient seen Dr. Sharon Figueroa on Friday and had suture removed. Patient to follow up with podiatrist next week.       Review of Systems (ROS)  This information was obtained from the patient    C The periwound skin exhibited: Edema, Moist. The periwound skin did not exhibit: Brawny Induration, Excoriation, Induration, Callus, Crepitus, Fluctuance, Friable, Rash, Dry/Scaly, Maceration, Atrophie Sheron, Cyanosis, Ecchymosis, Erythema, Hemosiderosis, Patient presents to the outpatient wound clinic with left 4th toe amputation site and distal plantar area with periwound callus  and slough/fibrin tissue impairing wound healing.  Performed selective debridement to remove devitalized tissue to enhance wound Wound #11 (Diabetic Ulcer) is located on the left foot. A selective debridement with a total area debrided of 12.16 sq cm was performed by Evonne Haddad NP. to remove devitalized tissue: biofilm, callus, fibrin, and slough.  The following instrument(s) wer A follow-up appointment should be scheduled.             Entered By: Brian Ba on 07/06/2020 9:12:44 AM  Signature(s): Date(s):         Header Image    Debridement Details  Patient Name: Roxie Hernandez  Patient Number: [id number]  Patient Date of Birth

## 2020-07-08 ENCOUNTER — LAB REQUISITION (OUTPATIENT)
Dept: LAB | Age: 50
End: 2020-07-08
Payer: COMMERCIAL

## 2020-07-08 DIAGNOSIS — M86.9 OSTEOMYELITIS, UNSPECIFIED (HCC): ICD-10-CM

## 2020-07-08 LAB
ALBUMIN SERPL-MCNC: 3.7 G/DL (ref 3.4–5)
ALBUMIN/GLOB SERPL: 1 {RATIO} (ref 1–2)
ALP LIVER SERPL-CCNC: 96 U/L (ref 45–117)
ALT SERPL-CCNC: 51 U/L (ref 16–61)
ANION GAP SERPL CALC-SCNC: 4 MMOL/L (ref 0–18)
AST SERPL-CCNC: 36 U/L (ref 15–37)
BASOPHILS # BLD AUTO: 0.05 X10(3) UL (ref 0–0.2)
BASOPHILS NFR BLD AUTO: 0.7 %
BILIRUB SERPL-MCNC: 0.4 MG/DL (ref 0.1–2)
BUN BLD-MCNC: 25 MG/DL (ref 7–18)
BUN/CREAT SERPL: 28.4 (ref 10–20)
CALCIUM BLD-MCNC: 9.3 MG/DL (ref 8.5–10.1)
CHLORIDE SERPL-SCNC: 105 MMOL/L (ref 98–112)
CO2 SERPL-SCNC: 28 MMOL/L (ref 21–32)
CREAT BLD-MCNC: 0.88 MG/DL (ref 0.7–1.3)
CRP SERPL-MCNC: 0.67 MG/DL (ref ?–0.3)
DEPRECATED RDW RBC AUTO: 42 FL (ref 35.1–46.3)
EOSINOPHIL # BLD AUTO: 0.22 X10(3) UL (ref 0–0.7)
EOSINOPHIL NFR BLD AUTO: 2.9 %
ERYTHROCYTE [DISTWIDTH] IN BLOOD BY AUTOMATED COUNT: 13.1 % (ref 11–15)
GLOBULIN PLAS-MCNC: 3.6 G/DL (ref 2.8–4.4)
GLUCOSE BLD-MCNC: 111 MG/DL (ref 70–99)
HCT VFR BLD AUTO: 39.6 % (ref 39–53)
HGB BLD-MCNC: 12.9 G/DL (ref 13–17.5)
IMM GRANULOCYTES # BLD AUTO: 0.03 X10(3) UL (ref 0–1)
IMM GRANULOCYTES NFR BLD: 0.4 %
LYMPHOCYTES # BLD AUTO: 1.52 X10(3) UL (ref 1–4)
LYMPHOCYTES NFR BLD AUTO: 19.8 %
M PROTEIN MFR SERPL ELPH: 7.3 G/DL (ref 6.4–8.2)
MCH RBC QN AUTO: 28.4 PG (ref 26–34)
MCHC RBC AUTO-ENTMCNC: 32.6 G/DL (ref 31–37)
MCV RBC AUTO: 87.2 FL (ref 80–100)
MONOCYTES # BLD AUTO: 0.61 X10(3) UL (ref 0.1–1)
MONOCYTES NFR BLD AUTO: 7.9 %
NEUTROPHILS # BLD AUTO: 5.26 X10 (3) UL (ref 1.5–7.7)
NEUTROPHILS # BLD AUTO: 5.26 X10(3) UL (ref 1.5–7.7)
NEUTROPHILS NFR BLD AUTO: 68.3 %
OSMOLALITY SERPL CALC.SUM OF ELEC: 289 MOSM/KG (ref 275–295)
PLATELET # BLD AUTO: 353 10(3)UL (ref 150–450)
POTASSIUM SERPL-SCNC: 4.1 MMOL/L (ref 3.5–5.1)
RBC # BLD AUTO: 4.54 X10(6)UL (ref 4.3–5.7)
SODIUM SERPL-SCNC: 137 MMOL/L (ref 136–145)
WBC # BLD AUTO: 7.7 X10(3) UL (ref 4–11)

## 2020-07-08 PROCEDURE — 80053 COMPREHEN METABOLIC PANEL: CPT | Performed by: INTERNAL MEDICINE

## 2020-07-08 PROCEDURE — 85025 COMPLETE CBC W/AUTO DIFF WBC: CPT | Performed by: INTERNAL MEDICINE

## 2020-07-08 PROCEDURE — 86140 C-REACTIVE PROTEIN: CPT | Performed by: INTERNAL MEDICINE

## 2020-07-10 ENCOUNTER — OFFICE VISIT (OUTPATIENT)
Dept: PODIATRY CLINIC | Facility: CLINIC | Age: 50
End: 2020-07-10
Payer: COMMERCIAL

## 2020-07-10 DIAGNOSIS — S91.302D OPEN WOUND OF LEFT FOOT, SUBSEQUENT ENCOUNTER: Primary | ICD-10-CM

## 2020-07-10 DIAGNOSIS — Z98.890 STATUS POST FOOT SURGERY: ICD-10-CM

## 2020-07-10 DIAGNOSIS — E11.40 TYPE 2 DIABETES, UNCONTROLLED, WITH NEUROPATHY (HCC): ICD-10-CM

## 2020-07-10 DIAGNOSIS — E11.65 TYPE 2 DIABETES, UNCONTROLLED, WITH NEUROPATHY (HCC): ICD-10-CM

## 2020-07-10 PROCEDURE — 99024 POSTOP FOLLOW-UP VISIT: CPT | Performed by: PODIATRIST

## 2020-07-13 ENCOUNTER — APPOINTMENT (OUTPATIENT)
Dept: WOUND CARE | Facility: HOSPITAL | Age: 50
End: 2020-07-13
Attending: CLINICAL NURSE SPECIALIST
Payer: COMMERCIAL

## 2020-07-13 DIAGNOSIS — E11.621 DIABETIC ULCER OF LEFT MIDFOOT ASSOCIATED WITH TYPE 2 DIABETES MELLITUS, WITH OTHER ULCER SEVERITY (HCC): ICD-10-CM

## 2020-07-13 DIAGNOSIS — R21 RASH AND OTHER NONSPECIFIC SKIN ERUPTION: ICD-10-CM

## 2020-07-13 DIAGNOSIS — L97.428 DIABETIC ULCER OF LEFT MIDFOOT ASSOCIATED WITH TYPE 2 DIABETES MELLITUS, WITH OTHER ULCER SEVERITY (HCC): ICD-10-CM

## 2020-07-13 DIAGNOSIS — M21.6X2 ACQUIRED CAVOVARUS FOOT DEFORMITY, LEFT: Primary | ICD-10-CM

## 2020-07-13 PROBLEM — Z89.422: Status: ACTIVE | Noted: 2020-07-13

## 2020-07-13 PROCEDURE — 97597 DBRDMT OPN WND 1ST 20 CM/<: CPT

## 2020-07-13 PROCEDURE — 97605 NEG PRS WND THER DME<=50SQCM: CPT

## 2020-07-13 NOTE — PROGRESS NOTES
Subjective    Chief Complaint  This information was obtained from the patient  The patient was seen today for follow up and management of difficult to heal left plantar foot wound. 7/13/20 Patient still having a rash on the foot and ankle.  Saw Dr Maria Isabel Tracy o The left foot, plantar surface diabetic foot ulcer, currently open.     6/15/2020: Patient has HX: Type 2 DM, with neuropathy, Hyperlipidemia, HTN, Uncontrolled Type 2 DM with Hyperglycemia with long term current use of insulin, left Diabetic ulcer with ost 1. Normal left leg venous duplex exam.  2. No deep venous thrombosis. 3. Nonenlarged reactive lymph node left inguinal region  6/29/2020: Patient seen Dr. Jim Hampton on Friday and had suture removed. Patient to follow up with podiatrist next week.       Del The periwound skin exhibited: Edema, Moist. The periwound skin did not exhibit: Brawny Induration, Excoriation, Induration, Callus, Crepitus, Fluctuance, Friable, Rash, Dry/Scaly, Maceration, Atrophie Sheron, Cyanosis, Ecchymosis, Erythema, Hemosiderosis, Patient's left plantar wound with slough/fibrin and periwound callus impairing wound healing. Performed selective debridement and paring of callus to remove devitalized tissue and enhance wound healing. Wound more granular. No s/s of infection noted.  Meryl Share Wound Orders:  Wound #11 Left Foot    Follow-Up Appointments  Return Appointment in 1 week. - 30 minutes weekly    Wound Cleansing & Dressings  Clean wound with Normal Saline or Wound Cleanser. May shower with protection.  - cast cover  Collagen - fibrac Bleeding: Minimal  Hemostasis Achieved:  Other  Procedural Pain: 0  Post Procedural Pain: 0  Response to Treatment: Procedure was tolerated well    Notes  callous also debrided around the wound    Entered By: Brittany Jin NP on 07/13/2020 9:05:48 AM  Misty

## 2020-07-13 NOTE — PROGRESS NOTES
Neha Fine is a 53year old male. Patient presents with:  Wound: left foot - still on wound vac - denies any pain - BS this AM 94.         HPI:   Patient returns the clinic now approximately 4 to 5 weeks status post surgery on his left foot he has a w Dispense 1 sensor every 14 days 6 each 3   • FREESTYLE JOSE 14 DAY READER Does not apply Device 1 each by Does not apply route every 14 (fourteen) days.  Dispense 1 Jose Greeley 1 Device 0   • Insulin Pen Needle (BD PEN NEEDLE RAFA U/F) 32G X 4 MM Does not Years: 26.00        Pack years: 44        Types: Cigarettes      Smokeless tobacco: Never Used    Substance and Sexual Activity      Alcohol use: Not Currently        Alcohol/week: 1.0 standard drinks        Types: 1 Standard drinks or equivalent per wee

## 2020-07-15 ENCOUNTER — LAB REQUISITION (OUTPATIENT)
Dept: LAB | Age: 50
End: 2020-07-15
Payer: COMMERCIAL

## 2020-07-15 DIAGNOSIS — M86.9 OSTEOMYELITIS, UNSPECIFIED (HCC): ICD-10-CM

## 2020-07-15 LAB
ALBUMIN SERPL-MCNC: 3.5 G/DL (ref 3.4–5)
ALBUMIN/GLOB SERPL: 0.9 {RATIO} (ref 1–2)
ALP LIVER SERPL-CCNC: 106 U/L (ref 45–117)
ALT SERPL-CCNC: 44 U/L (ref 16–61)
ANION GAP SERPL CALC-SCNC: 5 MMOL/L (ref 0–18)
AST SERPL-CCNC: 26 U/L (ref 15–37)
BASOPHILS # BLD AUTO: 0.06 X10(3) UL (ref 0–0.2)
BASOPHILS NFR BLD AUTO: 0.6 %
BILIRUB SERPL-MCNC: 0.4 MG/DL (ref 0.1–2)
BUN BLD-MCNC: 29 MG/DL (ref 7–18)
BUN/CREAT SERPL: 30.2 (ref 10–20)
CALCIUM BLD-MCNC: 9.4 MG/DL (ref 8.5–10.1)
CHLORIDE SERPL-SCNC: 105 MMOL/L (ref 98–112)
CO2 SERPL-SCNC: 27 MMOL/L (ref 21–32)
CREAT BLD-MCNC: 0.96 MG/DL (ref 0.7–1.3)
CRP SERPL-MCNC: 0.85 MG/DL (ref ?–0.3)
DEPRECATED RDW RBC AUTO: 39.6 FL (ref 35.1–46.3)
EOSINOPHIL # BLD AUTO: 0.21 X10(3) UL (ref 0–0.7)
EOSINOPHIL NFR BLD AUTO: 2.2 %
ERYTHROCYTE [DISTWIDTH] IN BLOOD BY AUTOMATED COUNT: 12.8 % (ref 11–15)
GLOBULIN PLAS-MCNC: 3.8 G/DL (ref 2.8–4.4)
GLUCOSE BLD-MCNC: 124 MG/DL (ref 70–99)
HCT VFR BLD AUTO: 39.5 % (ref 39–53)
HGB BLD-MCNC: 12.8 G/DL (ref 13–17.5)
IMM GRANULOCYTES # BLD AUTO: 0.03 X10(3) UL (ref 0–1)
IMM GRANULOCYTES NFR BLD: 0.3 %
LYMPHOCYTES # BLD AUTO: 1.83 X10(3) UL (ref 1–4)
LYMPHOCYTES NFR BLD AUTO: 19 %
M PROTEIN MFR SERPL ELPH: 7.3 G/DL (ref 6.4–8.2)
MCH RBC QN AUTO: 27.6 PG (ref 26–34)
MCHC RBC AUTO-ENTMCNC: 32.4 G/DL (ref 31–37)
MCV RBC AUTO: 85.3 FL (ref 80–100)
MONOCYTES # BLD AUTO: 0.75 X10(3) UL (ref 0.1–1)
MONOCYTES NFR BLD AUTO: 7.8 %
NEUTROPHILS # BLD AUTO: 6.77 X10 (3) UL (ref 1.5–7.7)
NEUTROPHILS # BLD AUTO: 6.77 X10(3) UL (ref 1.5–7.7)
NEUTROPHILS NFR BLD AUTO: 70.1 %
OSMOLALITY SERPL CALC.SUM OF ELEC: 291 MOSM/KG (ref 275–295)
PLATELET # BLD AUTO: 370 10(3)UL (ref 150–450)
POTASSIUM SERPL-SCNC: 4.2 MMOL/L (ref 3.5–5.1)
RBC # BLD AUTO: 4.63 X10(6)UL (ref 4.3–5.7)
SODIUM SERPL-SCNC: 137 MMOL/L (ref 136–145)
WBC # BLD AUTO: 9.7 X10(3) UL (ref 4–11)

## 2020-07-15 PROCEDURE — 80053 COMPREHEN METABOLIC PANEL: CPT | Performed by: INTERNAL MEDICINE

## 2020-07-15 PROCEDURE — 86140 C-REACTIVE PROTEIN: CPT | Performed by: INTERNAL MEDICINE

## 2020-07-15 PROCEDURE — 85025 COMPLETE CBC W/AUTO DIFF WBC: CPT | Performed by: INTERNAL MEDICINE

## 2020-07-20 ENCOUNTER — APPOINTMENT (OUTPATIENT)
Dept: WOUND CARE | Facility: HOSPITAL | Age: 50
End: 2020-07-20
Attending: CLINICAL NURSE SPECIALIST
Payer: COMMERCIAL

## 2020-07-20 DIAGNOSIS — M21.6X2 ACQUIRED CAVOVARUS FOOT DEFORMITY, LEFT: ICD-10-CM

## 2020-07-20 DIAGNOSIS — E11.621 DIABETIC ULCER OF LEFT MIDFOOT ASSOCIATED WITH TYPE 2 DIABETES MELLITUS, WITH OTHER ULCER SEVERITY (HCC): Primary | ICD-10-CM

## 2020-07-20 DIAGNOSIS — L97.428 DIABETIC ULCER OF LEFT MIDFOOT ASSOCIATED WITH TYPE 2 DIABETES MELLITUS, WITH OTHER ULCER SEVERITY (HCC): Primary | ICD-10-CM

## 2020-07-20 PROCEDURE — 97597 DBRDMT OPN WND 1ST 20 CM/<: CPT

## 2020-07-20 NOTE — PROGRESS NOTES
Subjective    Chief Complaint  This information was obtained from the patient  The patient was seen today for follow up and management of difficult to heal left plantar foot wound. \" Left medial ankle and leg rash is subsiding\" per patient.     Allergies 6/15/2020: Patient has HX: Type 2 DM, with neuropathy, Hyperlipidemia, HTN, Uncontrolled Type 2 DM with Hyperglycemia with long term current use of insulin, left Diabetic ulcer with osteomyelitis.  Patient was hospiitalized for DM foot ulcer/cellulitis and 3. Nonenlarged reactive lymph node left inguinal region  6/29/2020: Patient seen Dr. Jim Hampton on Friday and had suture removed. Patient to follow up with podiatrist next week.       Review of Systems (ROS)  This information was obtained from the patient    C The periwound skin exhibited: Edema, Moist. The periwound skin did not exhibit: Brawny Induration, Excoriation, Induration, Callus, Crepitus, Fluctuance, Friable, Rash, Dry/Scaly, Maceration, Atrophie Sheron, Cyanosis, Ecchymosis, Erythema, Hemosiderosis, Patient left plantar wound with slough/fibrin tissue impairing wound healing. Perform selective debridement to remove devitalized tissue to enhance wound healing. Wound more granular. No signs and symptoms of infection noted. Wound improved.  Maceration Ace wrap - ace wrap  Other: - apply stockinette then ace wrap after wound vac applied    Offloading  Other: - ambulates with crutches-patient nonweight bearing    NPWT Orders  [employer] VAC therapy, black foam at 125 mmHg continuous.  RN to change dressing 3x/wee

## 2020-07-22 ENCOUNTER — LAB REQUISITION (OUTPATIENT)
Dept: LAB | Facility: HOSPITAL | Age: 50
End: 2020-07-22
Payer: COMMERCIAL

## 2020-07-22 DIAGNOSIS — M86.9 OSTEOMYELITIS, UNSPECIFIED (HCC): ICD-10-CM

## 2020-07-22 LAB
ALBUMIN SERPL-MCNC: 3.7 G/DL (ref 3.4–5)
ALBUMIN/GLOB SERPL: 0.9 {RATIO} (ref 1–2)
ALP LIVER SERPL-CCNC: 112 U/L (ref 45–117)
ALT SERPL-CCNC: 51 U/L (ref 16–61)
ANION GAP SERPL CALC-SCNC: 4 MMOL/L (ref 0–18)
AST SERPL-CCNC: 26 U/L (ref 15–37)
BASOPHILS # BLD AUTO: 0.05 X10(3) UL (ref 0–0.2)
BASOPHILS NFR BLD AUTO: 0.5 %
BILIRUB SERPL-MCNC: 0.5 MG/DL (ref 0.1–2)
BUN BLD-MCNC: 26 MG/DL (ref 7–18)
BUN/CREAT SERPL: 26.8 (ref 10–20)
CALCIUM BLD-MCNC: 9.4 MG/DL (ref 8.5–10.1)
CHLORIDE SERPL-SCNC: 105 MMOL/L (ref 98–112)
CO2 SERPL-SCNC: 28 MMOL/L (ref 21–32)
CREAT BLD-MCNC: 0.97 MG/DL (ref 0.7–1.3)
CRP SERPL-MCNC: 1.64 MG/DL (ref ?–0.3)
DEPRECATED RDW RBC AUTO: 39.8 FL (ref 35.1–46.3)
EOSINOPHIL # BLD AUTO: 0.19 X10(3) UL (ref 0–0.7)
EOSINOPHIL NFR BLD AUTO: 2 %
ERYTHROCYTE [DISTWIDTH] IN BLOOD BY AUTOMATED COUNT: 12.7 % (ref 11–15)
GLOBULIN PLAS-MCNC: 4 G/DL (ref 2.8–4.4)
GLUCOSE BLD-MCNC: 96 MG/DL (ref 70–99)
HCT VFR BLD AUTO: 40.1 % (ref 39–53)
HGB BLD-MCNC: 12.9 G/DL (ref 13–17.5)
IMM GRANULOCYTES # BLD AUTO: 0.05 X10(3) UL (ref 0–1)
IMM GRANULOCYTES NFR BLD: 0.5 %
LYMPHOCYTES # BLD AUTO: 1.74 X10(3) UL (ref 1–4)
LYMPHOCYTES NFR BLD AUTO: 17.9 %
M PROTEIN MFR SERPL ELPH: 7.7 G/DL (ref 6.4–8.2)
MCH RBC QN AUTO: 27.6 PG (ref 26–34)
MCHC RBC AUTO-ENTMCNC: 32.2 G/DL (ref 31–37)
MCV RBC AUTO: 85.7 FL (ref 80–100)
MONOCYTES # BLD AUTO: 0.97 X10(3) UL (ref 0.1–1)
MONOCYTES NFR BLD AUTO: 10 %
NEUTROPHILS # BLD AUTO: 6.71 X10 (3) UL (ref 1.5–7.7)
NEUTROPHILS # BLD AUTO: 6.71 X10(3) UL (ref 1.5–7.7)
NEUTROPHILS NFR BLD AUTO: 69.1 %
OSMOLALITY SERPL CALC.SUM OF ELEC: 289 MOSM/KG (ref 275–295)
PLATELET # BLD AUTO: 388 10(3)UL (ref 150–450)
POTASSIUM SERPL-SCNC: 4.2 MMOL/L (ref 3.5–5.1)
RBC # BLD AUTO: 4.68 X10(6)UL (ref 4.3–5.7)
SODIUM SERPL-SCNC: 137 MMOL/L (ref 136–145)
WBC # BLD AUTO: 9.7 X10(3) UL (ref 4–11)

## 2020-07-22 PROCEDURE — 85025 COMPLETE CBC W/AUTO DIFF WBC: CPT | Performed by: INTERNAL MEDICINE

## 2020-07-22 PROCEDURE — 86140 C-REACTIVE PROTEIN: CPT | Performed by: INTERNAL MEDICINE

## 2020-07-22 PROCEDURE — 80053 COMPREHEN METABOLIC PANEL: CPT | Performed by: INTERNAL MEDICINE

## 2020-07-24 ENCOUNTER — OFFICE VISIT (OUTPATIENT)
Dept: PODIATRY CLINIC | Facility: CLINIC | Age: 50
End: 2020-07-24
Payer: COMMERCIAL

## 2020-07-24 DIAGNOSIS — S91.302D OPEN WOUND OF LEFT FOOT, SUBSEQUENT ENCOUNTER: ICD-10-CM

## 2020-07-24 DIAGNOSIS — Z98.890 STATUS POST FOOT SURGERY: Primary | ICD-10-CM

## 2020-07-24 DIAGNOSIS — L97.521 ULCERATED, FOOT, LEFT, LIMITED TO BREAKDOWN OF SKIN (HCC): ICD-10-CM

## 2020-07-24 DIAGNOSIS — E11.65 TYPE 2 DIABETES, UNCONTROLLED, WITH NEUROPATHY (HCC): ICD-10-CM

## 2020-07-24 DIAGNOSIS — E11.40 TYPE 2 DIABETES, UNCONTROLLED, WITH NEUROPATHY (HCC): ICD-10-CM

## 2020-07-24 PROCEDURE — 99024 POSTOP FOLLOW-UP VISIT: CPT | Performed by: PODIATRIST

## 2020-07-24 NOTE — PROGRESS NOTES
Theo Cloud is a 53year old male. Patient presents with:  Post-Op: left foot wound - denies any pain  - still using wound vac. HPI:   Patient returns to the clinic at today's visit with his wound VAC in place which was removed.   At today's vis route every 14 (fourteen) days. Dispense 1 [employer] 1 Device 0   • Insulin Pen Needle (BD PEN NEEDLE RAFA U/F) 32G X 4 MM Does not apply Misc Inject twice daily 200 each 3   • CHOLECALCIFEROL 5000 units Oral Tab Take 1 tablet by mouth daily.  Take 1 tab Activity      Alcohol use: Not Currently        Alcohol/week: 1.0 standard drinks        Types: 1 Standard drinks or equivalent per week      Drug use: No    Other Topics      Concerns:        Seat Belt: Yes          REVIEW OF SYSTEMS:   Today reviewed sys issues and agrees to the plan.     Valerie Tripp DPM

## 2020-07-27 ENCOUNTER — APPOINTMENT (OUTPATIENT)
Dept: MRI IMAGING | Facility: HOSPITAL | Age: 50
DRG: 638 | End: 2020-07-27
Attending: EMERGENCY MEDICINE
Payer: COMMERCIAL

## 2020-07-27 ENCOUNTER — HOSPITAL ENCOUNTER (INPATIENT)
Facility: HOSPITAL | Age: 50
LOS: 3 days | Discharge: HOME HEALTH | DRG: 638 | End: 2020-07-30
Attending: EMERGENCY MEDICINE | Admitting: HOSPITALIST
Payer: COMMERCIAL

## 2020-07-27 ENCOUNTER — OFFICE VISIT (OUTPATIENT)
Dept: WOUND CARE | Facility: HOSPITAL | Age: 50
End: 2020-07-27
Attending: CLINICAL NURSE SPECIALIST
Payer: COMMERCIAL

## 2020-07-27 DIAGNOSIS — L03.116 CELLULITIS OF LEFT FOOT: Primary | ICD-10-CM

## 2020-07-27 DIAGNOSIS — L97.428 DIABETIC ULCER OF LEFT MIDFOOT ASSOCIATED WITH TYPE 2 DIABETES MELLITUS, WITH OTHER ULCER SEVERITY (HCC): Primary | ICD-10-CM

## 2020-07-27 DIAGNOSIS — M21.6X2 ACQUIRED CAVOVARUS FOOT DEFORMITY, LEFT: ICD-10-CM

## 2020-07-27 DIAGNOSIS — L03.116 CELLULITIS OF LEFT FOOT: ICD-10-CM

## 2020-07-27 DIAGNOSIS — E11.621 DIABETIC ULCER OF LEFT MIDFOOT ASSOCIATED WITH TYPE 2 DIABETES MELLITUS, WITH OTHER ULCER SEVERITY (HCC): Primary | ICD-10-CM

## 2020-07-27 LAB
ANION GAP SERPL CALC-SCNC: 8 MMOL/L (ref 0–18)
BASOPHILS # BLD AUTO: 0.05 X10(3) UL (ref 0–0.2)
BASOPHILS NFR BLD AUTO: 0.3 %
BUN BLD-MCNC: 31 MG/DL (ref 7–18)
BUN/CREAT SERPL: 22.6 (ref 10–20)
CALCIUM BLD-MCNC: 9.9 MG/DL (ref 8.5–10.1)
CHLORIDE SERPL-SCNC: 105 MMOL/L (ref 98–112)
CO2 SERPL-SCNC: 24 MMOL/L (ref 21–32)
CREAT BLD-MCNC: 1.37 MG/DL (ref 0.7–1.3)
CRP SERPL-MCNC: 6.98 MG/DL (ref ?–0.3)
DEPRECATED RDW RBC AUTO: 38 FL (ref 35.1–46.3)
EOSINOPHIL # BLD AUTO: 0.12 X10(3) UL (ref 0–0.7)
EOSINOPHIL NFR BLD AUTO: 0.7 %
ERYTHROCYTE [DISTWIDTH] IN BLOOD BY AUTOMATED COUNT: 12.6 % (ref 11–15)
EST. AVERAGE GLUCOSE BLD GHB EST-MCNC: 171 MG/DL (ref 68–126)
GLUCOSE BLD-MCNC: 169 MG/DL (ref 70–99)
GLUCOSE BLDC GLUCOMTR-MCNC: 130 MG/DL (ref 70–99)
GLUCOSE BLDC GLUCOMTR-MCNC: 139 MG/DL (ref 70–99)
GLUCOSE BLDC GLUCOMTR-MCNC: 203 MG/DL (ref 70–99)
HBA1C MFR BLD HPLC: 7.6 % (ref ?–5.7)
HCT VFR BLD AUTO: 41.3 % (ref 39–53)
HGB BLD-MCNC: 14 G/DL (ref 13–17.5)
IMM GRANULOCYTES # BLD AUTO: 0.07 X10(3) UL (ref 0–1)
IMM GRANULOCYTES NFR BLD: 0.4 %
LYMPHOCYTES # BLD AUTO: 1.69 X10(3) UL (ref 1–4)
LYMPHOCYTES NFR BLD AUTO: 10.4 %
MCH RBC QN AUTO: 28.1 PG (ref 26–34)
MCHC RBC AUTO-ENTMCNC: 33.9 G/DL (ref 31–37)
MCV RBC AUTO: 82.8 FL (ref 80–100)
MONOCYTES # BLD AUTO: 1.32 X10(3) UL (ref 0.1–1)
MONOCYTES NFR BLD AUTO: 8.2 %
NEUTROPHILS # BLD AUTO: 12.93 X10 (3) UL (ref 1.5–7.7)
NEUTROPHILS # BLD AUTO: 12.93 X10(3) UL (ref 1.5–7.7)
NEUTROPHILS NFR BLD AUTO: 80 %
OSMOLALITY SERPL CALC.SUM OF ELEC: 294 MOSM/KG (ref 275–295)
PLATELET # BLD AUTO: 425 10(3)UL (ref 150–450)
POTASSIUM SERPL-SCNC: 4.4 MMOL/L (ref 3.5–5.1)
RBC # BLD AUTO: 4.99 X10(6)UL (ref 4.3–5.7)
SARS-COV-2 RNA RESP QL NAA+PROBE: NOT DETECTED
SODIUM SERPL-SCNC: 137 MMOL/L (ref 136–145)
WBC # BLD AUTO: 16.2 X10(3) UL (ref 4–11)

## 2020-07-27 PROCEDURE — 73720 MRI LWR EXTREMITY W/O&W/DYE: CPT | Performed by: EMERGENCY MEDICINE

## 2020-07-27 PROCEDURE — 99212 OFFICE O/P EST SF 10 MIN: CPT

## 2020-07-27 RX ORDER — SODIUM CHLORIDE 9 MG/ML
INJECTION, SOLUTION INTRAVENOUS CONTINUOUS
Status: DISCONTINUED | OUTPATIENT
Start: 2020-07-27 | End: 2020-07-28

## 2020-07-27 RX ORDER — FENOFIBRATE 67 MG/1
67 CAPSULE ORAL NIGHTLY
Status: DISCONTINUED | OUTPATIENT
Start: 2020-07-27 | End: 2020-07-30

## 2020-07-27 RX ORDER — HYDROCODONE BITARTRATE AND ACETAMINOPHEN 5; 325 MG/1; MG/1
1 TABLET ORAL EVERY 4 HOURS PRN
Status: DISCONTINUED | OUTPATIENT
Start: 2020-07-27 | End: 2020-07-30

## 2020-07-27 RX ORDER — VANCOMYCIN 2 GRAM/500 ML IN 0.9 % SODIUM CHLORIDE INTRAVENOUS
25 ONCE
Status: COMPLETED | OUTPATIENT
Start: 2020-07-27 | End: 2020-07-27

## 2020-07-27 RX ORDER — HYDRALAZINE HYDROCHLORIDE 25 MG/1
25 TABLET, FILM COATED ORAL EVERY 8 HOURS PRN
Status: DISCONTINUED | OUTPATIENT
Start: 2020-07-27 | End: 2020-07-30

## 2020-07-27 RX ORDER — VANCOMYCIN HYDROCHLORIDE
15 EVERY 12 HOURS
Status: DISCONTINUED | OUTPATIENT
Start: 2020-07-27 | End: 2020-07-28

## 2020-07-27 RX ORDER — HYDROCODONE BITARTRATE AND ACETAMINOPHEN 5; 325 MG/1; MG/1
2 TABLET ORAL EVERY 4 HOURS PRN
Status: DISCONTINUED | OUTPATIENT
Start: 2020-07-27 | End: 2020-07-30

## 2020-07-27 RX ORDER — SODIUM CHLORIDE 0.9 % (FLUSH) 0.9 %
3 SYRINGE (ML) INJECTION AS NEEDED
Status: DISCONTINUED | OUTPATIENT
Start: 2020-07-27 | End: 2020-07-30

## 2020-07-27 RX ORDER — BUPROPION HYDROCHLORIDE 300 MG/1
300 TABLET ORAL DAILY
Status: DISCONTINUED | OUTPATIENT
Start: 2020-07-28 | End: 2020-07-30

## 2020-07-27 RX ORDER — ACETAMINOPHEN 325 MG/1
650 TABLET ORAL EVERY 4 HOURS PRN
Status: DISCONTINUED | OUTPATIENT
Start: 2020-07-27 | End: 2020-07-30

## 2020-07-27 RX ORDER — GABAPENTIN 300 MG/1
300 CAPSULE ORAL 2 TIMES DAILY
Status: DISCONTINUED | OUTPATIENT
Start: 2020-07-27 | End: 2020-07-30

## 2020-07-27 RX ORDER — DEXTROSE MONOHYDRATE 25 G/50ML
50 INJECTION, SOLUTION INTRAVENOUS
Status: DISCONTINUED | OUTPATIENT
Start: 2020-07-27 | End: 2020-07-30

## 2020-07-27 RX ORDER — ROSUVASTATIN CALCIUM 10 MG/1
10 TABLET, COATED ORAL NIGHTLY
Status: DISCONTINUED | OUTPATIENT
Start: 2020-07-27 | End: 2020-07-30

## 2020-07-27 NOTE — PROGRESS NOTES
Subjective    Chief Complaint  This information was obtained from the patient  The patient was seen today for follow up and management of difficult to heal left plantar foot wound. \" I have pain on my left foot\" per patient    Allergies  No known allergie 6/15/2020: Patient has HX: Type 2 DM, with neuropathy, Hyperlipidemia, HTN, Uncontrolled Type 2 DM with Hyperglycemia with long term current use of insulin, left Diabetic ulcer with osteomyelitis.  Patient was hospiitalized for DM foot ulcer/cellulitis and 3. Nonenlarged reactive lymph node left inguinal region  6/29/2020: Patient seen Dr. Parish Cruz on Friday and had suture removed. Patient to follow up with podiatrist next week. 7/27/2020: Patient was seen by Dr. Parish Cruz last week and ID.     Review of System Wound #11 Left Foot is a [place] Grade 2 Diabetic Ulcer and has received a status of Not Healed. Subsequent wound encounter measurements are 3.2cm length x 3cm width x 0.5cm depth, with an area of 9.6 sq cm and a volume of 4.8 cubic cm.  No tunneling has bee (Encounter Diagnosis) L03.116 - Cellulitis of left lower limb    Diagnoses    ICD-10  E11.621: Type 2 diabetes mellitus with foot ulcer  E11.69: Type 2 diabetes mellitus with other specified complication  I53.294: Non-pressure chronic ulcer of other part o [employer] VAC therapy, black foam at 125 mmHg continuous. RN to change dressing 3x/week, unless noted below. - Wound vac on hold /Patient to call [employer] to inform wound vac on hold. Misc / Additional orders  Home health care nurse for wound care.  - Wound vac on

## 2020-07-27 NOTE — PROGRESS NOTES
[facility] Dosing Service    Initial Pharmacokinetic Consult for Vancomycin Dosing     Sammie Marinelli is a 53year old patient who is being treated for diabetic foot.   Pharmacy has been asked to dose Vancomycin by TUCKER Cintron    Patient has no known all

## 2020-07-27 NOTE — ED PROVIDER NOTES
Patient Seen in: [facility] AND [facility] Emergency Department    History   Patient presents with:  Postop/Procedure Problem  Wound    Stated Complaint: Sent from wound clinic - post op wound    HPI    Patient presents from wound care center for infection to  Types: Cigarettes      Smokeless tobacco: Never Used    Alcohol use: Not Currently      Alcohol/week: 1.0 standard drinks      Types: 1 Standard drinks or equivalent per week    Drug use: No        Medications :   FREESTYLE JOSE 14 DAY SENSOR Does not systems are as noted in HPI. Constitutional and vital signs reviewed.     Review of systems otherwise negative all others reviewed      Physical Exam     ED Triage Vitals [07/27/20 0833]   /63   Pulse 114   Resp 20   Temp 98.1 °F (36.7 °C)   Temp src of the left foot to be ordered this was done. I did update the patient he is receiving the antibiotics right now.     ED Course     Labs Reviewed   BASIC METABOLIC PANEL (8) - Abnormal; Notable for the following components:       Result Value    Glucose 16

## 2020-07-27 NOTE — ED NOTES
Orders for admission, patient is aware of plan and ready to go upstairs. Any questions, please call ED RN lilo  at extension [phone]. Patient is alert and oriented x4. Ambulates with crutches. On room air.

## 2020-07-27 NOTE — ED NOTES
Patient presents to ER with c/o infected wound to left foot. Patient seen at wound care today and sent in for further evaluation. Recent toe amputation to left foot. Finished 6 weeks of abx,. Picc was pulled from left upper arm last Thursday.

## 2020-07-27 NOTE — WOUND PROGRESS NOTE
Spoke to [employer] and advised to please apply a moist to dry dressing to the left plantar foot for now. Wound care services will follow up tomorrow.  Dr. Parish Cruz is on consult, pt was having wound vac tx to this wound and is current with the outpatient woun

## 2020-07-27 NOTE — H&P
[facility] Hospitalist Team  History and Physical  Admit Date:  7/27/20    ASSESSMENT / PLAN:   50 yo male HTN, HL/Hypertriglyceridemia, DM type II with neuropathy, obesity-BMI 35, depression with hx left DM foot ulcer/cellulitis/OM S/P  I&D and 4th ray amputatio completed abx last week and saw podiatry, all was going well and he was still using wound vac and was NWB on left.   Last night he noted pain in the left foot going up his leg, he couldn't get comfortable, today he was seen in the wound clinic and sent in d surgery   • TOE AMPUTATION Left 6/7/2020    Performed by Jenifer Golden DPM at [facility] OR   • WISDOM TEETH REMOVED          ALL:  No Known Allergies     Home Medications:  No outpatient medications have been marked as taking for the 7/27/20 encounter joint is also suspected and is not excluded given involvement of both sides of the joint. Subchondral irregularity of the medial portion of the 3rd metatarsal head suspicious for developing pathologic fracture.   No collapse of the articular surface is see and OM   SHANNON  DM Type II with Neuropathy  Hypertriglyceridemia  HL  HTN  Depression    Plan:  - vanc/zosyn  - ID and podiatry consulted  - poss surgery, maybe TMA, MRI concerning for osteo  - will discuss further with ID and podiatry after evaluations

## 2020-07-27 NOTE — CONSULTS
[facility] Infectious Disease  Report of Consultation    Tanisha Bhatia Patient Status:  Emergency    1970 MRN [id number]   Location [address] Attending Joy Briones MD   Hosp Day # 0 PCP Rafa Hill, TEETH REMOVED       Family History   Problem Relation Age of Onset   • Diabetes Father    • Lipids Father    • Diabetes Mother         Type 2 DM and had GDM before   • Lipids Mother    • Cancer Mother         breast   • Heart Disorder Mother         atrial 0833 108/63 98.1 °F (36.7 °C) 114 20 97 % 6' 3\" (1.905 m) 265 lb (120.2 kg)       Intake/Output:  I/O this shift:  In: 100 [IV PIGGYBACK:100]  Out: -     Physical Exam:   General: Awake, alert, non-tox and in NAD.    Head: Normocephalic, without obvious ab control    5. Disposition - inpatient. Awaiting MRI results. IV vancomycin and zosyn started. Repeat cultures. Final choice, route, and duration of antibiotics to be determined.   Patient aware he may require long course IV Rx once again pending MRI fi

## 2020-07-28 ENCOUNTER — APPOINTMENT (OUTPATIENT)
Dept: GENERAL RADIOLOGY | Facility: HOSPITAL | Age: 50
DRG: 638 | End: 2020-07-28
Attending: INTERNAL MEDICINE
Payer: COMMERCIAL

## 2020-07-28 ENCOUNTER — APPOINTMENT (OUTPATIENT)
Dept: PICC SERVICES | Facility: HOSPITAL | Age: 50
DRG: 638 | End: 2020-07-28
Attending: INTERNAL MEDICINE
Payer: COMMERCIAL

## 2020-07-28 LAB
ALBUMIN SERPL-MCNC: 3.2 G/DL (ref 3.4–5)
ALBUMIN/GLOB SERPL: 0.8 {RATIO} (ref 1–2)
ALP LIVER SERPL-CCNC: 83 U/L (ref 45–117)
ALT SERPL-CCNC: 31 U/L (ref 16–61)
ANION GAP SERPL CALC-SCNC: 4 MMOL/L (ref 0–18)
AST SERPL-CCNC: 14 U/L (ref 15–37)
BASOPHILS # BLD AUTO: 0.06 X10(3) UL (ref 0–0.2)
BASOPHILS NFR BLD AUTO: 0.5 %
BILIRUB SERPL-MCNC: 0.6 MG/DL (ref 0.1–2)
BUN BLD-MCNC: 24 MG/DL (ref 7–18)
BUN/CREAT SERPL: 20.5 (ref 10–20)
CALCIUM BLD-MCNC: 10 MG/DL (ref 8.5–10.1)
CHLORIDE SERPL-SCNC: 106 MMOL/L (ref 98–112)
CO2 SERPL-SCNC: 29 MMOL/L (ref 21–32)
CREAT BLD-MCNC: 1.17 MG/DL (ref 0.7–1.3)
DEPRECATED RDW RBC AUTO: 38.4 FL (ref 35.1–46.3)
EOSINOPHIL # BLD AUTO: 0.19 X10(3) UL (ref 0–0.7)
EOSINOPHIL NFR BLD AUTO: 1.7 %
ERYTHROCYTE [DISTWIDTH] IN BLOOD BY AUTOMATED COUNT: 12.4 % (ref 11–15)
GLOBULIN PLAS-MCNC: 4.2 G/DL (ref 2.8–4.4)
GLUCOSE BLD-MCNC: 76 MG/DL (ref 70–99)
GLUCOSE BLDC GLUCOMTR-MCNC: 109 MG/DL (ref 70–99)
GLUCOSE BLDC GLUCOMTR-MCNC: 194 MG/DL (ref 70–99)
GLUCOSE BLDC GLUCOMTR-MCNC: 76 MG/DL (ref 70–99)
GLUCOSE BLDC GLUCOMTR-MCNC: 82 MG/DL (ref 70–99)
GLUCOSE BLDC GLUCOMTR-MCNC: 87 MG/DL (ref 70–99)
HCT VFR BLD AUTO: 40.1 % (ref 39–53)
HGB BLD-MCNC: 13.4 G/DL (ref 13–17.5)
IMM GRANULOCYTES # BLD AUTO: 0.05 X10(3) UL (ref 0–1)
IMM GRANULOCYTES NFR BLD: 0.5 %
LYMPHOCYTES # BLD AUTO: 1.3 X10(3) UL (ref 1–4)
LYMPHOCYTES NFR BLD AUTO: 11.9 %
M PROTEIN MFR SERPL ELPH: 7.4 G/DL (ref 6.4–8.2)
MCH RBC QN AUTO: 28.2 PG (ref 26–34)
MCHC RBC AUTO-ENTMCNC: 33.4 G/DL (ref 31–37)
MCV RBC AUTO: 84.2 FL (ref 80–100)
MONOCYTES # BLD AUTO: 1.1 X10(3) UL (ref 0.1–1)
MONOCYTES NFR BLD AUTO: 10.1 %
NEUTROPHILS # BLD AUTO: 8.21 X10 (3) UL (ref 1.5–7.7)
NEUTROPHILS # BLD AUTO: 8.21 X10(3) UL (ref 1.5–7.7)
NEUTROPHILS NFR BLD AUTO: 75.3 %
OSMOLALITY SERPL CALC.SUM OF ELEC: 291 MOSM/KG (ref 275–295)
PLATELET # BLD AUTO: 347 10(3)UL (ref 150–450)
POTASSIUM SERPL-SCNC: 4.5 MMOL/L (ref 3.5–5.1)
RBC # BLD AUTO: 4.76 X10(6)UL (ref 4.3–5.7)
SODIUM SERPL-SCNC: 139 MMOL/L (ref 136–145)
VANCOMYCIN TROUGH SERPL-MCNC: 9.7 UG/ML (ref 10–20)
WBC # BLD AUTO: 10.9 X10(3) UL (ref 4–11)

## 2020-07-28 PROCEDURE — 02HV33Z INSERTION OF INFUSION DEVICE INTO SUPERIOR VENA CAVA, PERCUTANEOUS APPROACH: ICD-10-PCS | Performed by: HOSPITALIST

## 2020-07-28 PROCEDURE — 99253 IP/OBS CNSLTJ NEW/EST LOW 45: CPT | Performed by: PODIATRIST

## 2020-07-28 PROCEDURE — 71045 X-RAY EXAM CHEST 1 VIEW: CPT | Performed by: INTERNAL MEDICINE

## 2020-07-28 RX ORDER — ENOXAPARIN SODIUM 100 MG/ML
40 INJECTION SUBCUTANEOUS DAILY
Status: DISCONTINUED | OUTPATIENT
Start: 2020-07-28 | End: 2020-07-30

## 2020-07-28 RX ORDER — LISINOPRIL 5 MG/1
5 TABLET ORAL DAILY
Status: DISCONTINUED | OUTPATIENT
Start: 2020-07-28 | End: 2020-07-28

## 2020-07-28 RX ORDER — VANCOMYCIN HYDROCHLORIDE
1250 EVERY 8 HOURS
Status: DISCONTINUED | OUTPATIENT
Start: 2020-07-29 | End: 2020-07-29

## 2020-07-28 NOTE — CM/SW NOTE
2:05pm Update- CHEKO received confirmation from Sj at [address] [place] [address] who confirmed the pt has already met his deductible and out of pocket for the year, so his coverage is at 100%.  Will still need a script and clarified orders along with the picc note to f

## 2020-07-28 NOTE — PROGRESS NOTES
[facility] Hospitalist Team  Progress Note    Barabara Barrier Patient Status:  Inpatient    1970 MRN [id number]   Location [facility] 1W Attending Andres Barry MD   Hosp Day # 1 PCP Jessica Barton MD     CC: Follow Up  PCP: Jessica Barton MD    SEE ATTENDING NO Team  Pager [phone]  Answering Service number: [phone]  7/28/2020    SUBJECTIVE:   Improved pain to left leg.  Spoke with Dr Shelbie Yu, no plans for OR today       OBJECTIVE:   Blood pressure 141/67, pulse 95, temperature 98.7 °F (37.1 °C), temperat oral liquid 15 g, 15 g, Oral, Q15 Min PRN    Or  Glucose-Vitamin C (DEX-4) chewable tab 4 tablet, 4 tablet, Oral, Q15 Min PRN    Or  dextrose 50 % injection 50 mL, 50 mL, Intravenous, Q15 Min PRN    Or  glucose (DEX4) oral liquid 30 g, 30 g, Oral, Q15 Min Walt Roblero MD on 7/27/2020 at 12:00 PM              SEE ATTENDING NOTE BELOW:   Patient seen and examined independently. Discussed with APN and agree with note above.     S: patient feeling better, denies chest pain or sob, foot without pain, no fevers o

## 2020-07-28 NOTE — PLAN OF CARE
Patient A&Ox4. No complaints of pain. Low grade fevers treated with tyenol PRN. Dressing to L foot CDI. Patient NPO @midnight for possible procedure tmrw. Patient aware of plan.     Problem: Diabetes/Glucose Control  Goal: Glucose maintained within prescrib ADULT  Goal: Glucose maintained within prescribed range  Description  INTERVENTIONS:  - Monitor Blood Glucose as ordered  - Assess for signs and symptoms of hyperglycemia and hypoglycemia  - Administer ordered medications to maintain glucose within target

## 2020-07-28 NOTE — PAYOR COMM NOTE
--------------  ADMISSION REVIEW     Payor: [facility]  Subscriber #:  [id number]  Authorization Number: [id number]    Admit date: 7/27/20  Admit time: [address]       Admitting Physician: Elizabeth Mattson MD  Attending Physician:  Eulalia Monge MD  Primary Care Ph components:       Result Value    Glucose 169 (*)     BUN 31 (*)     Creatinine 1.37 (*)     BUN/CREA Ratio 22.6 (*)     All other components within normal limits   CBC W/ DIFFERENTIAL - Abnormal; Notable for the following components:    WBC 16.2 (*)     N OR          ID CONSULT    Reason for Consultation:  L foot wound    Patient continued to follow at wound care and there was some worsening erythema noted. No F/C. No N/V/D. He was sent to the ED and now IV vancomycin and zosyn have been given.   MRI pendi suspicious for developing pathologic fracture. No collapse of the articular surface is seen at this point.      Extensive cellulitis seen along the plantar lateral aspect of the foot in the region of prior ulceration and soft tissue resection with multiloc

## 2020-07-28 NOTE — PLAN OF CARE
PICC line placed today. Denies any complaint of pain. Scd's on. Started on lovenox. Vancomycin and Zosyn antibiotics. No fever noted. Will continue to monitor.   Problem: Diabetes/Glucose Control  Goal: Glucose maintained within prescribed range  Descriptio maintained within prescribed range  Description  INTERVENTIONS:  - Monitor Blood Glucose as ordered  - Assess for signs and symptoms of hyperglycemia and hypoglycemia  - Administer ordered medications to maintain glucose within target range  - Assess barri

## 2020-07-28 NOTE — CONSULTS
[facility]    Report of Consultation    Miko New Patient Status:  Inpatient    1970 MRN [id number]   Location [facility] 1W Attending Matthew Aparicio MD   Hosp Day # 1 PCP Juana Parker MD     Date of Admission:  2020  Edvin Diabetes Father    • Lipids Father    • Diabetes Mother         Type 2 DM and had GDM before   • Lipids Mother    • Cancer Mother         breast   • Heart Disorder Mother         atrial fib--resolved after cardioversion   • Cancer Sister         skin   • O g, Intravenous, Q8H  •  vancomycin IVPB premix 1.5g in 0.9% NaCl 250 mL, 15 mg/kg (Adjusted), Intravenous, Q12H  •  insulin detemir (LEVEMIR) 100 UNIT/ML flextouch 40 Units, 40 Units, Subcutaneous, Nightly  •  insulin detemir (LEVEMIR) 100 UNIT/ML flextouc tenosynovitis at the [place] knot of Josiane Darnell. Dictated by (CST): Krissy Bryant MD on 7/27/2020 at 11:51 AM     Finalized by (CST): Krissy Bryant MD on 7/27/2020 at 12:00 PM             Laboratory Data:  Recent Labs   Lab 07/28/20  0539   RBC 4.76   HGB 13. GWYN Subramanian   7/28/2020  4:45 PM

## 2020-07-28 NOTE — WOUND PROGRESS NOTE
WOUND CARE NOTE    History:  Past Medical History:   Diagnosis Date   • Depression    • Diabetes ([facility] [address]    • [facility]    • High blood pressure    • High cholesterol    • Hyperlipidemia LDL goal <100    • Hypertension    • Hypertriglycerid yes    Wound(s):  Pt was seen sitting up in bed, known to wound services from previous admissions. Pt is s/p 4th ray amputation on 6/7/20 and has been current with home health, wound vac tx, and the outpt wound clinic.  The dressing was removed from the omaira

## 2020-07-28 NOTE — PROGRESS NOTES
[facility] AND [facility] Infectious Disease  Progress Note    Jaylan Saenz Patient Status:  Inpatient    1970 MRN [id number]   Location [facility] 1W Attending Raul Narayanan MD   Hosp Day # 1 PCP Jose Elias Kaufman MD     Subjective:  Patient seen/exam 3rd digit proximal phalanx. Septic arthritis of the 3rd MTP joint is also suspected and is not excluded given involvement of both sides of the joint.      Subchondral irregularity of the medial portion of the 3rd metatarsal head suspicious for developing p enterococcus. Will follow with further recommendations. >35min spent at patient's bedside today in examination and coordination of today's plan of care. Elis Meadows [facility] Infectious Disease  [phone]    7/28/2020  1:21 PM

## 2020-07-29 LAB
ANION GAP SERPL CALC-SCNC: 5 MMOL/L (ref 0–18)
BASOPHILS # BLD AUTO: 0.06 X10(3) UL (ref 0–0.2)
BASOPHILS NFR BLD AUTO: 0.5 %
BUN BLD-MCNC: 19 MG/DL (ref 7–18)
BUN/CREAT SERPL: 17.4 (ref 10–20)
CALCIUM BLD-MCNC: 9.7 MG/DL (ref 8.5–10.1)
CHLORIDE SERPL-SCNC: 107 MMOL/L (ref 98–112)
CO2 SERPL-SCNC: 27 MMOL/L (ref 21–32)
CREAT BLD-MCNC: 1.09 MG/DL (ref 0.7–1.3)
DEPRECATED RDW RBC AUTO: 37.7 FL (ref 35.1–46.3)
EOSINOPHIL # BLD AUTO: 0.25 X10(3) UL (ref 0–0.7)
EOSINOPHIL NFR BLD AUTO: 2.2 %
ERYTHROCYTE [DISTWIDTH] IN BLOOD BY AUTOMATED COUNT: 12.5 % (ref 11–15)
GLUCOSE BLD-MCNC: 65 MG/DL (ref 70–99)
GLUCOSE BLDC GLUCOMTR-MCNC: 136 MG/DL (ref 70–99)
GLUCOSE BLDC GLUCOMTR-MCNC: 137 MG/DL (ref 70–99)
GLUCOSE BLDC GLUCOMTR-MCNC: 184 MG/DL (ref 70–99)
GLUCOSE BLDC GLUCOMTR-MCNC: 77 MG/DL (ref 70–99)
HCT VFR BLD AUTO: 39.3 % (ref 39–53)
HGB BLD-MCNC: 13.1 G/DL (ref 13–17.5)
IMM GRANULOCYTES # BLD AUTO: 0.05 X10(3) UL (ref 0–1)
IMM GRANULOCYTES NFR BLD: 0.4 %
LYMPHOCYTES # BLD AUTO: 1.18 X10(3) UL (ref 1–4)
LYMPHOCYTES NFR BLD AUTO: 10.5 %
MCH RBC QN AUTO: 27.7 PG (ref 26–34)
MCHC RBC AUTO-ENTMCNC: 33.3 G/DL (ref 31–37)
MCV RBC AUTO: 83.1 FL (ref 80–100)
MONOCYTES # BLD AUTO: 1.02 X10(3) UL (ref 0.1–1)
MONOCYTES NFR BLD AUTO: 9 %
NEUTROPHILS # BLD AUTO: 8.73 X10 (3) UL (ref 1.5–7.7)
NEUTROPHILS # BLD AUTO: 8.73 X10(3) UL (ref 1.5–7.7)
NEUTROPHILS NFR BLD AUTO: 77.4 %
OSMOLALITY SERPL CALC.SUM OF ELEC: 288 MOSM/KG (ref 275–295)
PLATELET # BLD AUTO: 389 10(3)UL (ref 150–450)
POTASSIUM SERPL-SCNC: 3.9 MMOL/L (ref 3.5–5.1)
RBC # BLD AUTO: 4.73 X10(6)UL (ref 4.3–5.7)
SODIUM SERPL-SCNC: 139 MMOL/L (ref 136–145)
WBC # BLD AUTO: 11.3 X10(3) UL (ref 4–11)

## 2020-07-29 PROCEDURE — 99233 SBSQ HOSP IP/OBS HIGH 50: CPT | Performed by: PODIATRIST

## 2020-07-29 RX ORDER — VANCOMYCIN HYDROCHLORIDE 125 MG/1
125 CAPSULE ORAL DAILY
Status: DISCONTINUED | OUTPATIENT
Start: 2020-07-29 | End: 2020-07-30

## 2020-07-29 NOTE — PROGRESS NOTES
[facility] AND [facility] Infectious Disease  Progress Note    Misti Session Patient Status:  Inpatient    1970 MRN [id number]   Location [facility] 1W Attending Anastacia Green MD   Hosp Day # 2 PCP Kristina Clements MD     Subjective:  Patient s joint.     Subchondral irregularity of the medial portion of the 3rd metatarsal head suspicious for developing pathologic fracture.  No collapse of the articular surface is seen at this point.     Extensive cellulitis seen along the plantar lateral aspect follow with further recommendations. >35min spent at patient's bedside today in examination and coordination of today's plan of care. Elis Perales [facility] Infectious Disease  [phone]    7/29/2020  11:34 AM

## 2020-07-29 NOTE — PROGRESS NOTES
[facility]                                                            Progress Note    Sharon Krishnan Patient Status:  Inpatient    1970 MRN [id number]   Location [facility] 1W Attending Cristobal Quiñones MD   Hosp Day # 2 PCP Sanket Romo Min PRN **OR** Glucose-Vitamin C (DEX-4) chewable tab 4 tablet, 4 tablet, Oral, Q15 Min PRN **OR** dextrose 50 % injection 50 mL, 50 mL, Intravenous, Q15 Min PRN **OR** glucose (DEX4) oral liquid 30 g, 30 g, Oral, Q15 Min PRN **OR** Glucose-Vitamin C (DEX- Hypertension, essential, benign     Obesity (BMI 30-39. 9)     Long-term insulin use (HCC)     Depression, unspecified depression type     Ulcerated, foot, left, limited to breakdown of skin ([address]     Hypertriglyceridemia     Diabetic foot ulcer ([address]     C

## 2020-07-29 NOTE — PLAN OF CARE
Afebrile, denies pain, seen by lianna crockett and taylor, dressing changed by dr vazquez, iv antibiotics as ordered  Problem: Diabetes/Glucose Control  Goal: Glucose maintained within prescribed range  Description  INTERVENTIONS:  - Monitor Blood Glucose as range  Description  INTERVENTIONS:  - Monitor Blood Glucose as ordered  - Assess for signs and symptoms of hyperglycemia and hypoglycemia  - Administer ordered medications to maintain glucose within target range  - Assess barriers to adequate nutritional i

## 2020-07-29 NOTE — PAYOR COMM NOTE
--------------  CONTINUED STAY REVIEW    Payor: [facility] PPO  Subscriber #:  [id number]  Authorization Number: [id number]    Admit date: 7/27/20  Admit time: 18    Admitting Physician: Jose M Campbell MD  Attending Physician:  MD Ming Chavez abscess  -tmax 100.4  -WBC 16.2-->10.9-->11.3, CRP 6.98  -wound cx MRSA  -wound care  -vanco and zosyn  -awaiting second opinion from podiatry, will also see if ortho foot specialist Dr Feliz Kam can provide input        SHANNON-improved  -baseline Cr 0.9

## 2020-07-29 NOTE — PLAN OF CARE
Pt venous glucose 65. Bedside glucose 77 and patient given juice and is ordering breakfast.  Dr. Chaim Stokes aware as orders adjusting pts levemir dosing is noted.

## 2020-07-29 NOTE — PROGRESS NOTES
[facility] Hospitalist Team  Progress Note    Fay Gomez Patient Status:  Inpatient    1970 MRN [id number]   Location [facility] 1W Attending Jimi Encinas MD   Hosp Day # 2 PCP Josi Kowalski MD     CC: Follow Up  PCP: Josi Kowalski MD    SEE ATTENDING NO plan as detailed above.  Discussed plan of care with Dr. Alvarez Wayne RN, NP  [address] [facility] Team  Pager [phone]  Answering Service number: [phone]  7/29/2020    SUBJECTIVE:   FLOWER lower this am. Awaiting Dr Hua Garrido Or  HYDROcodone-acetaminophen (NORCO) 5-325 MG per tab 1 tablet, 1 tablet, Oral, Q4H PRN    Or  HYDROcodone-acetaminophen (NORCO) 5-325 MG per tab 2 tablet, 2 tablet, Oral, Q4H PRN  buPROPion HCl ER (XL) (WELLBUTRIN XL) 300 MG 24 hr tab 300 mg, 300 mg, Ora 1. 8 cm. Mild reactive marrow edema seen involving the 5th metatarsal head with preservation of normal marrow signal.  Mild tenosynovitis at the master knot of Hannah James.       Dictated by (CST): Eleazar Schaffer MD on 7/27/2020 at 11:51 AM     Finalized by (CST): zosyn  -awaiting second opinion from podiatry, will also see if ortho foot specialist Dr Keenan Madden can provide input   -as per ID and podiatry      SHANNON-improved  -baseline Cr 0.9  -admission Cr 1.37-->1.17-->1.09  -holding ace-I for now   -follow     DM

## 2020-07-29 NOTE — PROGRESS NOTES
[facility] dosing service    Follow-up Pharmacokinetic Consult for Vancomycin Dosing     Steve Nova is a 53year old patient who is being treated for diabetic foot.    Patient is on day 2 of Vancomycin and is currently receiving 1.5 gm IV Q 12 hours [phone]

## 2020-07-29 NOTE — PLAN OF CARE
Pt alert, reports no pain. Aware of temp of 99.4. Dressing to left foot changed as ordered. Accucheck stable. Call light within easy reach. No acute distress noted.     Problem: Diabetes/Glucose Control  Goal: Glucose maintained within prescribed range

## 2020-07-29 NOTE — PROGRESS NOTES
[facility] Hospitalist Team  Progress Note    Vy Tilley Patient Status:  Inpatient    1970 MRN [id number]   Location [facility] 1W Attending Minh Pruett MD   Hosp Day # 2 PCP Sylvia Gutierrez MD     CC: Follow Up  PCP: Sylvia Gutierrez MD    SEE ATTENDING NO plan as detailed above.  Discussed plan of care with Dr. Jake Yan RN, NP  [address] [facility] Team  Pager [phone]  Answering Service number: [phone]  7/29/2020    SUBJECTIVE:   BS lower this am. Awaiting Dr Gloria Paez Or  HYDROcodone-acetaminophen (NORCO) 5-325 MG per tab 1 tablet, 1 tablet, Oral, Q4H PRN    Or  HYDROcodone-acetaminophen (NORCO) 5-325 MG per tab 2 tablet, 2 tablet, Oral, Q4H PRN  buPROPion HCl ER (XL) (WELLBUTRIN XL) 300 MG 24 hr tab 300 mg, 300 mg, Ora 1. 8 cm. Mild reactive marrow edema seen involving the 5th metatarsal head with preservation of normal marrow signal.  Mild tenosynovitis at the master knot of Starr oClunga.       Dictated by (CST): Sera Cardona MD on 7/27/2020 at 11:51 AM     Finalized by (CST):

## 2020-07-29 NOTE — CM/SW NOTE
2:45pm Update- Script obtained and sent via [facility] to [facility]. Call placed to [facility] to alert of the incoming script and pending plans. [facility]. CHEKO will enter order to resume [facility] services, sent to MD to [place].

## 2020-07-30 VITALS
OXYGEN SATURATION: 98 % | HEART RATE: 87 BPM | HEIGHT: 75 IN | BODY MASS INDEX: 32.95 KG/M2 | WEIGHT: 265 LBS | SYSTOLIC BLOOD PRESSURE: 137 MMHG | DIASTOLIC BLOOD PRESSURE: 97 MMHG | RESPIRATION RATE: 18 BRPM | TEMPERATURE: 98 F

## 2020-07-30 LAB
ANION GAP SERPL CALC-SCNC: 4 MMOL/L (ref 0–18)
BASOPHILS # BLD AUTO: 0.05 X10(3) UL (ref 0–0.2)
BASOPHILS NFR BLD AUTO: 0.6 %
BUN BLD-MCNC: 16 MG/DL (ref 7–18)
BUN/CREAT SERPL: 15.2 (ref 10–20)
CALCIUM BLD-MCNC: 9.9 MG/DL (ref 8.5–10.1)
CHLORIDE SERPL-SCNC: 105 MMOL/L (ref 98–112)
CO2 SERPL-SCNC: 28 MMOL/L (ref 21–32)
CREAT BLD-MCNC: 1.05 MG/DL (ref 0.7–1.3)
DEPRECATED RDW RBC AUTO: 37.6 FL (ref 35.1–46.3)
EOSINOPHIL # BLD AUTO: 0.18 X10(3) UL (ref 0–0.7)
EOSINOPHIL NFR BLD AUTO: 2.1 %
ERYTHROCYTE [DISTWIDTH] IN BLOOD BY AUTOMATED COUNT: 12.4 % (ref 11–15)
GLUCOSE BLD-MCNC: 98 MG/DL (ref 70–99)
GLUCOSE BLDC GLUCOMTR-MCNC: 102 MG/DL (ref 70–99)
GLUCOSE BLDC GLUCOMTR-MCNC: 147 MG/DL (ref 70–99)
HCT VFR BLD AUTO: 38.5 % (ref 39–53)
HGB BLD-MCNC: 13 G/DL (ref 13–17.5)
IMM GRANULOCYTES # BLD AUTO: 0.02 X10(3) UL (ref 0–1)
IMM GRANULOCYTES NFR BLD: 0.2 %
LYMPHOCYTES # BLD AUTO: 1.15 X10(3) UL (ref 1–4)
LYMPHOCYTES NFR BLD AUTO: 13.7 %
MCH RBC QN AUTO: 27.8 PG (ref 26–34)
MCHC RBC AUTO-ENTMCNC: 33.8 G/DL (ref 31–37)
MCV RBC AUTO: 82.4 FL (ref 80–100)
MONOCYTES # BLD AUTO: 0.8 X10(3) UL (ref 0.1–1)
MONOCYTES NFR BLD AUTO: 9.5 %
NEUTROPHILS # BLD AUTO: 6.2 X10 (3) UL (ref 1.5–7.7)
NEUTROPHILS # BLD AUTO: 6.2 X10(3) UL (ref 1.5–7.7)
NEUTROPHILS NFR BLD AUTO: 73.9 %
OSMOLALITY SERPL CALC.SUM OF ELEC: 285 MOSM/KG (ref 275–295)
PLATELET # BLD AUTO: 387 10(3)UL (ref 150–450)
POTASSIUM SERPL-SCNC: 4.1 MMOL/L (ref 3.5–5.1)
RBC # BLD AUTO: 4.67 X10(6)UL (ref 4.3–5.7)
SODIUM SERPL-SCNC: 137 MMOL/L (ref 136–145)
WBC # BLD AUTO: 8.4 X10(3) UL (ref 4–11)

## 2020-07-30 NOTE — CM/SW NOTE
09: 00AM  Received call from TUCKER Velarde - pt has had his 2nd opinion and medically cleared for d/c today. CHEKO contacted Sera Cesar ([phone]) and she confirmed they received all information for home IV anbx.  She stated that pt's insurance requires an [address]

## 2020-07-30 NOTE — PROGRESS NOTES
07/30/20  0817   BP: (!) 137/97   Pulse: 87   Resp: 18   Temp: 98.1 °F (36.7 °C)   Patient was seen today and he has made a decision that he wants to try hyperbaric oxygen.     Objective: Remove the dressing erythema edema markedly reduced on IV antibiotic

## 2020-07-30 NOTE — PLAN OF CARE
Discharged home with wound vac on placed by wound RN. Discharged instruction discussed with patient, verbalized understanding. Home health will call patient for scheduled treatment.   Problem: Diabetes/Glucose Control  Goal: Glucose maintained within prescr Problem: METABOLIC/FLUID AND ELECTROLYTES - ADULT  Goal: Glucose maintained within prescribed range  Description  INTERVENTIONS:  - Monitor Blood Glucose as ordered  - Assess for signs and symptoms of hyperglycemia and hypoglycemia  - Administer ordered

## 2020-07-30 NOTE — WOUND PROGRESS NOTE
Spoke to Christine Hobbs NP and Dr. Geovany Rodriguez regarding wound plan. Pt's wife to bring in home vac, wound services to place dressing and pt will d/c today. Pt is to follow up in the [facility] wound clinic to begin hyperbaric tx.

## 2020-07-30 NOTE — PAYOR COMM NOTE
--------------  CONTINUED STAY REVIEW    Payor: [facility] PPO  Subscriber #:  [id number]  Authorization Number: [id number]    Admit date: 7/27/20  Admit time: 18    Admitting Physician: Fatuma Perez MD  Attending Physician:  MD Ming Perez

## 2020-07-30 NOTE — PROGRESS NOTES
[facility] AND [facility] Infectious Disease  Progress Note    Tammy Root Patient Status:  Inpatient    1970 MRN [id number]   Location [facility] 1W Attending Chavez Hernandez MD   Hosp Day # 3 PCP Santa Thakkar MD     Subjective:  Patient s and is not excluded given involvement of both sides of the joint.     Subchondral irregularity of the medial portion of the 3rd metatarsal head suspicious for developing pathologic fracture.  No collapse of the articular surface is seen at this point.    weeks or sooner if needed. Prognosis very guarded in the absence of surgery. >35min spent at patient's bedside today in examination and coordination of today's plan of care.       Elis Choi [facility] Infectious Disease  [phone]    7/30

## 2020-07-30 NOTE — WOUND PROGRESS NOTE
Pt seen for home wound vac application. Left plantar wound is red, moist, erin wound is dry, peeling in some areas. Wound cleansed with saline, skin protectant applied to erin wound and up left lateral calf for bridge.  1 pc black vac foam placed into wound

## 2020-07-30 NOTE — DISCHARGE SUMMARY
[facility] Hospitalist Discharge Summary   Patient ID:  Chris Lora  [id number]  71 year old  6/30/1970    Admit date: 7/27/2020  Discharge date: 7/30/2020    Primary Care Physician: Kirti Hodgson MD   Attending Physician: Wandy Nathan MD   Consults:   Cons 52 yo male HTN, HL/Hypertriglyceridemia, DM type II with neuropathy, obesity-BMI 33, depression with hx left DM foot ulcer/cellulitis/OM S/P  I&D and 4th ray amputation with debridement of left foot S/P invanz (completed 7/23) with ongoing left foot wound Operative Procedures:   Radiology:   Mri Foot (w+wo), Left (cpt=73720)    Result Date: 7/27/2020  CONCLUSION:   Resection of the 4th metatarsal at the level of the 4th metatarsal mid shaft with a large plantar wound.   No osteomyelitis of the residual 4th m · when to take this  · additional instructions        CONTINUE taking these medications    buPROPion HCl ER (XL) 300 MG Tb24  Commonly known as:  WELLBUTRIN XL  TAKE 1 TABLET(300 MG) BY MOUTH DAILY     Cholecalciferol 125 MCG (5000 UT) Tabs  Commonly known Specialty:  SURGERY, ORTHOPEDIC  Why:  hospital follow up  Contact information:  [address] [phone]             Kenny Doe DO In 2 weeks.     Specialty:  INFECTIOUS DISEASES  Contact information:  Lauren Bellamy 7878 BP (!) 137/97 (BP Location: Left arm)   Pulse 87   Temp 98.1 °F (36.7 °C) (Oral)   Resp 18   Ht 6' 3\" (1.905 m)   Wt 265 lb (120.2 kg)   SpO2 98%   BMI 33.12 kg/m²     Exam:  GEN: NAD  HEENT: EOMI  Neck: Supple  Pulm: CTAB, no crackles or wheezes  CV: RRR - home regimen: toujeo 110 units with breakfast and 50 units, metformin   -accuchecks  -ADA diet   -continue gabapentin and ASA  -follow with endo Dr Amanda Brady      Hypertriglyceridemia  HL  -continue fenofibrate and crestor     HTN  -resume home meds-lisinop

## 2020-07-30 NOTE — PROGRESS NOTES
[facility]  Progress Note    Oswaldo Valles Patient Status:  Inpatient    1970 MRN [id number]   Location [address] [facility] Attending Cat Joe MD   Hosp Day # 3 PCP Liat Crespo MD     Subjective:  Oswaldo Valles is a(n) 53 year Acquired cavovarus foot deformity, left     Status post amputation of lesser toe, left (HCC)     Cellulitis of left foot      Spoke to patient is AM regarding podiatric / foot-ankle ortho situation at [facility].   Was previous patient of Dr Rafat Darnell who has reloc

## 2020-08-03 ENCOUNTER — OFFICE VISIT (OUTPATIENT)
Dept: WOUND CARE | Facility: HOSPITAL | Age: 50
End: 2020-08-03
Attending: INTERNAL MEDICINE
Payer: COMMERCIAL

## 2020-08-03 ENCOUNTER — APPOINTMENT (OUTPATIENT)
Dept: WOUND CARE | Facility: HOSPITAL | Age: 50
End: 2020-08-03
Attending: CLINICAL NURSE SPECIALIST
Payer: COMMERCIAL

## 2020-08-03 ENCOUNTER — LAB REQUISITION (OUTPATIENT)
Dept: LAB | Facility: HOSPITAL | Age: 50
End: 2020-08-03
Payer: COMMERCIAL

## 2020-08-03 ENCOUNTER — APPOINTMENT (OUTPATIENT)
Dept: WOUND CARE | Facility: HOSPITAL | Age: 50
End: 2020-08-03
Payer: COMMERCIAL

## 2020-08-03 DIAGNOSIS — L97.509 TYPE 2 DIABETES MELLITUS WITH FOOT ULCER (HCC): ICD-10-CM

## 2020-08-03 DIAGNOSIS — L97.526 NON-PRESSURE CHRONIC ULCER OF OTHER PART OF LEFT FOOT WITH BONE INVOLVEMENT WITHOUT EVIDENCE OF NECROSIS (HCC): ICD-10-CM

## 2020-08-03 DIAGNOSIS — M86.9 OSTEOMYELITIS, UNSPECIFIED (HCC): ICD-10-CM

## 2020-08-03 DIAGNOSIS — I10 ESSENTIAL (PRIMARY) HYPERTENSION: ICD-10-CM

## 2020-08-03 DIAGNOSIS — E11.621 TYPE 2 DIABETES MELLITUS WITH FOOT ULCER (HCC): ICD-10-CM

## 2020-08-03 DIAGNOSIS — M86.472 CHRONIC OSTEOMYELITIS WITH DRAINING SINUS, LEFT ANKLE AND FOOT (HCC): Primary | ICD-10-CM

## 2020-08-03 LAB
ALBUMIN SERPL-MCNC: 3.5 G/DL (ref 3.4–5)
ALBUMIN/GLOB SERPL: 0.8 {RATIO} (ref 1–2)
ALP LIVER SERPL-CCNC: 98 U/L (ref 45–117)
ALT SERPL-CCNC: 51 U/L (ref 16–61)
ANION GAP SERPL CALC-SCNC: 5 MMOL/L (ref 0–18)
AST SERPL-CCNC: 32 U/L (ref 15–37)
BASOPHILS # BLD AUTO: 0.06 X10(3) UL (ref 0–0.2)
BASOPHILS NFR BLD AUTO: 0.5 %
BILIRUB SERPL-MCNC: 0.3 MG/DL (ref 0.1–2)
BUN BLD-MCNC: 25 MG/DL (ref 7–18)
BUN/CREAT SERPL: 24.5 (ref 10–20)
CALCIUM BLD-MCNC: 9.6 MG/DL (ref 8.5–10.1)
CHLORIDE SERPL-SCNC: 105 MMOL/L (ref 98–112)
CK SERPL-CCNC: 165 U/L (ref 39–308)
CO2 SERPL-SCNC: 28 MMOL/L (ref 21–32)
CREAT BLD-MCNC: 1.02 MG/DL (ref 0.7–1.3)
CRP SERPL-MCNC: 3.25 MG/DL (ref ?–0.3)
DEPRECATED RDW RBC AUTO: 37.2 FL (ref 35.1–46.3)
EOSINOPHIL # BLD AUTO: 0.19 X10(3) UL (ref 0–0.7)
EOSINOPHIL NFR BLD AUTO: 1.7 %
ERYTHROCYTE [DISTWIDTH] IN BLOOD BY AUTOMATED COUNT: 12.3 % (ref 11–15)
GLOBULIN PLAS-MCNC: 4.4 G/DL (ref 2.8–4.4)
GLUCOSE BLD-MCNC: 72 MG/DL (ref 70–99)
HCT VFR BLD AUTO: 39.2 % (ref 39–53)
HGB BLD-MCNC: 13 G/DL (ref 13–17.5)
IMM GRANULOCYTES # BLD AUTO: 0.05 X10(3) UL (ref 0–1)
IMM GRANULOCYTES NFR BLD: 0.4 %
LYMPHOCYTES # BLD AUTO: 1.62 X10(3) UL (ref 1–4)
LYMPHOCYTES NFR BLD AUTO: 14.2 %
M PROTEIN MFR SERPL ELPH: 7.9 G/DL (ref 6.4–8.2)
MCH RBC QN AUTO: 27.7 PG (ref 26–34)
MCHC RBC AUTO-ENTMCNC: 33.2 G/DL (ref 31–37)
MCV RBC AUTO: 83.4 FL (ref 80–100)
MONOCYTES # BLD AUTO: 0.84 X10(3) UL (ref 0.1–1)
MONOCYTES NFR BLD AUTO: 7.4 %
NEUTROPHILS # BLD AUTO: 8.61 X10 (3) UL (ref 1.5–7.7)
NEUTROPHILS # BLD AUTO: 8.61 X10(3) UL (ref 1.5–7.7)
NEUTROPHILS NFR BLD AUTO: 75.8 %
OSMOLALITY SERPL CALC.SUM OF ELEC: 289 MOSM/KG (ref 275–295)
PLATELET # BLD AUTO: 467 10(3)UL (ref 150–450)
POTASSIUM SERPL-SCNC: 3.9 MMOL/L (ref 3.5–5.1)
RBC # BLD AUTO: 4.7 X10(6)UL (ref 4.3–5.7)
SODIUM SERPL-SCNC: 138 MMOL/L (ref 136–145)
WBC # BLD AUTO: 11.4 X10(3) UL (ref 4–11)

## 2020-08-03 PROCEDURE — 85025 COMPLETE CBC W/AUTO DIFF WBC: CPT | Performed by: INTERNAL MEDICINE

## 2020-08-03 PROCEDURE — 97605 NEG PRS WND THER DME<=50SQCM: CPT

## 2020-08-03 PROCEDURE — 82550 ASSAY OF CK (CPK): CPT | Performed by: INTERNAL MEDICINE

## 2020-08-03 PROCEDURE — 86140 C-REACTIVE PROTEIN: CPT | Performed by: INTERNAL MEDICINE

## 2020-08-03 PROCEDURE — 80053 COMPREHEN METABOLIC PANEL: CPT | Performed by: INTERNAL MEDICINE

## 2020-08-03 PROCEDURE — 99214 OFFICE O/P EST MOD 30 MIN: CPT

## 2020-08-03 NOTE — PROGRESS NOTES
Chief Complaint  This information was obtained from the patient  Pt here for a left foot wound HBO consult, pt presents with a negative pressure therapy machine, wound began after multiple toe amputations, pt states wound has been open since November o considered as adjunctive therapy to current plan of care which includes complete offloading of the wound, IV antibiotics, intense blood sugar control, negative pressure wound therapy with collagen dressings on the wound base.   He is under a comprehensive d drinks     Types: 1 Standard drinks or equivalent per week   Drug use: No    Family History  This information was obtained from the patient  Cancer - Mother, Sibling, Diabetes - Mother, Maternal Grandparents, Father, Paternal Grandparents, Heart Disease - 300 mg tablet extended release 24 hr  Adult Low Dose Aspirin - oral 81 mg tablet,delayed release (DR/EC)  betamethasone dipropionate - topical 0.05 % cream  cholecalciferol (vitamin D3) - oral 5,000 unit tablet        Objective    Wound Assessment(s)  Woun cm from heel with right measurement of 37.6 cm  Ankle Measurement 12 cm from heel with right measurement of 26 cm  Vascular Assessment:  Left Extremity Pulses:  Dorsalis Pedis: Palpable  Right Extremity Pulses:  Dorsalis Pedis: Palpable  Left Extremity col included herein, it is my determination the pt has a medical necessity for HBOT & meets the conditions for adjunctive treatment to wound care & comprehensive plan. : .    Brief History of Co-Morbidities That May Affect HBO Treatment:  Previously treated wit to assist with complications if required: .   HBO treatment goal: Complete wound healing,Elimination of bone infection,Osteogenesis,Angiogenesis        Plan    Wound Orders:  Wound #1 Left, Plantar Foot    Wound Cleansing & Dressings:  May shower with prote

## 2020-08-04 ENCOUNTER — OFFICE VISIT (OUTPATIENT)
Dept: WOUND CARE | Facility: HOSPITAL | Age: 50
End: 2020-08-04
Attending: INTERNAL MEDICINE
Payer: COMMERCIAL

## 2020-08-04 DIAGNOSIS — L97.428 DIABETIC ULCER OF LEFT MIDFOOT ASSOCIATED WITH TYPE 2 DIABETES MELLITUS, WITH OTHER ULCER SEVERITY (HCC): Primary | ICD-10-CM

## 2020-08-04 DIAGNOSIS — E11.621 DIABETIC ULCER OF LEFT MIDFOOT ASSOCIATED WITH TYPE 2 DIABETES MELLITUS, WITH OTHER ULCER SEVERITY (HCC): Primary | ICD-10-CM

## 2020-08-04 DIAGNOSIS — L97.509 TYPE 2 DIABETES MELLITUS WITH FOOT ULCER (HCC): ICD-10-CM

## 2020-08-04 DIAGNOSIS — I10 ESSENTIAL (PRIMARY) HYPERTENSION: ICD-10-CM

## 2020-08-04 DIAGNOSIS — L97.526 NON-PRESSURE CHRONIC ULCER OF OTHER PART OF LEFT FOOT WITH BONE INVOLVEMENT WITHOUT EVIDENCE OF NECROSIS (HCC): ICD-10-CM

## 2020-08-04 DIAGNOSIS — E11.621 TYPE 2 DIABETES MELLITUS WITH FOOT ULCER (HCC): ICD-10-CM

## 2020-08-04 DIAGNOSIS — M86.472 CHRONIC OSTEOMYELITIS WITH DRAINING SINUS, LEFT ANKLE AND FOOT (HCC): ICD-10-CM

## 2020-08-04 LAB — GLUCOSE BLD-MCNC: 123 MG/DL (ref 70–99)

## 2020-08-04 PROCEDURE — 82962 GLUCOSE BLOOD TEST: CPT

## 2020-08-04 NOTE — PROGRESS NOTES
HBO Tech Details      Patient Name: Dave Greer   Patient Number: [id number]   Patient YOB: 1970      Date: 8/4/2020   Physician / Jaz Grad: Darren Piper, [address]: Aaron Glez Treatment Course Details          Treatment Co

## 2020-08-05 ENCOUNTER — OFFICE VISIT (OUTPATIENT)
Dept: WOUND CARE | Facility: HOSPITAL | Age: 50
End: 2020-08-05
Attending: INTERNAL MEDICINE
Payer: COMMERCIAL

## 2020-08-05 DIAGNOSIS — E11.621 TYPE 2 DIABETES MELLITUS WITH FOOT ULCER (HCC): ICD-10-CM

## 2020-08-05 DIAGNOSIS — L97.509 TYPE 2 DIABETES MELLITUS WITH FOOT ULCER (HCC): ICD-10-CM

## 2020-08-05 DIAGNOSIS — L97.428 DIABETIC ULCER OF LEFT MIDFOOT ASSOCIATED WITH TYPE 2 DIABETES MELLITUS, WITH OTHER ULCER SEVERITY (HCC): Primary | ICD-10-CM

## 2020-08-05 DIAGNOSIS — L97.526 NON-PRESSURE CHRONIC ULCER OF OTHER PART OF LEFT FOOT WITH BONE INVOLVEMENT WITHOUT EVIDENCE OF NECROSIS (HCC): ICD-10-CM

## 2020-08-05 DIAGNOSIS — I10 ESSENTIAL (PRIMARY) HYPERTENSION: ICD-10-CM

## 2020-08-05 DIAGNOSIS — M86.472 CHRONIC OSTEOMYELITIS WITH DRAINING SINUS, LEFT ANKLE AND FOOT (HCC): ICD-10-CM

## 2020-08-05 DIAGNOSIS — E11.621 DIABETIC ULCER OF LEFT MIDFOOT ASSOCIATED WITH TYPE 2 DIABETES MELLITUS, WITH OTHER ULCER SEVERITY (HCC): Primary | ICD-10-CM

## 2020-08-05 LAB
GLUCOSE BLD-MCNC: 139 MG/DL (ref 70–99)
GLUCOSE BLD-MCNC: 157 MG/DL (ref 70–99)
GLUCOSE BLD-MCNC: 164 MG/DL (ref 70–99)
GLUCOSE BLD-MCNC: 187 MG/DL (ref 70–99)

## 2020-08-05 PROCEDURE — 97605 NEG PRS WND THER DME<=50SQCM: CPT

## 2020-08-05 PROCEDURE — 82962 GLUCOSE BLOOD TEST: CPT

## 2020-08-05 NOTE — PROGRESS NOTES
HBO Tech Details      Patient Name: Jannet Harper   Patient Number: [id number]   Patient YOB: 1970      Date: 8/5/2020   Physician / Abisai Obregon: Kellie Sykes, [address] [place] [address] Street: [facility] Treatment Course Details          Treatment

## 2020-08-06 ENCOUNTER — OFFICE VISIT (OUTPATIENT)
Dept: WOUND CARE | Facility: HOSPITAL | Age: 50
End: 2020-08-06
Attending: FAMILY MEDICINE
Payer: COMMERCIAL

## 2020-08-06 DIAGNOSIS — E11.621 TYPE 2 DIABETES MELLITUS WITH FOOT ULCER (HCC): ICD-10-CM

## 2020-08-06 DIAGNOSIS — I10 ESSENTIAL (PRIMARY) HYPERTENSION: ICD-10-CM

## 2020-08-06 DIAGNOSIS — L97.526 NON-PRESSURE CHRONIC ULCER OF OTHER PART OF LEFT FOOT WITH BONE INVOLVEMENT WITHOUT EVIDENCE OF NECROSIS (HCC): ICD-10-CM

## 2020-08-06 DIAGNOSIS — L97.509 TYPE 2 DIABETES MELLITUS WITH FOOT ULCER (HCC): ICD-10-CM

## 2020-08-06 DIAGNOSIS — M86.472 CHRONIC OSTEOMYELITIS WITH DRAINING SINUS, LEFT ANKLE AND FOOT (HCC): Primary | ICD-10-CM

## 2020-08-06 LAB
GLUCOSE BLD-MCNC: 120 MG/DL (ref 70–99)
GLUCOSE BLD-MCNC: 168 MG/DL (ref 70–99)

## 2020-08-06 PROCEDURE — 82962 GLUCOSE BLOOD TEST: CPT

## 2020-08-06 NOTE — PROGRESS NOTES
HBO Tech Details      Patient Name: Vy Novak   Patient Number: [id number]   Patient YOB: 1970      Date: 8/6/2020   Physician / Sofía Bonilla: Genesis Monge, [address]: Aaron Glez Treatment Course Details          Treatment Co

## 2020-08-07 ENCOUNTER — OFFICE VISIT (OUTPATIENT)
Dept: WOUND CARE | Facility: HOSPITAL | Age: 50
End: 2020-08-07
Attending: INTERNAL MEDICINE
Payer: COMMERCIAL

## 2020-08-07 DIAGNOSIS — L97.509 TYPE 2 DIABETES MELLITUS WITH FOOT ULCER (HCC): ICD-10-CM

## 2020-08-07 DIAGNOSIS — I10 ESSENTIAL (PRIMARY) HYPERTENSION: ICD-10-CM

## 2020-08-07 DIAGNOSIS — L97.526 NON-PRESSURE CHRONIC ULCER OF OTHER PART OF LEFT FOOT WITH BONE INVOLVEMENT WITHOUT EVIDENCE OF NECROSIS (HCC): ICD-10-CM

## 2020-08-07 DIAGNOSIS — M86.472 CHRONIC OSTEOMYELITIS WITH DRAINING SINUS, LEFT ANKLE AND FOOT (HCC): Primary | ICD-10-CM

## 2020-08-07 DIAGNOSIS — E11.621 TYPE 2 DIABETES MELLITUS WITH FOOT ULCER (HCC): ICD-10-CM

## 2020-08-07 LAB
GLUCOSE BLD-MCNC: 146 MG/DL (ref 70–99)
GLUCOSE BLD-MCNC: 168 MG/DL (ref 70–99)
GLUCOSE BLD-MCNC: 240 MG/DL (ref 70–99)

## 2020-08-07 PROCEDURE — 82962 GLUCOSE BLOOD TEST: CPT

## 2020-08-07 PROCEDURE — 97605 NEG PRS WND THER DME<=50SQCM: CPT

## 2020-08-07 NOTE — PROGRESS NOTES
HBO Tech Details      Patient Name: Rachel Dc   Patient Number: [id number]   Patient YOB: 1970      Date: 8/7/2020   Physician / Rowan Jasmine: Vy Cunha, [address] [place] [address] Street: [facility] Treatment Course Details          Treatment

## 2020-08-10 ENCOUNTER — APPOINTMENT (OUTPATIENT)
Dept: WOUND CARE | Facility: HOSPITAL | Age: 50
End: 2020-08-10
Attending: CLINICAL NURSE SPECIALIST
Payer: COMMERCIAL

## 2020-08-10 ENCOUNTER — OFFICE VISIT (OUTPATIENT)
Dept: WOUND CARE | Facility: HOSPITAL | Age: 50
End: 2020-08-10
Attending: INTERNAL MEDICINE
Payer: COMMERCIAL

## 2020-08-10 ENCOUNTER — LAB REQUISITION (OUTPATIENT)
Dept: LAB | Facility: HOSPITAL | Age: 50
End: 2020-08-10
Payer: COMMERCIAL

## 2020-08-10 DIAGNOSIS — L97.526 NON-PRESSURE CHRONIC ULCER OF OTHER PART OF LEFT FOOT WITH BONE INVOLVEMENT WITHOUT EVIDENCE OF NECROSIS (HCC): ICD-10-CM

## 2020-08-10 DIAGNOSIS — M86.9 OSTEOMYELITIS, UNSPECIFIED (HCC): ICD-10-CM

## 2020-08-10 DIAGNOSIS — I10 ESSENTIAL (PRIMARY) HYPERTENSION: ICD-10-CM

## 2020-08-10 DIAGNOSIS — L97.509 TYPE 2 DIABETES MELLITUS WITH FOOT ULCER (HCC): ICD-10-CM

## 2020-08-10 DIAGNOSIS — E11.621 TYPE 2 DIABETES MELLITUS WITH FOOT ULCER (HCC): ICD-10-CM

## 2020-08-10 DIAGNOSIS — M86.472 CHRONIC OSTEOMYELITIS WITH DRAINING SINUS, LEFT ANKLE AND FOOT (HCC): Primary | ICD-10-CM

## 2020-08-10 LAB
ALBUMIN SERPL-MCNC: 3.5 G/DL (ref 3.4–5)
ALBUMIN/GLOB SERPL: 0.8 {RATIO} (ref 1–2)
ALP LIVER SERPL-CCNC: 94 U/L (ref 45–117)
ALT SERPL-CCNC: 57 U/L (ref 16–61)
ANION GAP SERPL CALC-SCNC: 3 MMOL/L (ref 0–18)
AST SERPL-CCNC: 33 U/L (ref 15–37)
BASOPHILS # BLD AUTO: 0.05 X10(3) UL (ref 0–0.2)
BASOPHILS NFR BLD AUTO: 0.5 %
BILIRUB SERPL-MCNC: 0.3 MG/DL (ref 0.1–2)
BUN BLD-MCNC: 21 MG/DL (ref 7–18)
BUN/CREAT SERPL: 19.8 (ref 10–20)
CALCIUM BLD-MCNC: 9.5 MG/DL (ref 8.5–10.1)
CHLORIDE SERPL-SCNC: 107 MMOL/L (ref 98–112)
CK SERPL-CCNC: 223 U/L (ref 39–308)
CO2 SERPL-SCNC: 28 MMOL/L (ref 21–32)
CREAT BLD-MCNC: 1.06 MG/DL (ref 0.7–1.3)
CRP SERPL-MCNC: 1.12 MG/DL (ref ?–0.3)
DEPRECATED RDW RBC AUTO: 37.9 FL (ref 35.1–46.3)
EOSINOPHIL # BLD AUTO: 0.21 X10(3) UL (ref 0–0.7)
EOSINOPHIL NFR BLD AUTO: 2.3 %
ERYTHROCYTE [DISTWIDTH] IN BLOOD BY AUTOMATED COUNT: 12.3 % (ref 11–15)
GLOBULIN PLAS-MCNC: 4.4 G/DL (ref 2.8–4.4)
GLUCOSE BLD-MCNC: 111 MG/DL (ref 70–99)
GLUCOSE BLD-MCNC: 116 MG/DL (ref 70–99)
GLUCOSE BLD-MCNC: 132 MG/DL (ref 70–99)
HCT VFR BLD AUTO: 39.4 % (ref 39–53)
HGB BLD-MCNC: 12.9 G/DL (ref 13–17.5)
IMM GRANULOCYTES # BLD AUTO: 0.04 X10(3) UL (ref 0–1)
IMM GRANULOCYTES NFR BLD: 0.4 %
LYMPHOCYTES # BLD AUTO: 2 X10(3) UL (ref 1–4)
LYMPHOCYTES NFR BLD AUTO: 21.6 %
M PROTEIN MFR SERPL ELPH: 7.9 G/DL (ref 6.4–8.2)
MCH RBC QN AUTO: 27.7 PG (ref 26–34)
MCHC RBC AUTO-ENTMCNC: 32.7 G/DL (ref 31–37)
MCV RBC AUTO: 84.7 FL (ref 80–100)
MONOCYTES # BLD AUTO: 0.72 X10(3) UL (ref 0.1–1)
MONOCYTES NFR BLD AUTO: 7.8 %
NEUTROPHILS # BLD AUTO: 6.25 X10 (3) UL (ref 1.5–7.7)
NEUTROPHILS # BLD AUTO: 6.25 X10(3) UL (ref 1.5–7.7)
NEUTROPHILS NFR BLD AUTO: 67.4 %
OSMOLALITY SERPL CALC.SUM OF ELEC: 290 MOSM/KG (ref 275–295)
PLATELET # BLD AUTO: 463 10(3)UL (ref 150–450)
POTASSIUM SERPL-SCNC: 4 MMOL/L (ref 3.5–5.1)
RBC # BLD AUTO: 4.65 X10(6)UL (ref 4.3–5.7)
SODIUM SERPL-SCNC: 138 MMOL/L (ref 136–145)
WBC # BLD AUTO: 9.3 X10(3) UL (ref 4–11)

## 2020-08-10 PROCEDURE — 82550 ASSAY OF CK (CPK): CPT | Performed by: INTERNAL MEDICINE

## 2020-08-10 PROCEDURE — 86140 C-REACTIVE PROTEIN: CPT | Performed by: INTERNAL MEDICINE

## 2020-08-10 PROCEDURE — 11042 DBRDMT SUBQ TIS 1ST 20SQCM/<: CPT

## 2020-08-10 PROCEDURE — 80053 COMPREHEN METABOLIC PANEL: CPT | Performed by: INTERNAL MEDICINE

## 2020-08-10 PROCEDURE — 82962 GLUCOSE BLOOD TEST: CPT

## 2020-08-10 PROCEDURE — 85025 COMPLETE CBC W/AUTO DIFF WBC: CPT | Performed by: INTERNAL MEDICINE

## 2020-08-10 NOTE — PROGRESS NOTES
HBO Tech Details      Patient Name: Ran Hardin   Patient Number: [id number]   Patient YOB: 1970      Date: 8/10/2020   Physician / Julia Fuller: Tammy Garza, [address] [place] [address] Street: [facility] Treatment Course Details          Treatment

## 2020-08-10 NOTE — PROGRESS NOTES
Chief Complaint  This information was obtained from the patient  Pt here for a a f/u of left foot wound .     Allergies  NKA    HPI  This information was obtained from the patient    8/10/20- Wound followup appointment: Wound looks stable - not much impro considered as adjunctive therapy to current plan of care which includes complete offloading of the wound, IV antibiotics, intense blood sugar control, negative pressure wound therapy with collagen dressings on the wound base.   He is under a comprehensive d drinks     Types: 1 Standard drinks or equivalent per week   Drug use: No    Review of Systems (ROS)  This information was obtained from the patient    Complaints and Symptoms    General Notes:  negative except HPI - denies fever / increased pain / periwou lbs (120.45 kgs), BMI: 33.1, Temperature: 98.2 °F (36.78 °C), Pulse: 94 bpm, Respiratory Rate: 16 breaths/min, Blood Pressure: 93/61 mmHg.         Assessment    Active Problems    ICD-10  (Encounter Diagnosis) M86.472 - Chronic osteomyelitis with draining s answered.     HBOT:  HBO Tech consult            Entered By: Scottie Mercado on 08/10/2020 13:55:01

## 2020-08-11 ENCOUNTER — OFFICE VISIT (OUTPATIENT)
Dept: WOUND CARE | Facility: HOSPITAL | Age: 50
End: 2020-08-11
Attending: FAMILY MEDICINE
Payer: COMMERCIAL

## 2020-08-11 DIAGNOSIS — L97.526 NON-PRESSURE CHRONIC ULCER OF OTHER PART OF LEFT FOOT WITH BONE INVOLVEMENT WITHOUT EVIDENCE OF NECROSIS (HCC): ICD-10-CM

## 2020-08-11 DIAGNOSIS — L97.509 TYPE 2 DIABETES MELLITUS WITH FOOT ULCER (HCC): ICD-10-CM

## 2020-08-11 DIAGNOSIS — M86.472 CHRONIC OSTEOMYELITIS WITH DRAINING SINUS, LEFT ANKLE AND FOOT (HCC): Primary | ICD-10-CM

## 2020-08-11 DIAGNOSIS — I10 ESSENTIAL (PRIMARY) HYPERTENSION: ICD-10-CM

## 2020-08-11 DIAGNOSIS — E11.621 TYPE 2 DIABETES MELLITUS WITH FOOT ULCER (HCC): ICD-10-CM

## 2020-08-11 LAB
GLUCOSE BLD-MCNC: 104 MG/DL (ref 70–99)
GLUCOSE BLD-MCNC: 163 MG/DL (ref 70–99)

## 2020-08-11 PROCEDURE — 82962 GLUCOSE BLOOD TEST: CPT

## 2020-08-11 NOTE — PROGRESS NOTES
HBO Tech Details      Patient Name: Lorenzo Aguilar   Patient Number: [id number]   Patient YOB: 1970      Date: 8/11/2020   Physician / Megan Chung: Katie Gore, [address]: Aaron Glez Treatment Course Details          Treatment C

## 2020-08-12 ENCOUNTER — OFFICE VISIT (OUTPATIENT)
Dept: WOUND CARE | Facility: HOSPITAL | Age: 50
End: 2020-08-12
Attending: INTERNAL MEDICINE
Payer: COMMERCIAL

## 2020-08-12 DIAGNOSIS — L97.509 TYPE 2 DIABETES MELLITUS WITH FOOT ULCER (HCC): ICD-10-CM

## 2020-08-12 DIAGNOSIS — M86.472 CHRONIC OSTEOMYELITIS WITH DRAINING SINUS, LEFT ANKLE AND FOOT (HCC): Primary | ICD-10-CM

## 2020-08-12 DIAGNOSIS — L97.526 NON-PRESSURE CHRONIC ULCER OF OTHER PART OF LEFT FOOT WITH BONE INVOLVEMENT WITHOUT EVIDENCE OF NECROSIS (HCC): ICD-10-CM

## 2020-08-12 DIAGNOSIS — E11.621 TYPE 2 DIABETES MELLITUS WITH FOOT ULCER (HCC): ICD-10-CM

## 2020-08-12 DIAGNOSIS — I10 ESSENTIAL (PRIMARY) HYPERTENSION: ICD-10-CM

## 2020-08-12 LAB
GLUCOSE BLD-MCNC: 186 MG/DL (ref 70–99)
GLUCOSE BLD-MCNC: 222 MG/DL (ref 70–99)

## 2020-08-12 PROCEDURE — 97605 NEG PRS WND THER DME<=50SQCM: CPT

## 2020-08-12 PROCEDURE — 82962 GLUCOSE BLOOD TEST: CPT

## 2020-08-12 NOTE — PROGRESS NOTES
HBO Tech Details      Patient Name: Rachel Dc   Patient Number: [id number]   Patient YOB: 1970      Date: 8/12/2020   Physician / Rowan Jasmine: Vy Cuhna, [address] [place] [address] Street: [facility] Treatment Course Details          Treatment

## 2020-08-13 ENCOUNTER — OFFICE VISIT (OUTPATIENT)
Dept: WOUND CARE | Facility: HOSPITAL | Age: 50
End: 2020-08-13
Attending: FAMILY MEDICINE
Payer: COMMERCIAL

## 2020-08-13 DIAGNOSIS — L97.526 NON-PRESSURE CHRONIC ULCER OF OTHER PART OF LEFT FOOT WITH BONE INVOLVEMENT WITHOUT EVIDENCE OF NECROSIS (HCC): ICD-10-CM

## 2020-08-13 DIAGNOSIS — M86.472 CHRONIC OSTEOMYELITIS WITH DRAINING SINUS, LEFT ANKLE AND FOOT (HCC): Primary | ICD-10-CM

## 2020-08-13 DIAGNOSIS — E11.621 TYPE 2 DIABETES MELLITUS WITH FOOT ULCER (HCC): ICD-10-CM

## 2020-08-13 DIAGNOSIS — L97.509 TYPE 2 DIABETES MELLITUS WITH FOOT ULCER (HCC): ICD-10-CM

## 2020-08-13 DIAGNOSIS — I10 ESSENTIAL (PRIMARY) HYPERTENSION: ICD-10-CM

## 2020-08-13 LAB
GLUCOSE BLD-MCNC: 135 MG/DL (ref 70–99)
GLUCOSE BLD-MCNC: 181 MG/DL (ref 70–99)

## 2020-08-13 PROCEDURE — 82962 GLUCOSE BLOOD TEST: CPT

## 2020-08-13 NOTE — PROGRESS NOTES
Header Image    CurrentOld Version  Modified Version:  HBO Tech Details  Patient Name: Rachel Dc  Patient Number: [id number]  Patient YOB: 1970  Date: 8/13/2020  Physician / Rowan Jasmine: Lina Adan: Hayley

## 2020-08-14 ENCOUNTER — OFFICE VISIT (OUTPATIENT)
Dept: WOUND CARE | Facility: HOSPITAL | Age: 50
End: 2020-08-14
Attending: INTERNAL MEDICINE
Payer: COMMERCIAL

## 2020-08-14 DIAGNOSIS — L97.509 TYPE 2 DIABETES MELLITUS WITH FOOT ULCER (HCC): ICD-10-CM

## 2020-08-14 DIAGNOSIS — L97.526 NON-PRESSURE CHRONIC ULCER OF OTHER PART OF LEFT FOOT WITH BONE INVOLVEMENT WITHOUT EVIDENCE OF NECROSIS (HCC): ICD-10-CM

## 2020-08-14 DIAGNOSIS — M86.472 CHRONIC OSTEOMYELITIS WITH DRAINING SINUS, LEFT ANKLE AND FOOT (HCC): Primary | ICD-10-CM

## 2020-08-14 DIAGNOSIS — I10 ESSENTIAL (PRIMARY) HYPERTENSION: ICD-10-CM

## 2020-08-14 DIAGNOSIS — E11.621 TYPE 2 DIABETES MELLITUS WITH FOOT ULCER (HCC): ICD-10-CM

## 2020-08-14 LAB
GLUCOSE BLD-MCNC: 121 MG/DL (ref 70–99)
GLUCOSE BLD-MCNC: 179 MG/DL (ref 70–99)

## 2020-08-14 PROCEDURE — 82962 GLUCOSE BLOOD TEST: CPT

## 2020-08-14 PROCEDURE — 99213 OFFICE O/P EST LOW 20 MIN: CPT

## 2020-08-14 NOTE — PROGRESS NOTES
[facility] Tech Details  Patient Name: Chrissy Clifford  Patient Number: [id number]  Patient YOB: 1970  Date: 2020  Physician / Cortes Rosa: Bev Tracy: [id number] [facility] Treatment Course Details  Treatment Course Number: 1  Total Treat

## 2020-08-17 ENCOUNTER — LAB REQUISITION (OUTPATIENT)
Dept: LAB | Facility: HOSPITAL | Age: 50
End: 2020-08-17
Payer: COMMERCIAL

## 2020-08-17 ENCOUNTER — APPOINTMENT (OUTPATIENT)
Dept: WOUND CARE | Facility: HOSPITAL | Age: 50
End: 2020-08-17
Attending: CLINICAL NURSE SPECIALIST
Payer: COMMERCIAL

## 2020-08-17 ENCOUNTER — OFFICE VISIT (OUTPATIENT)
Dept: WOUND CARE | Facility: HOSPITAL | Age: 50
End: 2020-08-17
Attending: INTERNAL MEDICINE
Payer: COMMERCIAL

## 2020-08-17 DIAGNOSIS — M86.472 CHRONIC OSTEOMYELITIS WITH DRAINING SINUS, LEFT ANKLE AND FOOT (HCC): Primary | ICD-10-CM

## 2020-08-17 DIAGNOSIS — L97.509 TYPE 2 DIABETES MELLITUS WITH FOOT ULCER (HCC): ICD-10-CM

## 2020-08-17 DIAGNOSIS — I10 ESSENTIAL (PRIMARY) HYPERTENSION: ICD-10-CM

## 2020-08-17 DIAGNOSIS — M86.9 OSTEOMYELITIS, UNSPECIFIED (HCC): ICD-10-CM

## 2020-08-17 DIAGNOSIS — E11.621 TYPE 2 DIABETES MELLITUS WITH FOOT ULCER (HCC): ICD-10-CM

## 2020-08-17 DIAGNOSIS — L97.526 NON-PRESSURE CHRONIC ULCER OF OTHER PART OF LEFT FOOT WITH BONE INVOLVEMENT WITHOUT EVIDENCE OF NECROSIS (HCC): ICD-10-CM

## 2020-08-17 LAB
ALBUMIN SERPL-MCNC: 3.7 G/DL (ref 3.4–5)
ALBUMIN/GLOB SERPL: 0.9 {RATIO} (ref 1–2)
ALP LIVER SERPL-CCNC: 84 U/L (ref 45–117)
ALT SERPL-CCNC: 57 U/L (ref 16–61)
ANION GAP SERPL CALC-SCNC: 3 MMOL/L (ref 0–18)
AST SERPL-CCNC: 34 U/L (ref 15–37)
BASOPHILS # BLD AUTO: 0.07 X10(3) UL (ref 0–0.2)
BASOPHILS NFR BLD AUTO: 0.8 %
BILIRUB SERPL-MCNC: 0.6 MG/DL (ref 0.1–2)
BUN BLD-MCNC: 27 MG/DL (ref 7–18)
BUN/CREAT SERPL: 29.3 (ref 10–20)
CALCIUM BLD-MCNC: 10 MG/DL (ref 8.5–10.1)
CHLORIDE SERPL-SCNC: 107 MMOL/L (ref 98–112)
CK SERPL-CCNC: 240 U/L (ref 39–308)
CO2 SERPL-SCNC: 27 MMOL/L (ref 21–32)
CREAT BLD-MCNC: 0.92 MG/DL (ref 0.7–1.3)
CRP SERPL-MCNC: <0.29 MG/DL (ref ?–0.3)
DEPRECATED RDW RBC AUTO: 39 FL (ref 35.1–46.3)
EOSINOPHIL # BLD AUTO: 0.16 X10(3) UL (ref 0–0.7)
EOSINOPHIL NFR BLD AUTO: 1.8 %
ERYTHROCYTE [DISTWIDTH] IN BLOOD BY AUTOMATED COUNT: 12.7 % (ref 11–15)
GLOBULIN PLAS-MCNC: 4.2 G/DL (ref 2.8–4.4)
GLUCOSE BLD-MCNC: 102 MG/DL (ref 70–99)
GLUCOSE BLD-MCNC: 108 MG/DL (ref 70–99)
GLUCOSE BLD-MCNC: 159 MG/DL (ref 70–99)
GLUCOSE BLD-MCNC: 93 MG/DL (ref 70–99)
HCT VFR BLD AUTO: 40.5 % (ref 39–53)
HGB BLD-MCNC: 13 G/DL (ref 13–17.5)
IMM GRANULOCYTES # BLD AUTO: 0.05 X10(3) UL (ref 0–1)
IMM GRANULOCYTES NFR BLD: 0.6 %
LYMPHOCYTES # BLD AUTO: 1.61 X10(3) UL (ref 1–4)
LYMPHOCYTES NFR BLD AUTO: 18 %
M PROTEIN MFR SERPL ELPH: 7.9 G/DL (ref 6.4–8.2)
MCH RBC QN AUTO: 27.2 PG (ref 26–34)
MCHC RBC AUTO-ENTMCNC: 32.1 G/DL (ref 31–37)
MCV RBC AUTO: 84.7 FL (ref 80–100)
MONOCYTES # BLD AUTO: 0.72 X10(3) UL (ref 0.1–1)
MONOCYTES NFR BLD AUTO: 8.1 %
NEUTROPHILS # BLD AUTO: 6.31 X10 (3) UL (ref 1.5–7.7)
NEUTROPHILS # BLD AUTO: 6.31 X10(3) UL (ref 1.5–7.7)
NEUTROPHILS NFR BLD AUTO: 70.7 %
OSMOLALITY SERPL CALC.SUM OF ELEC: 289 MOSM/KG (ref 275–295)
PLATELET # BLD AUTO: 398 10(3)UL (ref 150–450)
POTASSIUM SERPL-SCNC: 3.9 MMOL/L (ref 3.5–5.1)
RBC # BLD AUTO: 4.78 X10(6)UL (ref 4.3–5.7)
SODIUM SERPL-SCNC: 137 MMOL/L (ref 136–145)
WBC # BLD AUTO: 8.9 X10(3) UL (ref 4–11)

## 2020-08-17 PROCEDURE — 82962 GLUCOSE BLOOD TEST: CPT

## 2020-08-17 PROCEDURE — 11042 DBRDMT SUBQ TIS 1ST 20SQCM/<: CPT

## 2020-08-17 PROCEDURE — 82550 ASSAY OF CK (CPK): CPT | Performed by: INTERNAL MEDICINE

## 2020-08-17 PROCEDURE — 85025 COMPLETE CBC W/AUTO DIFF WBC: CPT | Performed by: INTERNAL MEDICINE

## 2020-08-17 PROCEDURE — 86140 C-REACTIVE PROTEIN: CPT | Performed by: INTERNAL MEDICINE

## 2020-08-17 PROCEDURE — 80053 COMPREHEN METABOLIC PANEL: CPT | Performed by: INTERNAL MEDICINE

## 2020-08-17 NOTE — PROGRESS NOTES
Chief Complaint  This information was obtained from the patient  Patient is here for a wound care follow up. He denies any pain or new wound concerns.     Allergies  NKA    HPI  This information was obtained from the patient    8/17/20- Wound looks much b specialist for 6 weeks. He was also discharged with a wound VAC which has to be changed 3 times a week. Consideration is being given for further surgical interventions like transmetatarsal amputation.     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

## 2020-08-17 NOTE — PROGRESS NOTES
HBO Tech Details      Patient Name: Tom Meredith   Patient Number: [id number]   Patient YOB: 1970      Date: 8/17/2020   Physician / Gerry Junior: Clarence Brown, [address] [place] [address] Street: [facility] Treatment Course Details          Treatment

## 2020-08-18 ENCOUNTER — OFFICE VISIT (OUTPATIENT)
Dept: WOUND CARE | Facility: HOSPITAL | Age: 50
End: 2020-08-18
Attending: FAMILY MEDICINE
Payer: COMMERCIAL

## 2020-08-18 DIAGNOSIS — E11.621 TYPE 2 DIABETES MELLITUS WITH FOOT ULCER (HCC): ICD-10-CM

## 2020-08-18 DIAGNOSIS — M86.472 CHRONIC OSTEOMYELITIS WITH DRAINING SINUS, LEFT ANKLE AND FOOT (HCC): Primary | ICD-10-CM

## 2020-08-18 DIAGNOSIS — L97.509 TYPE 2 DIABETES MELLITUS WITH FOOT ULCER (HCC): ICD-10-CM

## 2020-08-18 DIAGNOSIS — I10 ESSENTIAL (PRIMARY) HYPERTENSION: ICD-10-CM

## 2020-08-18 DIAGNOSIS — L97.526 NON-PRESSURE CHRONIC ULCER OF OTHER PART OF LEFT FOOT WITH BONE INVOLVEMENT WITHOUT EVIDENCE OF NECROSIS (HCC): ICD-10-CM

## 2020-08-18 LAB
GLUCOSE BLD-MCNC: 160 MG/DL (ref 70–99)
GLUCOSE BLD-MCNC: 172 MG/DL (ref 70–99)

## 2020-08-18 PROCEDURE — 82962 GLUCOSE BLOOD TEST: CPT

## 2020-08-18 NOTE — PROGRESS NOTES
HBO Tech Details      Patient Name: Dave Greer   Patient Number: [id number]   Patient YOB: 1970      Date: 8/18/2020   Physician / Jaz Grad: [address]: Aaron Glez Treatment Course Details          Treatment C

## 2020-08-19 ENCOUNTER — OFFICE VISIT (OUTPATIENT)
Dept: WOUND CARE | Facility: HOSPITAL | Age: 50
End: 2020-08-19
Attending: INTERNAL MEDICINE
Payer: COMMERCIAL

## 2020-08-19 DIAGNOSIS — E11.621 TYPE 2 DIABETES MELLITUS WITH FOOT ULCER (HCC): ICD-10-CM

## 2020-08-19 DIAGNOSIS — M86.472 CHRONIC OSTEOMYELITIS WITH DRAINING SINUS, LEFT ANKLE AND FOOT (HCC): Primary | ICD-10-CM

## 2020-08-19 DIAGNOSIS — L97.526 NON-PRESSURE CHRONIC ULCER OF OTHER PART OF LEFT FOOT WITH BONE INVOLVEMENT WITHOUT EVIDENCE OF NECROSIS (HCC): ICD-10-CM

## 2020-08-19 DIAGNOSIS — I10 ESSENTIAL (PRIMARY) HYPERTENSION: ICD-10-CM

## 2020-08-19 DIAGNOSIS — L97.509 TYPE 2 DIABETES MELLITUS WITH FOOT ULCER (HCC): ICD-10-CM

## 2020-08-19 LAB
GLUCOSE BLD-MCNC: 124 MG/DL (ref 70–99)
GLUCOSE BLD-MCNC: 126 MG/DL (ref 70–99)

## 2020-08-19 PROCEDURE — 82962 GLUCOSE BLOOD TEST: CPT

## 2020-08-19 NOTE — PROGRESS NOTES
HBO Tech Details      Patient Name: Javid Rivers   Patient Number: [id number]   Patient YOB: 1970      Date: 8/19/2020   Physician / Maynor Paris: Rivear Hoover, [address] [place] [address] Street: [facility] Treatment Course Details          Treatment

## 2020-08-20 ENCOUNTER — OFFICE VISIT (OUTPATIENT)
Dept: WOUND CARE | Facility: HOSPITAL | Age: 50
End: 2020-08-20
Attending: FAMILY MEDICINE
Payer: COMMERCIAL

## 2020-08-20 DIAGNOSIS — L97.526 NON-PRESSURE CHRONIC ULCER OF OTHER PART OF LEFT FOOT WITH BONE INVOLVEMENT WITHOUT EVIDENCE OF NECROSIS (HCC): ICD-10-CM

## 2020-08-20 DIAGNOSIS — I10 ESSENTIAL (PRIMARY) HYPERTENSION: ICD-10-CM

## 2020-08-20 DIAGNOSIS — M86.472 CHRONIC OSTEOMYELITIS WITH DRAINING SINUS, LEFT ANKLE AND FOOT (HCC): Primary | ICD-10-CM

## 2020-08-20 DIAGNOSIS — E11.621 TYPE 2 DIABETES MELLITUS WITH FOOT ULCER (HCC): ICD-10-CM

## 2020-08-20 DIAGNOSIS — L97.509 TYPE 2 DIABETES MELLITUS WITH FOOT ULCER (HCC): ICD-10-CM

## 2020-08-20 LAB
GLUCOSE BLD-MCNC: 117 MG/DL (ref 70–99)
GLUCOSE BLD-MCNC: 152 MG/DL (ref 70–99)
GLUCOSE BLD-MCNC: 206 MG/DL (ref 70–99)

## 2020-08-20 PROCEDURE — 82962 GLUCOSE BLOOD TEST: CPT

## 2020-08-20 NOTE — PROGRESS NOTES
HBO Tech Details  Patient Name: Yosvany Schmitz  Patient Number: [id number]  Patient YOB: 1970  Date: 8/20/2020  Physician / Vivi Gibbs: Mahesh Dear, [address] [place] [address]: [address] [place] [address] Treatment Course Details  Treatment Course Number: 1  Total Treatme

## 2020-08-21 ENCOUNTER — OFFICE VISIT (OUTPATIENT)
Dept: WOUND CARE | Facility: HOSPITAL | Age: 50
End: 2020-08-21
Attending: INTERNAL MEDICINE
Payer: COMMERCIAL

## 2020-08-21 ENCOUNTER — OFFICE VISIT (OUTPATIENT)
Dept: PODIATRY CLINIC | Facility: CLINIC | Age: 50
End: 2020-08-21
Payer: COMMERCIAL

## 2020-08-21 DIAGNOSIS — S91.302D OPEN WOUND OF LEFT FOOT, SUBSEQUENT ENCOUNTER: Primary | ICD-10-CM

## 2020-08-21 DIAGNOSIS — L97.521 ULCERATED, FOOT, LEFT, LIMITED TO BREAKDOWN OF SKIN (HCC): ICD-10-CM

## 2020-08-21 DIAGNOSIS — M86.472 CHRONIC OSTEOMYELITIS WITH DRAINING SINUS, LEFT ANKLE AND FOOT (HCC): Primary | ICD-10-CM

## 2020-08-21 DIAGNOSIS — E11.621 TYPE 2 DIABETES MELLITUS WITH FOOT ULCER (HCC): ICD-10-CM

## 2020-08-21 DIAGNOSIS — L97.526 NON-PRESSURE CHRONIC ULCER OF OTHER PART OF LEFT FOOT WITH BONE INVOLVEMENT WITHOUT EVIDENCE OF NECROSIS (HCC): ICD-10-CM

## 2020-08-21 DIAGNOSIS — I10 ESSENTIAL (PRIMARY) HYPERTENSION: ICD-10-CM

## 2020-08-21 DIAGNOSIS — L97.509 TYPE 2 DIABETES MELLITUS WITH FOOT ULCER (HCC): ICD-10-CM

## 2020-08-21 LAB
GLUCOSE BLD-MCNC: 150 MG/DL (ref 70–99)
GLUCOSE BLD-MCNC: 190 MG/DL (ref 70–99)

## 2020-08-21 PROCEDURE — 82962 GLUCOSE BLOOD TEST: CPT

## 2020-08-21 PROCEDURE — 99024 POSTOP FOLLOW-UP VISIT: CPT | Performed by: PODIATRIST

## 2020-08-21 NOTE — PROGRESS NOTES
[facility] Tech Details  Patient Name: Vy Novak  Patient Number: [id number]  Patient YOB: 1970  Date: 8/21/2020  Physician / Sofía Beltraner: Candi Castro, [address] [id number] [facility] Treatment Course Details  Treatment Course Number: 1  Total Treat

## 2020-08-23 NOTE — PROGRESS NOTES
Riccardo Montero is a 53year old male. Patient presents with:  Post-Op: Post op visit on left foot surgery. Denies any  pain, but has a little bit clear/pink discharge from the wound.           HPI:   Patient returns the clinic he has been going to the wo times daily with meals. 180 tablet 3   • BUPROPION HCL ER, XL, 300 MG Oral Tablet 24 Hr TAKE 1 TABLET(300 MG) BY MOUTH DAILY 90 tablet 3   • LOW-DOSE ASPIRIN OR Take 81 mg by mouth daily.        • Multiple Vitamins-Minerals (MULTI-VITAMIN/MINERALS) Oral Tab Mother         breast   • Heart Disorder Mother         atrial fib--resolved after cardioversion   • Cancer Sister         skin   • Other (autistic) Son       Social History    Socioeconomic History      Marital status:       Spouse name: Not on nancy and follow-up with me again in 2 to 3 weeks    The patient indicates understanding of these issues and agrees to the plan.     Nicolás Summers DPM

## 2020-08-24 ENCOUNTER — OFFICE VISIT (OUTPATIENT)
Dept: WOUND CARE | Facility: HOSPITAL | Age: 50
End: 2020-08-24
Attending: INTERNAL MEDICINE
Payer: COMMERCIAL

## 2020-08-24 ENCOUNTER — LAB REQUISITION (OUTPATIENT)
Dept: LAB | Facility: HOSPITAL | Age: 50
End: 2020-08-24
Payer: COMMERCIAL

## 2020-08-24 ENCOUNTER — APPOINTMENT (OUTPATIENT)
Dept: WOUND CARE | Facility: HOSPITAL | Age: 50
End: 2020-08-24
Attending: CLINICAL NURSE SPECIALIST
Payer: COMMERCIAL

## 2020-08-24 DIAGNOSIS — E11.621 TYPE 2 DIABETES MELLITUS WITH FOOT ULCER (HCC): ICD-10-CM

## 2020-08-24 DIAGNOSIS — L97.526 NON-PRESSURE CHRONIC ULCER OF OTHER PART OF LEFT FOOT WITH BONE INVOLVEMENT WITHOUT EVIDENCE OF NECROSIS (HCC): ICD-10-CM

## 2020-08-24 DIAGNOSIS — M86.472 CHRONIC OSTEOMYELITIS WITH DRAINING SINUS, LEFT ANKLE AND FOOT (HCC): Primary | ICD-10-CM

## 2020-08-24 DIAGNOSIS — I10 ESSENTIAL (PRIMARY) HYPERTENSION: ICD-10-CM

## 2020-08-24 DIAGNOSIS — L97.509 TYPE 2 DIABETES MELLITUS WITH FOOT ULCER (HCC): ICD-10-CM

## 2020-08-24 DIAGNOSIS — M86.9 OSTEOMYELITIS, UNSPECIFIED (HCC): ICD-10-CM

## 2020-08-24 LAB
ALBUMIN SERPL-MCNC: 3.6 G/DL (ref 3.4–5)
ALBUMIN/GLOB SERPL: 0.8 {RATIO} (ref 1–2)
ALP LIVER SERPL-CCNC: 91 U/L (ref 45–117)
ALT SERPL-CCNC: 51 U/L (ref 16–61)
ANION GAP SERPL CALC-SCNC: 8 MMOL/L (ref 0–18)
AST SERPL-CCNC: 29 U/L (ref 15–37)
BASOPHILS # BLD AUTO: 0.07 X10(3) UL (ref 0–0.2)
BASOPHILS NFR BLD AUTO: 0.7 %
BILIRUB SERPL-MCNC: 0.4 MG/DL (ref 0.1–2)
BUN BLD-MCNC: 25 MG/DL (ref 7–18)
BUN/CREAT SERPL: 24.3 (ref 10–20)
CALCIUM BLD-MCNC: 9.9 MG/DL (ref 8.5–10.1)
CHLORIDE SERPL-SCNC: 104 MMOL/L (ref 98–112)
CK SERPL-CCNC: 291 U/L (ref 39–308)
CO2 SERPL-SCNC: 24 MMOL/L (ref 21–32)
CREAT BLD-MCNC: 1.03 MG/DL (ref 0.7–1.3)
CRP SERPL-MCNC: 1.18 MG/DL (ref ?–0.3)
DEPRECATED RDW RBC AUTO: 39.3 FL (ref 35.1–46.3)
EOSINOPHIL # BLD AUTO: 0.28 X10(3) UL (ref 0–0.7)
EOSINOPHIL NFR BLD AUTO: 2.7 %
ERYTHROCYTE [DISTWIDTH] IN BLOOD BY AUTOMATED COUNT: 13.1 % (ref 11–15)
GLOBULIN PLAS-MCNC: 4.3 G/DL (ref 2.8–4.4)
GLUCOSE BLD-MCNC: 104 MG/DL (ref 70–99)
GLUCOSE BLD-MCNC: 153 MG/DL (ref 70–99)
GLUCOSE BLD-MCNC: 155 MG/DL (ref 70–99)
HCT VFR BLD AUTO: 40.1 % (ref 39–53)
HGB BLD-MCNC: 13.1 G/DL (ref 13–17.5)
IMM GRANULOCYTES # BLD AUTO: 0.05 X10(3) UL (ref 0–1)
IMM GRANULOCYTES NFR BLD: 0.5 %
LYMPHOCYTES # BLD AUTO: 1.69 X10(3) UL (ref 1–4)
LYMPHOCYTES NFR BLD AUTO: 16.1 %
M PROTEIN MFR SERPL ELPH: 7.9 G/DL (ref 6.4–8.2)
MCH RBC QN AUTO: 27.2 PG (ref 26–34)
MCHC RBC AUTO-ENTMCNC: 32.7 G/DL (ref 31–37)
MCV RBC AUTO: 83.2 FL (ref 80–100)
MONOCYTES # BLD AUTO: 0.81 X10(3) UL (ref 0.1–1)
MONOCYTES NFR BLD AUTO: 7.7 %
NEUTROPHILS # BLD AUTO: 7.57 X10 (3) UL (ref 1.5–7.7)
NEUTROPHILS # BLD AUTO: 7.57 X10(3) UL (ref 1.5–7.7)
NEUTROPHILS NFR BLD AUTO: 72.3 %
OSMOLALITY SERPL CALC.SUM OF ELEC: 287 MOSM/KG (ref 275–295)
PLATELET # BLD AUTO: 361 10(3)UL (ref 150–450)
POTASSIUM SERPL-SCNC: 3.7 MMOL/L (ref 3.5–5.1)
RBC # BLD AUTO: 4.82 X10(6)UL (ref 4.3–5.7)
SODIUM SERPL-SCNC: 136 MMOL/L (ref 136–145)
WBC # BLD AUTO: 10.5 X10(3) UL (ref 4–11)

## 2020-08-24 PROCEDURE — 80053 COMPREHEN METABOLIC PANEL: CPT | Performed by: INTERNAL MEDICINE

## 2020-08-24 PROCEDURE — 85025 COMPLETE CBC W/AUTO DIFF WBC: CPT | Performed by: INTERNAL MEDICINE

## 2020-08-24 PROCEDURE — 86140 C-REACTIVE PROTEIN: CPT | Performed by: INTERNAL MEDICINE

## 2020-08-24 PROCEDURE — 82550 ASSAY OF CK (CPK): CPT | Performed by: INTERNAL MEDICINE

## 2020-08-24 PROCEDURE — 15275 SKIN SUB GRAFT FACE/NK/HF/G: CPT

## 2020-08-24 PROCEDURE — 82962 GLUCOSE BLOOD TEST: CPT

## 2020-08-24 NOTE — PROGRESS NOTES
HBO Tech Details      Patient Name: Jannet Harper   Patient Number: [id number]   Patient YOB: 1970      Date: 8/24/2020   Physician / Abisai Obregon: Kellie Sykes, [address] [place] [address] Street: [facility] Treatment Course Details          Treatment

## 2020-08-24 NOTE — PROGRESS NOTES
Chief Complaint  This information was obtained from the patient  Patient is here for a wound care follow up. Patients denies any pain or new wound concerns.     Allergies  NKA    HPI  This information was obtained from the patient    8/24/20 - Wound impro with I&D of the infected left foot region. He was discharged from the hospital with improved blood parameters, on IV antibiotics as recommended by infectious disease specialist for 6 weeks.   He was also discharged with a wound VAC which has to be change Lipids Mother  • Cancer Mother     breast  • Heart Disorder Mother     atrial fib--resolved after cardioversion  • Cancer Sister     skin  • Other (autistic) Son    Social History   Tobacco Use     Smoking status: Former Smoker       Packs/day: 1.50 Temp screened at entrance. Glucose to be done in HBO.         Assessment    Active Problems    ICD-10  (Encounter Diagnosis) M86.472 - Chronic osteomyelitis with draining sinus, left ankle and foot  (Encounter Diagnosis) E11.621 - Type 2 diabetes mellitus w changed PRN    Off-Loading:  No Weight Bearing    NPWT Orders:  NPWT, black foam to at 125 mmHG continuous. RN to change dressing three times weekly, unless noted below. - HOLD VAC FOR NOW    Follow-Up Appointments:  Return Appointment in 1 week.     Misc/A

## 2020-08-25 ENCOUNTER — OFFICE VISIT (OUTPATIENT)
Dept: WOUND CARE | Facility: HOSPITAL | Age: 50
End: 2020-08-25
Attending: FAMILY MEDICINE
Payer: COMMERCIAL

## 2020-08-25 DIAGNOSIS — L97.526 NON-PRESSURE CHRONIC ULCER OF OTHER PART OF LEFT FOOT WITH BONE INVOLVEMENT WITHOUT EVIDENCE OF NECROSIS (HCC): ICD-10-CM

## 2020-08-25 DIAGNOSIS — E11.621 TYPE 2 DIABETES MELLITUS WITH FOOT ULCER (HCC): ICD-10-CM

## 2020-08-25 DIAGNOSIS — M86.472 CHRONIC OSTEOMYELITIS WITH DRAINING SINUS, LEFT ANKLE AND FOOT (HCC): Primary | ICD-10-CM

## 2020-08-25 DIAGNOSIS — I10 ESSENTIAL (PRIMARY) HYPERTENSION: ICD-10-CM

## 2020-08-25 DIAGNOSIS — L97.509 TYPE 2 DIABETES MELLITUS WITH FOOT ULCER (HCC): ICD-10-CM

## 2020-08-25 LAB
GLUCOSE BLD-MCNC: 159 MG/DL (ref 70–99)
GLUCOSE BLD-MCNC: 172 MG/DL (ref 70–99)

## 2020-08-25 PROCEDURE — 82962 GLUCOSE BLOOD TEST: CPT

## 2020-08-25 NOTE — PROGRESS NOTES
HBO Tech Details      Patient Name: Ryan Stahl   Patient Number: [id number]   Patient YOB: 1970      Date: 8/25/2020   Physician / Modesto Conquest: [address]: Aaron Glez Treatment Course Details          Treatment C

## 2020-08-26 ENCOUNTER — OFFICE VISIT (OUTPATIENT)
Dept: WOUND CARE | Facility: HOSPITAL | Age: 50
End: 2020-08-26
Attending: INTERNAL MEDICINE
Payer: COMMERCIAL

## 2020-08-26 DIAGNOSIS — E11.621 TYPE 2 DIABETES MELLITUS WITH FOOT ULCER (HCC): ICD-10-CM

## 2020-08-26 DIAGNOSIS — M86.472 CHRONIC OSTEOMYELITIS WITH DRAINING SINUS, LEFT ANKLE AND FOOT (HCC): Primary | ICD-10-CM

## 2020-08-26 DIAGNOSIS — L97.509 TYPE 2 DIABETES MELLITUS WITH FOOT ULCER (HCC): ICD-10-CM

## 2020-08-26 DIAGNOSIS — I10 ESSENTIAL (PRIMARY) HYPERTENSION: ICD-10-CM

## 2020-08-26 DIAGNOSIS — L97.526 NON-PRESSURE CHRONIC ULCER OF OTHER PART OF LEFT FOOT WITH BONE INVOLVEMENT WITHOUT EVIDENCE OF NECROSIS (HCC): ICD-10-CM

## 2020-08-26 LAB
GLUCOSE BLD-MCNC: 151 MG/DL (ref 70–99)
GLUCOSE BLD-MCNC: 180 MG/DL (ref 70–99)

## 2020-08-26 PROCEDURE — 82962 GLUCOSE BLOOD TEST: CPT

## 2020-08-26 NOTE — PROGRESS NOTES
HBO Tech Details      Patient Name: Rachel Dc   Patient Number: [id number]   Patient YOB: 1970      Date: 8/26/2020   Physician / Rowan Jasmine: Vy Cunha, [address] [place] [address] Street: [facility] Treatment Course Details          Treatment

## 2020-08-27 ENCOUNTER — OFFICE VISIT (OUTPATIENT)
Dept: WOUND CARE | Facility: HOSPITAL | Age: 50
End: 2020-08-27
Attending: FAMILY MEDICINE
Payer: COMMERCIAL

## 2020-08-27 DIAGNOSIS — M86.472 CHRONIC OSTEOMYELITIS WITH DRAINING SINUS, LEFT ANKLE AND FOOT (HCC): Primary | ICD-10-CM

## 2020-08-27 DIAGNOSIS — L97.526 NON-PRESSURE CHRONIC ULCER OF OTHER PART OF LEFT FOOT WITH BONE INVOLVEMENT WITHOUT EVIDENCE OF NECROSIS (HCC): ICD-10-CM

## 2020-08-27 DIAGNOSIS — I10 ESSENTIAL (PRIMARY) HYPERTENSION: ICD-10-CM

## 2020-08-27 DIAGNOSIS — E11.621 TYPE 2 DIABETES MELLITUS WITH FOOT ULCER (HCC): ICD-10-CM

## 2020-08-27 DIAGNOSIS — L97.509 TYPE 2 DIABETES MELLITUS WITH FOOT ULCER (HCC): ICD-10-CM

## 2020-08-27 LAB
GLUCOSE BLD-MCNC: 152 MG/DL (ref 70–99)
GLUCOSE BLD-MCNC: 191 MG/DL (ref 70–99)

## 2020-08-27 PROCEDURE — 82962 GLUCOSE BLOOD TEST: CPT

## 2020-08-27 NOTE — PROGRESS NOTES
HBO Tech Details  Patient Name: Vy Novak  Patient Number: [id number]  Patient YOB: 1970  Date: 8/27/2020  Physician / Sofía Bonilla: Genesis Monge, [address]: [address] [place] [address] Treatment Course Details  Treatment Course Number: 1  Total Treatme

## 2020-08-28 ENCOUNTER — OFFICE VISIT (OUTPATIENT)
Dept: WOUND CARE | Facility: HOSPITAL | Age: 50
End: 2020-08-28
Attending: INTERNAL MEDICINE
Payer: COMMERCIAL

## 2020-08-28 DIAGNOSIS — M86.472 CHRONIC OSTEOMYELITIS WITH DRAINING SINUS, LEFT ANKLE AND FOOT (HCC): Primary | ICD-10-CM

## 2020-08-28 DIAGNOSIS — L97.509 TYPE 2 DIABETES MELLITUS WITH FOOT ULCER (HCC): ICD-10-CM

## 2020-08-28 DIAGNOSIS — I10 ESSENTIAL (PRIMARY) HYPERTENSION: ICD-10-CM

## 2020-08-28 DIAGNOSIS — E11.621 TYPE 2 DIABETES MELLITUS WITH FOOT ULCER (HCC): ICD-10-CM

## 2020-08-28 DIAGNOSIS — L97.526 NON-PRESSURE CHRONIC ULCER OF OTHER PART OF LEFT FOOT WITH BONE INVOLVEMENT WITHOUT EVIDENCE OF NECROSIS (HCC): ICD-10-CM

## 2020-08-28 LAB
GLUCOSE BLD-MCNC: 155 MG/DL (ref 70–99)
GLUCOSE BLD-MCNC: 179 MG/DL (ref 70–99)

## 2020-08-28 PROCEDURE — 82962 GLUCOSE BLOOD TEST: CPT

## 2020-08-28 NOTE — PROGRESS NOTES
[facility] Tech Details  Patient Name: Lorenzo Aguilar  Patient Number: [id number]  Patient YOB: 1970  Date: 8/28/2020  Physician / Megan Chung: Chanelle Noriega, [address] [id number] [facility] Treatment Course Details  Treatment Course Number: 1  Total Treat

## 2020-08-31 ENCOUNTER — APPOINTMENT (OUTPATIENT)
Dept: WOUND CARE | Facility: HOSPITAL | Age: 50
End: 2020-08-31
Attending: CLINICAL NURSE SPECIALIST
Payer: COMMERCIAL

## 2020-08-31 ENCOUNTER — LAB REQUISITION (OUTPATIENT)
Dept: LAB | Facility: HOSPITAL | Age: 50
End: 2020-08-31
Payer: COMMERCIAL

## 2020-08-31 ENCOUNTER — OFFICE VISIT (OUTPATIENT)
Dept: WOUND CARE | Facility: HOSPITAL | Age: 50
End: 2020-08-31
Attending: INTERNAL MEDICINE
Payer: COMMERCIAL

## 2020-08-31 DIAGNOSIS — M86.9 OSTEOMYELITIS, UNSPECIFIED (HCC): ICD-10-CM

## 2020-08-31 DIAGNOSIS — E11.621 TYPE 2 DIABETES MELLITUS WITH FOOT ULCER, UNSPECIFIED WHETHER LONG TERM INSULIN USE (HCC): Primary | ICD-10-CM

## 2020-08-31 DIAGNOSIS — L97.509 TYPE 2 DIABETES MELLITUS WITH FOOT ULCER, UNSPECIFIED WHETHER LONG TERM INSULIN USE (HCC): Primary | ICD-10-CM

## 2020-08-31 DIAGNOSIS — L97.526 NON-PRESSURE CHRONIC ULCER OF OTHER PART OF LEFT FOOT WITH BONE INVOLVEMENT WITHOUT EVIDENCE OF NECROSIS (HCC): ICD-10-CM

## 2020-08-31 DIAGNOSIS — I10 ESSENTIAL (PRIMARY) HYPERTENSION: ICD-10-CM

## 2020-08-31 DIAGNOSIS — M86.472 CHRONIC OSTEOMYELITIS WITH DRAINING SINUS, LEFT ANKLE AND FOOT (HCC): ICD-10-CM

## 2020-08-31 LAB
ALBUMIN SERPL-MCNC: 3.6 G/DL (ref 3.4–5)
ALBUMIN/GLOB SERPL: 0.9 {RATIO} (ref 1–2)
ALP LIVER SERPL-CCNC: 93 U/L (ref 45–117)
ALT SERPL-CCNC: 48 U/L (ref 16–61)
ANION GAP SERPL CALC-SCNC: 1 MMOL/L (ref 0–18)
AST SERPL-CCNC: 29 U/L (ref 15–37)
BASOPHILS # BLD AUTO: 0.08 X10(3) UL (ref 0–0.2)
BASOPHILS NFR BLD AUTO: 0.8 %
BILIRUB SERPL-MCNC: 0.4 MG/DL (ref 0.1–2)
BUN BLD-MCNC: 28 MG/DL (ref 7–18)
BUN/CREAT SERPL: 23.7 (ref 10–20)
CALCIUM BLD-MCNC: 10 MG/DL (ref 8.5–10.1)
CHLORIDE SERPL-SCNC: 108 MMOL/L (ref 98–112)
CK SERPL-CCNC: 319 U/L (ref 39–308)
CO2 SERPL-SCNC: 26 MMOL/L (ref 21–32)
CREAT BLD-MCNC: 1.18 MG/DL (ref 0.7–1.3)
CRP SERPL-MCNC: 0.42 MG/DL (ref ?–0.3)
DEPRECATED RDW RBC AUTO: 40.3 FL (ref 35.1–46.3)
EOSINOPHIL # BLD AUTO: 0.29 X10(3) UL (ref 0–0.7)
EOSINOPHIL NFR BLD AUTO: 3.1 %
ERYTHROCYTE [DISTWIDTH] IN BLOOD BY AUTOMATED COUNT: 12.9 % (ref 11–15)
GLOBULIN PLAS-MCNC: 4.1 G/DL (ref 2.8–4.4)
GLUCOSE BLD-MCNC: 116 MG/DL (ref 70–99)
GLUCOSE BLD-MCNC: 122 MG/DL (ref 70–99)
GLUCOSE BLD-MCNC: 160 MG/DL (ref 70–99)
HCT VFR BLD AUTO: 40.5 % (ref 39–53)
HGB BLD-MCNC: 12.7 G/DL (ref 13–17.5)
IMM GRANULOCYTES # BLD AUTO: 0.07 X10(3) UL (ref 0–1)
IMM GRANULOCYTES NFR BLD: 0.7 %
LYMPHOCYTES # BLD AUTO: 2.02 X10(3) UL (ref 1–4)
LYMPHOCYTES NFR BLD AUTO: 21.4 %
M PROTEIN MFR SERPL ELPH: 7.7 G/DL (ref 6.4–8.2)
MCH RBC QN AUTO: 26.8 PG (ref 26–34)
MCHC RBC AUTO-ENTMCNC: 31.4 G/DL (ref 31–37)
MCV RBC AUTO: 85.6 FL (ref 80–100)
MONOCYTES # BLD AUTO: 0.8 X10(3) UL (ref 0.1–1)
MONOCYTES NFR BLD AUTO: 8.5 %
NEUTROPHILS # BLD AUTO: 6.16 X10 (3) UL (ref 1.5–7.7)
NEUTROPHILS # BLD AUTO: 6.16 X10(3) UL (ref 1.5–7.7)
NEUTROPHILS NFR BLD AUTO: 65.5 %
OSMOLALITY SERPL CALC.SUM OF ELEC: 287 MOSM/KG (ref 275–295)
PLATELET # BLD AUTO: 332 10(3)UL (ref 150–450)
POTASSIUM SERPL-SCNC: 3.8 MMOL/L (ref 3.5–5.1)
RBC # BLD AUTO: 4.73 X10(6)UL (ref 4.3–5.7)
SODIUM SERPL-SCNC: 135 MMOL/L (ref 136–145)
WBC # BLD AUTO: 9.4 X10(3) UL (ref 4–11)

## 2020-08-31 PROCEDURE — 80053 COMPREHEN METABOLIC PANEL: CPT | Performed by: INTERNAL MEDICINE

## 2020-08-31 PROCEDURE — 85025 COMPLETE CBC W/AUTO DIFF WBC: CPT | Performed by: INTERNAL MEDICINE

## 2020-08-31 PROCEDURE — 86140 C-REACTIVE PROTEIN: CPT | Performed by: INTERNAL MEDICINE

## 2020-08-31 PROCEDURE — 82550 ASSAY OF CK (CPK): CPT | Performed by: INTERNAL MEDICINE

## 2020-08-31 PROCEDURE — 15275 SKIN SUB GRAFT FACE/NK/HF/G: CPT

## 2020-08-31 PROCEDURE — 82962 GLUCOSE BLOOD TEST: CPT

## 2020-08-31 NOTE — PROGRESS NOTES
Chief Complaint  This information was obtained from the patient  Patient is here for a wound care follow up for left foot woound. Allergies  NKA    HPI  This information was obtained from the patient    8/31/20- WOund improved - Epifix # 2 today.   HBO extensive cellulitis of the plantar lateral aspect of the foot with noted abscess. He had an elevated white count on admission. Wound culture grew MRSA and enterococcus.   He had surgical drainage of the wound on 6/8/2020 which included a partial fourth r 12/13/2019  foot surgery  • TOE AMPUTATION Left 6/7/2020  Performed by Cruzita Babinski, DPM at [facility] OR  • WISDOM TEETH REMOVED    Family History  Problem Relation Age of Onset  • Diabetes Father  • Lipids Father  • Diabetes Mother     Type 2 DM and temperature of the periwound skin is WNL. Periwound skin does not exhibit signs or symptoms of infection.  Local Pulse is Normal.    Vitals  Height/Length: 75 in (190.5 cm), Weight: 265 lbs (120.45 kgs), BMI: 33.1, Pulse: 86 bpm, Respiratory Rate: 16 breath with Steri-Strips. Post application, a dressing was applied: sorbact, kerramax, hydrofera transfer. A time out was not conducted prior to the start of the procedure.  The procedure was tolerated well with a pain level of 0 throughout and a pain level of 0 f

## 2020-08-31 NOTE — PROGRESS NOTES
HBO Tech Details      Patient Name: Kendal Matute   Patient Number: [id number]   Patient YOB: 1970      Date: 8/31/2020   Physician / Eduardo Laguna: Robina Palma, [address] [place] [address] Street: [facility] Treatment Course Details          Treatment

## 2020-09-01 ENCOUNTER — OFFICE VISIT (OUTPATIENT)
Dept: WOUND CARE | Facility: HOSPITAL | Age: 50
End: 2020-09-01
Attending: FAMILY MEDICINE
Payer: COMMERCIAL

## 2020-09-01 DIAGNOSIS — L97.509 TYPE 2 DIABETES MELLITUS WITH FOOT ULCER (HCC): ICD-10-CM

## 2020-09-01 DIAGNOSIS — M86.472 CHRONIC OSTEOMYELITIS WITH DRAINING SINUS, LEFT ANKLE AND FOOT (HCC): Primary | ICD-10-CM

## 2020-09-01 DIAGNOSIS — L97.526 NON-PRESSURE CHRONIC ULCER OF OTHER PART OF LEFT FOOT WITH BONE INVOLVEMENT WITHOUT EVIDENCE OF NECROSIS (HCC): ICD-10-CM

## 2020-09-01 DIAGNOSIS — I10 ESSENTIAL (PRIMARY) HYPERTENSION: ICD-10-CM

## 2020-09-01 DIAGNOSIS — E11.621 TYPE 2 DIABETES MELLITUS WITH FOOT ULCER (HCC): ICD-10-CM

## 2020-09-01 LAB
GLUCOSE BLD-MCNC: 131 MG/DL (ref 70–99)
GLUCOSE BLD-MCNC: 195 MG/DL (ref 70–99)

## 2020-09-01 PROCEDURE — 82962 GLUCOSE BLOOD TEST: CPT

## 2020-09-02 ENCOUNTER — OFFICE VISIT (OUTPATIENT)
Dept: WOUND CARE | Facility: HOSPITAL | Age: 50
End: 2020-09-02
Attending: INTERNAL MEDICINE
Payer: COMMERCIAL

## 2020-09-02 DIAGNOSIS — I10 ESSENTIAL (PRIMARY) HYPERTENSION: ICD-10-CM

## 2020-09-02 DIAGNOSIS — L97.526 NON-PRESSURE CHRONIC ULCER OF OTHER PART OF LEFT FOOT WITH BONE INVOLVEMENT WITHOUT EVIDENCE OF NECROSIS (HCC): ICD-10-CM

## 2020-09-02 DIAGNOSIS — E11.621 TYPE 2 DIABETES MELLITUS WITH FOOT ULCER (HCC): ICD-10-CM

## 2020-09-02 DIAGNOSIS — M86.472 CHRONIC OSTEOMYELITIS WITH DRAINING SINUS, LEFT ANKLE AND FOOT (HCC): Primary | ICD-10-CM

## 2020-09-02 DIAGNOSIS — L97.509 TYPE 2 DIABETES MELLITUS WITH FOOT ULCER (HCC): ICD-10-CM

## 2020-09-02 LAB
GLUCOSE BLD-MCNC: 177 MG/DL (ref 70–99)
GLUCOSE BLD-MCNC: 178 MG/DL (ref 70–99)

## 2020-09-02 PROCEDURE — 82962 GLUCOSE BLOOD TEST: CPT

## 2020-09-02 NOTE — PROGRESS NOTES
HBO Tech Details      Patient Name: Harlin Bamberger   Patient Number: [id number]   Patient YOB: 1970      Date: 9/1/2020   Physician / Won Espinoza: Filipe Cons: Aaron Glez Treatment Course Details          Treatment Co

## 2020-09-02 NOTE — PROGRESS NOTES
HBO Tech Details      Patient Name: Jannet Harper   Patient Number: [id number]   Patient YOB: 1970      Date: 9/2/2020   Physician / Abisai Obregon: Kellie Sykes, [address] [place] [address] Street: [facility] Treatment Course Details          Treatment

## 2020-09-03 ENCOUNTER — OFFICE VISIT (OUTPATIENT)
Dept: WOUND CARE | Facility: HOSPITAL | Age: 50
End: 2020-09-03
Attending: FAMILY MEDICINE
Payer: COMMERCIAL

## 2020-09-03 DIAGNOSIS — M86.472 CHRONIC OSTEOMYELITIS WITH DRAINING SINUS, LEFT ANKLE AND FOOT (HCC): Primary | ICD-10-CM

## 2020-09-03 LAB
GLUCOSE BLD-MCNC: 134 MG/DL (ref 70–99)
GLUCOSE BLD-MCNC: 152 MG/DL (ref 70–99)

## 2020-09-03 PROCEDURE — 82962 GLUCOSE BLOOD TEST: CPT

## 2020-09-03 NOTE — PROGRESS NOTES
HBO Tech Details      Patient Name: Joe Tabares   Patient Number: [id number]   Patient YOB: 1970      Date: 9/3/2020   Physician / Markie Bill: Leydi Eric: Aaron Glez Treatment Course Details          Treatment Co

## 2020-09-04 ENCOUNTER — OFFICE VISIT (OUTPATIENT)
Dept: WOUND CARE | Facility: HOSPITAL | Age: 50
End: 2020-09-04
Attending: INTERNAL MEDICINE
Payer: COMMERCIAL

## 2020-09-04 DIAGNOSIS — E11.621 TYPE 2 DIABETES MELLITUS WITH FOOT ULCER (HCC): ICD-10-CM

## 2020-09-04 DIAGNOSIS — M86.472 CHRONIC OSTEOMYELITIS WITH DRAINING SINUS, LEFT ANKLE AND FOOT (HCC): Primary | ICD-10-CM

## 2020-09-04 DIAGNOSIS — L97.509 TYPE 2 DIABETES MELLITUS WITH FOOT ULCER (HCC): ICD-10-CM

## 2020-09-04 DIAGNOSIS — L97.526 NON-PRESSURE CHRONIC ULCER OF OTHER PART OF LEFT FOOT WITH BONE INVOLVEMENT WITHOUT EVIDENCE OF NECROSIS (HCC): ICD-10-CM

## 2020-09-04 DIAGNOSIS — I10 ESSENTIAL (PRIMARY) HYPERTENSION: ICD-10-CM

## 2020-09-04 LAB
GLUCOSE BLD-MCNC: 135 MG/DL (ref 70–99)
GLUCOSE BLD-MCNC: 166 MG/DL (ref 70–99)

## 2020-09-04 PROCEDURE — 82962 GLUCOSE BLOOD TEST: CPT

## 2020-09-04 NOTE — PROGRESS NOTES
[facility] Tech Details      Patient Name: Aris Sage   Patient Number: [id number]   Patient YOB: 1970      Date: 9/4/2020   Physician / Alexis Peresy: Ria Langford, [address] [place] [address] Street: [facility] Treatment Course Details          Treatment

## 2020-09-07 ENCOUNTER — APPOINTMENT (OUTPATIENT)
Dept: WOUND CARE | Facility: HOSPITAL | Age: 50
End: 2020-09-07
Payer: COMMERCIAL

## 2020-09-08 ENCOUNTER — OFFICE VISIT (OUTPATIENT)
Dept: WOUND CARE | Facility: HOSPITAL | Age: 50
End: 2020-09-08
Attending: FAMILY MEDICINE
Payer: COMMERCIAL

## 2020-09-08 ENCOUNTER — LAB REQUISITION (OUTPATIENT)
Dept: LAB | Facility: HOSPITAL | Age: 50
End: 2020-09-08
Payer: COMMERCIAL

## 2020-09-08 DIAGNOSIS — I10 ESSENTIAL (PRIMARY) HYPERTENSION: ICD-10-CM

## 2020-09-08 DIAGNOSIS — L97.526 NON-PRESSURE CHRONIC ULCER OF OTHER PART OF LEFT FOOT WITH BONE INVOLVEMENT WITHOUT EVIDENCE OF NECROSIS (HCC): ICD-10-CM

## 2020-09-08 DIAGNOSIS — M86.472 CHRONIC OSTEOMYELITIS WITH DRAINING SINUS, LEFT ANKLE AND FOOT (HCC): Primary | ICD-10-CM

## 2020-09-08 DIAGNOSIS — M86.9 OSTEOMYELITIS, UNSPECIFIED (HCC): ICD-10-CM

## 2020-09-08 DIAGNOSIS — E11.621 TYPE 2 DIABETES MELLITUS WITH FOOT ULCER (HCC): ICD-10-CM

## 2020-09-08 DIAGNOSIS — L97.509 TYPE 2 DIABETES MELLITUS WITH FOOT ULCER (HCC): ICD-10-CM

## 2020-09-08 LAB
ALBUMIN SERPL-MCNC: 3.8 G/DL (ref 3.4–5)
ALBUMIN/GLOB SERPL: 1 {RATIO} (ref 1–2)
ALP LIVER SERPL-CCNC: 89 U/L (ref 45–117)
ALT SERPL-CCNC: 54 U/L (ref 16–61)
ANION GAP SERPL CALC-SCNC: 5 MMOL/L (ref 0–18)
AST SERPL-CCNC: 30 U/L (ref 15–37)
BASOPHILS # BLD AUTO: 0.08 X10(3) UL (ref 0–0.2)
BASOPHILS NFR BLD AUTO: 0.8 %
BILIRUB SERPL-MCNC: 0.4 MG/DL (ref 0.1–2)
BUN BLD-MCNC: 28 MG/DL (ref 7–18)
BUN/CREAT SERPL: 25.7 (ref 10–20)
CALCIUM BLD-MCNC: 10 MG/DL (ref 8.5–10.1)
CHLORIDE SERPL-SCNC: 107 MMOL/L (ref 98–112)
CK SERPL-CCNC: 402 U/L (ref 39–308)
CO2 SERPL-SCNC: 26 MMOL/L (ref 21–32)
CREAT BLD-MCNC: 1.09 MG/DL (ref 0.7–1.3)
CRP SERPL-MCNC: 0.48 MG/DL (ref ?–0.3)
DEPRECATED RDW RBC AUTO: 40.9 FL (ref 35.1–46.3)
EOSINOPHIL # BLD AUTO: 0.35 X10(3) UL (ref 0–0.7)
EOSINOPHIL NFR BLD AUTO: 3.6 %
ERYTHROCYTE [DISTWIDTH] IN BLOOD BY AUTOMATED COUNT: 13.2 % (ref 11–15)
GLOBULIN PLAS-MCNC: 4 G/DL (ref 2.8–4.4)
GLUCOSE BLD-MCNC: 126 MG/DL (ref 70–99)
GLUCOSE BLD-MCNC: 152 MG/DL (ref 70–99)
GLUCOSE BLD-MCNC: 155 MG/DL (ref 70–99)
HCT VFR BLD AUTO: 41 % (ref 39–53)
HGB BLD-MCNC: 13 G/DL (ref 13–17.5)
IMM GRANULOCYTES # BLD AUTO: 0.06 X10(3) UL (ref 0–1)
IMM GRANULOCYTES NFR BLD: 0.6 %
LYMPHOCYTES # BLD AUTO: 2.17 X10(3) UL (ref 1–4)
LYMPHOCYTES NFR BLD AUTO: 22.3 %
M PROTEIN MFR SERPL ELPH: 7.8 G/DL (ref 6.4–8.2)
MCH RBC QN AUTO: 27 PG (ref 26–34)
MCHC RBC AUTO-ENTMCNC: 31.7 G/DL (ref 31–37)
MCV RBC AUTO: 85.2 FL (ref 80–100)
MONOCYTES # BLD AUTO: 0.86 X10(3) UL (ref 0.1–1)
MONOCYTES NFR BLD AUTO: 8.8 %
NEUTROPHILS # BLD AUTO: 6.2 X10 (3) UL (ref 1.5–7.7)
NEUTROPHILS # BLD AUTO: 6.2 X10(3) UL (ref 1.5–7.7)
NEUTROPHILS NFR BLD AUTO: 63.9 %
OSMOLALITY SERPL CALC.SUM OF ELEC: 293 MOSM/KG (ref 275–295)
PLATELET # BLD AUTO: 367 10(3)UL (ref 150–450)
POTASSIUM SERPL-SCNC: 3.8 MMOL/L (ref 3.5–5.1)
RBC # BLD AUTO: 4.81 X10(6)UL (ref 4.3–5.7)
SODIUM SERPL-SCNC: 138 MMOL/L (ref 136–145)
WBC # BLD AUTO: 9.7 X10(3) UL (ref 4–11)

## 2020-09-08 PROCEDURE — 85025 COMPLETE CBC W/AUTO DIFF WBC: CPT | Performed by: INTERNAL MEDICINE

## 2020-09-08 PROCEDURE — 82962 GLUCOSE BLOOD TEST: CPT

## 2020-09-08 PROCEDURE — 80053 COMPREHEN METABOLIC PANEL: CPT | Performed by: INTERNAL MEDICINE

## 2020-09-08 PROCEDURE — 82550 ASSAY OF CK (CPK): CPT | Performed by: INTERNAL MEDICINE

## 2020-09-08 PROCEDURE — 86140 C-REACTIVE PROTEIN: CPT | Performed by: INTERNAL MEDICINE

## 2020-09-08 NOTE — PROGRESS NOTES
HBO Tech Details      Patient Name: Vy Novak   Patient Number: [id number]   Patient YOB: 1970      Date: 9/8/2020   Physician / Sofía Bonilla: Cora Wisdom: Aaron Glez Treatment Course Details          Treatment Co

## 2020-09-09 ENCOUNTER — OFFICE VISIT (OUTPATIENT)
Dept: WOUND CARE | Facility: HOSPITAL | Age: 50
End: 2020-09-09
Attending: INTERNAL MEDICINE
Payer: COMMERCIAL

## 2020-09-09 ENCOUNTER — OFFICE VISIT (OUTPATIENT)
Dept: PODIATRY CLINIC | Facility: CLINIC | Age: 50
End: 2020-09-09
Payer: COMMERCIAL

## 2020-09-09 VITALS — BODY MASS INDEX: 33 KG/M2 | WEIGHT: 265 LBS

## 2020-09-09 DIAGNOSIS — L97.526 NON-PRESSURE CHRONIC ULCER OF OTHER PART OF LEFT FOOT WITH BONE INVOLVEMENT WITHOUT EVIDENCE OF NECROSIS (HCC): ICD-10-CM

## 2020-09-09 DIAGNOSIS — M86.472 CHRONIC OSTEOMYELITIS WITH DRAINING SINUS, LEFT ANKLE AND FOOT (HCC): Primary | ICD-10-CM

## 2020-09-09 DIAGNOSIS — E11.621 TYPE 2 DIABETES MELLITUS WITH FOOT ULCER (HCC): ICD-10-CM

## 2020-09-09 DIAGNOSIS — E11.40 TYPE 2 DIABETES, UNCONTROLLED, WITH NEUROPATHY (HCC): ICD-10-CM

## 2020-09-09 DIAGNOSIS — I10 ESSENTIAL (PRIMARY) HYPERTENSION: ICD-10-CM

## 2020-09-09 DIAGNOSIS — L91.8 HYPERTROPHIC GRANULATION TISSUE: ICD-10-CM

## 2020-09-09 DIAGNOSIS — L97.509 TYPE 2 DIABETES MELLITUS WITH FOOT ULCER (HCC): ICD-10-CM

## 2020-09-09 DIAGNOSIS — E11.65 TYPE 2 DIABETES, UNCONTROLLED, WITH NEUROPATHY (HCC): ICD-10-CM

## 2020-09-09 DIAGNOSIS — Z98.890 STATUS POST FOOT SURGERY: Primary | ICD-10-CM

## 2020-09-09 DIAGNOSIS — S91.302D OPEN WOUND OF LEFT FOOT, SUBSEQUENT ENCOUNTER: ICD-10-CM

## 2020-09-09 DIAGNOSIS — L97.521 ULCERATED, FOOT, LEFT, LIMITED TO BREAKDOWN OF SKIN (HCC): ICD-10-CM

## 2020-09-09 LAB
GLUCOSE BLD-MCNC: 202 MG/DL (ref 70–99)
GLUCOSE BLD-MCNC: 204 MG/DL (ref 70–99)

## 2020-09-09 PROCEDURE — 99213 OFFICE O/P EST LOW 20 MIN: CPT | Performed by: PODIATRIST

## 2020-09-09 PROCEDURE — 82962 GLUCOSE BLOOD TEST: CPT

## 2020-09-09 NOTE — PROGRESS NOTES
Adela Villanueva is a 53year old male. Patient presents with:   Follow - Up: wound on ball of left foot ,denies pain  today         HPI:   Patient returns to the clinic still complaining of some open wound on the bottom of his left foot but he is no longer strip 1   • LANA MICROLET LANCETS Does not apply Misc Check blood glucoses twice a day 200 each 1   • TRUEPLUS PEN NEEDLES 32G X 4 MM Does not apply Misc INJECT TWICE DAILY 200 each 1   • FREESTYLE JOSE 14 DAY SENSOR Does not apply [address] • Lipids Mother    • Cancer Mother         breast   • Heart Disorder Mother         atrial fib--resolved after cardioversion   • Cancer Sister         skin   • Other (autistic) Son       Social History    Socioeconomic History      Marital status: Erlinda Brooks breakdown of skin (HCC)  -     XR FOOT, COMPLETE (MIN 3 VIEWS), LEFT (CPT=73630); Future    Type 2 diabetes, uncontrolled, with neuropathy (HCC)    Hypertrophic granulation tissue  -     XR FOOT, COMPLETE (MIN 3 VIEWS), LEFT (CPT=73630);  Future        Plan

## 2020-09-09 NOTE — PROGRESS NOTES
[facility] Tech Details  Patient Name: Lorenzo Aguilar  Patient Number: [id number]  Patient YOB: 1970  Date: 9/9/2020  Physician / Megan Chung: Chanelle Noriega, [address] [address] [facility] Treatment Course Details  Treatment Course Number: 1  Total Treatm

## 2020-09-10 ENCOUNTER — OFFICE VISIT (OUTPATIENT)
Dept: WOUND CARE | Facility: HOSPITAL | Age: 50
End: 2020-09-10
Attending: FAMILY MEDICINE
Payer: COMMERCIAL

## 2020-09-10 DIAGNOSIS — E11.621 TYPE 2 DIABETES MELLITUS WITH FOOT ULCER (HCC): ICD-10-CM

## 2020-09-10 DIAGNOSIS — L97.526 NON-PRESSURE CHRONIC ULCER OF OTHER PART OF LEFT FOOT WITH BONE INVOLVEMENT WITHOUT EVIDENCE OF NECROSIS (HCC): ICD-10-CM

## 2020-09-10 DIAGNOSIS — I10 ESSENTIAL (PRIMARY) HYPERTENSION: ICD-10-CM

## 2020-09-10 DIAGNOSIS — L97.509 TYPE 2 DIABETES MELLITUS WITH FOOT ULCER (HCC): ICD-10-CM

## 2020-09-10 DIAGNOSIS — M86.472 CHRONIC OSTEOMYELITIS WITH DRAINING SINUS, LEFT ANKLE AND FOOT (HCC): Primary | ICD-10-CM

## 2020-09-10 LAB
GLUCOSE BLD-MCNC: 165 MG/DL (ref 70–99)
GLUCOSE BLD-MCNC: 198 MG/DL (ref 70–99)

## 2020-09-10 PROCEDURE — 82962 GLUCOSE BLOOD TEST: CPT

## 2020-09-10 NOTE — PROGRESS NOTES
[facility] Tech Details      Patient Name: Owen Gill   Patient Number: [id number]   Patient YOB: 1970      Date: 9/10/2020   Physician / Rhys Vargas: Dava Simmonds, [address]: Aaron Glez Treatment Course Details          Treatment C

## 2020-09-11 ENCOUNTER — OFFICE VISIT (OUTPATIENT)
Dept: WOUND CARE | Facility: HOSPITAL | Age: 50
End: 2020-09-11
Attending: INTERNAL MEDICINE
Payer: COMMERCIAL

## 2020-09-11 DIAGNOSIS — L97.526 NON-PRESSURE CHRONIC ULCER OF OTHER PART OF LEFT FOOT WITH BONE INVOLVEMENT WITHOUT EVIDENCE OF NECROSIS (HCC): ICD-10-CM

## 2020-09-11 DIAGNOSIS — L97.509 TYPE 2 DIABETES MELLITUS WITH FOOT ULCER (HCC): ICD-10-CM

## 2020-09-11 DIAGNOSIS — I10 ESSENTIAL (PRIMARY) HYPERTENSION: ICD-10-CM

## 2020-09-11 DIAGNOSIS — M86.472 CHRONIC OSTEOMYELITIS WITH DRAINING SINUS, LEFT ANKLE AND FOOT (HCC): Primary | ICD-10-CM

## 2020-09-11 DIAGNOSIS — E11.621 TYPE 2 DIABETES MELLITUS WITH FOOT ULCER (HCC): ICD-10-CM

## 2020-09-11 LAB
GLUCOSE BLD-MCNC: 200 MG/DL (ref 70–99)
GLUCOSE BLD-MCNC: 218 MG/DL (ref 70–99)

## 2020-09-11 PROCEDURE — 82962 GLUCOSE BLOOD TEST: CPT

## 2020-09-11 PROCEDURE — 15275 SKIN SUB GRAFT FACE/NK/HF/G: CPT

## 2020-09-11 NOTE — PROGRESS NOTES
HBO Tech Details      Patient Name: Alberto Finney   Patient Number: [id number]   Patient YOB: 1970      Date: 9/11/2020   Physician / Laura Ear: Jonathan Chester, [address]: [facility] Treatment Course Details          Treatment

## 2020-09-11 NOTE — PROGRESS NOTES
Chief Complaint  This information was obtained from the patient  Patient is here for a wound care follow up. He denies any pain or new wound concerns.     Allergies  NKA    HPI  This information was obtained from the patient    9/11/20 - Wound improving - the antibiotics were completed the wound reopened and the most recent MRI showed osteomyelitis of the third metatarsal with possible pathological fracture and extensive cellulitis of the plantar lateral aspect of the foot with noted abscess.   He had an dilcia MAIN OR  • FOOT JOINT FUSION Right 12/13/2019  Performed by Clyde Galindo MD at [facility] MAIN OR  • OTHER  right foot surgery  • OTHER SURGICAL HISTORY Right 12/13/2019  foot surgery  • TOE AMPUTATION Left 6/7/2020  Performed by Young Ott DPM at exhibit: Dry/Scaly, Moist, Maceration, Ecchymosis, Erythema. The temperature of the periwound skin is WNL. Periwound skin does not exhibit signs or symptoms of infection.  Local Pulse is Normal.    Vitals  Height/Length: 75 in (190.5 cm), Weight: 265 lbs (1 leg elevation - salt restriction - dHACM epifix / foam bolster dressings    HBOT:  HBO Tech consult    Entered ByTran Stone on 09/11/2020 09:45:22

## 2020-09-14 ENCOUNTER — LAB REQUISITION (OUTPATIENT)
Dept: LAB | Facility: HOSPITAL | Age: 50
End: 2020-09-14
Payer: COMMERCIAL

## 2020-09-14 ENCOUNTER — OFFICE VISIT (OUTPATIENT)
Dept: WOUND CARE | Facility: HOSPITAL | Age: 50
End: 2020-09-14
Attending: INTERNAL MEDICINE
Payer: COMMERCIAL

## 2020-09-14 ENCOUNTER — HOSPITAL ENCOUNTER (OUTPATIENT)
Dept: GENERAL RADIOLOGY | Facility: HOSPITAL | Age: 50
Discharge: HOME | End: 2020-09-14
Attending: PODIATRIST
Payer: COMMERCIAL

## 2020-09-14 DIAGNOSIS — I10 ESSENTIAL (PRIMARY) HYPERTENSION: ICD-10-CM

## 2020-09-14 DIAGNOSIS — L91.8 HYPERTROPHIC GRANULATION TISSUE: ICD-10-CM

## 2020-09-14 DIAGNOSIS — M86.472 CHRONIC OSTEOMYELITIS WITH DRAINING SINUS, LEFT ANKLE AND FOOT (HCC): Primary | ICD-10-CM

## 2020-09-14 DIAGNOSIS — M86.9 OSTEOMYELITIS, UNSPECIFIED (HCC): ICD-10-CM

## 2020-09-14 DIAGNOSIS — S91.302D OPEN WOUND OF LEFT FOOT, SUBSEQUENT ENCOUNTER: ICD-10-CM

## 2020-09-14 DIAGNOSIS — L97.521 ULCERATED, FOOT, LEFT, LIMITED TO BREAKDOWN OF SKIN (HCC): ICD-10-CM

## 2020-09-14 DIAGNOSIS — L97.509 TYPE 2 DIABETES MELLITUS WITH FOOT ULCER (HCC): ICD-10-CM

## 2020-09-14 DIAGNOSIS — E11.621 TYPE 2 DIABETES MELLITUS WITH FOOT ULCER (HCC): ICD-10-CM

## 2020-09-14 DIAGNOSIS — L97.526 NON-PRESSURE CHRONIC ULCER OF OTHER PART OF LEFT FOOT WITH BONE INVOLVEMENT WITHOUT EVIDENCE OF NECROSIS (HCC): ICD-10-CM

## 2020-09-14 LAB
ALBUMIN SERPL-MCNC: 3.8 G/DL (ref 3.4–5)
ALBUMIN/GLOB SERPL: 0.9 {RATIO} (ref 1–2)
ALP LIVER SERPL-CCNC: 92 U/L (ref 45–117)
ALT SERPL-CCNC: 60 U/L (ref 16–61)
ANION GAP SERPL CALC-SCNC: 3 MMOL/L (ref 0–18)
AST SERPL-CCNC: 29 U/L (ref 15–37)
BASOPHILS # BLD AUTO: 0.07 X10(3) UL (ref 0–0.2)
BASOPHILS NFR BLD AUTO: 0.8 %
BILIRUB SERPL-MCNC: 0.4 MG/DL (ref 0.1–2)
BUN BLD-MCNC: 28 MG/DL (ref 7–18)
BUN/CREAT SERPL: 26.9 (ref 10–20)
CALCIUM BLD-MCNC: 9.8 MG/DL (ref 8.5–10.1)
CHLORIDE SERPL-SCNC: 106 MMOL/L (ref 98–112)
CK SERPL-CCNC: 277 U/L (ref 39–308)
CO2 SERPL-SCNC: 28 MMOL/L (ref 21–32)
CREAT BLD-MCNC: 1.04 MG/DL (ref 0.7–1.3)
CRP SERPL-MCNC: 0.42 MG/DL (ref ?–0.3)
DEPRECATED RDW RBC AUTO: 39.4 FL (ref 35.1–46.3)
EOSINOPHIL # BLD AUTO: 0.32 X10(3) UL (ref 0–0.7)
EOSINOPHIL NFR BLD AUTO: 3.7 %
ERYTHROCYTE [DISTWIDTH] IN BLOOD BY AUTOMATED COUNT: 13 % (ref 11–15)
GLOBULIN PLAS-MCNC: 4.1 G/DL (ref 2.8–4.4)
GLUCOSE BLD-MCNC: 124 MG/DL (ref 70–99)
GLUCOSE BLD-MCNC: 156 MG/DL (ref 70–99)
GLUCOSE BLD-MCNC: 270 MG/DL (ref 70–99)
HCT VFR BLD AUTO: 42.3 % (ref 39–53)
HGB BLD-MCNC: 13.5 G/DL (ref 13–17.5)
IMM GRANULOCYTES # BLD AUTO: 0.05 X10(3) UL (ref 0–1)
IMM GRANULOCYTES NFR BLD: 0.6 %
LYMPHOCYTES # BLD AUTO: 1.95 X10(3) UL (ref 1–4)
LYMPHOCYTES NFR BLD AUTO: 22.5 %
M PROTEIN MFR SERPL ELPH: 7.9 G/DL (ref 6.4–8.2)
MCH RBC QN AUTO: 27.1 PG (ref 26–34)
MCHC RBC AUTO-ENTMCNC: 31.9 G/DL (ref 31–37)
MCV RBC AUTO: 84.8 FL (ref 80–100)
MONOCYTES # BLD AUTO: 0.78 X10(3) UL (ref 0.1–1)
MONOCYTES NFR BLD AUTO: 9 %
NEUTROPHILS # BLD AUTO: 5.5 X10 (3) UL (ref 1.5–7.7)
NEUTROPHILS # BLD AUTO: 5.5 X10(3) UL (ref 1.5–7.7)
NEUTROPHILS NFR BLD AUTO: 63.4 %
OSMOLALITY SERPL CALC.SUM OF ELEC: 293 MOSM/KG (ref 275–295)
PLATELET # BLD AUTO: 395 10(3)UL (ref 150–450)
POTASSIUM SERPL-SCNC: 4 MMOL/L (ref 3.5–5.1)
RBC # BLD AUTO: 4.99 X10(6)UL (ref 4.3–5.7)
SODIUM SERPL-SCNC: 137 MMOL/L (ref 136–145)
WBC # BLD AUTO: 8.7 X10(3) UL (ref 4–11)

## 2020-09-14 PROCEDURE — 86140 C-REACTIVE PROTEIN: CPT | Performed by: INTERNAL MEDICINE

## 2020-09-14 PROCEDURE — 82962 GLUCOSE BLOOD TEST: CPT

## 2020-09-14 PROCEDURE — 80053 COMPREHEN METABOLIC PANEL: CPT | Performed by: INTERNAL MEDICINE

## 2020-09-14 PROCEDURE — 85025 COMPLETE CBC W/AUTO DIFF WBC: CPT | Performed by: INTERNAL MEDICINE

## 2020-09-14 PROCEDURE — 73630 X-RAY EXAM OF FOOT: CPT | Performed by: PODIATRIST

## 2020-09-14 PROCEDURE — 82550 ASSAY OF CK (CPK): CPT | Performed by: INTERNAL MEDICINE

## 2020-09-14 NOTE — PROGRESS NOTES
HBO Tech Details      Patient Name: Vy Novak   Patient Number: [id number]   Patient YOB: 1970      Date: 9/14/2020   Physician / Sofía Bonilla: Candi Castro, [address] [place] [address] Street: [facility] Treatment Course Details          Treatment

## 2020-09-15 ENCOUNTER — OFFICE VISIT (OUTPATIENT)
Dept: WOUND CARE | Facility: HOSPITAL | Age: 50
End: 2020-09-15
Attending: FAMILY MEDICINE
Payer: COMMERCIAL

## 2020-09-15 DIAGNOSIS — M86.472 CHRONIC OSTEOMYELITIS WITH DRAINING SINUS, LEFT ANKLE AND FOOT (HCC): Primary | ICD-10-CM

## 2020-09-15 DIAGNOSIS — E11.621 TYPE 2 DIABETES MELLITUS WITH FOOT ULCER (HCC): ICD-10-CM

## 2020-09-15 DIAGNOSIS — L97.509 TYPE 2 DIABETES MELLITUS WITH FOOT ULCER (HCC): ICD-10-CM

## 2020-09-15 LAB
GLUCOSE BLD-MCNC: 132 MG/DL (ref 70–99)
GLUCOSE BLD-MCNC: 206 MG/DL (ref 70–99)

## 2020-09-15 PROCEDURE — 82962 GLUCOSE BLOOD TEST: CPT

## 2020-09-15 NOTE — PROGRESS NOTES
[facility] Tech Details  Patient Name: Owen Gill  Patient Number: [id number]  Patient YOB: 1970  Date: 9/15/2020  Physician / Rhys Vargas: Dava Simmonds, [address]: [address] [facility] Treatment Course Details  Treatment Course Number: 1  Total Treatme

## 2020-09-16 ENCOUNTER — APPOINTMENT (OUTPATIENT)
Dept: WOUND CARE | Facility: HOSPITAL | Age: 50
End: 2020-09-16
Payer: COMMERCIAL

## 2020-09-17 ENCOUNTER — APPOINTMENT (OUTPATIENT)
Dept: WOUND CARE | Facility: HOSPITAL | Age: 50
End: 2020-09-17
Payer: COMMERCIAL

## 2020-09-17 PROBLEM — Z87.891 EX-SMOKER FOR MORE THAN 1 YEAR: Status: ACTIVE | Noted: 2020-09-17

## 2020-09-18 ENCOUNTER — OFFICE VISIT (OUTPATIENT)
Dept: WOUND CARE | Facility: HOSPITAL | Age: 50
End: 2020-09-18
Attending: INTERNAL MEDICINE
Payer: COMMERCIAL

## 2020-09-18 ENCOUNTER — APPOINTMENT (OUTPATIENT)
Dept: WOUND CARE | Facility: HOSPITAL | Age: 50
End: 2020-09-18
Payer: COMMERCIAL

## 2020-09-18 DIAGNOSIS — L97.509 TYPE 2 DIABETES MELLITUS WITH FOOT ULCER (HCC): ICD-10-CM

## 2020-09-18 DIAGNOSIS — E11.621 TYPE 2 DIABETES MELLITUS WITH FOOT ULCER (HCC): ICD-10-CM

## 2020-09-18 DIAGNOSIS — L97.526 NON-PRESSURE CHRONIC ULCER OF OTHER PART OF LEFT FOOT WITH BONE INVOLVEMENT WITHOUT EVIDENCE OF NECROSIS (HCC): ICD-10-CM

## 2020-09-18 DIAGNOSIS — M86.472 CHRONIC OSTEOMYELITIS WITH DRAINING SINUS, LEFT ANKLE AND FOOT (HCC): Primary | ICD-10-CM

## 2020-09-18 DIAGNOSIS — I10 ESSENTIAL (PRIMARY) HYPERTENSION: ICD-10-CM

## 2020-09-18 PROCEDURE — 11042 DBRDMT SUBQ TIS 1ST 20SQCM/<: CPT

## 2020-09-18 NOTE — PROGRESS NOTES
Chief Complaint  This information was obtained from the patient  Patient is here for a wound care follow up. He complains of increased intermittent pain to the wound that he rates at 4/10.     Allergies  NKA    HPI  This information was obtained from the podiatrist Dr. Jolly Mckeon. He was diagnosed with osteomyelitis in June 2020 and was started on IV antibiotics and had been under the care of [facility] wound clinic.   He tells me that once the antibiotics were completed the wound reopened and the most recent M impairment  eyeglasses  • Vitamin D deficiency    Past Surgical History:  Procedure Laterality Date  • EXTREMITY LOWER IRRIGATION & DEBRIDEMENT Right 10/12/2019  Performed by Torin Sierra DPM at [facility] OR  • Kristian Right 12/13/2019  Per to the wound being insensate. The wound margin is well defined. Wound bed has % pink, firm granulation. The periwound skin exhibited: Edema, Callus. The periwound skin did not exhibit: Dry/Scaly, Moist, Maceration, Ecchymosis, Erythema.  The temperat protection. Hydrofera Transfer  Bordered gauze  Change Dressing Every: - visit    Compression Therapy:  Spanda     Off-Loading:  No Weight Bearing  TCC - monday    Follow-Up Appointments:  Return Appointment in 1 week.  - monday    Misc/Additional Orde

## 2020-09-21 ENCOUNTER — OFFICE VISIT (OUTPATIENT)
Dept: WOUND CARE | Facility: HOSPITAL | Age: 50
End: 2020-09-21
Attending: INTERNAL MEDICINE
Payer: COMMERCIAL

## 2020-09-21 ENCOUNTER — LAB REQUISITION (OUTPATIENT)
Dept: LAB | Facility: HOSPITAL | Age: 50
End: 2020-09-21
Payer: COMMERCIAL

## 2020-09-21 DIAGNOSIS — L97.526 NON-PRESSURE CHRONIC ULCER OF OTHER PART OF LEFT FOOT WITH BONE INVOLVEMENT WITHOUT EVIDENCE OF NECROSIS (HCC): ICD-10-CM

## 2020-09-21 DIAGNOSIS — L97.509 TYPE 2 DIABETES MELLITUS WITH FOOT ULCER (HCC): ICD-10-CM

## 2020-09-21 DIAGNOSIS — M86.9 OSTEOMYELITIS, UNSPECIFIED (HCC): ICD-10-CM

## 2020-09-21 DIAGNOSIS — E11.621 TYPE 2 DIABETES MELLITUS WITH FOOT ULCER (HCC): ICD-10-CM

## 2020-09-21 DIAGNOSIS — I10 ESSENTIAL (PRIMARY) HYPERTENSION: ICD-10-CM

## 2020-09-21 DIAGNOSIS — M86.472 CHRONIC OSTEOMYELITIS WITH DRAINING SINUS, LEFT ANKLE AND FOOT (HCC): Primary | ICD-10-CM

## 2020-09-21 PROCEDURE — 29445 APPL RIGID TOT CNTC LEG CAST: CPT

## 2020-09-21 PROCEDURE — 82550 ASSAY OF CK (CPK): CPT

## 2020-09-21 PROCEDURE — 86140 C-REACTIVE PROTEIN: CPT

## 2020-09-21 PROCEDURE — 80053 COMPREHEN METABOLIC PANEL: CPT

## 2020-09-21 PROCEDURE — 85025 COMPLETE CBC W/AUTO DIFF WBC: CPT

## 2020-09-23 ENCOUNTER — OFFICE VISIT (OUTPATIENT)
Dept: WOUND CARE | Facility: HOSPITAL | Age: 50
End: 2020-09-23
Attending: INTERNAL MEDICINE
Payer: COMMERCIAL

## 2020-09-23 DIAGNOSIS — I10 ESSENTIAL (PRIMARY) HYPERTENSION: ICD-10-CM

## 2020-09-23 DIAGNOSIS — M86.472 CHRONIC OSTEOMYELITIS WITH DRAINING SINUS, LEFT ANKLE AND FOOT (HCC): Primary | ICD-10-CM

## 2020-09-23 DIAGNOSIS — E11.621 TYPE 2 DIABETES MELLITUS WITH FOOT ULCER (HCC): ICD-10-CM

## 2020-09-23 DIAGNOSIS — L97.526 NON-PRESSURE CHRONIC ULCER OF OTHER PART OF LEFT FOOT WITH BONE INVOLVEMENT WITHOUT EVIDENCE OF NECROSIS (HCC): ICD-10-CM

## 2020-09-23 DIAGNOSIS — L97.509 TYPE 2 DIABETES MELLITUS WITH FOOT ULCER (HCC): ICD-10-CM

## 2020-09-23 PROCEDURE — 11042 DBRDMT SUBQ TIS 1ST 20SQCM/<: CPT

## 2020-09-23 NOTE — PROGRESS NOTES
Chief Complaint  This information was obtained from the patient  Patient is here for a F/U visit. Patient is not having any pain.     Allergies  NKA    HPI  This information was obtained from the patient  9/23/20- Wound dimensions improved  tolerating TCC osteomyelitis of the left foot. Patient has been having treatment for diabetic foot ulcer since December of last year. He is under the care of podiatrist Dr. Luis Christy.   He was diagnosed with osteomyelitis in June 2020 and was started on IV antibiotics and ([facility] 2019  right foot  • PONV (postoperative nausea and vomiting)  • Type 2 diabetes mellitus (HCC)  Dx at age 41  • Visual impairment  eyeglasses  • Vitamin D deficiency    Past Surgical History:  Procedure Laterality Date  • EXTREMITY LOWER IRRIGATION & There is a moderate amount of serous drainage noted which has no odor. The patient reports no wound pain due to the wound being insensate. The wound margin is well defined. Wound bed has % bright red, pink, spongy granulation.   The periwound skin exh controlled with pressure. The procedure was tolerated well with a pain level of 0 throughout and a pain level of 0 following the procedure.  Post Debridement Measurements: 1.8cm length x 1.5cm width x 0.1cm depth; with an area of 2.7 sq cm and a volume of 0

## 2020-09-30 ENCOUNTER — OFFICE VISIT (OUTPATIENT)
Dept: WOUND CARE | Facility: HOSPITAL | Age: 50
End: 2020-09-30
Attending: INTERNAL MEDICINE
Payer: COMMERCIAL

## 2020-09-30 DIAGNOSIS — L97.509 TYPE 2 DIABETES MELLITUS WITH FOOT ULCER (HCC): ICD-10-CM

## 2020-09-30 DIAGNOSIS — L97.526 NON-PRESSURE CHRONIC ULCER OF OTHER PART OF LEFT FOOT WITH BONE INVOLVEMENT WITHOUT EVIDENCE OF NECROSIS (HCC): ICD-10-CM

## 2020-09-30 DIAGNOSIS — I10 ESSENTIAL (PRIMARY) HYPERTENSION: ICD-10-CM

## 2020-09-30 DIAGNOSIS — M86.472 CHRONIC OSTEOMYELITIS WITH DRAINING SINUS, LEFT ANKLE AND FOOT (HCC): Primary | ICD-10-CM

## 2020-09-30 DIAGNOSIS — E11.621 TYPE 2 DIABETES MELLITUS WITH FOOT ULCER (HCC): ICD-10-CM

## 2020-09-30 PROCEDURE — 97597 DBRDMT OPN WND 1ST 20 CM/<: CPT

## 2020-09-30 NOTE — PROGRESS NOTES
Chief Complaint  This information was obtained from the patient  Patient is here for a wound care follow up. He denies any pain or new wound concerns.     Allergies  NKA    HPI  This information was obtained from the patient    9/30/20- tissue seems impro hyperbaric oxygen therapy consultation. Patient was discharged from the hospital last week after being admitted for cellulitis of the left foot associated with chronic osteomyelitis of the left foot.   Patient has been having treatment for diabetic foot ul pressure  • High cholesterol  • Hyperlipidemia LDL goal • Hypertension  • Hypertriglyceridemia  • Neuropathy  FEET  • Obesity (BMI 30-39. 9)  BMI 32  • [facility]  right foot  • PONV (postoperative nausea and vomiting)  • Type 2 diabetes amauri measurable depth, with an area of 2.8 sq cm . Hypergranulation was noted. No tunneling has been noted. No sinus tract has been noted. No undermining has been noted. There is a moderate amount of serous drainage noted which has no odor.  The patient reports biofilm and fibrin. The following instrument(s) were used: curette. Pain control was achieved using 4% Lido. A time out was not conducted prior to the start of the procedure. A minimal amount of bleeding was controlled with pressure.  The procedure was tole

## 2020-10-06 ENCOUNTER — OFFICE VISIT (OUTPATIENT)
Dept: PODIATRY CLINIC | Facility: CLINIC | Age: 50
End: 2020-10-06
Payer: COMMERCIAL

## 2020-10-06 DIAGNOSIS — E11.621 DIABETIC ULCER OF LEFT MIDFOOT ASSOCIATED WITH TYPE 2 DIABETES MELLITUS, WITH OTHER ULCER SEVERITY (HCC): ICD-10-CM

## 2020-10-06 DIAGNOSIS — L97.428 DIABETIC ULCER OF LEFT MIDFOOT ASSOCIATED WITH TYPE 2 DIABETES MELLITUS, WITH OTHER ULCER SEVERITY (HCC): ICD-10-CM

## 2020-10-06 DIAGNOSIS — S91.302D OPEN WOUND OF LEFT FOOT, SUBSEQUENT ENCOUNTER: Primary | ICD-10-CM

## 2020-10-06 DIAGNOSIS — M86.672 OTHER CHRONIC OSTEOMYELITIS OF LEFT FOOT (HCC): ICD-10-CM

## 2020-10-06 PROCEDURE — 99213 OFFICE O/P EST LOW 20 MIN: CPT | Performed by: PODIATRIST

## 2020-10-06 NOTE — PROGRESS NOTES
Fay Gomez is a 53year old male. Patient presents with:  Consult: sx consult, left foot. HPI:     Patient presents today for second opinion sent in by Dr. Geovany Rodriguez for his left foot ulcer. Allergies: Patient has no known allergies.    Sunil Ag mouth daily. • TURMERIC OR Take 1 capsule by mouth daily with dinner.        • Rosuvastatin Calcium 10 MG Oral Tab TAKE 1 TABLET(10 MG) BY MOUTH EVERY NIGHT 90 tablet 3   • gabapentin 300 MG Oral Cap Take 1 capsule (300 mg total) by mouth 2 (two) times education: Not on file      Highest education level: Not on file    Tobacco Use      Smoking status: Former Smoker        Packs/day: 1.50        Years: 26.00        Pack years: 39        Types: Cigarettes      Smokeless tobacco: Never Used      Tobacco com getting get an opinion from Dr. Ann Sandoval at [facility] regarding reconstruction he was seen by a Dr. Gaye Machado here in [place] and they did not want to do any sort of reconstruction with him.   I do think that prior to any sort of reconstruction of the infection

## 2020-10-07 ENCOUNTER — OFFICE VISIT (OUTPATIENT)
Dept: WOUND CARE | Facility: HOSPITAL | Age: 50
End: 2020-10-07
Attending: INTERNAL MEDICINE
Payer: COMMERCIAL

## 2020-10-07 DIAGNOSIS — E11.621 TYPE 2 DIABETES MELLITUS WITH FOOT ULCER (HCC): ICD-10-CM

## 2020-10-07 DIAGNOSIS — L97.526 NON-PRESSURE CHRONIC ULCER OF OTHER PART OF LEFT FOOT WITH BONE INVOLVEMENT WITHOUT EVIDENCE OF NECROSIS (HCC): ICD-10-CM

## 2020-10-07 DIAGNOSIS — I10 ESSENTIAL (PRIMARY) HYPERTENSION: ICD-10-CM

## 2020-10-07 DIAGNOSIS — M86.472 CHRONIC OSTEOMYELITIS WITH DRAINING SINUS, LEFT ANKLE AND FOOT (HCC): Primary | ICD-10-CM

## 2020-10-07 DIAGNOSIS — L97.509 TYPE 2 DIABETES MELLITUS WITH FOOT ULCER (HCC): ICD-10-CM

## 2020-10-07 PROCEDURE — 29445 APPL RIGID TOT CNTC LEG CAST: CPT

## 2020-10-14 ENCOUNTER — OFFICE VISIT (OUTPATIENT)
Dept: WOUND CARE | Facility: HOSPITAL | Age: 50
End: 2020-10-14
Attending: INTERNAL MEDICINE
Payer: COMMERCIAL

## 2020-10-14 DIAGNOSIS — L97.509 TYPE 2 DIABETES MELLITUS WITH FOOT ULCER (HCC): ICD-10-CM

## 2020-10-14 DIAGNOSIS — E11.621 TYPE 2 DIABETES MELLITUS WITH FOOT ULCER (HCC): ICD-10-CM

## 2020-10-14 DIAGNOSIS — L97.526 NON-PRESSURE CHRONIC ULCER OF OTHER PART OF LEFT FOOT WITH BONE INVOLVEMENT WITHOUT EVIDENCE OF NECROSIS (HCC): ICD-10-CM

## 2020-10-14 DIAGNOSIS — M86.472 CHRONIC OSTEOMYELITIS WITH DRAINING SINUS, LEFT ANKLE AND FOOT (HCC): Primary | ICD-10-CM

## 2020-10-14 DIAGNOSIS — I10 ESSENTIAL (PRIMARY) HYPERTENSION: ICD-10-CM

## 2020-10-14 PROCEDURE — 97597 DBRDMT OPN WND 1ST 20 CM/<: CPT

## 2020-10-14 NOTE — PROGRESS NOTES
Chief Complaint  This information was obtained from the patient  Patient is here for a wound care follow up. Patients complain pain around the wound 2/10.  Blister on the lateral of the wound    Allergies  NKA    HPI  This information was obtained from th not swollen. there is localized edema periwound. no s/o infection. Appt with ID thursday. Appt with podiatrist coming up. HBO ongoing - no issues. offloading- 100% with crutches.  Wound vac in place    8/3/20: HYPERBARIC OXYGEN THERAPY CONSULT NOTE:    50- hypertension and hypertriglyceridemia are also being managed well. He has strong dorsalis pedis pulses on the left lower extremity. Last hemoglobin A1c done last month is 7.6.     Past Medical History:  Diagnosis Date  • Depression  • Diabetes ([facility] [address] Wednesday for provider visit.  Jared Claudio RN    Review of Systems (ROS)  This information was obtained from the patient    Complaints and Symptoms    General Notes:  negative except HPI - denies fever / increased pain / periwound redness - denies chest pain cast          Assessment    Active Problems    ICD-10  (Encounter Diagnosis) M86.472 - Chronic osteomyelitis with draining sinus, left ankle and foot  (Encounter Diagnosis) E11.621 - Type 2 diabetes mellitus with foot ulcer  (Encounter Diagnosis) L97.526 - bolster dressings; betadiene and abd pad on lateral pale area            Entered By: Juliano Lang on 10/14/2020 10:38:28

## 2020-10-20 ENCOUNTER — OFFICE VISIT (OUTPATIENT)
Dept: WOUND CARE | Facility: HOSPITAL | Age: 50
End: 2020-10-20
Attending: INTERNAL MEDICINE
Payer: COMMERCIAL

## 2020-10-20 DIAGNOSIS — L97.526 NON-PRESSURE CHRONIC ULCER OF OTHER PART OF LEFT FOOT WITH BONE INVOLVEMENT WITHOUT EVIDENCE OF NECROSIS (HCC): ICD-10-CM

## 2020-10-20 DIAGNOSIS — L97.509 TYPE 2 DIABETES MELLITUS WITH FOOT ULCER (HCC): ICD-10-CM

## 2020-10-20 DIAGNOSIS — E11.621 TYPE 2 DIABETES MELLITUS WITH FOOT ULCER (HCC): ICD-10-CM

## 2020-10-20 DIAGNOSIS — M86.472 CHRONIC OSTEOMYELITIS WITH DRAINING SINUS, LEFT ANKLE AND FOOT (HCC): Primary | ICD-10-CM

## 2020-10-20 DIAGNOSIS — I10 ESSENTIAL (PRIMARY) HYPERTENSION: ICD-10-CM

## 2020-10-20 PROCEDURE — 99213 OFFICE O/P EST LOW 20 MIN: CPT

## 2020-10-21 ENCOUNTER — OFFICE VISIT (OUTPATIENT)
Dept: WOUND CARE | Facility: HOSPITAL | Age: 50
End: 2020-10-21
Attending: INTERNAL MEDICINE
Payer: COMMERCIAL

## 2020-10-21 DIAGNOSIS — E11.621 TYPE 2 DIABETES MELLITUS WITH FOOT ULCER (HCC): ICD-10-CM

## 2020-10-21 DIAGNOSIS — I10 ESSENTIAL (PRIMARY) HYPERTENSION: ICD-10-CM

## 2020-10-21 DIAGNOSIS — M86.472 CHRONIC OSTEOMYELITIS WITH DRAINING SINUS, LEFT ANKLE AND FOOT (HCC): Primary | ICD-10-CM

## 2020-10-21 DIAGNOSIS — L97.526 NON-PRESSURE CHRONIC ULCER OF OTHER PART OF LEFT FOOT WITH BONE INVOLVEMENT WITHOUT EVIDENCE OF NECROSIS (HCC): ICD-10-CM

## 2020-10-21 DIAGNOSIS — L97.509 TYPE 2 DIABETES MELLITUS WITH FOOT ULCER (HCC): ICD-10-CM

## 2020-10-21 PROCEDURE — 29445 APPL RIGID TOT CNTC LEG CAST: CPT

## 2020-10-21 NOTE — PROGRESS NOTES
Chief Complaint  This information was obtained from the patient  Patient is here for a follow up appointment. Patient will have surgery depending on when they can get him scheduled.     Allergies  NKA    HPI  This information was obtained from the patient wound tunnels deep. Will do a vac break this week. edema and periwound redness decreased significantly. Fully offload with crutches.     8/10/20- Wound followup appointment: Wound looks stable - not much improvement - increased maceration +  callus maria isabel care which includes complete offloading of the wound, IV antibiotics, intense blood sugar control, negative pressure wound therapy with collagen dressings on the wound base.   He is under a comprehensive diabetic management plan through his endocrinologist. per week   Drug use: No    Review of Systems (ROS)  This information was obtained from the patient    Complaints and Symptoms    General Notes:  negative except HPI - denies fever / increased pain / periwound redness - denies chest pain / Sob    Additional cast          Assessment    Active Problems    ICD-10  (Encounter Diagnosis) M86.472 - Chronic osteomyelitis with draining sinus, left ankle and foot  (Encounter Diagnosis) E11.621 - Type 2 diabetes mellitus with foot ulcer  (Encounter Diagnosis) L97.526 -

## 2020-10-28 ENCOUNTER — OFFICE VISIT (OUTPATIENT)
Dept: WOUND CARE | Facility: HOSPITAL | Age: 50
End: 2020-10-28
Attending: INTERNAL MEDICINE
Payer: COMMERCIAL

## 2020-10-28 DIAGNOSIS — E11.621 TYPE 2 DIABETES MELLITUS WITH FOOT ULCER (HCC): ICD-10-CM

## 2020-10-28 DIAGNOSIS — M86.472 CHRONIC OSTEOMYELITIS WITH DRAINING SINUS, LEFT ANKLE AND FOOT (HCC): Primary | ICD-10-CM

## 2020-10-28 DIAGNOSIS — I10 ESSENTIAL (PRIMARY) HYPERTENSION: ICD-10-CM

## 2020-10-28 DIAGNOSIS — L97.526 NON-PRESSURE CHRONIC ULCER OF OTHER PART OF LEFT FOOT WITH BONE INVOLVEMENT WITHOUT EVIDENCE OF NECROSIS (HCC): ICD-10-CM

## 2020-10-28 DIAGNOSIS — L97.509 TYPE 2 DIABETES MELLITUS WITH FOOT ULCER (HCC): ICD-10-CM

## 2020-10-28 PROCEDURE — 99214 OFFICE O/P EST MOD 30 MIN: CPT

## 2020-10-28 NOTE — PROGRESS NOTES
Chief Complaint  This information was obtained from the patient  Patient is here for a follow up appointment.  Patients is having a surgery on Nov 11 by Dr Chauncey Boeck at [address] [place] [address]  This information was obtained from the patient    10/28/ placenta based skin substitute - Epifix # 1 today  HBO / Iv antibiotics ongoing. 8/17/20- Wound looks much better overall - but one area of the wound tunnels deep. Will do a vac break this week. edema and periwound redness decreased significantly.   Full surgical interventions like transmetatarsal amputation.     Hyperbaric oxygen therapy is being considered as adjunctive therapy to current plan of care which includes complete offloading of the wound, IV antibiotics, intense blood sugar control, negative pr Cigarettes     Smokeless tobacco: Never Used   Alcohol use: Not Currently     Alcohol/week: 1.0 standard drinks     Types: 1 Standard drinks or equivalent per week   Drug use: No    Review of Systems (ROS)  This information was obtained from the patient Measurement 34 cm from heel  Ankle Measurement 12 cm from heel  Off-Loading:  Left off-loading device in use: Yes  Device used correctly: Yes  Off-Loading Device Used: Contact cast          Assessment    Active Problems    ICD-10  (Encounter Diagnosis) M86

## 2020-10-29 ENCOUNTER — APPOINTMENT (OUTPATIENT)
Dept: WOUND CARE | Facility: HOSPITAL | Age: 50
End: 2020-10-29
Attending: INTERNAL MEDICINE
Payer: COMMERCIAL

## 2020-11-04 ENCOUNTER — OFFICE VISIT (OUTPATIENT)
Dept: WOUND CARE | Facility: HOSPITAL | Age: 50
End: 2020-11-04
Attending: INTERNAL MEDICINE
Payer: COMMERCIAL

## 2020-11-04 DIAGNOSIS — L97.526 NON-PRESSURE CHRONIC ULCER OF OTHER PART OF LEFT FOOT WITH BONE INVOLVEMENT WITHOUT EVIDENCE OF NECROSIS (HCC): ICD-10-CM

## 2020-11-04 DIAGNOSIS — M86.472 CHRONIC OSTEOMYELITIS WITH DRAINING SINUS, LEFT ANKLE AND FOOT (HCC): Primary | ICD-10-CM

## 2020-11-04 DIAGNOSIS — L08.9 INFECTED WOUND: ICD-10-CM

## 2020-11-04 DIAGNOSIS — E11.621 TYPE 2 DIABETES MELLITUS WITH FOOT ULCER (HCC): ICD-10-CM

## 2020-11-04 DIAGNOSIS — T14.8XXA INFECTED WOUND: ICD-10-CM

## 2020-11-04 DIAGNOSIS — L97.509 TYPE 2 DIABETES MELLITUS WITH FOOT ULCER (HCC): ICD-10-CM

## 2020-11-04 DIAGNOSIS — I10 ESSENTIAL (PRIMARY) HYPERTENSION: ICD-10-CM

## 2020-11-04 PROCEDURE — 87070 CULTURE OTHR SPECIMN AEROBIC: CPT

## 2020-11-04 PROCEDURE — 99214 OFFICE O/P EST MOD 30 MIN: CPT

## 2020-11-04 PROCEDURE — 87186 SC STD MICRODIL/AGAR DIL: CPT

## 2020-11-04 PROCEDURE — 87077 CULTURE AEROBIC IDENTIFY: CPT

## 2020-11-04 PROCEDURE — 87205 SMEAR GRAM STAIN: CPT

## 2020-11-04 NOTE — PROGRESS NOTES
Chief Complaint  This information was obtained from the patient  Patient is here for a wound care follow up. He denies any pain or concerns. Allergies  NKA    HPI  This information was obtained from the patient    11/4/20- WOUND CLOSED.   However the b improved - Epifix # 2 today. HBO ongoing. Iv antibiotics ongoing. BS better  8/26/20 - Utilization review for HBO- completed 17 sessions of HBO as of today. Wound seems to be improving - started placenta based skin substitute grafts.   Will continue HBO which included a partial fourth ray amputation with I&D of the infected left foot region. He was discharged from the hospital with improved blood parameters, on IV antibiotics as recommended by infectious disease specialist for 6 weeks.   He was also dis Diabetes Mother     Type 2 DM and had GDM before  • Lipids Mother  • Cancer Mother     breast  • Heart Disorder Mother     atrial fib--resolved after cardioversion  • Cancer Sister     skin  • Other (autistic) Son  MH  Social History   Tobacco Use     Smok tract has been noted. No undermining has been noted. There is a small amount of serous drainage noted which has no odor. The patient reports a wound pain of level 0/10. The wound margin is well defined. Wound bed has % pink, firm granulation.   The pe Cleansing & Dressings:  May shower and/or cleanse wound with mild soap and water. Silver Foam  Change Dressing Every Other Day and As Needed. Follow-Up Appointments:  Discharge from Outpatient Services.     Care summary  Discussed the Plan of Care at be

## 2020-11-09 RX ORDER — GENTAMICIN SULFATE 1 MG/G
1 OINTMENT TOPICAL 3 TIMES DAILY
Qty: 30 G | Refills: 1 | Status: SHIPPED | OUTPATIENT
Start: 2020-11-09 | End: 2020-12-26 | Stop reason: ALTCHOICE

## 2020-11-09 RX ORDER — SULFAMETHOXAZOLE AND TRIMETHOPRIM 800; 160 MG/1; MG/1
1 TABLET ORAL 2 TIMES DAILY
Qty: 14 TABLET | Refills: 0 | Status: SHIPPED | OUTPATIENT
Start: 2020-11-09 | End: 2020-11-16

## 2020-12-04 ENCOUNTER — OFFICE VISIT (OUTPATIENT)
Dept: WOUND CARE | Facility: HOSPITAL | Age: 50
End: 2020-12-04
Attending: INTERNAL MEDICINE
Payer: COMMERCIAL

## 2020-12-04 ENCOUNTER — HOSPITAL ENCOUNTER (OUTPATIENT)
Dept: LAB | Facility: HOSPITAL | Age: 50
Discharge: HOME | End: 2020-12-04
Attending: INTERNAL MEDICINE
Payer: COMMERCIAL

## 2020-12-04 DIAGNOSIS — M86.172 OTHER ACUTE OSTEOMYELITIS, LEFT ANKLE AND FOOT (HCC): ICD-10-CM

## 2020-12-04 DIAGNOSIS — L97.509 TYPE 2 DIABETES MELLITUS WITH FOOT ULCER (HCC): ICD-10-CM

## 2020-12-04 DIAGNOSIS — E11.69 DIABETIC FOOT ULCER WITH OSTEOMYELITIS (HCC): ICD-10-CM

## 2020-12-04 DIAGNOSIS — E11.621 TYPE 2 DIABETES MELLITUS WITH FOOT ULCER (HCC): ICD-10-CM

## 2020-12-04 DIAGNOSIS — M86.9 DIABETIC FOOT ULCER WITH OSTEOMYELITIS (HCC): Primary | ICD-10-CM

## 2020-12-04 DIAGNOSIS — L97.526 NON-PRESSURE CHRONIC ULCER OF OTHER PART OF LEFT FOOT WITH BONE INVOLVEMENT WITHOUT EVIDENCE OF NECROSIS (HCC): ICD-10-CM

## 2020-12-04 DIAGNOSIS — E11.621 DIABETIC FOOT ULCER WITH OSTEOMYELITIS (HCC): Primary | ICD-10-CM

## 2020-12-04 DIAGNOSIS — E11.621 DIABETIC FOOT ULCER WITH OSTEOMYELITIS (HCC): ICD-10-CM

## 2020-12-04 DIAGNOSIS — L97.509 DIABETIC FOOT ULCER WITH OSTEOMYELITIS (HCC): Primary | ICD-10-CM

## 2020-12-04 DIAGNOSIS — M86.9 DIABETIC FOOT ULCER WITH OSTEOMYELITIS (HCC): ICD-10-CM

## 2020-12-04 DIAGNOSIS — L97.509 DIABETIC FOOT ULCER WITH OSTEOMYELITIS (HCC): ICD-10-CM

## 2020-12-04 DIAGNOSIS — E11.69 DIABETIC FOOT ULCER WITH OSTEOMYELITIS (HCC): Primary | ICD-10-CM

## 2020-12-04 DIAGNOSIS — L97.524 NON-PRESSURE CHRONIC ULCER OF OTHER PART OF LEFT FOOT WITH NECROSIS OF BONE (HCC): ICD-10-CM

## 2020-12-04 PROCEDURE — 87070 CULTURE OTHR SPECIMN AEROBIC: CPT

## 2020-12-04 PROCEDURE — 80053 COMPREHEN METABOLIC PANEL: CPT

## 2020-12-04 PROCEDURE — 87186 SC STD MICRODIL/AGAR DIL: CPT

## 2020-12-04 PROCEDURE — 99215 OFFICE O/P EST HI 40 MIN: CPT

## 2020-12-04 PROCEDURE — 87077 CULTURE AEROBIC IDENTIFY: CPT

## 2020-12-04 PROCEDURE — 85652 RBC SED RATE AUTOMATED: CPT

## 2020-12-04 PROCEDURE — 83036 HEMOGLOBIN GLYCOSYLATED A1C: CPT

## 2020-12-04 PROCEDURE — 87205 SMEAR GRAM STAIN: CPT

## 2020-12-04 PROCEDURE — 87176 TISSUE HOMOGENIZATION CULTR: CPT

## 2020-12-04 PROCEDURE — 84134 ASSAY OF PREALBUMIN: CPT

## 2020-12-04 PROCEDURE — 86140 C-REACTIVE PROTEIN: CPT

## 2020-12-04 PROCEDURE — 85025 COMPLETE CBC W/AUTO DIFF WBC: CPT

## 2020-12-04 PROCEDURE — 36415 COLL VENOUS BLD VENIPUNCTURE: CPT

## 2020-12-04 RX ORDER — GENTAMICIN SULFATE 3 MG/ML
SOLUTION/ DROPS OPHTHALMIC
Qty: 15 ML | Refills: 2 | Status: SHIPPED | OUTPATIENT
Start: 2020-12-04 | End: 2020-12-26 | Stop reason: ALTCHOICE

## 2020-12-04 RX ORDER — SULFAMETHOXAZOLE AND TRIMETHOPRIM 800; 160 MG/1; MG/1
1 TABLET ORAL 2 TIMES DAILY
Qty: 14 TABLET | Refills: 0 | Status: SHIPPED | OUTPATIENT
Start: 2020-12-04 | End: 2020-12-11

## 2020-12-04 NOTE — PROGRESS NOTES
Chief Complaint  This information was obtained from the patient  Patient is here for a wound care follow up. Pt had surgery on 11/11 and the wound opened up larger since he was told to weight bear.     Allergies  NKA    HPI  This information was obtained last week. dimensions smaller - tissue granular - edges moving in.  no s/o infection - seen by ID PA- ANGÉLICA all antibiotics for now. Seen by surgeon yesterday - plan on reconstructive surgery of foot - notes reviewed and d/w pt in detail.   10/14/20- Wound m localized edema periwound. no s/o infection. Appt with ID thursday. Appt with podiatrist coming up. HBO ongoing - no issues. offloading- 100% with crutches.  Wound vac in place    8/3/20: HYPERBARIC OXYGEN THERAPY CONSULT NOTE:    21-year-old  mal hypertriglyceridemia are also being managed well. He has strong dorsalis pedis pulses on the left lower extremity. Last hemoglobin A1c done last month is 7.6.     Past Medical History:  Diagnosis Date  • Depression  • Diabetes ([facility] [address]  • Diabetic neurop Notes:  negative except HPI - denies fever / increased pain / periwound redness - denies chest pain / Sob    Additional Information  Medication reconciliation completed at today's visit. : Yes        Objective    Wound Assessment(s)  Wound #3 Left, Lateral Rate: 16 breaths/min, Blood Pressure: 137/81 mmHg, Capillary Blood Glucose: 227 mg/dl.     Lower Extremity Assessment  Edema Assessment:  Left Extremity: Edema is present  Compression Device In Use: No  Calf Measurement 37 cm from heel with left measurement pain level of 0 following the procedure.  Post Debridement Measurements: 3.4cm length x 3.3cm width x 0.7cm depth; with an area of 11.22 sq cm and a volume of 7.854 cubic cm;        Plan    Wound Orders:  Wound #3 Left, Lateral, Plantar Foot    Wound Cleans Ophthalmic Solution          Si drops into affected area topically TID          Dispense:  15 mL          Refill:  2    · MRI FOOT LEFT  · ID CONSULT  · COMPLETE OFFLOADING. · RTC 1 WEEK OR SOONER PRN.        Entered By: Ananya Zambrano on

## 2020-12-11 ENCOUNTER — OFFICE VISIT (OUTPATIENT)
Dept: WOUND CARE | Facility: HOSPITAL | Age: 50
End: 2020-12-11
Attending: INTERNAL MEDICINE
Payer: COMMERCIAL

## 2020-12-11 DIAGNOSIS — I10 ESSENTIAL (PRIMARY) HYPERTENSION: ICD-10-CM

## 2020-12-11 DIAGNOSIS — E11.621 TYPE 2 DIABETES MELLITUS WITH FOOT ULCER (HCC): ICD-10-CM

## 2020-12-11 DIAGNOSIS — M86.172 OTHER ACUTE OSTEOMYELITIS, LEFT ANKLE AND FOOT (HCC): ICD-10-CM

## 2020-12-11 DIAGNOSIS — L97.524 NON-PRESSURE CHRONIC ULCER OF OTHER PART OF LEFT FOOT WITH NECROSIS OF BONE (HCC): Primary | ICD-10-CM

## 2020-12-11 DIAGNOSIS — L97.509 TYPE 2 DIABETES MELLITUS WITH FOOT ULCER (HCC): ICD-10-CM

## 2020-12-11 DIAGNOSIS — L97.526 NON-PRESSURE CHRONIC ULCER OF OTHER PART OF LEFT FOOT WITH BONE INVOLVEMENT WITHOUT EVIDENCE OF NECROSIS (HCC): ICD-10-CM

## 2020-12-11 PROCEDURE — 11042 DBRDMT SUBQ TIS 1ST 20SQCM/<: CPT

## 2020-12-11 RX ORDER — FUROSEMIDE 40 MG/1
40 TABLET ORAL DAILY
Qty: 30 TABLET | Refills: 1 | Status: SHIPPED | OUTPATIENT
Start: 2020-12-11 | End: 2021-01-07 | Stop reason: ALTCHOICE

## 2020-12-11 RX ORDER — SULFAMETHOXAZOLE AND TRIMETHOPRIM 800; 160 MG/1; MG/1
1 TABLET ORAL 2 TIMES DAILY
Qty: 14 TABLET | Refills: 0 | Status: SHIPPED | OUTPATIENT
Start: 2020-12-11 | End: 2020-12-18

## 2020-12-15 ENCOUNTER — HOSPITAL ENCOUNTER (OUTPATIENT)
Dept: MRI IMAGING | Facility: HOSPITAL | Age: 50
Discharge: HOME | End: 2020-12-15
Attending: INTERNAL MEDICINE
Payer: COMMERCIAL

## 2020-12-15 DIAGNOSIS — L97.509 DIABETIC FOOT ULCER WITH OSTEOMYELITIS (HCC): ICD-10-CM

## 2020-12-15 DIAGNOSIS — E11.621 DIABETIC FOOT ULCER WITH OSTEOMYELITIS (HCC): ICD-10-CM

## 2020-12-15 DIAGNOSIS — M86.9 DIABETIC FOOT ULCER WITH OSTEOMYELITIS (HCC): ICD-10-CM

## 2020-12-15 DIAGNOSIS — E11.69 DIABETIC FOOT ULCER WITH OSTEOMYELITIS (HCC): ICD-10-CM

## 2020-12-15 PROCEDURE — 73718 MRI LOWER EXTREMITY W/O DYE: CPT | Performed by: INTERNAL MEDICINE

## 2020-12-17 ENCOUNTER — HOSPITAL ENCOUNTER (OUTPATIENT)
Dept: GENERAL RADIOLOGY | Facility: HOSPITAL | Age: 50
Discharge: HOME | End: 2020-12-17
Attending: PODIATRIST
Payer: COMMERCIAL

## 2020-12-17 ENCOUNTER — OFFICE VISIT (OUTPATIENT)
Dept: PODIATRY CLINIC | Facility: CLINIC | Age: 50
End: 2020-12-17
Payer: COMMERCIAL

## 2020-12-17 DIAGNOSIS — L97.428 DIABETIC ULCER OF LEFT MIDFOOT ASSOCIATED WITH TYPE 2 DIABETES MELLITUS, WITH OTHER ULCER SEVERITY (HCC): ICD-10-CM

## 2020-12-17 DIAGNOSIS — S91.302D OPEN WOUND OF LEFT FOOT, SUBSEQUENT ENCOUNTER: Primary | ICD-10-CM

## 2020-12-17 DIAGNOSIS — E11.621 DIABETIC ULCER OF LEFT MIDFOOT ASSOCIATED WITH TYPE 2 DIABETES MELLITUS, WITH OTHER ULCER SEVERITY (HCC): ICD-10-CM

## 2020-12-17 DIAGNOSIS — S91.302D OPEN WOUND OF LEFT FOOT, SUBSEQUENT ENCOUNTER: ICD-10-CM

## 2020-12-17 PROCEDURE — 73630 X-RAY EXAM OF FOOT: CPT | Performed by: PODIATRIST

## 2020-12-17 PROCEDURE — 99213 OFFICE O/P EST LOW 20 MIN: CPT | Performed by: PODIATRIST

## 2020-12-17 NOTE — PROGRESS NOTES
Martir Herrera is a 53year old male. Patient presents with:  Post-Op: Present for post up, osteomyelitis. SX was 9/11. Recent MRI. C/o on and off pain, swelling, redness and d/c. Pain scale 6-7/10. BS AM was 90.  Last A1C was 12/4, 9.4        HPI:   Denice (MULTI-VITAMIN/MINERALS) Oral Tab Take 1 tablet by mouth daily. • TURMERIC OR Take 1 capsule by mouth daily with dinner.        • Rosuvastatin Calcium 10 MG Oral Tab TAKE 1 TABLET(10 MG) BY MOUTH EVERY NIGHT 90 tablet 3   • CHOLECALCIFEROL 5000 units Or SURGICAL HISTORY Right 12/13/2019    foot surgery   • TOE AMPUTATION Left 6/7/2020    Performed by Michael Lowe DPM at [address] [facility] MAIN OR   • WISDOM TEETH REMOVED        Family History   Problem Relation Age of Onset   • Diabetes Father    • Lipids Father posterior tibial pulses on the left   3. Neurologic: The patient has diminished pedal sensorium   4. 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   He has an open draining ulceration with osteomyelitis of the fifth meta

## 2020-12-18 ENCOUNTER — OFFICE VISIT (OUTPATIENT)
Dept: WOUND CARE | Facility: HOSPITAL | Age: 50
End: 2020-12-18
Attending: INTERNAL MEDICINE
Payer: COMMERCIAL

## 2020-12-18 DIAGNOSIS — M86.172 OTHER ACUTE OSTEOMYELITIS, LEFT ANKLE AND FOOT (HCC): ICD-10-CM

## 2020-12-18 DIAGNOSIS — L97.526 NON-PRESSURE CHRONIC ULCER OF OTHER PART OF LEFT FOOT WITH BONE INVOLVEMENT WITHOUT EVIDENCE OF NECROSIS (HCC): ICD-10-CM

## 2020-12-18 DIAGNOSIS — I10 ESSENTIAL (PRIMARY) HYPERTENSION: ICD-10-CM

## 2020-12-18 DIAGNOSIS — E11.621 TYPE 2 DIABETES MELLITUS WITH FOOT ULCER (HCC): ICD-10-CM

## 2020-12-18 DIAGNOSIS — L97.509 TYPE 2 DIABETES MELLITUS WITH FOOT ULCER (HCC): ICD-10-CM

## 2020-12-18 DIAGNOSIS — L97.524 NON-PRESSURE CHRONIC ULCER OF OTHER PART OF LEFT FOOT WITH NECROSIS OF BONE (HCC): Primary | ICD-10-CM

## 2020-12-18 PROCEDURE — 82962 GLUCOSE BLOOD TEST: CPT

## 2020-12-18 PROCEDURE — 99214 OFFICE O/P EST MOD 30 MIN: CPT

## 2020-12-18 NOTE — PROGRESS NOTES
Chief Complaint  This information was obtained from the patient  Patient is here for a wound care for a left foot. He stated he went to see Dr Deysi Schumacher.  He is waiting for the surgery appointments    Allergies  [facility]  This information was obtained fro up into a full thickness wound. he has not been using the crutches as his original wound had closed. reconstructive Surgery planned for next Wednesday. hence will culture the new wound and treat  wit low threshold.   will discharge from clinic today to car - undermining decreasing. Will start placenta based skin substitute - Epifix # 1 today  HBO / Iv antibiotics ongoing. 8/17/20- Wound looks much better overall - but one area of the wound tunnels deep. Will do a vac break this week.   edema and periwound Consideration is being given for further surgical interventions like transmetatarsal amputation.     Hyperbaric oxygen therapy is being considered as adjunctive therapy to current plan of care which includes complete offloading of the wound, IV antibiotics, 26.00       Pack years: 39       Types: Cigarettes     Smokeless tobacco: Never Used   Alcohol use: Not Currently     Alcohol/week: 1.0 standard drinks     Types: 1 Standard drinks or equivalent per week   Drug use: No    Review of Systems (ROS)  This info The wound margin is undefined. The periwound skin exhibited: Edema, Erythema. The temperature of the periwound skin is Warm. Periwound skin does not exhibit signs or symptoms of infection.  Local Pulse is Normal.  General Notes:  DTI noted    Vitals  Heigh multivitamin  Increase protein into diet  Decrease salt intake  Other: - INTENSE BLOOD SUGAR CONTROL    Care summary  Discussed the Plan of Care at bedside with patient.  The patient verbally acknowledges understanding of all instructions and all questions

## 2020-12-21 ENCOUNTER — TELEPHONE (OUTPATIENT)
Dept: PODIATRY CLINIC | Facility: CLINIC | Age: 50
End: 2020-12-21

## 2020-12-21 DIAGNOSIS — S91.302D OPEN WOUND OF LEFT FOOT, SUBSEQUENT ENCOUNTER: Primary | ICD-10-CM

## 2020-12-21 DIAGNOSIS — E11.40 TYPE 2 DIABETES, UNCONTROLLED, WITH NEUROPATHY (HCC): ICD-10-CM

## 2020-12-21 DIAGNOSIS — E11.621 DIABETIC ULCER OF LEFT MIDFOOT ASSOCIATED WITH TYPE 2 DIABETES MELLITUS, WITH OTHER ULCER SEVERITY (HCC): ICD-10-CM

## 2020-12-21 DIAGNOSIS — L97.428 DIABETIC ULCER OF LEFT MIDFOOT ASSOCIATED WITH TYPE 2 DIABETES MELLITUS, WITH OTHER ULCER SEVERITY (HCC): ICD-10-CM

## 2020-12-21 DIAGNOSIS — L97.521 ULCERATED, FOOT, LEFT, LIMITED TO BREAKDOWN OF SKIN (HCC): ICD-10-CM

## 2020-12-21 DIAGNOSIS — E11.65 TYPE 2 DIABETES, UNCONTROLLED, WITH NEUROPATHY (HCC): ICD-10-CM

## 2020-12-21 DIAGNOSIS — M86.172 ACUTE OSTEOMYELITIS OF METATARSAL BONE OF LEFT FOOT (HCC): ICD-10-CM

## 2020-12-21 NOTE — TELEPHONE ENCOUNTER
Called patient this date and scheduled surgery for 12/30/20 at [facility].  Patient was informed that he would need pre-surgical clearance from his PCP. He understood and message sent to PCP, Perry Perry, this date in [address] [place] [address] Rd. Reviewed pre and post op.  I

## 2020-12-21 NOTE — TELEPHONE ENCOUNTER
Patient was recently seen in clinic calling for update on surgery. Please call at:[phone],thanks.

## 2020-12-21 NOTE — TELEPHONE ENCOUNTER
Procedure: Transmetatarsal amputation left with SHANI  CPT code: 38886 (Achilles tendon lengthening) 58057 (transmetatarsal amputation)  Length of Surgery: 1.5-hour  Any Instruments: Mini power, mini fluoroscopy  Call patient: ASAP  Anesthesia: Gen  Location

## 2020-12-27 ENCOUNTER — LAB ENCOUNTER (OUTPATIENT)
Dept: LAB | Facility: HOSPITAL | Age: 50
End: 2020-12-27
Attending: PODIATRIST
Payer: COMMERCIAL

## 2020-12-27 DIAGNOSIS — Z01.812 PRE-PROCEDURE LAB EXAM: Primary | ICD-10-CM

## 2020-12-29 ENCOUNTER — ANESTHESIA EVENT (OUTPATIENT)
Dept: SURGERY | Facility: HOSPITAL | Age: 50
DRG: 617 | End: 2020-12-29
Payer: COMMERCIAL

## 2020-12-30 ENCOUNTER — HOSPITAL ENCOUNTER (INPATIENT)
Facility: HOSPITAL | Age: 50
LOS: 5 days | Discharge: HOME HEALTH | DRG: 617 | End: 2021-01-04
Attending: PODIATRIST | Admitting: PODIATRIST
Payer: COMMERCIAL

## 2020-12-30 ENCOUNTER — APPOINTMENT (OUTPATIENT)
Dept: GENERAL RADIOLOGY | Facility: HOSPITAL | Age: 50
DRG: 617 | End: 2020-12-30
Attending: PODIATRIST
Payer: COMMERCIAL

## 2020-12-30 ENCOUNTER — ANESTHESIA (OUTPATIENT)
Dept: SURGERY | Facility: HOSPITAL | Age: 50
DRG: 617 | End: 2020-12-30
Payer: COMMERCIAL

## 2020-12-30 DIAGNOSIS — E11.40 TYPE 2 DIABETES, UNCONTROLLED, WITH NEUROPATHY (HCC): ICD-10-CM

## 2020-12-30 DIAGNOSIS — M86.172 ACUTE OSTEOMYELITIS OF METATARSAL BONE OF LEFT FOOT (HCC): ICD-10-CM

## 2020-12-30 DIAGNOSIS — S91.302D OPEN WOUND OF LEFT FOOT, SUBSEQUENT ENCOUNTER: ICD-10-CM

## 2020-12-30 DIAGNOSIS — E11.621 DIABETIC ULCER OF LEFT MIDFOOT ASSOCIATED WITH TYPE 2 DIABETES MELLITUS, WITH OTHER ULCER SEVERITY (HCC): ICD-10-CM

## 2020-12-30 DIAGNOSIS — L97.428 DIABETIC ULCER OF LEFT MIDFOOT ASSOCIATED WITH TYPE 2 DIABETES MELLITUS, WITH OTHER ULCER SEVERITY (HCC): ICD-10-CM

## 2020-12-30 DIAGNOSIS — E11.65 TYPE 2 DIABETES, UNCONTROLLED, WITH NEUROPATHY (HCC): ICD-10-CM

## 2020-12-30 DIAGNOSIS — L97.521 ULCERATED, FOOT, LEFT, LIMITED TO BREAKDOWN OF SKIN (HCC): ICD-10-CM

## 2020-12-30 PROBLEM — S91.302A OPEN WOUND OF LEFT FOOT: Status: ACTIVE | Noted: 2020-12-30

## 2020-12-30 LAB
GLUCOSE BLDC GLUCOMTR-MCNC: 127 MG/DL (ref 70–99)
GLUCOSE BLDC GLUCOMTR-MCNC: 151 MG/DL (ref 70–99)
GLUCOSE BLDC GLUCOMTR-MCNC: 159 MG/DL (ref 70–99)
GLUCOSE BLDC GLUCOMTR-MCNC: 248 MG/DL (ref 70–99)
GLUCOSE BLDC GLUCOMTR-MCNC: 283 MG/DL (ref 70–99)

## 2020-12-30 PROCEDURE — 0Y6N0Z7 DETACHMENT AT LEFT FOOT, COMPLETE 4TH RAY, OPEN APPROACH: ICD-10-PCS | Performed by: PODIATRIST

## 2020-12-30 PROCEDURE — 0QPP04Z REMOVAL OF INTERNAL FIXATION DEVICE FROM LEFT METATARSAL, OPEN APPROACH: ICD-10-PCS | Performed by: PODIATRIST

## 2020-12-30 PROCEDURE — 0HDNXZZ EXTRACTION OF LEFT FOOT SKIN, EXTERNAL APPROACH: ICD-10-PCS | Performed by: PODIATRIST

## 2020-12-30 PROCEDURE — 0Y6N0Z8 DETACHMENT AT LEFT FOOT, COMPLETE 5TH RAY, OPEN APPROACH: ICD-10-PCS | Performed by: PODIATRIST

## 2020-12-30 PROCEDURE — 99233 SBSQ HOSP IP/OBS HIGH 50: CPT | Performed by: INTERNAL MEDICINE

## 2020-12-30 PROCEDURE — 76000 FLUOROSCOPY <1 HR PHYS/QHP: CPT | Performed by: PODIATRIST

## 2020-12-30 PROCEDURE — 0Y6N0Z6 DETACHMENT AT LEFT FOOT, COMPLETE 3RD RAY, OPEN APPROACH: ICD-10-PCS | Performed by: PODIATRIST

## 2020-12-30 PROCEDURE — 28805 AMPUTATION THRU METATARSAL: CPT | Performed by: PODIATRIST

## 2020-12-30 RX ORDER — ROSUVASTATIN CALCIUM 10 MG/1
10 TABLET, COATED ORAL NIGHTLY
Status: DISCONTINUED | OUTPATIENT
Start: 2020-12-30 | End: 2021-01-04

## 2020-12-30 RX ORDER — PROCHLORPERAZINE EDISYLATE 5 MG/ML
5 INJECTION INTRAMUSCULAR; INTRAVENOUS ONCE AS NEEDED
Status: DISCONTINUED | OUTPATIENT
Start: 2020-12-30 | End: 2020-12-30 | Stop reason: HOSPADM

## 2020-12-30 RX ORDER — SODIUM CHLORIDE 0.9 % (FLUSH) 0.9 %
10 SYRINGE (ML) INJECTION AS NEEDED
Status: DISCONTINUED | OUTPATIENT
Start: 2020-12-30 | End: 2021-01-04

## 2020-12-30 RX ORDER — HYDROCODONE BITARTRATE AND ACETAMINOPHEN 5; 325 MG/1; MG/1
2 TABLET ORAL AS NEEDED
Status: DISCONTINUED | OUTPATIENT
Start: 2020-12-30 | End: 2020-12-30 | Stop reason: HOSPADM

## 2020-12-30 RX ORDER — ACETAMINOPHEN 500 MG
1000 TABLET ORAL ONCE
Status: COMPLETED | OUTPATIENT
Start: 2020-12-30 | End: 2020-12-30

## 2020-12-30 RX ORDER — LISINOPRIL 5 MG/1
5 TABLET ORAL NIGHTLY
Status: DISCONTINUED | OUTPATIENT
Start: 2020-12-30 | End: 2021-01-04

## 2020-12-30 RX ORDER — DEXTROSE MONOHYDRATE 25 G/50ML
50 INJECTION, SOLUTION INTRAVENOUS
Status: DISCONTINUED | OUTPATIENT
Start: 2020-12-30 | End: 2020-12-30 | Stop reason: HOSPADM

## 2020-12-30 RX ORDER — ROCURONIUM BROMIDE 10 MG/ML
INJECTION, SOLUTION INTRAVENOUS AS NEEDED
Status: DISCONTINUED | OUTPATIENT
Start: 2020-12-30 | End: 2020-12-30 | Stop reason: SURG

## 2020-12-30 RX ORDER — SODIUM CHLORIDE, SODIUM LACTATE, POTASSIUM CHLORIDE, CALCIUM CHLORIDE 600; 310; 30; 20 MG/100ML; MG/100ML; MG/100ML; MG/100ML
INJECTION, SOLUTION INTRAVENOUS CONTINUOUS
Status: DISCONTINUED | OUTPATIENT
Start: 2020-12-30 | End: 2020-12-30 | Stop reason: HOSPADM

## 2020-12-30 RX ORDER — HYDROCODONE BITARTRATE AND ACETAMINOPHEN 5; 325 MG/1; MG/1
1 TABLET ORAL AS NEEDED
Status: DISCONTINUED | OUTPATIENT
Start: 2020-12-30 | End: 2020-12-30 | Stop reason: HOSPADM

## 2020-12-30 RX ORDER — DIPHENHYDRAMINE HYDROCHLORIDE 50 MG/ML
25 INJECTION INTRAMUSCULAR; INTRAVENOUS
Status: DISCONTINUED | OUTPATIENT
Start: 2020-12-30 | End: 2020-12-30 | Stop reason: HOSPADM

## 2020-12-30 RX ORDER — FAMOTIDINE 20 MG/1
20 TABLET ORAL ONCE
Status: COMPLETED | OUTPATIENT
Start: 2020-12-30 | End: 2020-12-30

## 2020-12-30 RX ORDER — VANCOMYCIN HYDROCHLORIDE
15 EVERY 12 HOURS
Status: DISCONTINUED | OUTPATIENT
Start: 2020-12-30 | End: 2021-01-01

## 2020-12-30 RX ORDER — LIDOCAINE HYDROCHLORIDE 10 MG/ML
INJECTION, SOLUTION EPIDURAL; INFILTRATION; INTRACAUDAL; PERINEURAL AS NEEDED
Status: DISCONTINUED | OUTPATIENT
Start: 2020-12-30 | End: 2020-12-30 | Stop reason: SURG

## 2020-12-30 RX ORDER — GLYCOPYRROLATE 0.2 MG/ML
INJECTION, SOLUTION INTRAMUSCULAR; INTRAVENOUS AS NEEDED
Status: DISCONTINUED | OUTPATIENT
Start: 2020-12-30 | End: 2020-12-30 | Stop reason: SURG

## 2020-12-30 RX ORDER — MIDAZOLAM HYDROCHLORIDE 1 MG/ML
INJECTION INTRAMUSCULAR; INTRAVENOUS AS NEEDED
Status: DISCONTINUED | OUTPATIENT
Start: 2020-12-30 | End: 2020-12-30 | Stop reason: SURG

## 2020-12-30 RX ORDER — HYDROMORPHONE HYDROCHLORIDE 1 MG/ML
0.4 INJECTION, SOLUTION INTRAMUSCULAR; INTRAVENOUS; SUBCUTANEOUS EVERY 5 MIN PRN
Status: DISCONTINUED | OUTPATIENT
Start: 2020-12-30 | End: 2020-12-30 | Stop reason: HOSPADM

## 2020-12-30 RX ORDER — SCOLOPAMINE TRANSDERMAL SYSTEM 1 MG/1
1 PATCH, EXTENDED RELEASE TRANSDERMAL
Status: DISCONTINUED | OUTPATIENT
Start: 2020-12-30 | End: 2021-01-02 | Stop reason: HOSPADM

## 2020-12-30 RX ORDER — FENOFIBRATE 67 MG/1
67 CAPSULE ORAL DAILY
Status: DISCONTINUED | OUTPATIENT
Start: 2020-12-30 | End: 2021-01-04

## 2020-12-30 RX ORDER — ONDANSETRON 2 MG/ML
INJECTION INTRAMUSCULAR; INTRAVENOUS AS NEEDED
Status: DISCONTINUED | OUTPATIENT
Start: 2020-12-30 | End: 2020-12-30 | Stop reason: SURG

## 2020-12-30 RX ORDER — VANCOMYCIN HYDROCHLORIDE
15 ONCE
Status: DISCONTINUED | OUTPATIENT
Start: 2020-12-30 | End: 2020-12-30 | Stop reason: HOSPADM

## 2020-12-30 RX ORDER — GABAPENTIN 300 MG/1
300 CAPSULE ORAL 2 TIMES DAILY
Status: DISCONTINUED | OUTPATIENT
Start: 2020-12-30 | End: 2021-01-04

## 2020-12-30 RX ORDER — ONDANSETRON 2 MG/ML
4 INJECTION INTRAMUSCULAR; INTRAVENOUS EVERY 6 HOURS PRN
Status: ACTIVE | OUTPATIENT
Start: 2020-12-30 | End: 2021-01-01

## 2020-12-30 RX ORDER — MORPHINE SULFATE 10 MG/ML
6 INJECTION, SOLUTION INTRAMUSCULAR; INTRAVENOUS EVERY 10 MIN PRN
Status: DISCONTINUED | OUTPATIENT
Start: 2020-12-30 | End: 2020-12-30 | Stop reason: HOSPADM

## 2020-12-30 RX ORDER — HALOPERIDOL 5 MG/ML
0.25 INJECTION INTRAMUSCULAR ONCE AS NEEDED
Status: DISCONTINUED | OUTPATIENT
Start: 2020-12-30 | End: 2020-12-30 | Stop reason: HOSPADM

## 2020-12-30 RX ORDER — INSULIN ASPART 100 [IU]/ML
INJECTION, SOLUTION INTRAVENOUS; SUBCUTANEOUS ONCE
Status: DISCONTINUED | OUTPATIENT
Start: 2020-12-30 | End: 2020-12-30 | Stop reason: HOSPADM

## 2020-12-30 RX ORDER — HYDROMORPHONE HYDROCHLORIDE 1 MG/ML
0.2 INJECTION, SOLUTION INTRAMUSCULAR; INTRAVENOUS; SUBCUTANEOUS EVERY 5 MIN PRN
Status: DISCONTINUED | OUTPATIENT
Start: 2020-12-30 | End: 2020-12-30 | Stop reason: HOSPADM

## 2020-12-30 RX ORDER — PHENYLEPHRINE HCL 10 MG/ML
VIAL (ML) INJECTION AS NEEDED
Status: DISCONTINUED | OUTPATIENT
Start: 2020-12-30 | End: 2020-12-30 | Stop reason: SURG

## 2020-12-30 RX ORDER — VANCOMYCIN HYDROCHLORIDE 125 MG/1
125 CAPSULE ORAL DAILY
Status: DISCONTINUED | OUTPATIENT
Start: 2020-12-30 | End: 2021-01-04

## 2020-12-30 RX ORDER — NALOXONE HYDROCHLORIDE 0.4 MG/ML
80 INJECTION, SOLUTION INTRAMUSCULAR; INTRAVENOUS; SUBCUTANEOUS AS NEEDED
Status: DISCONTINUED | OUTPATIENT
Start: 2020-12-30 | End: 2020-12-30 | Stop reason: HOSPADM

## 2020-12-30 RX ORDER — NEOSTIGMINE METHYLSULFATE 1 MG/ML
INJECTION INTRAVENOUS AS NEEDED
Status: DISCONTINUED | OUTPATIENT
Start: 2020-12-30 | End: 2020-12-30 | Stop reason: SURG

## 2020-12-30 RX ORDER — SODIUM CHLORIDE, SODIUM LACTATE, POTASSIUM CHLORIDE, CALCIUM CHLORIDE 600; 310; 30; 20 MG/100ML; MG/100ML; MG/100ML; MG/100ML
INJECTION, SOLUTION INTRAVENOUS CONTINUOUS
Status: DISCONTINUED | OUTPATIENT
Start: 2020-12-30 | End: 2021-01-04

## 2020-12-30 RX ORDER — BUPROPION HYDROCHLORIDE 300 MG/1
300 TABLET ORAL DAILY
Status: DISCONTINUED | OUTPATIENT
Start: 2020-12-31 | End: 2021-01-04

## 2020-12-30 RX ORDER — ONDANSETRON 2 MG/ML
4 INJECTION INTRAMUSCULAR; INTRAVENOUS ONCE AS NEEDED
Status: DISCONTINUED | OUTPATIENT
Start: 2020-12-30 | End: 2020-12-30 | Stop reason: HOSPADM

## 2020-12-30 RX ORDER — DEXAMETHASONE SODIUM PHOSPHATE 4 MG/ML
VIAL (ML) INJECTION AS NEEDED
Status: DISCONTINUED | OUTPATIENT
Start: 2020-12-30 | End: 2020-12-30 | Stop reason: SURG

## 2020-12-30 RX ORDER — DEXTROSE MONOHYDRATE 25 G/50ML
50 INJECTION, SOLUTION INTRAVENOUS
Status: DISCONTINUED | OUTPATIENT
Start: 2020-12-30 | End: 2021-01-04

## 2020-12-30 RX ORDER — MORPHINE SULFATE 4 MG/ML
4 INJECTION, SOLUTION INTRAMUSCULAR; INTRAVENOUS EVERY 10 MIN PRN
Status: DISCONTINUED | OUTPATIENT
Start: 2020-12-30 | End: 2020-12-30 | Stop reason: HOSPADM

## 2020-12-30 RX ORDER — HYDROMORPHONE HYDROCHLORIDE 1 MG/ML
0.6 INJECTION, SOLUTION INTRAMUSCULAR; INTRAVENOUS; SUBCUTANEOUS EVERY 5 MIN PRN
Status: DISCONTINUED | OUTPATIENT
Start: 2020-12-30 | End: 2020-12-30 | Stop reason: HOSPADM

## 2020-12-30 RX ORDER — HYDROCODONE BITARTRATE AND ACETAMINOPHEN 5; 325 MG/1; MG/1
1 TABLET ORAL EVERY 4 HOURS PRN
Status: DISCONTINUED | OUTPATIENT
Start: 2020-12-30 | End: 2021-01-04

## 2020-12-30 RX ORDER — HYDROCODONE BITARTRATE AND ACETAMINOPHEN 5; 325 MG/1; MG/1
2 TABLET ORAL EVERY 4 HOURS PRN
Status: DISCONTINUED | OUTPATIENT
Start: 2020-12-30 | End: 2021-01-04

## 2020-12-30 RX ORDER — MORPHINE SULFATE 4 MG/ML
2 INJECTION, SOLUTION INTRAMUSCULAR; INTRAVENOUS EVERY 10 MIN PRN
Status: DISCONTINUED | OUTPATIENT
Start: 2020-12-30 | End: 2020-12-30 | Stop reason: HOSPADM

## 2020-12-30 RX ADMIN — ROCURONIUM BROMIDE 5 MG: 10 INJECTION, SOLUTION INTRAVENOUS at 09:50:00

## 2020-12-30 RX ADMIN — NEOSTIGMINE METHYLSULFATE 1 MG: 1 INJECTION INTRAVENOUS at 10:38:00

## 2020-12-30 RX ADMIN — GLYCOPYRROLATE 0.8 MG: 0.2 INJECTION, SOLUTION INTRAMUSCULAR; INTRAVENOUS at 10:34:00

## 2020-12-30 RX ADMIN — DEXAMETHASONE SODIUM PHOSPHATE 4 MG: 4 MG/ML VIAL (ML) INJECTION at 08:57:00

## 2020-12-30 RX ADMIN — MIDAZOLAM HYDROCHLORIDE 2 MG: 1 INJECTION INTRAMUSCULAR; INTRAVENOUS at 08:43:00

## 2020-12-30 RX ADMIN — ROCURONIUM BROMIDE 50 MG: 10 INJECTION, SOLUTION INTRAVENOUS at 08:49:00

## 2020-12-30 RX ADMIN — ROCURONIUM BROMIDE 10 MG: 10 INJECTION, SOLUTION INTRAVENOUS at 09:18:00

## 2020-12-30 RX ADMIN — ONDANSETRON 4 MG: 2 INJECTION INTRAMUSCULAR; INTRAVENOUS at 10:03:00

## 2020-12-30 RX ADMIN — SODIUM CHLORIDE, SODIUM LACTATE, POTASSIUM CHLORIDE, CALCIUM CHLORIDE: 600; 310; 30; 20 INJECTION, SOLUTION INTRAVENOUS at 09:58:00

## 2020-12-30 RX ADMIN — PHENYLEPHRINE HCL 100 MCG: 10 MG/ML VIAL (ML) INJECTION at 09:03:00

## 2020-12-30 RX ADMIN — NEOSTIGMINE METHYLSULFATE 4 MG: 1 INJECTION INTRAVENOUS at 10:34:00

## 2020-12-30 RX ADMIN — LIDOCAINE HYDROCHLORIDE 50 MG: 10 INJECTION, SOLUTION EPIDURAL; INFILTRATION; INTRACAUDAL; PERINEURAL at 08:48:00

## 2020-12-30 RX ADMIN — GLYCOPYRROLATE 0.2 MG: 0.2 INJECTION, SOLUTION INTRAMUSCULAR; INTRAVENOUS at 10:38:00

## 2020-12-30 RX ADMIN — PHENYLEPHRINE HCL 100 MCG: 10 MG/ML VIAL (ML) INJECTION at 09:16:00

## 2020-12-30 RX ADMIN — PHENYLEPHRINE HCL 100 MCG: 10 MG/ML VIAL (ML) INJECTION at 09:11:00

## 2020-12-30 RX ADMIN — ROCURONIUM BROMIDE 5 MG: 10 INJECTION, SOLUTION INTRAVENOUS at 09:48:00

## 2020-12-30 RX ADMIN — ROCURONIUM BROMIDE 10 MG: 10 INJECTION, SOLUTION INTRAVENOUS at 10:00:00

## 2020-12-30 RX ADMIN — SODIUM CHLORIDE, SODIUM LACTATE, POTASSIUM CHLORIDE, CALCIUM CHLORIDE: 600; 310; 30; 20 INJECTION, SOLUTION INTRAVENOUS at 10:30:00

## 2020-12-30 NOTE — BRIEF OP NOTE
Pre-Operative Diagnosis: Osteomyelitis third fourth and fifth metatarsals secondary to diabetic neuropathy with open wound of foot left lower extremity     Post-Operative Diagnosis: Same     Procedure Performed:   Procedure(s):  transmetatarsal amputation

## 2020-12-30 NOTE — ANESTHESIA POSTPROCEDURE EVALUATION
Patient: Sharyle Emerald    Procedure Summary     Date: 12/30/20 Room / Location: [facility] OR 03 / [facility] OR    Anesthesia Start: 0022 Anesthesia Stop: 7925    Procedure: TOE AMPUTATION (Left Foot) Diagnosis:       Open wound of left foot, subsequent en

## 2020-12-30 NOTE — PROGRESS NOTES
[facility]    Progress Note    Artemio Becker Patient Status:  Inpatient    1970 MRN [id number]   Location [facility] 4W/SW/SE Attending Walter Sauceda DPM   Hosp Day # 0 PCP Pam Feng MD       Subjective:   Patient see Aspart Pen  1-7 Units Subcutaneous TID CC       Current PRN Inpatient Meds:      Normal Saline Flush, ondansetron HCl, glucose **OR** Glucose-Vitamin C **OR** dextrose **OR** glucose **OR** Glucose-Vitamin C    Results:   No results for input(s): RBC, HGB, of the 4th toe as well as the head and neck region of the 4th metatarsal from before as well as postoperative changes about the head and neck region of the 3rd metatarsal.  There appears to be increasing demineralization with some erosive change about the MD                  This note was prepared using [employer] [facility] voice recognition dictation software. As a result errors may occur. When identified these errors have been corrected.  While every attempt is made to correct errors during dictation daphnei

## 2020-12-30 NOTE — CONSULTS
[facility]    Report of [facility] ID Consultation    Leia Nyhan Patient Status:  Inpatient    1970 MRN [id number]   Location [facility] 4W/SW/SE Attending Clarice Larkin DPM   Hosp Day # 0 PCP Willem Sotelo MD     Date of Ad before   • Lipids Mother    • Cancer Mother         breast   • Heart Disorder Mother         atrial fib--resolved after cardioversion   • Cancer Sister         skin   • Other (autistic) Son        Social History  Social History    Tobacco Use      Smoking EVERY NIGHT    •  Sulfamethoxazole-TMP -160 MG Oral Tab per tablet, Take 1 tablet by mouth 2 (two) times daily. •  furosemide 40 MG Oral Tab, Take 1 tablet (40 mg total) by mouth daily.     •  Fenofibrate 54 MG Oral Tab, Take 1 tablet (54 mg total) hematuria  NEUROLOGICAL:  as per HPI, No focal deficits  HEMATOLOGIC:  as per HPI, No anemia,   LYMPHATICS:  as per HPI, Negative  PSYCHIATRIC:  as per HPI, No depression or anxiety.   ENDOCRINOLOGIC:  as per HPI, No reports of sweating, cold or heat intole Antibiotics:  Vancomycin, cefepime 12/30    # Chronic L foot diabetic ulcers with chronic osteomyelitis.   S/p 12/30/20 L TMA  # H/o L 2nd-4th toe amputations  # DM  # HTN  # H/o MRSA, PSAR, enterococcus, S lugdunensis, Ecoli from wound cultures      R

## 2020-12-30 NOTE — ANESTHESIA PREPROCEDURE EVALUATION
Anesthesia PreOp Note    HPI:     No Mathis is a 53year old male who presents for preoperative consultation requested by: Cruzita Babinski, DPM    Date of Surgery: 12/30/2020    Procedure(s):  TOE AMPUTATION  ACHILLES TENDON REPAIR/LENGTHENING  I Ulcerated, foot, left, limited to breakdown of skin [facility])         Date Noted: 07/31/2019      Depression, unspecified depression type         Date Noted: 04/29/2019      Microalbuminuria due to type 2 diabetes mellitus [facility])         Date Noted: 11/19/201 •  ROSUVASTATIN CALCIUM 10 MG Oral Tab, TAKE 1 TABLET(10 MG) BY MOUTH EVERY NIGHT, Disp: 90 tablet, Rfl: 3, 12/29/2020 at Unknown time    •  Sulfamethoxazole-TMP -160 MG Oral Tab per tablet, Take 1 tablet by mouth 2 (two) times daily. , Disp: , Rfl: , •  LANA MICROLET LANCETS Does not apply Misc, Check blood glucoses twice a day, Disp: 200 each, Rfl: 1    •  TRUEPLUS PEN NEEDLES 32G X 4 MM Does not apply Misc, INJECT TWICE DAILY, Disp: 200 each, Rfl: 1    •  FREESTYLE JOSE 14 DAY SENSOR Does not kathryn Types: 1 Standard drinks or equivalent per week      Drug use: No      Sexual activity: Not on file    Lifestyle      Physical activity        Days per week: Not on file        Minutes per session: Not on file      Stress: Not on file    Relationship CREATSERUM 1.36 (H) 12/23/2020    GLU 89 12/23/2020    PGLU 159 (H) 12/30/2020    PGLU 247 (H) 12/18/2020    CA 10.4 (H) 12/23/2020          Vital Signs: Body mass index is 33.75 kg/m². height is 1.905 m (6' 3\") and weight is 122.5 kg (270 lb).  His or I have informed Kisha Whitley and/or legal guardian or family member of the nature of the anesthetic plan, benefits, risks including possible dental damage if relevant, major complications, and any alternative forms of anesthetic management.    All of th

## 2020-12-30 NOTE — PROGRESS NOTES
[facility] Dosing Service    Initial Pharmacokinetic Consult for Vancomycin Dosing     Chris Lora is a 53year old patient who is being treated for diabetic foot.   Pharmacy has been asked to dose Vancomycin by Dr. Tenisha Stephenson:  Patient acosta

## 2020-12-30 NOTE — PLAN OF CARE
Pt A&Ox4. VSS. CMS intact, but more decreased, has good sensation RA.  IS.  SCD on right leg. Gas (+). Up with crutches and non-weight bearing on left leg.  Dressing changed with 4x4, kerlix, and ace wrap, compression is in line and dressing is clean dry falls.  - [place] fall precautions as indicated by assessment.  - Educate pt/family on patient safety including physical limitations  - Instruct pt to call for assistance with activity based on assessment  - Modify environment to reduce risk of injury  - INTERVENTIONS:  - Support and protect limb and body alignment per provider's orders  - Instruct and reinforce with patient and family use of appropriate assistive device and precautions (e.g. spinal or hip dislocation precautions)  Outcome: Progressing

## 2020-12-30 NOTE — ANESTHESIA PROCEDURE NOTES
Airway  Urgency: Elective      General Information and Staff    Patient location during procedure: OR  Anesthesiologist: Milton Fan MD  Resident/CRNA: Magdalena Martini CRNA  Performed: CRNA     Indications and Patient Condition  Indications for airwa

## 2020-12-31 LAB
ANION GAP SERPL CALC-SCNC: 6 MMOL/L (ref 0–18)
BASOPHILS # BLD AUTO: 0.03 X10(3) UL (ref 0–0.2)
BASOPHILS NFR BLD AUTO: 0.3 %
BUN BLD-MCNC: 21 MG/DL (ref 7–18)
BUN/CREAT SERPL: 18.1 (ref 10–20)
CALCIUM BLD-MCNC: 9.7 MG/DL (ref 8.5–10.1)
CHLORIDE SERPL-SCNC: 104 MMOL/L (ref 98–112)
CO2 SERPL-SCNC: 26 MMOL/L (ref 21–32)
CREAT BLD-MCNC: 1.16 MG/DL
DEPRECATED RDW RBC AUTO: 37.6 FL (ref 35.1–46.3)
EOSINOPHIL # BLD AUTO: 0.1 X10(3) UL (ref 0–0.7)
EOSINOPHIL NFR BLD AUTO: 0.9 %
ERYTHROCYTE [DISTWIDTH] IN BLOOD BY AUTOMATED COUNT: 12.4 % (ref 11–15)
GLUCOSE BLD-MCNC: 166 MG/DL (ref 70–99)
GLUCOSE BLDC GLUCOMTR-MCNC: 142 MG/DL (ref 70–99)
GLUCOSE BLDC GLUCOMTR-MCNC: 230 MG/DL (ref 70–99)
GLUCOSE BLDC GLUCOMTR-MCNC: 242 MG/DL (ref 70–99)
GLUCOSE BLDC GLUCOMTR-MCNC: 294 MG/DL (ref 70–99)
HCT VFR BLD AUTO: 35.2 %
HGB BLD-MCNC: 11.6 G/DL
IMM GRANULOCYTES # BLD AUTO: 0.04 X10(3) UL (ref 0–1)
IMM GRANULOCYTES NFR BLD: 0.4 %
LYMPHOCYTES # BLD AUTO: 1.39 X10(3) UL (ref 1–4)
LYMPHOCYTES NFR BLD AUTO: 12.8 %
MCH RBC QN AUTO: 27.3 PG (ref 26–34)
MCHC RBC AUTO-ENTMCNC: 33 G/DL (ref 31–37)
MCV RBC AUTO: 82.8 FL
MONOCYTES # BLD AUTO: 1.01 X10(3) UL (ref 0.1–1)
MONOCYTES NFR BLD AUTO: 9.3 %
NEUTROPHILS # BLD AUTO: 8.31 X10 (3) UL (ref 1.5–7.7)
NEUTROPHILS # BLD AUTO: 8.31 X10(3) UL (ref 1.5–7.7)
NEUTROPHILS NFR BLD AUTO: 76.3 %
OSMOLALITY SERPL CALC.SUM OF ELEC: 289 MOSM/KG (ref 275–295)
PLATELET # BLD AUTO: 390 10(3)UL (ref 150–450)
POTASSIUM SERPL-SCNC: 4 MMOL/L (ref 3.5–5.1)
RBC # BLD AUTO: 4.25 X10(6)UL
SODIUM SERPL-SCNC: 136 MMOL/L (ref 136–145)
WBC # BLD AUTO: 10.9 X10(3) UL (ref 4–11)

## 2020-12-31 RX ORDER — HEPARIN SODIUM 5000 [USP'U]/ML
5000 INJECTION, SOLUTION INTRAVENOUS; SUBCUTANEOUS EVERY 12 HOURS SCHEDULED
Status: DISCONTINUED | OUTPATIENT
Start: 2020-12-31 | End: 2021-01-04

## 2020-12-31 NOTE — PROGRESS NOTES
12/31/20  1242   BP: 130/78   Pulse: 78   Resp: 16   Temp: 97.9 °F (36.6 °C)   Patient seen resting comfortably in bed no pain no fever no chills    Objective: Initial culture results showing Enterococcus species.   Waiting for further delineation Path rep

## 2020-12-31 NOTE — PROGRESS NOTES
[facility]ECTIOUS DISEASE PROGRESS NOTE  [facility]    Chris Lora Patient Status:  Inpatient    1970 MRN [id number]   Location [facility] 4W/SW/SE Attending Vanessa Altman MD   Hosp Day # 1 PC Diabetic ulcer of left foot associated with other specified diabetes mellitus, unspecified part of foot, unspecified ulcer stage ([address]     Diabetic foot ulcer with osteomyelitis ([address]     Acquired cavovarus foot deformity, left     Status post amputatio

## 2020-12-31 NOTE — PROGRESS NOTES
[facility] Hospitalist Progress Note     CC: Hospital Follow up    PCP: Pam Feng MD       Assessment/Plan:     Principal Problem:    Open wound of left foot  Active Problems:    Hyperlipidemia LDL goal <100    Type 2 diabetes, uncontrolled, with neuropathy ([address] CN intact, sensory intact  Psych: Affect- normal  SKIN: warm, dry  EXT: no edema      Data Review:       Labs:     Recent Labs   Lab 12/31/20  0705   RBC 4.25*   HGB 11.6*   HCT 35.2*   MCV 82.8   MCH 27.3   MCHC 33.0   RDW 12.4   NEPRELIM 8.31*   WBC 10.9

## 2020-12-31 NOTE — OPERATIVE REPORT
[facility]    PATIENT'S NAME: Steve Cardona   ATTENDING PHYSICIAN: Tolu Coburn. GWYN Subramanian   OPERATING PHYSICIAN: Tolu Coburn.  GWYN Subramanian   PATIENT ACCOUNT#:   [de-identified]    LOCATION:  [facility] PACU 2 [facility]  MEDICAL RECORD #:   [id number] metatarsal head with exposed bone, necrotic tissue, malodor. The entire foot was erythematous, edematous. When brought into the operating room, that dissipated with the end of the surgery. There was no deep plantar space abscess.   Prior to surgery, it w tissue or abscess formation. None was noted except in the area of the ulceration by the fifth metatarsal head. That was completely resected with the forefoot.   The plate was exposed and a linear incision was made following the first metatarsal and the pl

## 2020-12-31 NOTE — PLAN OF CARE
Diana Gandhi states he is only having minimal to no pain this evening, declined the need for norco at this time. Will continue to monitor. CMS remains unchanged, stated he did have \"1 episode of phantom limb\" this evening. Left foot dressing remains CDI.  Up with injury  - Provide assistive devices as appropriate  - Consider OT/PT consult to assist with strengthening/mobility  - Encourage toileting schedule  Outcome: Progressing     Problem: DISCHARGE PLANNING  Goal: Discharge to home or other facility with appropr Progressing     Problem: SKIN/TISSUE INTEGRITY - ADULT  Goal: Skin integrity remains intact  Description: INTERVENTIONS  - Assess and document risk factors for pressure ulcer development  - Assess and document skin integrity  - Monitor for areas of redness

## 2020-12-31 NOTE — PAYOR COMM NOTE
--------------  ADMISSION REVIEW     Payor: SAEID FOX  Subscriber #:  [id number]  Authorization Number: [id number]    Admit date: 12/30/20  Admit time: 18       Admitting Physician: Zulay Garcia DPM  Attending Physician:  MD JAMIR Ramirez courses including currently. Now admitted for L TMA done this am,  ID consulted.         Past Medical History:   Diagnosis Date   • Diabetes ([facility] [address]     • Diabetic neuropathy ([facility] [address]     • High blood pressure     • High cholesterol     • Hyperlipidemia LDL goa Megan Davidson, RN      Cefepime HCl (MAXIPIME) 1 g in sodium chloride 0.9% 100 mL MBP/add-vantage     Date Action Dose Route User    12/31/2020 0533 New Bag 1 g [facility]ous [facility]    12/30/2020 7816 New Bag 1 g Intravenous [facility] [address] [facility] Andrey Mota, RN      lidocaine PF (XYLOCAINE) 1% injection     Date Action Dose Route User    12/30/2020 0848 Given 50 mg Intravenous Jessie Rivera CRNA      lisinopril tab 5 mg     Date Action Dose Route User    12/30/2020 2011 Given 5 mg Oral Date Action Dose Route User    12/30/2020 9612 Patch Applied 1 patch Transdermal (Behind Right Ear) Apple Davidson, RN      Thrombin-Recombinant (RECOTHROM) 5000 units powder     Date Action Dose Route User    12/30/2020 9297 Given 5,000 Units Topic

## 2020-12-31 NOTE — PLAN OF CARE
Problem: Patient Centered Care  Goal: Patient preferences are identified and integrated in the patient's plan of care  Description: Interventions:  - What would you like us to know as we care for you?  To keep me and my family updated   - Provide timely, strengthening/mobility  - Encourage toileting schedule  Outcome: Progressing  Patient has fall precautions in place. Patient has call light within reach. Patient will call for assistance.  Patient is otherwise stable at this time     Problem: DISCHARGE PLAN INTERVENTIONS:  - Support and protect limb and body alignment per provider's orders  - Instruct and reinforce with patient and family use of appropriate assistive device and precautions (e.g. spinal or hip dislocation precautions)  Outcome: Progressing  Se Progressing  Patient blood glucose was stable this am. Patient stated that he takes a long acting insulin at home which has yet to be restarted here. Will Md know during rounds      Patient is alert and oriented x4. Patient is voiding.  Patient is passing g

## 2021-01-01 LAB
ANION GAP SERPL CALC-SCNC: 4 MMOL/L (ref 0–18)
BASOPHILS # BLD AUTO: 0.06 X10(3) UL (ref 0–0.2)
BASOPHILS NFR BLD AUTO: 0.7 %
BUN BLD-MCNC: 21 MG/DL (ref 7–18)
BUN/CREAT SERPL: 17.8 (ref 10–20)
CALCIUM BLD-MCNC: 10 MG/DL (ref 8.5–10.1)
CHLORIDE SERPL-SCNC: 103 MMOL/L (ref 98–112)
CO2 SERPL-SCNC: 30 MMOL/L (ref 21–32)
CREAT BLD-MCNC: 1.18 MG/DL
DEPRECATED RDW RBC AUTO: 37.6 FL (ref 35.1–46.3)
EOSINOPHIL # BLD AUTO: 0.18 X10(3) UL (ref 0–0.7)
EOSINOPHIL NFR BLD AUTO: 2.1 %
ERYTHROCYTE [DISTWIDTH] IN BLOOD BY AUTOMATED COUNT: 12.3 % (ref 11–15)
GLUCOSE BLD-MCNC: 170 MG/DL (ref 70–99)
GLUCOSE BLDC GLUCOMTR-MCNC: 174 MG/DL (ref 70–99)
GLUCOSE BLDC GLUCOMTR-MCNC: 182 MG/DL (ref 70–99)
GLUCOSE BLDC GLUCOMTR-MCNC: 240 MG/DL (ref 70–99)
GLUCOSE BLDC GLUCOMTR-MCNC: 249 MG/DL (ref 70–99)
HCT VFR BLD AUTO: 37.5 %
HGB BLD-MCNC: 12.2 G/DL
IMM GRANULOCYTES # BLD AUTO: 0.04 X10(3) UL (ref 0–1)
IMM GRANULOCYTES NFR BLD: 0.5 %
LYMPHOCYTES # BLD AUTO: 1.61 X10(3) UL (ref 1–4)
LYMPHOCYTES NFR BLD AUTO: 18.5 %
MCH RBC QN AUTO: 27.1 PG (ref 26–34)
MCHC RBC AUTO-ENTMCNC: 32.5 G/DL (ref 31–37)
MCV RBC AUTO: 83.3 FL
MONOCYTES # BLD AUTO: 0.85 X10(3) UL (ref 0.1–1)
MONOCYTES NFR BLD AUTO: 9.8 %
NEUTROPHILS # BLD AUTO: 5.96 X10 (3) UL (ref 1.5–7.7)
NEUTROPHILS # BLD AUTO: 5.96 X10(3) UL (ref 1.5–7.7)
NEUTROPHILS NFR BLD AUTO: 68.4 %
OSMOLALITY SERPL CALC.SUM OF ELEC: 291 MOSM/KG (ref 275–295)
PLATELET # BLD AUTO: 355 10(3)UL (ref 150–450)
POTASSIUM SERPL-SCNC: 4.4 MMOL/L (ref 3.5–5.1)
RBC # BLD AUTO: 4.5 X10(6)UL
SODIUM SERPL-SCNC: 137 MMOL/L (ref 136–145)
VANCOMYCIN TROUGH SERPL-MCNC: 8.2 UG/ML (ref 10–20)
WBC # BLD AUTO: 8.7 X10(3) UL (ref 4–11)

## 2021-01-01 RX ORDER — VANCOMYCIN 2 GRAM/500 ML IN 0.9 % SODIUM CHLORIDE INTRAVENOUS
2000 EVERY 12 HOURS
Status: DISCONTINUED | OUTPATIENT
Start: 2021-01-01 | End: 2021-01-04

## 2021-01-01 RX ORDER — HYDROCODONE BITARTRATE AND ACETAMINOPHEN 5; 325 MG/1; MG/1
1 TABLET ORAL EVERY 8 HOURS PRN
Qty: 15 TABLET | Refills: 0 | Status: SHIPPED | OUTPATIENT
Start: 2021-01-01 | End: 2021-01-06

## 2021-01-01 NOTE — PLAN OF CARE
Problem: Patient Centered Care  Goal: Patient preferences are identified and integrated in the patient's plan of care  Description: Interventions:  - What would you like us to know as we care for you?   - Provide timely, complete, and accurate informatio Progressing     Problem: DISCHARGE PLANNING  Goal: Discharge to home or other facility with appropriate resources  Description: INTERVENTIONS:  - Identify barriers to discharge w/pt and caregiver  - Include patient/family/discharge partner in discharge omaira pressure ulcer development  - Assess and document skin integrity  - Monitor for areas of redness and/or skin breakdown  - Initiate interventions, skin care algorithm/standards of care as needed  Outcome: Progressing  Goal: Incision(s), wounds(s) or drain s

## 2021-01-01 NOTE — PLAN OF CARE
VSS. Dressing changed via MD. Wound was bleeding, MD put multiple gauze and ABD's with ace wrap as pressure dressing. Will continue to monitor for signs of bleeding. IV ABX, accuchecks. Sub q levemir vial in room, 100 units given with 3 insulin needles.  Pt patient safety including physical limitations  - Instruct pt to call for assistance with activity based on assessment  - Modify environment to reduce risk of injury  - Provide assistive devices as appropriate  - Consider OT/PT consult to assist with streng Instruct and reinforce with patient and family use of appropriate assistive device and precautions (e.g. spinal or hip dislocation precautions)  Outcome: Progressing     Problem: SKIN/TISSUE INTEGRITY - ADULT  Goal: Skin integrity remains intact  Jacques Novak

## 2021-01-01 NOTE — PROGRESS NOTES
[facility] dosing service    Follow-up Pharmacokinetic Consult for Vancomycin Dosing     Fermin Conde is a 53year old patient who is being treated for diabetic foot.    Patient is on day 2 of Vancomycin and is currently receiving 1.5 gm IV Q 12 hours Vancomycin trough levels prior to 4th dose. Goal trough level 15-20 ug/mL. 3.  Pharmacy will need Scr daily while on Vancomycin to assess renal function. 4.  Pharmacy will follow and adjust as necessary.     We appreciate the opportunity to assist in

## 2021-01-01 NOTE — PROGRESS NOTES
[facility] Hospitalist Progress Note     CC: Hospital Follow up    PCP: Ronald Mills MD       Assessment/Plan:     Principal Problem:    Open wound of left foot  Active Problems:    Hyperlipidemia LDL goal <100    Type 2 diabetes, uncontrolled, with neuropathy ([address] normal, CN intact, sensory intact  Psych: Affect- normal  SKIN: warm, dry  EXT: no edema      Data Review:       Labs:     Recent Labs   Lab 12/31/20  0705 01/01/21  0648   RBC 4.25* 4.50   HGB 11.6* 12.2*   HCT 35.2* 37.5*   MCV 82.8 83.3   MCH 27.3 27.1

## 2021-01-01 NOTE — PROGRESS NOTES
01/01/21  0800   BP: 128/75   Pulse: 78   Resp: 16   Temp: 97.6 °F (36.4 °C)   Patient was seen resting comfortably in bed he has no complaints small strikethrough on his current dressing.     Objective: Dressing was changed some maceration at the dorsal a

## 2021-01-02 LAB
ANION GAP SERPL CALC-SCNC: 6 MMOL/L (ref 0–18)
BASOPHILS # BLD AUTO: 0.06 X10(3) UL (ref 0–0.2)
BASOPHILS NFR BLD AUTO: 0.8 %
BUN BLD-MCNC: 18 MG/DL (ref 7–18)
BUN/CREAT SERPL: 17.3 (ref 10–20)
CALCIUM BLD-MCNC: 10 MG/DL (ref 8.5–10.1)
CHLORIDE SERPL-SCNC: 102 MMOL/L (ref 98–112)
CO2 SERPL-SCNC: 30 MMOL/L (ref 21–32)
CREAT BLD-MCNC: 1.04 MG/DL
DEPRECATED RDW RBC AUTO: 37.2 FL (ref 35.1–46.3)
EOSINOPHIL # BLD AUTO: 0.21 X10(3) UL (ref 0–0.7)
EOSINOPHIL NFR BLD AUTO: 2.7 %
ERYTHROCYTE [DISTWIDTH] IN BLOOD BY AUTOMATED COUNT: 12.3 % (ref 11–15)
GLUCOSE BLD-MCNC: 105 MG/DL (ref 70–99)
GLUCOSE BLDC GLUCOMTR-MCNC: 117 MG/DL (ref 70–99)
GLUCOSE BLDC GLUCOMTR-MCNC: 195 MG/DL (ref 70–99)
GLUCOSE BLDC GLUCOMTR-MCNC: 204 MG/DL (ref 70–99)
GLUCOSE BLDC GLUCOMTR-MCNC: 239 MG/DL (ref 70–99)
HCT VFR BLD AUTO: 38.7 %
HGB BLD-MCNC: 12.5 G/DL
IMM GRANULOCYTES # BLD AUTO: 0.07 X10(3) UL (ref 0–1)
IMM GRANULOCYTES NFR BLD: 0.9 %
LYMPHOCYTES # BLD AUTO: 1.41 X10(3) UL (ref 1–4)
LYMPHOCYTES NFR BLD AUTO: 18.1 %
MCH RBC QN AUTO: 26.9 PG (ref 26–34)
MCHC RBC AUTO-ENTMCNC: 32.3 G/DL (ref 31–37)
MCV RBC AUTO: 83.2 FL
MONOCYTES # BLD AUTO: 0.72 X10(3) UL (ref 0.1–1)
MONOCYTES NFR BLD AUTO: 9.3 %
NEUTROPHILS # BLD AUTO: 5.31 X10 (3) UL (ref 1.5–7.7)
NEUTROPHILS # BLD AUTO: 5.31 X10(3) UL (ref 1.5–7.7)
NEUTROPHILS NFR BLD AUTO: 68.2 %
OSMOLALITY SERPL CALC.SUM OF ELEC: 288 MOSM/KG (ref 275–295)
PLATELET # BLD AUTO: 437 10(3)UL (ref 150–450)
POTASSIUM SERPL-SCNC: 3.9 MMOL/L (ref 3.5–5.1)
RBC # BLD AUTO: 4.65 X10(6)UL
SODIUM SERPL-SCNC: 138 MMOL/L (ref 136–145)
VANCOMYCIN TROUGH SERPL-MCNC: 11.5 UG/ML (ref 10–20)
WBC # BLD AUTO: 7.8 X10(3) UL (ref 4–11)

## 2021-01-02 NOTE — PROGRESS NOTES
INFECTIOUS DISEASE PROGRESS NOTE  [facility]    Riccardo Montero Patient Status:  Inpatient    1970 MRN [id number]   Location [facility] 4W/SW/SE Attending Ellie Moore MD   Hosp Day # 3 PC Sx 12/13/19, Rt Realign Subtalar TN Arthrodesis, 2/3/4 MTP Capsulotomies Claw Toe Correction GTT, Post Tib Tendon Lengthen, Plantar Fascia Release, SHANI, EDL Trans to PT, I&D w/ Dr. Prachi Valverde 3/12/20     Diabetic ulcer of left foot associated with othe

## 2021-01-02 NOTE — PROGRESS NOTES
[facility] Hospitalist Progress Note     CC: Hospital Follow up    PCP: Jose Elias Kaufman MD       Assessment/Plan:     Principal Problem:    Open wound of left foot  Active Problems:    Hyperlipidemia LDL goal <100    Type 2 diabetes, uncontrolled, with neuropathy ([address] normal, CN intact, sensory intact  Psych: Affect- normal  SKIN: warm, dry  EXT: no edema      Data Review:       Labs:     Recent Labs   Lab 12/31/20  0705 01/01/21  0648 01/02/21  0821   RBC 4.25* 4.50 4.65   HGB 11.6* 12.2* 12.5*   HCT 35.2* 37.5* 38.7*

## 2021-01-02 NOTE — PROGRESS NOTES
01/02/21  0420   BP: 121/79   Pulse: 79   Resp: 16   Temp: 98.4 °F (36.9 °C)   Patient is resting comfortably in bed he has no complaints of pain fever or chills.     Objective: Pathology not back yet no changes in cultures dressing change was performed bl

## 2021-01-03 LAB
GLUCOSE BLDC GLUCOMTR-MCNC: 151 MG/DL (ref 70–99)
GLUCOSE BLDC GLUCOMTR-MCNC: 219 MG/DL (ref 70–99)
GLUCOSE BLDC GLUCOMTR-MCNC: 239 MG/DL (ref 70–99)
GLUCOSE BLDC GLUCOMTR-MCNC: 254 MG/DL (ref 70–99)

## 2021-01-03 NOTE — PLAN OF CARE
Hiren Tilley is resting comfortably in bed this evening, denies any pain. Up with standby assist and crutches, maintaining NWB to the left leg. Bed is locked and in the lowest position with side rails up x2. Nonskid socks and fall risk band in place.  Call light an injury  - Provide assistive devices as appropriate  - Consider OT/PT consult to assist with strengthening/mobility  - Encourage toileting schedule  Outcome: Progressing     Problem: DISCHARGE PLANNING  Goal: Discharge to home or other facility with appropr Progressing     Problem: SKIN/TISSUE INTEGRITY - ADULT  Goal: Skin integrity remains intact  Description: INTERVENTIONS  - Assess and document risk factors for pressure ulcer development  - Assess and document skin integrity  - Monitor for areas of redness

## 2021-01-03 NOTE — PLAN OF CARE
Problem: Patient Centered Care  Goal: Patient preferences are identified and integrated in the patient's plan of care  Description: Interventions:  - What would you like us to know as we care for you  - Provide timely, complete, and accurate information Progressing     Problem: DISCHARGE PLANNING  Goal: Discharge to home or other facility with appropriate resources  Description: INTERVENTIONS:  - Identify barriers to discharge w/pt and caregiver  - Include patient/family/discharge partner in discharge omaira pressure ulcer development  - Assess and document skin integrity  - Monitor for areas of redness and/or skin breakdown  - Initiate interventions, skin care algorithm/standards of care as needed  Outcome: Progressing  Goal: Incision(s), wounds(s) or drain s

## 2021-01-03 NOTE — PROGRESS NOTES
[facility] dosing service    Follow-up Pharmacokinetic Consult for Vancomycin Dosing     Jolie Hahn is a 53year old patient who is being treated for osteomyelitis. Patient is on day 3 of Vancomycin and is currently receiving 2 gm IV Q 12 hours. pharmacokinetics, adjusted body weight of 99 kg and renal function)    2. Will re-check Vancomycin trough levels in 5-7 days. Goal trough level 15-20 ug/mL. 3.  Pharmacy will need Scr daily while on Vancomycin to assess renal function.     4.  Pharmacy

## 2021-01-04 ENCOUNTER — APPOINTMENT (OUTPATIENT)
Dept: PICC SERVICES | Facility: HOSPITAL | Age: 51
DRG: 617 | End: 2021-01-04
Attending: PHYSICIAN ASSISTANT
Payer: COMMERCIAL

## 2021-01-04 VITALS
BODY MASS INDEX: 33.57 KG/M2 | RESPIRATION RATE: 16 BRPM | DIASTOLIC BLOOD PRESSURE: 82 MMHG | OXYGEN SATURATION: 100 % | HEIGHT: 75 IN | HEART RATE: 85 BPM | WEIGHT: 270 LBS | SYSTOLIC BLOOD PRESSURE: 126 MMHG | TEMPERATURE: 98 F

## 2021-01-04 LAB
CK SERPL-CCNC: 173 U/L
GLUCOSE BLDC GLUCOMTR-MCNC: 154 MG/DL (ref 70–99)
GLUCOSE BLDC GLUCOMTR-MCNC: 207 MG/DL (ref 70–99)

## 2021-01-04 PROCEDURE — 02HV33Z INSERTION OF INFUSION DEVICE INTO SUPERIOR VENA CAVA, PERCUTANEOUS APPROACH: ICD-10-PCS | Performed by: INTERNAL MEDICINE

## 2021-01-04 NOTE — CM/SW NOTE
SW self referred to pt's chart due to RN notifying that pt is getting a PICC line for IV Abx. SW performed chart review and pt has hx with [facility] and [facility]. SW spoke with [facility] who states they cannot accept.  Pt confirms address and living situati

## 2021-01-04 NOTE — PROGRESS NOTES
[facility] DISEASE PROGR[facility]  [facility]    Tammy Root Patient Status:  Inpatient    1970 MRN [id number]   Location [facility] 4W/SW/SE Attending Diane Larios MD   Hosp Day # 5 PC Diabetic ulcer of left foot associated with other specified diabetes mellitus, unspecified part of foot, unspecified ulcer stage ([address]     Diabetic foot ulcer with osteomyelitis ([address]     Acquired cavovarus foot deformity, left     Status post amputatio

## 2021-01-04 NOTE — PROGRESS NOTES
[facility] Hospitalist Progress Note     CC: Hospital Follow up    PCP: Chrissy Mckeon MD       Assessment/Plan:     Principal Problem:    Open wound of left foot  Active Problems:    Hyperlipidemia LDL goal <100    Type 2 diabetes, uncontrolled, with neuropathy ([address] pulses  ABD: Soft, non-tender, non-distended, +BS  MSK: strength 5/5 in all extremities  Neuro: Grossly normal, CN intact, sensory intact  Psych: Affect- normal  SKIN: warm, dry  EXT: no edema      Data Review:       Labs:     Recent Labs   Lab 12/31/20  0

## 2021-01-04 NOTE — PLAN OF CARE
Problem: Patient Centered Care  Goal: Patient preferences are identified and integrated in the patient's plan of care  Description: Interventions:  - What would you like us to know as we care for you?  I have a high pain tolerence  - Provide timely, compl consult to assist with strengthening/mobility  - Encourage toileting schedule  Outcome: Progressing  Note: Reviewed safety plan with Betty Demarco.  Room close to nurses station     Problem: DISCHARGE PLANNING  Goal: Discharge to home or other facility with ronal device and precautions (e.g. spinal or hip dislocation precautions)  Outcome: Progressing  Note: Left leg elevated while in bed.       Problem: SKIN/TISSUE INTEGRITY - ADULT  Goal: Skin integrity remains intact  Description: INTERVENTIONS  - Assess and docu

## 2021-01-04 NOTE — PAYOR COMM NOTE
--------------  CONTINUED STAY REVIEW    Payor: SAEID LOGAN  Subscriber #:  [id number]  Authorization Number: [id number]    Admit date: 12/30/20  Admit time: 18    Admitting Physician: Candie Mcmullen DPM  Attending Physician:  MD Ming Espinal h/o DM, HTN, Osteomyelitis, chronic L foot diabetic ulcers.  Previous cultures from L foot wounds included:  MRSA, PSAR, enterococcus, S lugdunensis, Ecoli.  Has been on bactrim x several courses including currently.  Now admitted for L TMA done this am,

## 2021-01-04 NOTE — PLAN OF CARE
Problem: Patient Centered Care  Goal: Patient preferences are identified and integrated in the patient's plan of care  Description: Interventions:  - What would you like us to know as we care for you?  To keep me and my family updated   - Provide timely, strengthening/mobility  - Encourage toileting schedule  Outcome: Progressing  Patient has fall precautions in place. Patient has call light within reach. Patient will call for assistance.       Problem: DISCHARGE PLANNING  Goal: Discharge to home or other f and family use of appropriate assistive device and precautions (e.g. spinal or hip dislocation precautions)  Outcome: Progressing  See above     Problem: SKIN/TISSUE INTEGRITY - ADULT  Goal: Skin integrity remains intact  Description: INTERVENTIONS  - Asse disease. Patient had picc line placed this am. Patient denies pain at this time. Patient will be d/c home on home abx, daptomyacin. Patient wound care instructed per podiatry. Patient VS stable. Patient able to have bm 3 days ago.  Patient is able to pass g

## 2021-01-04 NOTE — DISCHARGE SUMMARY
General Medicine Discharge Summary     Patient ID:  Sammie Marinelli  53year old  6/30/1970    Admit date: 12/30/2020    Discharge date and time: 1/4/21    Attending Physician: Pavan Koehler MD     Consults: IP CONSULT TO INFECTIOUS DISEASE  IP CONSULT TO known as: Kisha Ac  Take 1 tablet by mouth every 8 (eight) hours as needed.         CHANGE how you take these medications    [place] SoloStar 300 UNIT/ML Sopn  Generic drug: Insulin Glargine (2 Unit Dial)  Take 120 Units with breakfast and 70 Units at bedti Injection  · HYDROcodone-acetaminophen 5-325 MG Tabs         FU      DC instructions: Other Discharge Instructions: The following are your instructions for the care of your foot:  1. Overall stay off the left foot.   2.  Keep the dressing clean

## 2021-01-05 NOTE — PAYOR COMM NOTE
--------------  CONTINUED STAY REVIEW    Payor: SAEID FOX  Subscriber #:  [id number]  Authorization Number: [id number]    Admit date: 12/30/20  Admit time: [address] 1/2/21- 1/4/21 1/2/21   Assessment/Plan:      Principal Probl 01/01/21  0648 01/02/21  0821   * 170* 105*   BUN 21* 21* 18   CREATSERUM 1.16 1.18 1.04   GFRAA 85 83 97   GFRNAA 74 72 84   CA 9.7 10.0 10.0    137 138   K 4.0 4.4 3.9    103 102   CO2 26.0 30.0 30.0    Meds:      • vancomycin  2,000 m Soft, non-tender, non-distended, +BS  MSK: strength 5/5 in all extremities  Neuro: Grossly normal, CN intact, sensory intact  Psych: Affect- normal  SKIN: warm, dry  EXT: no edema    DATE OF DISCHARGE: 1/4/21

## 2021-01-07 ENCOUNTER — OFFICE VISIT (OUTPATIENT)
Dept: PODIATRY CLINIC | Facility: CLINIC | Age: 51
End: 2021-01-07
Payer: COMMERCIAL

## 2021-01-07 VITALS — BODY MASS INDEX: 32.95 KG/M2 | HEIGHT: 75 IN | WEIGHT: 265 LBS

## 2021-01-07 DIAGNOSIS — Z98.890 STATUS POST FOOT SURGERY: Primary | ICD-10-CM

## 2021-01-07 PROCEDURE — 3008F BODY MASS INDEX DOCD: CPT | Performed by: PODIATRIST

## 2021-01-07 PROCEDURE — 99024 POSTOP FOLLOW-UP VISIT: CPT | Performed by: PODIATRIST

## 2021-01-07 NOTE — PROGRESS NOTES
Martir Herrera is a 53year old male. Patient presents with:  Post-Op: amputation of toes on the left foot 12/30/20,patient denies pain         HPI:   Patient returns the clinic now approximately a week postop doing well.   At today's visit reviewed nurse (MULTI-VITAMIN/MINERALS) Oral Tab Take 1 tablet by mouth daily. • CHOLECALCIFEROL 5000 units Oral Tab Take 1 tablet by mouth daily.  Take 1 tablet daily      • LANA MICROLET LANCETS Does not apply Misc Check blood glucoses twice a day 200 each 1   • TU on file      Highest education level: Not on file    Tobacco Use      Smoking status: Former Smoker        Packs/day: 1.50        Years: 26.00        Pack years: 39        Types: Cigarettes      Smokeless tobacco: Never Used      Tobacco comment: 2011-Quit

## 2021-01-12 ENCOUNTER — TELEPHONE (OUTPATIENT)
Dept: PODIATRY CLINIC | Facility: CLINIC | Age: 51
End: 2021-01-12

## 2021-01-12 NOTE — TELEPHONE ENCOUNTER
Per [facility] RN, needing an order sent stating dressing to not be changed by [facility] RN only by Moris . Fax order to [phone]

## 2021-01-12 NOTE — TELEPHONE ENCOUNTER
Spoke to [facility] nurse, Lauryn Suh, and informed her of Moris's message. Lauryn Suh took this as a verbal order.

## 2021-01-14 ENCOUNTER — OFFICE VISIT (OUTPATIENT)
Dept: PODIATRY CLINIC | Facility: CLINIC | Age: 51
End: 2021-01-14
Payer: COMMERCIAL

## 2021-01-14 VITALS — WEIGHT: 265 LBS | HEIGHT: 75 IN | BODY MASS INDEX: 32.95 KG/M2

## 2021-01-14 DIAGNOSIS — Z98.890 STATUS POST FOOT SURGERY: Primary | ICD-10-CM

## 2021-01-14 PROCEDURE — 99024 POSTOP FOLLOW-UP VISIT: CPT | Performed by: PODIATRIST

## 2021-01-14 PROCEDURE — 3008F BODY MASS INDEX DOCD: CPT | Performed by: PODIATRIST

## 2021-01-18 NOTE — PROGRESS NOTES
Sharon Krishnan is a 53year old male.  Patient presents with:  Post-Op: 2nd visit ,amputation of left  toes ,,denies pain         HPI:   Patient is now 2 weeks postop doing very well either transmetatarsal amputation of his left foot still has an open ulc daily.     • TURMERIC OR Take 1 capsule by mouth daily with dinner. • CHOLECALCIFEROL 5000 units Oral Tab Take 1 tablet by mouth daily.  Take 1 tablet daily      • LANA MICROLET LANCETS Does not apply Misc Check blood glucoses twice a day 200 each 1 Tobacco Use      Smoking status: Former Smoker        Packs/day: 1.50        Years: 26.00        Pack years: 39        Types: Cigarettes      Smokeless tobacco: Never Used      Tobacco comment: 2011-Quit; pack years: 44    Substance and Sexual Activity

## 2021-01-21 ENCOUNTER — OFFICE VISIT (OUTPATIENT)
Dept: PODIATRY CLINIC | Facility: CLINIC | Age: 51
End: 2021-01-21
Payer: COMMERCIAL

## 2021-01-21 DIAGNOSIS — Z98.890 STATUS POST FOOT SURGERY: Primary | ICD-10-CM

## 2021-01-21 PROCEDURE — 99024 POSTOP FOLLOW-UP VISIT: CPT | Performed by: PODIATRIST

## 2021-01-24 NOTE — PROGRESS NOTES
Oswaldo Valles is a 53year old male. Patient presents with:  Post-Op: 3rd visit, s/p amputation of left toes. sx on 12/30/20. Denies any pain. HPI:   Patient returns the clinic now about 3-1/2 weeks postop doing well denies any pain.   At today's • CHOLECALCIFEROL 5000 units Oral Tab Take 1 tablet by mouth daily.  Take 1 tablet daily         Past Medical History:   Diagnosis Date   • Depression    • Diabetes ([address]    • Diabetic neuropathy (HCC)    • High blood pressure    • High cholesterol    • H Used      Tobacco comment: 2011-Quit; pack years: 39    Substance and Sexual Activity      Alcohol use: Not Currently        Alcohol/week: 1.0 standard drinks        Types: 1 Standard drinks or equivalent per week      Drug use: No    Other Topics      Con

## 2021-01-26 ENCOUNTER — OFFICE VISIT (OUTPATIENT)
Dept: PODIATRY CLINIC | Facility: CLINIC | Age: 51
End: 2021-01-26
Payer: COMMERCIAL

## 2021-01-26 DIAGNOSIS — E11.65 TYPE 2 DIABETES, UNCONTROLLED, WITH NEUROPATHY (HCC): ICD-10-CM

## 2021-01-26 DIAGNOSIS — L97.428 DIABETIC ULCER OF LEFT MIDFOOT ASSOCIATED WITH TYPE 2 DIABETES MELLITUS, WITH OTHER ULCER SEVERITY (HCC): Primary | ICD-10-CM

## 2021-01-26 DIAGNOSIS — E11.621 DIABETIC ULCER OF LEFT MIDFOOT ASSOCIATED WITH TYPE 2 DIABETES MELLITUS, WITH OTHER ULCER SEVERITY (HCC): Primary | ICD-10-CM

## 2021-01-26 DIAGNOSIS — Z98.890 POST-OPERATIVE STATE: ICD-10-CM

## 2021-01-26 DIAGNOSIS — E11.40 TYPE 2 DIABETES, UNCONTROLLED, WITH NEUROPATHY (HCC): ICD-10-CM

## 2021-01-26 PROCEDURE — 73610 X-RAY EXAM OF ANKLE: CPT | Performed by: PODIATRIST

## 2021-01-26 PROCEDURE — 99213 OFFICE O/P EST LOW 20 MIN: CPT | Performed by: PODIATRIST

## 2021-01-26 NOTE — PROGRESS NOTES
Adela Villanueva is a 53year old male. Patient presents with:  Consult: ref by Dr Gill Lopes - diabetic  type 2 - BS this AM 93 - sx consult on left foot - pain scale 2/10.         HPI:     Today for follow-up evaluation is referred in by Dr. Gill Lopes for poss • TURMERIC OR Take 1 capsule by mouth daily with dinner. • CHOLECALCIFEROL 5000 units Oral Tab Take 1 tablet by mouth daily.  Take 1 tablet daily         Past Medical History:   Diagnosis Date   • Depression    • [facility]    • Diabetic neuro years: 39        Types: Cigarettes      Smokeless tobacco: Never Used      Tobacco comment: 2011-Quit; pack years: 44    Substance and Sexual Activity      Alcohol use: Not Currently        Alcohol/week: 1.0 standard drinks        Types: 1 Standard drinks diabetes mellitus, with other ulcer severity ([address]  -     XR ANKLE (MIN 3 VIEWS), LEFT (CPT=73610)    Type 2 diabetes, uncontrolled, with neuropathy (HCC)  -     XR ANKLE (MIN 3 VIEWS), LEFT (CPT=73610)    Post-operative state  -     XR ANKLE (MIN 3 VIEWS)

## 2021-02-01 ENCOUNTER — OFFICE VISIT (OUTPATIENT)
Dept: PODIATRY CLINIC | Facility: CLINIC | Age: 51
End: 2021-02-01
Payer: COMMERCIAL

## 2021-02-01 DIAGNOSIS — L97.428 DIABETIC ULCER OF LEFT MIDFOOT ASSOCIATED WITH TYPE 2 DIABETES MELLITUS, WITH OTHER ULCER SEVERITY (HCC): ICD-10-CM

## 2021-02-01 DIAGNOSIS — E11.621 DIABETIC ULCER OF LEFT MIDFOOT ASSOCIATED WITH TYPE 2 DIABETES MELLITUS, WITH OTHER ULCER SEVERITY (HCC): ICD-10-CM

## 2021-02-01 DIAGNOSIS — Z98.890 STATUS POST FOOT SURGERY: Primary | ICD-10-CM

## 2021-02-01 PROCEDURE — 99024 POSTOP FOLLOW-UP VISIT: CPT | Performed by: PODIATRIST

## 2021-02-02 NOTE — PROGRESS NOTES
Leilani Lopez is a 53year old male. Patient presents with:  Post-Op: s/p transmetatarsal amputation of left foot -- Sx on 12/30/20. Rates pain 1-2/10 that comes and goes. Denies any fever.          HPI:   Patient returns the clinic about 4 weeks status mouth daily. • TURMERIC OR Take 1 capsule by mouth daily with dinner. • CHOLECALCIFEROL 5000 units Oral Tab Take 1 tablet by mouth daily.  Take 1 tablet daily         Past Medical History:   Diagnosis Date   • Depression    • [facility] ([facility]    • D 26.00        Pack years: 39        Types: Cigarettes      Smokeless tobacco: Never Used      Tobacco comment: 2011-Quit; pack years: 44    Substance and Sexual Activity      Alcohol use: Not Currently        Alcohol/week: 1.0 standard drinks        Types:

## 2021-02-04 ENCOUNTER — PATIENT MESSAGE (OUTPATIENT)
Dept: PODIATRY CLINIC | Facility: CLINIC | Age: 51
End: 2021-02-04

## 2021-02-04 NOTE — TELEPHONE ENCOUNTER
From: Hannah Blanco  To: Bianca Marcum DPM  Sent: 2/4/2021 1:55 PM CST  Subject: Visit Follow-up Question    Sorry for the second email. I thought I should see the [facility] ortho as well. Is that Junie Acosta at THE [facility]? Thanks!     Malu Akins

## 2021-03-02 ENCOUNTER — OFFICE VISIT (OUTPATIENT)
Dept: PODIATRY CLINIC | Facility: CLINIC | Age: 51
End: 2021-03-02
Payer: COMMERCIAL

## 2021-03-02 DIAGNOSIS — Z98.890 STATUS POST FOOT SURGERY: Primary | ICD-10-CM

## 2021-03-02 PROCEDURE — 99024 POSTOP FOLLOW-UP VISIT: CPT | Performed by: PODIATRIST

## 2021-03-02 NOTE — PROGRESS NOTES
Kisha Whitley is a 53year old male. Patient presents with:  Post-Op: transmetatarsal amputation left foot -- Sx on 12/30/20. States having more pain over the last week or so. Rates pain 3-5/10 and not constant. States it has aching and spiking pain.  Ta BUPROPION HCL ER, XL, 300 MG Oral Tablet 24 Hr TAKE 1 TABLET(300 MG) BY MOUTH DAILY 90 tablet 3   • LOW-DOSE ASPIRIN OR Take 81 mg by mouth daily. • Multiple Vitamins-Minerals (MULTI-VITAMIN/MINERALS) Oral Tab Take 1 tablet by mouth daily.      • JOSÉ MIGUEL Spouse name: Not on file      Number of children: Not on file      Years of education: Not on file      Highest education level: Not on file    Tobacco Use      Smoking status: Former Smoker        Packs/day: 1.50        Years: 26.00        Pack years: 44

## 2021-04-07 ENCOUNTER — TELEPHONE (OUTPATIENT)
Dept: PODIATRY CLINIC | Facility: CLINIC | Age: 51
End: 2021-04-07

## 2021-04-07 ENCOUNTER — APPOINTMENT (OUTPATIENT)
Dept: GENERAL RADIOLOGY | Facility: HOSPITAL | Age: 51
DRG: 854 | End: 2021-04-07
Attending: EMERGENCY MEDICINE
Payer: COMMERCIAL

## 2021-04-07 ENCOUNTER — HOSPITAL ENCOUNTER (INPATIENT)
Facility: HOSPITAL | Age: 51
LOS: 6 days | Discharge: HOME HEALTH | DRG: 854 | End: 2021-04-13
Attending: EMERGENCY MEDICINE | Admitting: HOSPITALIST
Payer: COMMERCIAL

## 2021-04-07 DIAGNOSIS — E11.628 CELLULITIS IN DIABETIC FOOT (HCC): Primary | ICD-10-CM

## 2021-04-07 DIAGNOSIS — L03.119 CELLULITIS IN DIABETIC FOOT (HCC): Primary | ICD-10-CM

## 2021-04-07 DIAGNOSIS — M86.9 OSTEOMYELITIS OF LEFT FOOT, UNSPECIFIED TYPE (HCC): ICD-10-CM

## 2021-04-07 PROCEDURE — 73630 X-RAY EXAM OF FOOT: CPT | Performed by: EMERGENCY MEDICINE

## 2021-04-07 RX ORDER — ACETAMINOPHEN 325 MG/1
650 TABLET ORAL EVERY 4 HOURS PRN
Status: DISCONTINUED | OUTPATIENT
Start: 2021-04-07 | End: 2021-04-11

## 2021-04-07 RX ORDER — ROSUVASTATIN CALCIUM 10 MG/1
10 TABLET, COATED ORAL NIGHTLY
Status: DISCONTINUED | OUTPATIENT
Start: 2021-04-07 | End: 2021-04-13

## 2021-04-07 RX ORDER — BUPROPION HYDROCHLORIDE 300 MG/1
300 TABLET ORAL DAILY
Status: DISCONTINUED | OUTPATIENT
Start: 2021-04-08 | End: 2021-04-13

## 2021-04-07 RX ORDER — FENOFIBRATE 67 MG/1
67 CAPSULE ORAL NIGHTLY
Status: DISCONTINUED | OUTPATIENT
Start: 2021-04-07 | End: 2021-04-13

## 2021-04-07 RX ORDER — ACETAMINOPHEN 500 MG
1000 TABLET ORAL ONCE
Status: COMPLETED | OUTPATIENT
Start: 2021-04-07 | End: 2021-04-07

## 2021-04-07 RX ORDER — GABAPENTIN 300 MG/1
300 CAPSULE ORAL 2 TIMES DAILY
Status: DISCONTINUED | OUTPATIENT
Start: 2021-04-07 | End: 2021-04-13

## 2021-04-07 RX ORDER — LISINOPRIL 5 MG/1
5 TABLET ORAL NIGHTLY
Status: DISCONTINUED | OUTPATIENT
Start: 2021-04-07 | End: 2021-04-13

## 2021-04-07 RX ORDER — HEPARIN SODIUM 5000 [USP'U]/ML
5000 INJECTION, SOLUTION INTRAVENOUS; SUBCUTANEOUS EVERY 8 HOURS SCHEDULED
Status: DISCONTINUED | OUTPATIENT
Start: 2021-04-07 | End: 2021-04-09

## 2021-04-07 RX ORDER — VANCOMYCIN HYDROCHLORIDE
15 EVERY 12 HOURS
Status: DISCONTINUED | OUTPATIENT
Start: 2021-04-08 | End: 2021-04-13

## 2021-04-07 RX ORDER — ONDANSETRON 2 MG/ML
4 INJECTION INTRAMUSCULAR; INTRAVENOUS EVERY 6 HOURS PRN
Status: DISCONTINUED | OUTPATIENT
Start: 2021-04-07 | End: 2021-04-13

## 2021-04-07 RX ORDER — HYDROCODONE BITARTRATE AND ACETAMINOPHEN 5; 325 MG/1; MG/1
2 TABLET ORAL EVERY 4 HOURS PRN
Status: DISCONTINUED | OUTPATIENT
Start: 2021-04-07 | End: 2021-04-11

## 2021-04-07 RX ORDER — DEXTROSE MONOHYDRATE 25 G/50ML
50 INJECTION, SOLUTION INTRAVENOUS
Status: DISCONTINUED | OUTPATIENT
Start: 2021-04-07 | End: 2021-04-13

## 2021-04-07 RX ORDER — VANCOMYCIN 2 GRAM/500 ML IN 0.9 % SODIUM CHLORIDE INTRAVENOUS
25 ONCE
Status: COMPLETED | OUTPATIENT
Start: 2021-04-07 | End: 2021-04-07

## 2021-04-07 RX ORDER — HYDROCODONE BITARTRATE AND ACETAMINOPHEN 5; 325 MG/1; MG/1
1 TABLET ORAL EVERY 4 HOURS PRN
Status: DISCONTINUED | OUTPATIENT
Start: 2021-04-07 | End: 2021-04-11

## 2021-04-07 RX ORDER — ASPIRIN 81 MG/1
81 TABLET, CHEWABLE ORAL DAILY
Status: DISCONTINUED | OUTPATIENT
Start: 2021-04-08 | End: 2021-04-09

## 2021-04-07 RX ORDER — SODIUM CHLORIDE 9 MG/ML
INJECTION, SOLUTION INTRAVENOUS CONTINUOUS
Status: DISCONTINUED | OUTPATIENT
Start: 2021-04-07 | End: 2021-04-11

## 2021-04-07 NOTE — ED QUICK NOTES
Orders for admission, patient is aware of plan and ready to go upstairs. Any questions, please call ED RN J Luis Holman  at [address].    Type of COVID test sent: rapid  COVID Suspicion level: Low    Titratable drug(s) infusing: none  Rate: n/a    LOC at [facility]on

## 2021-04-07 NOTE — H&P
[facility] Hospitalist H&P       CC: Patient presents with:  Fever  Swelling Edema       PCP: Santa Thakkar MD    Date of Admission: 4/7/2021  4:24 PM    ASSESSMENT / PLAN:     Mr. Jose Angel Castillo is a 52 yo M with PMH of DM2, HTN, HL prior hx OM s/p transmetatarsal amputatio neuropathy ([facility]    • High blood pressure    • High cholesterol    • Hyperlipidemia LDL goal <100    • Hypertension    • Hypertriglyceridemia    • Neuropathy     FEET   • Obesity (BMI 30-39. 9)     BMI 32   • Osteomyelitis ([facility] 2019; 2020    right foot in Systems  Comprehensive ROS reviewed and negative except for what's stated above.      OBJECTIVE:  /55   Pulse 103   Temp 98.4 °F (36.9 °C) (Oral)   Resp 15   Ht 6' 3\" (1.905 m)   Wt 250 lb (113.4 kg)   SpO2 97%   BMI 31.25 kg/m²     GEN: male in NAD

## 2021-04-07 NOTE — ED PROVIDER NOTES
Patient Seen in: [facility] AND [facility] Emergency Department      History   Patient presents with:  Fever  Swelling Edema    Stated Complaint: Left Foot/Leg Pain    HPI/Subjective:   HPI    52-year-old diabetic, Status post left-sided transmetatarsal amputati Packs/day: 1.50        Years: 26.00        Pack years: 39        Types: Cigarettes      Smokeless tobacco: Never Used      Tobacco comment: 2011-Quit; pack years: 44    Vaping Use      Vaping Use: Never used    Alcohol use: Not Currently      Alcohol/week: Legs:    Skin:     General: Skin is warm. Neurological:      General: No focal deficit present. Mental Status: He is alert.    Psychiatric:         Mood and Affect: Mood normal.             ED Course     Pulse Oximeter:  Pulse oximetry on room air is Immature Granulocyte % 0.6 %   RAPID SARS-COV-2 BY PCR    Collection Time: 04/07/21  4:49 PM    Specimen: Nares;  Other   Result Value Ref Range    Rapid SARS-CoV-2 by PCR Not Detected Not Detected       Imaging Results Available and Reviewed by me while in presenting problem including cellulitis, ulcer, abscess, osteomyelitis.     Critical Care Time:  Total critical care time for this patient was 50 minutes exclusive of separately billable procedures, but in obtaining history, performing a physical exam, beds

## 2021-04-07 NOTE — PROGRESS NOTES
[facility] Pharmacy Note: Antimicrobial Weight Based Dose Adjustment for: ceftriaxone (ROCEPHIN)    Martir Herrera is a 53year old patient who has been prescribed ceftriaxone (ROCEPHIN) 1000 mg x 1 dose.     Estimated Creatinine Clearance: 96.2 mL/min (based on

## 2021-04-07 NOTE — PROGRESS NOTES
[facility] Pharmacy Note: Antimicrobial Weight Based Dose Adjustment for: piperacillin/tazobactam (Guion Irons)    Sharon Krishnan is a 53year old patient who has been prescribed piperacillin/tazobactam (ZOSYN) 3.375 gm x 1 dose.     Estimated Creatinine Clearance: 96

## 2021-04-07 NOTE — ED INITIAL ASSESSMENT (HPI)
Left LE swelling/redness since last week. Hx of Transmetatarsal amputation in December of 2020. Fevers at home.  Patient states he informed Dr. Rosalia Pastrana he is here

## 2021-04-07 NOTE — TELEPHONE ENCOUNTER
Pt called and states that he started having increase swelling in LLE last last week. Pt has increased pain and site is warm to the touch. Pt states he has \"flu-like symptoms\" such as a fever of 101.3 and chills.  States he has \"little to no drainage\" fr

## 2021-04-08 ENCOUNTER — APPOINTMENT (OUTPATIENT)
Dept: MRI IMAGING | Facility: HOSPITAL | Age: 51
DRG: 854 | End: 2021-04-08
Attending: EMERGENCY MEDICINE
Payer: COMMERCIAL

## 2021-04-08 PROCEDURE — 73720 MRI LWR EXTREMITY W/O&W/DYE: CPT | Performed by: EMERGENCY MEDICINE

## 2021-04-08 PROCEDURE — 99253 IP/OBS CNSLTJ NEW/EST LOW 45: CPT | Performed by: PODIATRIST

## 2021-04-08 RX ORDER — DEXTROSE MONOHYDRATE 25 G/50ML
INJECTION, SOLUTION INTRAVENOUS
Status: DISPENSED
Start: 2021-04-08 | End: 2021-04-08

## 2021-04-08 NOTE — PROGRESS NOTES
[facility] Dosing Service  Antibiotic Dosing    Samira Tripp is a 53year old for whom pharmacy is dosing Cefepime for treatment of  osteomyelitis. .  Other antibiotics (Not dosed by pharmacy): Vancomcyin    Allergies: has No Known Allergies.     Heather emphysema. EFFUSION: None visible. Impression   CONCLUSION:   1. Extensive fragmentation of the metatarsal remnants, consistent with severe osteomyelitis.        2. Additional imaging findings concerning for potential tarsal osteom

## 2021-04-08 NOTE — PROGRESS NOTES
Fenofibrate TABS 54 mg is Non-Formulary Medication &  Auto-Substituted to Fenofibrate 67 mg Per P&T PROTOCOL

## 2021-04-08 NOTE — PLAN OF CARE
Continue IV antibiotics and IV fluids. Monitor vital signs, labs and signs and symptoms of infection.      Problem: Patient Centered Care  Goal: Patient preferences are identified and integrated in the patient's plan of care  Description: Interventions:  - signs of decreased cardiac output  - Evaluate effectiveness of vasoactive medications to optimize hemodynamic stability  - Monitor arterial and/or venous puncture sites for bleeding and/or hematoma  - Assess quality of pulses, skin color and temperature  -

## 2021-04-08 NOTE — PLAN OF CARE
Patient cooperative and pleasant; ambulates well with crutches; self sufficient throughout shift; patient resting comfortably in bed, locked in lowest position, non-skid socks in place; call light within reach; will continue to monitor.      Patient made aw goals for specific interventions  Outcome: Progressing     Problem: CARDIOVASCULAR - ADULT  Goal: Maintains optimal cardiac output and hemodynamic stability  Description: INTERVENTIONS:  - Monitor vital signs, rhythm, and trends  - Monitor for bleeding, hy responsible for managing their own health  - Refer to Case Management Department for coordinating discharge planning if the patient needs post-hospital services based on physician/LIP order or complex needs related to functional status, cognitive ability o

## 2021-04-08 NOTE — CONSULTS
[facility] HOSP - [facility]    Report of Consultation    Kellie Francisco Patient Status:  Inpatient    1970 MRN [id number]   Location [facility]5W Attending Maria Isabel Montague MD   Hosp Day # 1 PCP Jaclyn Syed MD     Date of Admission:   12/30/2020    TMA   • TOE AMPUTATION Left 12/30/2020    Performed by Cruzita Babinski, DPM at [address] [facility] [address]   • TOE AMPUTATION Left 6/7/2020    Performed by Cruzita Babinski, DPM at [address] [facility] OR   • WISDOM TEETH REMOVED       Family History   Problem Rela Units, Subcutaneous, Nightly  •  Insulin Aspart Pen (NOVOLOG) 100 UNIT/ML flexpen 1-7 Units, 1-7 Units, Subcutaneous, TID CC  •  Cefepime HCl (MAXIPIME) 1 g in sodium chloride 0.9% 100 mL IVPB-MBP, 1 g, Intravenous, Q8H  •  vancomycin IVPB premix 1.5g in 0 Dictated by (CST): General Golden MD on 4/07/2021 at 5:22 PM     Finalized by (CST): General Golden MD on 4/07/2021 at 5:26 PM             Laboratory Data:  Recent Labs   Lab 04/07/21  1645 04/07/21  1645 04/08/21  0609   RBC 3.72*   < > 3.40*   HGB 9.7* fourth and fifth metatarsals may be even the third with possible involvement of the cuboid as well.   These are very concerning for osteomyelitis even though the wound is closed I doubt we would see an elevated white count like this and I increased lactic a

## 2021-04-08 NOTE — PROGRESS NOTES
[address] [facility] Dosing Service    Initial Pharmacokinetic Consult for Vancomycin Dosing     Delilah Huerta is a 53year old patient who is being treated for osteomyelitis. Pharmacy has been asked to dose Vancomycin by Dr. Jaret Martinez.     Allergies:  Patient

## 2021-04-08 NOTE — PAYOR COMM NOTE
--------------  ADMISSION REVIEW     Payor: SAEID [facility]  Subscriber #:  [id number]  Authorization Number: [id number]    Admit date: 4/7/21  Admit time:  8:17 PM       Admitting Physician: Grace Murphy MD  Attending Physician:  Grace Murphy MD  P Eyes:      Extraocular Movements: Extraocular movements intact. Cardiovascular:      Rate and Rhythm: Regular rhythm. Tachycardia present. Pulmonary:      Effort: Pulmonary effort is normal.   Abdominal:      Palpations: Abdomen is soft.    Musculoskele Absolute 0.10 0.00 - 1.00 x10(3) uL    Neutrophil % 82.9 %    Lymphocyte % 8.0 %    Monocyte % 7.9 %    Eosinophil % 0.3 %    Basophil % 0.3 %    Immature Granulocyte % 0.6 %   RAPID SARS-COV-2 BY PCR    Collection Time: 04/07/21  4:49 PM    Specimen: Nare and ID consult  - XR with evidence of OM     DM2, uncontrolled, with neuropathy ([address]  - home regimen: now takes Toujeo 60 units AM and 30 units PM  - accuchecks QID, hypoglycemic protocol, SSI    HTN  - home lisinopril 5     Hyperlipidemia   - statin  HPI Dose Route User    4/7/2021 2221 Given 650 mg Oral Marylu Fox RN      acetaminophen (TYLENOL EXTRA STRENGTH) tab 1,000 mg     Date Action Dose Route User    Admitted on 4/7/2021 4/7/2021 1610 Given 1,000 mg Oral Kaz Hobbs RN      aspirin chewable 4/7/2021 2216 Given 5,000 Units Subcutaneous (Left Lower Abdomen) Steve Ritchie, RN      insulin detemir (LEVEMIR) 100 UNIT/ML flextouch 20 Units     Date Action Dose Route User    4/7/2021 2208 Given 20 Units Subcutaneous (Right Upper Arm) Steve Ritchie, RN

## 2021-04-08 NOTE — PROGRESS NOTES
I called the patient with the MRI results. All metatarsals including the cuneiforms cuboid and even portion of the calcaneus has osteomyelitis. Evidently the talus is spared. As result of this I do not believe that the foot is salvageable.   And I think

## 2021-04-08 NOTE — PROGRESS NOTES
[facility] Hospitalist Progress Note     CC: Hospital Follow up    PCP: Wellington Haas MD       Assessment/Plan:     Principal Problem:    Cellulitis in diabetic foot ([facility] [address]  Active Problems:    Osteomyelitis of left foot, unspecified type [facility])    Mr. Nathan Machado is a 54 yo °C)-103.1 °F (39.5 °C)] 98.3 °F (36.8 °C)  Pulse:  [] 94  Resp:  [15-28] 18  BP: (108-175)/(52-83) 131/62      Intake/Output:    Intake/Output Summary (Last 24 hours) at 4/8/2021 1328  Last data filed at 4/8/2021 1015  Gross per 24 hour   Intake 2360 bases of all 5 metatarsals, all 3 cuneiforms, cuboid and navicular bone. Early osteomyelitis involving the talar neck as well as calcaneal body and anterior calcaneal process.   Extensive dorsal foot cellulitis and plantar foot myositis with 2 separate sof

## 2021-04-09 ENCOUNTER — APPOINTMENT (OUTPATIENT)
Dept: CV DIAGNOSTICS | Facility: HOSPITAL | Age: 51
DRG: 854 | End: 2021-04-09
Attending: INTERNAL MEDICINE
Payer: COMMERCIAL

## 2021-04-09 PROCEDURE — 93306 TTE W/DOPPLER COMPLETE: CPT | Performed by: INTERNAL MEDICINE

## 2021-04-09 PROCEDURE — 99253 IP/OBS CNSLTJ NEW/EST LOW 45: CPT | Performed by: ORTHOPAEDIC SURGERY

## 2021-04-09 NOTE — PROGRESS NOTES
Per podiatry, ok for any of surgeons in [place] group to perform LBKA on patient. Awaiting response to see when patient will meet physician taking on his case.

## 2021-04-09 NOTE — PLAN OF CARE
Patient pleasant and in positive mood despite Left BKA scheduled for tomorrow with Dr. Hellen Clemente; NPO midnight; order will be put in my doctor for consent; patient resting comfortably in bed, locked in lowest position, call light within reach; will continue Maintains optimal cardiac output and hemodynamic stability  Description: INTERVENTIONS:  - Monitor vital signs, rhythm, and trends  - Monitor for bleeding, hypotension and signs of decreased cardiac output  - Evaluate effectiveness of vasoactive medication planning if the patient needs post-hospital services based on physician/LIP order or complex needs related to functional status, cognitive ability or social support system  Outcome: Progressing

## 2021-04-09 NOTE — PROGRESS NOTES
[facility] Hospitalist Progress Note     CC: Hospital Follow up    PCP: Ivan Brito MD       Assessment/Plan:     Principal Problem:    Cellulitis in diabetic foot ([facility] [address]  Active Problems:    Osteomyelitis of left foot, unspecified type [facility])    Mr. Bibiana Rosario is a 54 yo 97.6 °F (36.4 °C)  Pulse:  [88-99] 99  Resp:  [18] 18  BP: (126-144)/(67-69) 140/69      Intake/Output:    Intake/Output Summary (Last 24 hours) at 4/9/2021 1125  Last data filed at 4/9/2021 0649  Gross per 24 hour   Intake 2576.67 ml   Output 5650 ml   Ne metatarsals, all 3 cuneiforms, cuboid and navicular bone. Early osteomyelitis involving the talar neck as well as calcaneal body and anterior calcaneal process.   Extensive dorsal foot cellulitis and plantar foot myositis with 2 separate soft tissue absces

## 2021-04-09 NOTE — CONSULTS
[facility] HOSP - [facility]    Report of Consultation    Tanisha Bhatia Patient Status:  Inpatient    1970 MRN [id number]   Location [facility]5W Attending Vladimir Adhikari MD   Hosp Day # 2 PCP Rafa Hill MD     Date of Admission:   History  Family History   Problem Relation Age of Onset   • Diabetes Father    • Lipids Father    • Diabetes Mother         Type 2 DM and had GDM before   • Lipids Mother    • Cancer Mother         breast   • Heart Disorder Mother         atrial fib--resol Units, Oral, Daily  fenofibrate micronized (LOFIBRA) cap 67 mg, 67 mg, Oral, Nightly  gabapentin (NEURONTIN) cap 300 mg, 300 mg, Oral, BID  lisinopril tab 5 mg, 5 mg, Oral, Nightly  Rosuvastatin Calcium (CRESTOR) tab 10 mg, 10 mg, Oral, Nightly      buPROP decreased sensation in a stocking glove distribution in both feet. Physical Exam:   Blood pressure 140/69, pulse 99, temperature 97.6 °F (36.4 °C), temperature source Oral, resp. rate 18, height 6' 3\" (1.905 m), weight 250 lb (113.4 kg), SpO2 97 %. soft tissue abscesses as described. Tenosynovitis involving the flexor hallucis longus and tibialis posterior. Extensive subcutaneous gas throughout the dorsal foot soft tissues.   This report was communicated by telephone to Dr. Be Land on 4/8/2021 at 6

## 2021-04-09 NOTE — PROGRESS NOTES
Lab called this RN at  with a glucose of 63; lab states it \"just popped up\"; this RN did not address the low glucose because the patient has eaten multiple times and his accucheck has been within acceptable parameters.

## 2021-04-09 NOTE — PLAN OF CARE
Patient alert and oriented. Room air. 0.9% Nacl running at 100, IV vanco and cefapine continued. Blood cultures positive for MRSA. Redraw this AM. BKA planned during hospitalization. Call light within reach.      Problem: Patient Centered Care  Goal: Fernando stability  Description: INTERVENTIONS:  - Monitor vital signs, rhythm, and trends  - Monitor for bleeding, hypotension and signs of decreased cardiac output  - Evaluate effectiveness of vasoactive medications to optimize hemodynamic stability  - Monitor ar services based on physician/LIP order or complex needs related to functional status, cognitive ability or social support system  Outcome: Progressing

## 2021-04-09 NOTE — CONSULTS
[facility] AND [facility] Infectious Disease  Report of Consultation    No Mathis Patient Status:  Inpatient    1970 MRN [id number]   Location [facility]5W Attending Jacob Kayser, MD   Hosp Day # 1 PCP Skip Anderson MD     Date of Admi Performed by Porfirio Dixon MD at [facility] OR   • OTHER      right foot surgery   • OTHER SURGICAL HISTORY Right 12/13/2019    foot surgery   • SPINE CLASS  12/30/2020    TMA   • TOE AMPUTATION Left 12/30/2020    Performed by Lillie Rodriguez DPM at HYDROcodone-acetaminophen (NORCO) 5-325 MG per tab 2 tablet, 2 tablet, Oral, Q4H PRN  •  ondansetron HCl (ZOFRAN) injection 4 mg, 4 mg, Intravenous, Q6H PRN  •  Insulin Aspart Pen (NOVOLOG) 100 UNIT/ML flexpen 1-7 Units, 1-7 Units, Subcutaneous, TID CC  • 04/08/21 2027 126/68 97.6 °F (36.4 °C) Oral 88 18 98 %   04/08/21 1700 134/67 99.6 °F (37.6 °C) Oral 91 18 97 %   04/08/21 0900 131/62 98.3 °F (36.8 °C) Oral 94 18 97 %   04/08/21 0555 131/66 99.9 °F (37.7 °C) Oral 95 16 96 %   04/07/21 2100 — — — 102 — communicated by telephone to Dr. Efren Nelson on 4/8/2021 at 1145 hours.         Assessment and Plan:    1.   MRSA sepsis with 2/2 blood cultures positive for MRSA on admission and source related to complicated, refractory L foot process  - IV vancomycin and cef

## 2021-04-09 NOTE — PROGRESS NOTES
Fermin Conde is a 53year old male. Patient presents with:  Fever  Swelling Edema      HPI:    Bad night of sleep    REVIEW OF SYSTEMS:   A comprehensive 11 point review of systems was completed.   Pertinent positives and negatives noted in the the HP masses, no lymphadenopathy. LUNGS:  Clear to auscultation b/l, no rhonchi, rales, or wheezes. CARDIO: RRR K7/R0, no rubs, clicks, heaves, or murmurs. GI:  Soft NT/ND, BS present, No masses , rebound, no HSM.   EXTREMITIES:  Foot wrapped and not opened by BUN/CREA Ratio 18.8 10.0 - 20.0    Calcium, Total 9.1 8.5 - 10.1 mg/dL    Calculated Osmolality 287 275 - 295 mOsm/kg    GFR, Non- 102 >=60    GFR, -American 118 >=60   CBC, PLATELET; NO DIFFERENTIAL   Result Value Ref Range    WBC 1 Blood Culture Result Staphylococcus aureus (A)     Blood Smear Gram positive cocci in clusters (A)    BLOOD CULTURE    Specimen: Blood,peripheral   Result Value Ref Range    Blood Culture Result Staphylococcus aureus (A)     Blood Smear Positive Blood Cult

## 2021-04-09 NOTE — PROGRESS NOTES
[facility] dosing service    Follow-up Pharmacokinetic Consult for Vancomycin Dosing     Jaylan Saenz is a 53year old patient who is being treated for osteomyelitis.    Patient is on day 3 of Vancomycin and is currently receiving 1.5 gm IV Q 12 hours

## 2021-04-09 NOTE — PAYOR COMM NOTE
--------------  CONTINUED STAY REVIEW    Payor: SAEID O  Subscriber #:  [id number]  Authorization Number: [id number]    Admit date: 4/7/21  Admit time:  8:17 PM    Admitting Physician: Malia Sanchez MD  Attending Physician:  Malia Sanchez MD Units     Date Action Dose Route User    4/9/2021 1007 Given 25 Units Subcutaneous (Right Upper Arm) Henrry Simpson      insulin detemir (LEVEMIR) 100 UNIT/ML flextouch 10 Units     Date Action Dose Route User    4/8/2021 2042 Given 10 Units Subcutaneous 135 (L)     136 - 145 mmol/L             Potassium     4.1     3.5 - 5.1 mmol/L             Chloride     101     98 - 112 mmol/L             CO2     23.0     21.0 - 32.0 mmol/L             Anion Gap     11     0 - 18 mmol/L             BUN     23 (H) 34.0 pg             MCHC     31.3     31.0 - 37.0 g/dL             RDW     13.5     11.0 - 15.0 %             RDW-SD     41.1     35.1 - 46.3 fL             PLT     682.0 (H)     150.0 - 450.0 10(3)uL       HEMOGLOBIN A1C       Result     Value     Ref Ran RAINBOW DRAW LIGHT GREEN       Result     Value     Ref Range             Hold Lt Green     Auto Resulted             BLOOD CULTURE             Specimen: Blood,peripheral       Result     Value     Ref Range             Blood Culture Result     Staph x10(3) uL             Monocyte Absolute     1.24 (H)     0.10 - 1.00 x10(3) uL             Eosinophil Absolute     0.05     0.00 - 0.70 x10(3) uL             Basophil Absolute     0.05     0.00 - 0.20 x10(3) uL             Immature Granulocyte Absolute

## 2021-04-10 ENCOUNTER — ANESTHESIA (OUTPATIENT)
Dept: SURGERY | Facility: HOSPITAL | Age: 51
DRG: 854 | End: 2021-04-10
Payer: COMMERCIAL

## 2021-04-10 ENCOUNTER — ANESTHESIA EVENT (OUTPATIENT)
Dept: SURGERY | Facility: HOSPITAL | Age: 51
DRG: 854 | End: 2021-04-10
Payer: COMMERCIAL

## 2021-04-10 PROCEDURE — 3E0T3BZ INTRODUCTION OF ANESTHETIC AGENT INTO PERIPHERAL NERVES AND PLEXI, PERCUTANEOUS APPROACH: ICD-10-PCS | Performed by: ANESTHESIOLOGY

## 2021-04-10 PROCEDURE — 76942 ECHO GUIDE FOR BIOPSY: CPT | Performed by: ANESTHESIOLOGY

## 2021-04-10 PROCEDURE — 0Y6J0Z1 DETACHMENT AT LEFT LOWER LEG, HIGH, OPEN APPROACH: ICD-10-PCS | Performed by: ORTHOPAEDIC SURGERY

## 2021-04-10 RX ORDER — CETIRIZINE HYDROCHLORIDE 5 MG/1
5 TABLET ORAL NIGHTLY
Status: DISCONTINUED | OUTPATIENT
Start: 2021-04-10 | End: 2021-04-13

## 2021-04-10 RX ORDER — MORPHINE SULFATE 4 MG/ML
6 INJECTION, SOLUTION INTRAMUSCULAR; INTRAVENOUS EVERY 2 HOUR PRN
Status: DISCONTINUED | OUTPATIENT
Start: 2021-04-10 | End: 2021-04-11

## 2021-04-10 RX ORDER — HYDROMORPHONE HYDROCHLORIDE 1 MG/ML
0.4 INJECTION, SOLUTION INTRAMUSCULAR; INTRAVENOUS; SUBCUTANEOUS EVERY 5 MIN PRN
Status: DISCONTINUED | OUTPATIENT
Start: 2021-04-10 | End: 2021-04-10 | Stop reason: HOSPADM

## 2021-04-10 RX ORDER — SCOLOPAMINE TRANSDERMAL SYSTEM 1 MG/1
1 PATCH, EXTENDED RELEASE TRANSDERMAL
Status: DISCONTINUED | OUTPATIENT
Start: 2021-04-10 | End: 2021-04-13

## 2021-04-10 RX ORDER — NALOXONE HYDROCHLORIDE 0.4 MG/ML
80 INJECTION, SOLUTION INTRAMUSCULAR; INTRAVENOUS; SUBCUTANEOUS AS NEEDED
Status: DISCONTINUED | OUTPATIENT
Start: 2021-04-10 | End: 2021-04-10 | Stop reason: HOSPADM

## 2021-04-10 RX ORDER — PROCHLORPERAZINE EDISYLATE 5 MG/ML
5 INJECTION INTRAMUSCULAR; INTRAVENOUS ONCE AS NEEDED
Status: DISCONTINUED | OUTPATIENT
Start: 2021-04-10 | End: 2021-04-10 | Stop reason: HOSPADM

## 2021-04-10 RX ORDER — MORPHINE SULFATE 10 MG/ML
6 INJECTION, SOLUTION INTRAMUSCULAR; INTRAVENOUS EVERY 10 MIN PRN
Status: DISCONTINUED | OUTPATIENT
Start: 2021-04-10 | End: 2021-04-10 | Stop reason: HOSPADM

## 2021-04-10 RX ORDER — LIDOCAINE HYDROCHLORIDE 10 MG/ML
INJECTION, SOLUTION INFILTRATION; PERINEURAL
Status: COMPLETED | OUTPATIENT
Start: 2021-04-10 | End: 2021-04-10

## 2021-04-10 RX ORDER — DEXTROSE MONOHYDRATE 25 G/50ML
50 INJECTION, SOLUTION INTRAVENOUS
Status: DISCONTINUED | OUTPATIENT
Start: 2021-04-10 | End: 2021-04-10 | Stop reason: HOSPADM

## 2021-04-10 RX ORDER — HEPARIN SODIUM 5000 [USP'U]/ML
5000 INJECTION, SOLUTION INTRAVENOUS; SUBCUTANEOUS EVERY 8 HOURS SCHEDULED
Status: DISCONTINUED | OUTPATIENT
Start: 2021-04-10 | End: 2021-04-10

## 2021-04-10 RX ORDER — HYDROCODONE BITARTRATE AND ACETAMINOPHEN 5; 325 MG/1; MG/1
2 TABLET ORAL AS NEEDED
Status: DISCONTINUED | OUTPATIENT
Start: 2021-04-10 | End: 2021-04-10 | Stop reason: HOSPADM

## 2021-04-10 RX ORDER — MORPHINE SULFATE 4 MG/ML
4 INJECTION, SOLUTION INTRAMUSCULAR; INTRAVENOUS EVERY 10 MIN PRN
Status: DISCONTINUED | OUTPATIENT
Start: 2021-04-10 | End: 2021-04-10 | Stop reason: HOSPADM

## 2021-04-10 RX ORDER — MORPHINE SULFATE 4 MG/ML
2 INJECTION, SOLUTION INTRAMUSCULAR; INTRAVENOUS EVERY 10 MIN PRN
Status: DISCONTINUED | OUTPATIENT
Start: 2021-04-10 | End: 2021-04-10 | Stop reason: HOSPADM

## 2021-04-10 RX ORDER — VANCOMYCIN/0.9 % SOD CHLORIDE 1.75 G/5
15 PLASTIC BAG, INJECTION (ML) INTRAVENOUS ONCE
Status: DISCONTINUED | OUTPATIENT
Start: 2021-04-10 | End: 2021-04-10

## 2021-04-10 RX ORDER — HEPARIN SODIUM 5000 [USP'U]/ML
5000 INJECTION, SOLUTION INTRAVENOUS; SUBCUTANEOUS EVERY 8 HOURS SCHEDULED
Status: DISCONTINUED | OUTPATIENT
Start: 2021-04-11 | End: 2021-04-13

## 2021-04-10 RX ORDER — ROPIVACAINE HYDROCHLORIDE 5 MG/ML
INJECTION, SOLUTION EPIDURAL; INFILTRATION; PERINEURAL
Status: COMPLETED | OUTPATIENT
Start: 2021-04-10 | End: 2021-04-10

## 2021-04-10 RX ORDER — ONDANSETRON 2 MG/ML
4 INJECTION INTRAMUSCULAR; INTRAVENOUS ONCE AS NEEDED
Status: COMPLETED | OUTPATIENT
Start: 2021-04-10 | End: 2021-04-10

## 2021-04-10 RX ORDER — HYDROMORPHONE HYDROCHLORIDE 1 MG/ML
0.6 INJECTION, SOLUTION INTRAMUSCULAR; INTRAVENOUS; SUBCUTANEOUS EVERY 5 MIN PRN
Status: DISCONTINUED | OUTPATIENT
Start: 2021-04-10 | End: 2021-04-10 | Stop reason: HOSPADM

## 2021-04-10 RX ORDER — OXYCODONE HYDROCHLORIDE 5 MG/1
10 TABLET ORAL EVERY 4 HOURS PRN
Status: DISCONTINUED | OUTPATIENT
Start: 2021-04-10 | End: 2021-04-11

## 2021-04-10 RX ORDER — OXYCODONE HYDROCHLORIDE 5 MG/1
5 TABLET ORAL EVERY 4 HOURS PRN
Status: DISCONTINUED | OUTPATIENT
Start: 2021-04-10 | End: 2021-04-11

## 2021-04-10 RX ORDER — HALOPERIDOL 5 MG/ML
0.25 INJECTION INTRAMUSCULAR ONCE AS NEEDED
Status: DISCONTINUED | OUTPATIENT
Start: 2021-04-10 | End: 2021-04-10 | Stop reason: HOSPADM

## 2021-04-10 RX ORDER — SODIUM CHLORIDE, SODIUM LACTATE, POTASSIUM CHLORIDE, CALCIUM CHLORIDE 600; 310; 30; 20 MG/100ML; MG/100ML; MG/100ML; MG/100ML
INJECTION, SOLUTION INTRAVENOUS CONTINUOUS
Status: DISCONTINUED | OUTPATIENT
Start: 2021-04-10 | End: 2021-04-10 | Stop reason: HOSPADM

## 2021-04-10 RX ORDER — MORPHINE SULFATE 4 MG/ML
2 INJECTION, SOLUTION INTRAMUSCULAR; INTRAVENOUS EVERY 2 HOUR PRN
Status: DISCONTINUED | OUTPATIENT
Start: 2021-04-10 | End: 2021-04-11

## 2021-04-10 RX ORDER — HYDROCODONE BITARTRATE AND ACETAMINOPHEN 5; 325 MG/1; MG/1
1 TABLET ORAL AS NEEDED
Status: DISCONTINUED | OUTPATIENT
Start: 2021-04-10 | End: 2021-04-10 | Stop reason: HOSPADM

## 2021-04-10 RX ORDER — MORPHINE SULFATE 4 MG/ML
4 INJECTION, SOLUTION INTRAMUSCULAR; INTRAVENOUS EVERY 2 HOUR PRN
Status: DISCONTINUED | OUTPATIENT
Start: 2021-04-10 | End: 2021-04-11

## 2021-04-10 RX ORDER — MORPHINE SULFATE 15 MG/1
15 TABLET ORAL EVERY 4 HOURS PRN
Status: ACTIVE | OUTPATIENT
Start: 2021-04-10 | End: 2021-04-11

## 2021-04-10 RX ORDER — MIDAZOLAM HYDROCHLORIDE 1 MG/ML
INJECTION INTRAMUSCULAR; INTRAVENOUS
Status: COMPLETED | OUTPATIENT
Start: 2021-04-10 | End: 2021-04-10

## 2021-04-10 RX ORDER — HYDROMORPHONE HYDROCHLORIDE 1 MG/ML
0.2 INJECTION, SOLUTION INTRAMUSCULAR; INTRAVENOUS; SUBCUTANEOUS EVERY 5 MIN PRN
Status: DISCONTINUED | OUTPATIENT
Start: 2021-04-10 | End: 2021-04-10 | Stop reason: HOSPADM

## 2021-04-10 RX ORDER — ACETAMINOPHEN 500 MG
1000 TABLET ORAL EVERY 8 HOURS
Status: DISPENSED | OUTPATIENT
Start: 2021-04-10 | End: 2021-04-11

## 2021-04-10 RX ADMIN — ONDANSETRON 4 MG: 2 INJECTION INTRAMUSCULAR; INTRAVENOUS at 11:59:00

## 2021-04-10 RX ADMIN — ROPIVACAINE HYDROCHLORIDE 30 ML: 5 INJECTION, SOLUTION EPIDURAL; INFILTRATION; PERINEURAL at 11:50:00

## 2021-04-10 RX ADMIN — LIDOCAINE HYDROCHLORIDE 5 ML: 10 INJECTION, SOLUTION INFILTRATION; PERINEURAL at 11:50:00

## 2021-04-10 RX ADMIN — MIDAZOLAM HYDROCHLORIDE 2 MG: 1 INJECTION INTRAMUSCULAR; INTRAVENOUS at 11:50:00

## 2021-04-10 NOTE — ANESTHESIA POSTPROCEDURE EVALUATION
Patient: Samira Tripp    Procedure Summary     Date: 04/10/21 Room / Location: [address] [place] [address] MAIN OR 04 / [address] [place] [address] MAIN OR    Anesthesia Start: 9628 Anesthesia Stop: 7742    Procedure: left below knee amputation (Left ) Diagnosis: (left foot osteomylitis)    Surgeons

## 2021-04-10 NOTE — PROGRESS NOTES
[facility] HOSP - [facility]    Progress Note    Tammy Root Patient Status:  Inpatient    1970 MRN [id number]   Location [address] [facility] Attending Chavez Hernandez MD   Hosp Day # 3 PCP Santa Thakkar MD        Subjective:   Neha Saeed 29.0 04/10/2021    CO2 29.0 04/10/2021     (H) 04/10/2021     (H) 04/10/2021    CA 9.2 04/10/2021    CA 9.2 04/10/2021    ALB 2.1 (L) 04/10/2021    ALKPHO 239 (H) 04/10/2021    BILT 0.4 04/10/2021    TP 7.7 04/10/2021    AST 82 (H) 04/10/2021

## 2021-04-10 NOTE — PROGRESS NOTES
[facility] Hospitalist Progress Note     CC: Hospital Follow up    PCP: Blane Monge MD       Assessment/Plan:     Principal Problem:    Cellulitis in diabetic foot ([facility] [address]  Active Problems:    Osteomyelitis of left foot, unspecified type [facility])    Mr. Fatuma Lynn is a 52 yo questions and note understanding and agreeing with therapeutic plan as outlined     Nimesh Langley MD  [facility] Hospitalist  Answering Service number: [phone]      Subjective:     Feels well, afebrile. Ortho eval yesterday, plan for L BKA today.      OBJ 9.2  9.2    138 136  136   K 4.1 3.9 4.2  4.2    106 103  103   CO2 28.0 26.0 29.0  29.0       Recent Labs   Lab 04/10/21  0552   *   AST 82*   ALB 2.1*         Imaging:  XR FOOT, COMPLETE (MIN 3 VIEWS), LEFT (CPT=73630)    Result Date: micronized  67 mg Oral Nightly   • gabapentin  300 mg Oral BID   • lisinopril  5 mg Oral Nightly   • Rosuvastatin Calcium  10 mg Oral Nightly     • sodium chloride 100 mL/hr at 04/09/21 1334     glucose **OR** Glucose-Vitamin C **OR** dextrose **OR** gluco

## 2021-04-10 NOTE — PROGRESS NOTES
[facility] AND [facility] Infectious Disease Progress Note    Misti Session Patient Status:  Inpatient    1970 MRN [id number]   [place] [address] Attending Anastacia Green MD   Hosp Day # 3 PCP Kristina Clements MD     Subjective: 1.5g in 0.9% NaCl 250 mL, 15 mg/kg (Adjusted), Intravenous, Q12H  •  buPROPion HCl ER (XL) (WELLBUTRIN XL) 150 MG 24 hr tab 300 mg, 300 mg, Oral, Daily  •  Vitamin D3 (Cholecalciferol) cap 5,000 Units, 5,000 Units, Oral, Daily  •  fenofibrate micronized (L 04/10/2021    .0 04/10/2021    .0 04/10/2021    CREATSERUM 0.96 04/10/2021    CREATSERUM 0.96 04/10/2021    BUN 13 04/10/2021    BUN 13 04/10/2021     04/10/2021     04/10/2021    K 4.2 04/10/2021    K 4.2 04/10/2021     04/

## 2021-04-10 NOTE — ANESTHESIA PREPROCEDURE EVALUATION
Anesthesia PreOp Note    HPI:     Chris Lora is a 53year old male who presents for preoperative consultation requested by: Ha Starr MD    Date of Surgery: 4/7/2021 - 4/10/2021    Procedure(s):  left below knee amputation  Indication: lef Date Noted: 11/19/2018      Long-term insulin use ([facility] [address]         Date Noted: 11/19/2018      Uncontrolled type 2 diabetes mellitus with hyperglycemia, with long-term current use of insulin [facility])         Date Noted: 09/25/2018      Vitamin D deficiency ), Disp: 40 pen, Rfl: 3, 4/7/2021 at 0700  metFORMIN HCl 1000 MG Oral Tab, Take 1 tablet with breakfast and dinner, Disp: 180 tablet, Rfl: 3, 4/7/2021 at 0700  lisinopril 5 MG Oral Tab, Take 1 tablet (5 mg total) by mouth nightly.  TAKE 1 TABLET(5 MG) BY MO 25 Units at 04/09/21 1007  [MAR Hold] insulin detemir (LEVEMIR) 100 UNIT/ML flextouch 10 Units, 10 Units, Subcutaneous, Nightly, Jack Leslie MD, 5 Units at 04/09/21 2136  [facility] Hold] glucose (DEX4) oral liquid 15 g, 15 g, Oral, Q15 Min DAVIDN, Shayla Berger 300 mg at 04/09/21 1005  Vitamin D3 (Cholecalciferol) cap 5,000 Units, 5,000 Units, Oral, Daily, Jose Simpson MD, 5,000 Units at 04/09/21 1011  fenofibrate micronized (LOFIBRA) cap 67 mg, 67 mg, Oral, Nightly, Jose Simpson MD, 67 mg at 04/09/21 2 Asked        Stress Concern: Not Asked        Weight Concern: Not Asked        Special Diet: Not Asked        Back Care: Not Asked        Exercise: Not Asked        Bike Helmet: Not Asked        Seat Belt: Yes        Self-Exams: Not Asked    Social History CO2 29.0 04/10/2021    CO2 25.6 03/18/2021    BUN 13 04/10/2021    BUN 13 04/10/2021    BUN 29.0 (H) 03/18/2021    CREATSERUM 0.96 04/10/2021    CREATSERUM 0.96 04/10/2021    CREATSERUM 0.95 03/18/2021     (H) 04/10/2021     (H) 04/10/2021

## 2021-04-10 NOTE — OPERATIVE REPORT
[facility] OPERATING ROOM  Operative Note     Miko New Location: OR   [facility] [id number] MRN [id number]   Admission Date 4/7/2021 Operation Date 4/10/2021   Attending Physician Luan Raymond MD Operating Physician Elena Jiang MD measured and marked. Incision was made through skin and subcutaneous tissue sharply with a #10 blade. Fascia was incised over the anterior and lateral compartments and Bovie was used to rupa the tibia at planned amputation site.   Bovie was used to dissec keep rigid splint in place for 2 weeks and the patient will follow-up in orthopedic clinic for splint removal and wound check at this time. After the wound is fully healed the patient can start to be fit prosthetics.      Gayathri Aguayo MD  4/10/2021

## 2021-04-10 NOTE — PLAN OF CARE
Received pt from PACU. Pt is A&Ox4, VSS on 2 L O2. Denies pain, scheduled PO tylenol given. Voiding freely. Tolerating diet, slightly nauseous but not vomiting. IV fluids infusing, scheduled cefepime given.  Plan is to have PT/OT see pt tomorrow, call light Maintains optimal cardiac output and hemodynamic stability  Description: INTERVENTIONS:  - Monitor vital signs, rhythm, and trends  - Monitor for bleeding, hypotension and signs of decreased cardiac output  - Evaluate effectiveness of vasoactive medication planning if the patient needs post-hospital services based on physician/LIP order or complex needs related to functional status, cognitive ability or social support system  Outcome: Progressing

## 2021-04-10 NOTE — ANESTHESIA PROCEDURE NOTES
Airway  Date/Time: 4/10/2021 12:10 PM  Urgency: Elective    Airway not difficult    General Information and Staff    Patient location during procedure: OR  Anesthesiologist: Divine Man MD  Performed: anesthesiologist     Indications and Patient Coletta Primus

## 2021-04-10 NOTE — ANESTHESIA PROCEDURE NOTES
Peripheral Block    Date/Time: 4/10/2021 11:50 AM  Performed by: Carmen Stevenson MD  Authorized by: Carmen Stevenson MD       General Information and Staff    Start Time:  4/10/2021 11:40 AM  End Time:  4/10/2021 11:50 AM  Anesthesiologist:  Carmen Stevenson,

## 2021-04-11 RX ORDER — MORPHINE SULFATE 2 MG/ML
1 INJECTION, SOLUTION INTRAMUSCULAR; INTRAVENOUS EVERY 2 HOUR PRN
Status: DISCONTINUED | OUTPATIENT
Start: 2021-04-11 | End: 2021-04-13

## 2021-04-11 RX ORDER — DOCUSATE SODIUM 100 MG/1
100 CAPSULE, LIQUID FILLED ORAL 2 TIMES DAILY
Status: DISCONTINUED | OUTPATIENT
Start: 2021-04-11 | End: 2021-04-13

## 2021-04-11 RX ORDER — POLYETHYLENE GLYCOL 3350 17 G/17G
17 POWDER, FOR SOLUTION ORAL DAILY PRN
Status: DISCONTINUED | OUTPATIENT
Start: 2021-04-11 | End: 2021-04-13

## 2021-04-11 RX ORDER — MORPHINE SULFATE 2 MG/ML
2 INJECTION, SOLUTION INTRAMUSCULAR; INTRAVENOUS EVERY 2 HOUR PRN
Status: DISCONTINUED | OUTPATIENT
Start: 2021-04-11 | End: 2021-04-13

## 2021-04-11 RX ORDER — MORPHINE SULFATE 4 MG/ML
4 INJECTION, SOLUTION INTRAMUSCULAR; INTRAVENOUS
Status: DISCONTINUED | OUTPATIENT
Start: 2021-04-11 | End: 2021-04-13

## 2021-04-11 RX ORDER — OXYCODONE HYDROCHLORIDE AND ACETAMINOPHEN 5; 325 MG/1; MG/1
2 TABLET ORAL EVERY 4 HOURS PRN
Status: DISCONTINUED | OUTPATIENT
Start: 2021-04-11 | End: 2021-04-13

## 2021-04-11 RX ORDER — OXYCODONE HYDROCHLORIDE AND ACETAMINOPHEN 5; 325 MG/1; MG/1
1 TABLET ORAL EVERY 4 HOURS PRN
Status: DISCONTINUED | OUTPATIENT
Start: 2021-04-11 | End: 2021-04-13

## 2021-04-11 RX ORDER — MAGNESIUM OXIDE 400 MG (241.3 MG MAGNESIUM) TABLET
400 TABLET ONCE
Status: COMPLETED | OUTPATIENT
Start: 2021-04-11 | End: 2021-04-11

## 2021-04-11 NOTE — PLAN OF CARE
Pt is A&Ox4, VSS on room air. Complaining of severe leg pain, controlled with PRN norco and percocet. IV fluids stopped, vanco and cefepime given. Tolerating general diet, no n/v, ACHS accucheck. Voiding freely. Passing gas.  Up with 1 assist and the walker Find a regimen that works for me  - Keep my doctors and nurse informed when I am in pain  - PRN norco  - See additional Care Plan goals for specific interventions  Outcome: Progressing     Problem: CARDIOVASCULAR - ADULT  Goal: Maintains optimal cardiac ou preferences of patient/family/discharge partner  - Complete POLST form as appropriate  - Assess patient's ability to be responsible for managing their own health  - Refer to Case Management Department for coordinating discharge planning if the patient need

## 2021-04-11 NOTE — PHYSICAL THERAPY NOTE
PHYSICAL THERAPY EVALUATION - INPATIENT     Room Number: 459/459-A  Evaluation Date: 4/11/2021  Type of Evaluation: Initial   Physician Order: PT Eval and Treat    Presenting Problem: s/p L BKA on 4/11/21 by Dr. Baldev Mark  Reason for Therapy: Mobility Dysfu Research supports that patients with this level of impairment may benefit from home with HHPT. Recommended pt get a w/c for long distance ambulation.  Pt not interested in going to rehab at this time as he reports he is used to being non-WBing and utilizing Performed by Lety Gabriel DPM at [facility] OR   • [address] SITUATION  Type of Home: House   Home Layout: Two level  Stairs to Enter : 2  Railing: Yes  Stairs to Bedroom: 10  Railing: Yes    Lives With: Spouse;Son (son has sev wheelchair)?: A Little   -   Need to walk in hospital room?: A Little   -   Climbing 3-5 steps with a railing?: A Lot     AM-PAC Score:  Raw Score: 17   Approx Degree of Impairment: 50.57%   Standardized Score (AM-PAC Scale): 42.13   CMS Modifier (G-Code):

## 2021-04-11 NOTE — PLAN OF CARE
Problem: Patient Centered Care  Goal: Patient preferences are identified and integrated in the patient's plan of care  Description: Interventions:  - What would you like us to know as we care for you?  I had foot surgery this past December.  - Provide simeon hemodynamic stability  - Monitor arterial and/or venous puncture sites for bleeding and/or hematoma  - Assess quality of pulses, skin color and temperature  - Assess for signs of decreased coronary artery perfusion - ex.  Angina  - Evaluate fluid balance, a 0/10 pain. Plaster cast c/d/I, patient denies kneecap numbness, tingling. Call light in reach and safety needs met.

## 2021-04-11 NOTE — PROGRESS NOTES
[facility]    Progress Note    Steve Nova Patient Status:  Inpatient    1970 MRN [id number]   Location [facility] 4W/SW/SE Attending Mark Rodgers MD   Hosp Day # 4 PCP Ryan Cárdenas MD        Subjective:   Dash Bonilla (H) 04/10/2021    BILT 0.4 04/10/2021    TP 7.7 04/10/2021    AST 82 (H) 04/10/2021     (H) 04/10/2021    INR 1.21 (H) 06/07/2020    TSH 3.710 03/18/2021     01/13/2017    PSA 0.660 03/16/2020    ESRML 54 (H) 12/04/2020    CRP 4.99 (H) 12/04/

## 2021-04-11 NOTE — PROGRESS NOTES
[facility] Hospitalist Progress Note     CC: Hospital Follow up    PCP: Emily Mock MD       Assessment/Plan:     Principal Problem:    Cellulitis in diabetic foot ([facility] [address]  Active Problems:    Osteomyelitis of left foot, unspecified type [facility])    Mr. Zelda Ramirez is a 52 yo understanding and agreeing with therapeutic plan as outlined  Alexandro Devlin MD  [facility] Hospitalist  Answering Service number: [phone]      Subjective:     S/p BKA yesterday, block wore off overnight, having a lot of pain today.  Norco switched to p 92  92 87   CA 9.3 9.2  9.2 8.7    136  136 133*   K 3.9 4.2  4.2 4.6    103  103 101   CO2 26.0 29.0  29.0 29.0       Recent Labs   Lab 04/10/21  0552   *   AST 82*   ALB 2.1*         Imaging:  No results found.       Meds:     • docusat

## 2021-04-11 NOTE — OCCUPATIONAL THERAPY NOTE
OCCUPATIONAL THERAPY EVALUATION - INPATIENT     Room Number: 459/459-A  Evaluation Date: 4/11/2021  Type of Evaluation: Initial  Presenting Problem: s/p L BKA    Physician Order: IP Consult to Occupational Therapy  Reason for Therapy: ADL/IADL Dysfunction Research supports that patients with this level of impairment may benefit from home.      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

## 2021-04-11 NOTE — PROGRESS NOTES
Refugio Perez is a 53year old male. Patient presents with:  Fever  Swelling Edema      HPI:    Resting comfortably without complaints    REVIEW OF SYSTEMS:   A comprehensive 11 point review of systems was completed.   Pertinent positives and negatives trachea ML. NECK:  Supple, no masses, no lymphadenopathy. LUNGS:  Clear to auscultation b/l, no rhonchi, rales, or wheezes. CARDIO: RRR U8/J4, no rubs, clicks, heaves, or murmurs. GI:  Soft NT/ND, BS present, No masses , rebound, no HSM.   EXTREMITIES: 2.0 mmol/L   LACTIC ACID 3 HR POST POSITIVE   Result Value Ref Range    Lactic Acid 1.5 0.4 - 2.0 mmol/L   BASIC METABOLIC PANEL (8)   Result Value Ref Range    Glucose 62 (L) 70 - 99 mg/dL    Sodium 139 136 - 145 mmol/L    Potassium 4.1 3.5 - 5.1 mmol/L - 37.0 g/dL    RDW 13.6 11.0 - 15.0 %    RDW-SD 42.3 35.1 - 46.3 fL    .0 (H) 150.0 - 450.0 75(7)AW   BASIC METABOLIC PANEL (8)   Result Value Ref Range    Glucose 148 (H) 70 - 99 mg/dL    Sodium 136 136 - 145 mmol/L    Potassium 4.2 3.5 - 5.1 mmol/ BASIC METABOLIC PANEL (8)   Result Value Ref Range    Glucose 198 (H) 70 - 99 mg/dL    Sodium 133 (L) 136 - 145 mmol/L    Potassium 4.6 3.5 - 5.1 mmol/L    Chloride 101 98 - 112 mmol/L    CO2 29.0 21.0 - 32.0 mmol/L    Anion Gap 3 0 - 18 mmol/L    BUN 12 - 99 mg/dL   POCT GLUCOSE   Result Value Ref Range    POC Glucose  164 (H) 70 - 99 mg/dL   POCT GLUCOSE   Result Value Ref Range    POC Glucose  126 (H) 70 - 99 mg/dL   POCT GLUCOSE   Result Value Ref Range    POC Glucose  157 (H) 70 - 99 mg/dL   POCT GLUC Staphylococcus aureus, MRSA -  (no method available)     Cefazolin  Resistant      Clindamycin >=8 Resistant      Erythromycin >=8 Resistant      Gentamicin <=0.5 Sensitive      Levofloxacin >=8 Resistant      Oxacillin >=4 Resistant      Tetracycline > uL    Basophil Absolute 0.05 0.00 - 0.20 x10(3) uL    Immature Granulocyte Absolute 0.10 0.00 - 1.00 x10(3) uL    Neutrophil % 82.9 %    Lymphocyte % 8.0 %    Monocyte % 7.9 %    Eosinophil % 0.3 %    Basophil % 0.3 %    Immature Granulocyte % 0.6 %   CBC

## 2021-04-12 ENCOUNTER — APPOINTMENT (OUTPATIENT)
Dept: PICC SERVICES | Facility: HOSPITAL | Age: 51
DRG: 854 | End: 2021-04-12
Attending: INTERNAL MEDICINE
Payer: COMMERCIAL

## 2021-04-12 PROCEDURE — 02HV33Z INSERTION OF INFUSION DEVICE INTO SUPERIOR VENA CAVA, PERCUTANEOUS APPROACH: ICD-10-PCS | Performed by: INTERNAL MEDICINE

## 2021-04-12 PROCEDURE — B548ZZA ULTRASONOGRAPHY OF SUPERIOR VENA CAVA, GUIDANCE: ICD-10-PCS | Performed by: INTERNAL MEDICINE

## 2021-04-12 RX ORDER — MAGNESIUM OXIDE 400 MG (241.3 MG MAGNESIUM) TABLET
400 TABLET ONCE
Status: COMPLETED | OUTPATIENT
Start: 2021-04-12 | End: 2021-04-12

## 2021-04-12 RX ORDER — LIDOCAINE HYDROCHLORIDE 10 MG/ML
5 INJECTION, SOLUTION EPIDURAL; INFILTRATION; INTRACAUDAL; PERINEURAL ONCE
Status: DISCONTINUED | OUTPATIENT
Start: 2021-04-12 | End: 2021-04-13

## 2021-04-12 NOTE — CM/SW NOTE
4/13 243pm: Plan is for discharge home today, the prescription will stay the same and it is okay to skip the PM dose tonight and start care tomorrow. [facility] have been notified.   Pt. Will discharge home today 4/13.  -----------------  4/

## 2021-04-12 NOTE — PAYOR COMM NOTE
--------------  CONTINUED STAY REVIEW    Payor: [employer]  Subscriber #:  [id number]  Authorization Number: [id number]    Admit date: 4/7/21  Admit time:  8:17 PM    Admitting Physician: Malia Sanchez MD  Attending Physician:  DO Jo Ann Andre Nikki Wu RN      lisinopril tab 5 mg     Date Action Dose Route User    4/11/2021 1934 Given 5 mg Oral Sonja Pacheco RN      magnesium oxide (MAG-OX) tab 400 mg     Date Action Dose Route User    4/11/2021 1235 Given 400 mg Oral Nancy Mckeon RN      mo 04/10/2021     K 4.2 04/10/2021     K 4.2 04/10/2021      04/10/2021      04/10/2021     CO2 29.0 04/10/2021     CO2 29.0 04/10/2021      (H) 04/10/2021      (H) 04/10/2021     CA 9.2 04/10/2021     CA 9.2 04/10/2021     ALB 2.1 ( time:  54 minutes at 250 mmHg     Surgery in Detail: Patient was evaluated preoperatively has a deep foot infection with unsuccessful source control with local debridements and IV antibiotics.  Therefore, we discussed definitive treatment of the infection used to complete the amputation of the superficial posterior compartment in oblique fashion to allow for muscle coverage of the distal tibia.  Tourniquet was let down and any further bleeding was controlled with the Bovie.   The wound was copiously irrigate ceftriaxone on 11/23/19  4.  DMII   -strict glycemic control for optimal healing  5.   Dispo  -anticipate at least 4 weeks of IV abx     If you have any questions or concerns please call [facility] at [phone].      Roel Nyhan, NP  4/10/2021  2:08 PM

## 2021-04-12 NOTE — PROGRESS NOTES
[facility] AND [facility]  [address] Infectious Disease  Progress Note    Riccardo Montero Patient Status:  Inpatient    1970 MRN [id number]   Location [facility] 4W/SW/SE Attending Alexy Enriquez,    Hosp Day # 5 PCP Ronald Mills MD     Subjective:  Patient osteomyelitis involving the bases of all 5 metatarsals, all 3 cuneiforms, cuboid and navicular bone.       Early osteomyelitis involving the talar neck as well as calcaneal body and anterior calcaneal process.       Extensive dorsal foot cellulitis and omaira

## 2021-04-12 NOTE — PHYSICAL THERAPY NOTE
PHYSICAL THERAPY TREATMENT NOTE - INPATIENT     Room Number: 459/459-A       Presenting Problem: s/p L BKA on 4/11/21 by Dr. Baldev Mark    Problem List  Principal Problem:    Cellulitis in diabetic foot [facility])  Active Problems:    Osteomyelitis of left foot, u bedclothes, sheets and blankets)?: A Little   -   Sitting down on and standing up from a chair with arms (e.g., wheelchair, bedside commode, etc.): A Little   -   Moving from lying on back to sitting on the side of the bed?: A Little   How much help from a Goal #5   Current Status In progress   Goal #6    Goal #6  Current Status

## 2021-04-12 NOTE — HOME CARE LIAISON
Received referral from [facility]. Spoke w/ patient and provided with list of [place] [address] providers from [facility]. Pt has hx of [facility]. Patient choice is [facility].  Agency reserved in [facility] and contact information placed on AVS.Financial interest disclosur

## 2021-04-12 NOTE — PROGRESS NOTES
[facility] Hospitalist Progress Note     CC: Hospital Follow up    PCP: Liat Crespo MD       Assessment/Plan:   Mr. Chung Oviedo is a 54 yo M with PMH of DM2, HTN, HL prior hx OM s/p transmetatarsal amputation of L foot 12/30/20 who presented with fever, fatigue, L foot pa Weights  04/07/21 1610 : 250 lb (113.4 kg)  04/08/21 0613 : 274 lb (124.3 kg)  03/23/21 1443 : 258 lb (117 kg)    Exam   GEN: male in NAD  Pulm: CTABL  CV: RRR  ABD: Soft, non-tender, non-distended, +BS  MSK: s/p L BKA  Psych: Affect- normal  SKIN: warm, d Oral Daily   • Vitamin D3 (Cholecalciferol)  5,000 Units Oral Daily   • fenofibrate micronized  67 mg Oral Nightly   • gabapentin  300 mg Oral BID   • lisinopril  5 mg Oral Nightly   • Rosuvastatin Calcium  10 mg Oral Nightly       oxyCODONE-acetaminophen

## 2021-04-12 NOTE — PAYOR COMM NOTE
--------------  CONTINUED STAY REVIEW    Payor: [employer]  Subscriber #:  [id number]  Authorization Number: [id number]    Admit date: 4/7/21  Admit time:  8:17 PM    Admitting Physician: Noni Caballero MD  Attending Physician:  DO Shanae Whitman Patricia Oropeza RN    4/11/2021 1740 Given 3 Units Subcutaneous (Right Upper Arm) Jensen Cleary RN    4/11/2021 1238 Given 3 Units Subcutaneous (Right Upper Arm) Shefali Fontana, TEE      insulin detemir (LEVEMIR) 100 UNIT/ML flextouch 25 Units     Date Ac Oral, resp.  rate 20, height 6' 3\" (1.905 m), weight 250 lb (113.4 kg), SpO2 97 %.     Intake/Output:     Intake/Output Summary (Last 24 hours) at 4/12/2021 0946  Last data filed at 4/12/2021 0619      Gross per 24 hour   Intake 3118 ml   Output 6750 ml posterior.       Extensive subcutaneous gas throughout the dorsal foot soft tissues.       This report was communicated by telephone to Dr. Kirill Joseph on 4/8/2021 at 1145 hours.          Assessment and Plan:     1.  MRSA sepsis with 2/2 blood cultures positiv

## 2021-04-12 NOTE — PLAN OF CARE
Problem: Patient Centered Care  Goal: Patient preferences are identified and integrated in the patient's plan of care  Description: Interventions:  - What would you like us to know as we care for you?  I have had 5 surgeries on my left foot/leg  - Provide Monitor vital signs, rhythm, and trends  - Monitor for bleeding, hypotension and signs of decreased cardiac output  - Evaluate effectiveness of vasoactive medications to optimize hemodynamic stability  - Monitor arterial and/or venous puncture sites for bl complex needs related to functional status, cognitive ability or social support system  Outcome: Progressing     Patient received A&Ox3. VSS. PRN pain meds given and patient is comfortable in bed. Plaster cast in place over BKA.  Call light in reach and saf

## 2021-04-12 NOTE — PROGRESS NOTES
[facility]    Progress Note    Miko Shaq Patient Status:  Inpatient    1970 MRN [id number]   Location [facility] 4W/SW/SE Attending Dennis Gordon, DO   Hosp Day # 5 PCP Juana Parker MD        Subjective:   Miko New 04/12/2021    HCT 29.9 (L) 04/12/2021    HCT 29.9 (L) 04/12/2021    .0 (H) 04/12/2021    .0 (H) 04/12/2021    CREATSERUM 0.94 04/12/2021    CREATSERUM 0.94 04/12/2021    BUN 12 04/12/2021    BUN 12 04/12/2021     (L) 04/12/2021    NA 13

## 2021-04-12 NOTE — PLAN OF CARE
Patient stable. Patient with below the knee amputation and plaster cast. Neuro exam within normal limits. Patient pain controlled with percocet. PICC line inserted today to left arm, patient tolerated well.  Patient calling appropriately for needs, bed in l my doctors and nurse informed when I am in pain  - PRN norco  - See additional Care Plan goals for specific interventions  Outcome: Progressing     Problem: CARDIOVASCULAR - ADULT  Goal: Maintains optimal cardiac output and hemodynamic stability  Descripti partner  - Complete POLST form as appropriate  - Assess patient's ability to be responsible for managing their own health  - Refer to Case Management Department for coordinating discharge planning if the patient needs post-hospital services based on physic

## 2021-04-13 VITALS
RESPIRATION RATE: 18 BRPM | SYSTOLIC BLOOD PRESSURE: 138 MMHG | HEART RATE: 93 BPM | DIASTOLIC BLOOD PRESSURE: 74 MMHG | BODY MASS INDEX: 31.08 KG/M2 | WEIGHT: 250 LBS | OXYGEN SATURATION: 96 % | HEIGHT: 75 IN | TEMPERATURE: 98 F

## 2021-04-13 RX ORDER — MAGNESIUM OXIDE 400 MG (241.3 MG MAGNESIUM) TABLET
400 TABLET ONCE
Status: COMPLETED | OUTPATIENT
Start: 2021-04-13 | End: 2021-04-13

## 2021-04-13 RX ORDER — VANCOMYCIN/0.9 % SOD CHLORIDE 1.75 G/5
15 PLASTIC BAG, INJECTION (ML) INTRAVENOUS EVERY 12 HOURS
Status: DISCONTINUED | OUTPATIENT
Start: 2021-04-13 | End: 2021-04-13

## 2021-04-13 NOTE — PLAN OF CARE
Patient alert and oriented throughout shift, vital signs stable on room air, cleared for discharge. Patient will be discharged with Left PICC line to get IV antibiotics outpatient via home health. Patient cleared by PT/OT.  PIV removed, AVS (medication inst appointments   - Continue to get stronger when I am go home  - See additional Care Plan goals for specific interventions  Outcome: Adequate for Discharge  Goal: Patient/Family Short Term Goal  Description: Patient's Short Term Goal: Keep my pain below a 7/ barriers to discharge w/pt and caregiver  - Include patient/family/discharge partner in discharge planning  - Arrange for needed discharge resources and transportation as appropriate  - Identify discharge learning needs (meds, wound care, etc)  - Arrange f

## 2021-04-13 NOTE — OCCUPATIONAL THERAPY NOTE
OCCUPATIONAL THERAPY TREATMENT NOTE - INPATIENT        Room Number: 459/459-A           Presenting Problem: s/p L BKA    Problem List  Principal Problem:    Cellulitis in diabetic foot ([facility] [address]  Active Problems:    Osteomyelitis of left foot, unspecified type ok.\")  Location: LLE  Management Techniques: Repositioning     ACTIVITY TOLERANCE  114 bpm post activity    ACTIVITIES OF DAILY LIVING ASSESSMENT  AM-PAC ‘6-Clicks’ Inpatient Daily Activity Short Form  How much help from another person does the patient cu

## 2021-04-13 NOTE — PROGRESS NOTES
[facility] Hospitalist Progress Note     CC: Hospital Follow up    PCP: Denisha Mccabe MD       Assessment/Plan:   Mr. Marvin Shearer is a 52 yo M with PMH of DM2, HTN, HL prior hx OM s/p transmetatarsal amputation of L foot 12/30/20 who presented with fever, fatigue, L foot pa Net -4480 ml       Last 3 Weights  04/07/21 1610 : 250 lb (113.4 kg)  04/08/21 0613 : 274 lb (124.3 kg)  03/23/21 1443 : 258 lb (117 kg)    Exam   GEN: male in NAD  Pulm: CTABL  CV: RRR  ABD: Soft, non-tender, non-distended, +BS  MSK: s/p L BKA  Psych: A Units Subcutaneous TID CC   • cefepime  1 g Intravenous Q8H   • vancomycin  15 mg/kg (Adjusted) Intravenous Q12H   • buPROPion HCl ER (XL)  300 mg Oral Daily   • Vitamin D3 (Cholecalciferol)  5,000 Units Oral Daily   • fenofibrate micronized  67 mg Oral Ni

## 2021-04-13 NOTE — PLAN OF CARE
Vital signs stable. Patient is alert and oriented x4. Pain managed with Percocet. Patient is voiding freely. ACHS. IV abx as scheduled. Fall precautions in place and maintained. Call light within reach, bed locked and in lowest position.  Instructed patien for me  - Keep my doctors and nurse informed when I am in pain  - PRN norco  - See additional Care Plan goals for specific interventions  Outcome: Progressing     Problem: CARDIOVASCULAR - ADULT  Goal: Maintains optimal cardiac output and hemodynamic stabi patient/family/discharge partner  - Complete POLST form as appropriate  - Assess patient's ability to be responsible for managing their own health  - Refer to Case Management Department for coordinating discharge planning if the patient needs post-hospital

## 2021-04-13 NOTE — PHYSICAL THERAPY NOTE
PHYSICAL THERAPY TREATMENT NOTE - INPATIENT     Room Number: 459/459-A       Presenting Problem: s/p L BKA on 4/11/21 by Dr. Riggs Lipoma    Problem List  Principal Problem:    Cellulitis in diabetic foot [facility])  Active Problems:    Osteomyelitis of left foot, u Static Sitting: Good  Dynamic Sitting: Good           Static Standing: Fair  Dynamic Standing: Fair -    ACTIVITY TOLERANCE  Pulse: 88  Heart Rate Source: Monitor  Resp: 18  BP: 135/74  BP Patient is able to demonstrate supine - sit EOB @ level: modified independent     Goal #1   Current Status CGA   Goal #2 Patient is able to demonstrate transfers Sit to/from Stand at assistance level: modified independent with least restrictive AD.       Go

## 2021-04-13 NOTE — PAYOR COMM NOTE
--------------  CONTINUED STAY REVIEW    Payor: [facility]  Subscriber #:  [id number]  Authorization Number: [id number]    Admit date: 4/7/21  Admit time:  8:17 PM    Admitting Physician: Malia Sanchez MD  Attending Physician:  DO Jo Ann Andre [id number]  Resp:  [18] 18  BP: (122-159)/(60-84) 122/60        Intake/Output:     Intake/Output Summary (Last 24 hours) at 4/13/2021 1028  Last data filed at 4/13/2021 0807      Gross per 24 hour   Intake 2220 ml   Output 6700 ml   Net -4480 ml         La Cefepime HCl (MAXIPIME) 1 g in sodium chloride 0.9% 100 mL IVPB-MBP     Date Action Dose Route User    4/13/2021 0619 New Bag 1 g Intravenous Claudene Lucy, RN    4/12/2021 2320 New Bag 1 g Intravenous Claudene Lucy, RN    4/12/2021 6144 New Bag 1 g In insulin detemir (LEVEMIR) 100 UNIT/ML flextouch 60 Units     Date Action Dose Route User    4/13/2021 1030 Given 60 Units Subcutaneous (Right Upper Arm) Madelaine Fontana, RN      lisinopril tab 5 mg     Date Action Dose Route User    4/12/2021 2103 G increase, will change to home dosing  - accuchecks QID, hypoglycemic protocol, SSI     HTN  - home lisinopril 5     Hyperlipidemia   - statin     Depression  - cont meds, buproprion 300      FN:  - Diet: diabetic      DVT Prophy: SCD, heparin sub q      As No rashes or lesions     Assessment & Plan     Assessment and Plan:     Cellulitis in diabetic foot ([address]          Osteomyelitis of left foot, unspecified type ([address]  Status post left below-knee amputation.   Continue antibiotics per infectious disease angel Left lower leg amputation sent for gross pathology     Drains: None     Condition: Stable     Estimated Blood Loss: Blood Output: 100 mL (4/10/2021  1:53 PM)        IVF: 1 L     Tourniquet time:  54 minutes at 250 mmHg     Surgery in Detail: Patient was ev proximal to the tibia.  Posterior neurovascular bundle was identified and ligated in a similar fashion and the nerve was cut sharply and allowed to retract proximally.  Amputation knife was then used to complete the amputation of the superficial posterior

## 2021-04-13 NOTE — DISCHARGE SUMMARY
General Medicine Discharge Summary     Patient ID:  Steve Nova  53year old  6/30/1970    Admit date: 4/7/2021    Discharge date and time: 4/13/21    Attending Physician: DO Seun Wagner Procedure(s) (LRB):  left below knee amputation (Left)     Patient instructions:      Discharge medications reconciled with current medication list     Current Discharge Medication List    CONTINUE these medications which have NOT CHANGED    buPROPion HC 30 minutes    Meme Jeter, DO

## 2021-04-13 NOTE — PROGRESS NOTES
[facility] dosing service    Follow-up Pharmacokinetic Consult for Vancomycin Dosing     Hannah Blanco is a 53year old patient who is being treated for MRSA sepsis/bacteremia.    Patient is on day 7 of Vancomycin and is currently receiving 1.5 gm IV Q pharmacokinetics, actual body weight of 113.4 kg and renal function). 2.  Will re-check Vancomycin trough level in ~ 3-5 days. Goal trough level ~15 ug/mL. 3.  Pharmacy will need Scr daily while on Vancomycin to assess renal function.     4.  Pharmac

## 2021-04-13 NOTE — PROGRESS NOTES
[facility] AND [facility] Infectious Disease  Progress Note    Chris Lora Patient Status:  Inpatient    1970 MRN [id number]   Location [facility] 4W/SW/SE Attending Katie Glover, DO   Hosp Day # 6 PCP Kirti Hodgson MD     Subjective:  Patient     Extensive subcutaneous gas throughout the dorsal foot soft tissues.       This report was communicated by telephone to Dr. Deysi Schumacher on 4/8/2021 at 1145 hours.          Assessment and Plan:     1.  MRSA sepsis with 2/2 blood cultures positive for MRSA

## 2021-04-19 ENCOUNTER — LAB REQUISITION (OUTPATIENT)
Dept: LAB | Facility: HOSPITAL | Age: 51
End: 2021-04-19
Payer: COMMERCIAL

## 2021-04-19 DIAGNOSIS — Z89.512 ACQUIRED ABSENCE OF LEFT LEG BELOW KNEE (HCC): ICD-10-CM

## 2021-04-19 PROCEDURE — 80202 ASSAY OF VANCOMYCIN: CPT | Performed by: INTERNAL MEDICINE

## 2021-04-19 PROCEDURE — 85025 COMPLETE CBC W/AUTO DIFF WBC: CPT | Performed by: INTERNAL MEDICINE

## 2021-04-19 PROCEDURE — 86140 C-REACTIVE PROTEIN: CPT | Performed by: INTERNAL MEDICINE

## 2021-04-19 PROCEDURE — 80053 COMPREHEN METABOLIC PANEL: CPT | Performed by: INTERNAL MEDICINE

## 2021-04-20 ENCOUNTER — OFFICE VISIT (OUTPATIENT)
Dept: ORTHOPEDICS CLINIC | Facility: CLINIC | Age: 51
End: 2021-04-20
Payer: COMMERCIAL

## 2021-04-20 DIAGNOSIS — S88.119A BELOW KNEE AMPUTATION (HCC): Primary | ICD-10-CM

## 2021-04-20 PROCEDURE — 99024 POSTOP FOLLOW-UP VISIT: CPT | Performed by: ORTHOPAEDIC SURGERY

## 2021-04-20 NOTE — PROGRESS NOTES
NURSING INTAKE COMMENTS: Patient presents with:  New Patient: Follow up from surgery on 4/10/2021 in the hospital, patient denies any pain, fever or chills.       HPI: This 53year old male presents today with complaints of routine follow-up status post lef Tab Take 1 tablet (5 mg total) by mouth nightly. TAKE 1 TABLET(5 MG) BY MOUTH DAILY 90 tablet 1   • Fenofibrate 54 MG Oral Tab Take 1 tablet (54 mg total) by mouth daily.  (Patient taking differently: Take 54 mg by mouth nightly.  ) 90 tablet 1   • gabapent Systems:  GENERAL: feels generally well, no recent fevers or chills, no significant weight loss or weight gain  MUSCULOSKELETAL: See HPI  NEURO: See HPI    Physical Examination:    There were no vitals taken for this visit.   General appearance: alert and o Subcutaneous emphysema is present in the soft tissues. 4. Postoperative changes of transmetatarsal amputations are noted.     Dictated by (CST): Joaquina Farnsworth MD on 4/07/2021 at 5:22 PM     Finalized by (CST): Joaquina Farnsworth MD on 4/07/2021 at 5:26 PM effusions are seen involving the tibiotalar and subtalar joints. TENDONS: Extensive fluid is seen within the tibialis posterior and flexor hallucis longus tendon sheaths. No retracted tear.  OTHER: There is extensive subcutaneous edema and subcutaneous gas Room: 217970-P Accession: [id number] HT:(75in) WT:(249.5lb)                       BP: 140 / 69 ------------------------------------------------------------------- Indications:      MRSA infection, evaluate for endocarditis. ------------------------------- The estimated ejection fraction was 60%. Wall motion was normal; there were no regional wall motion abnormalities. Left ventricular diastolic function parameters were normal. Aortic valve:   Trileaflet; mildly thickened leaflets.  There is sclerosis without 36    %        25 - 43  LV PW thickness, ED                       0.9   cm       0.6 - 1.0  IVS/LV PW ratio, ED                       1.02           ---------  LV end-diastolic volume,                  133   ml       67 - 155  Teichholz MM Value          Reference  Aortic root ID                            3.2   cm       &lt;4.5   Left atrium                               Value          Reference  LA ID, A-P, ES                    (H)     4.4   cm       3.0 - 4.0  LA volume, ES, 2-p infection. Plan: Patient will continue a dry compressive dressing which was applied today. Dressing changes daily with local wound care. Follow-up in 10 days for repeat evaluation and most likely staple removal and then referral for prosthetics.     Jeison Combs

## 2021-04-26 ENCOUNTER — PATIENT MESSAGE (OUTPATIENT)
Dept: ORTHOPEDICS CLINIC | Facility: CLINIC | Age: 51
End: 2021-04-26

## 2021-04-26 ENCOUNTER — TELEPHONE (OUTPATIENT)
Dept: ORTHOPEDICS CLINIC | Facility: CLINIC | Age: 51
End: 2021-04-26

## 2021-04-26 NOTE — TELEPHONE ENCOUNTER
Per pt is having pain and states there is redness around incision area from 4/10 surgery. Please call thank you.

## 2021-04-26 NOTE — TELEPHONE ENCOUNTER
S/w pt and he states redness/pain increased on 4/25/21. He denies any fever, chills, discharge. He is unsure if he is wrapping the leg too tight with ace. Pt is still on IV Vanco by Dr Kaylene Wood. Do you have any concerns?

## 2021-04-26 NOTE — TELEPHONE ENCOUNTER
From: Vy Tilley  To: Benny Moncada MD  Sent: 4/26/2021 11:15 AM CDT  Subject: Visit Follow-up Question    These are images of my left leg post-amputation.  I am experiencing a bit of redness, and some stinging pain along the incision line and d

## 2021-04-27 ENCOUNTER — OFFICE VISIT (OUTPATIENT)
Dept: ORTHOPEDICS CLINIC | Facility: CLINIC | Age: 51
End: 2021-04-27
Payer: COMMERCIAL

## 2021-04-27 ENCOUNTER — LAB REQUISITION (OUTPATIENT)
Dept: LAB | Facility: HOSPITAL | Age: 51
End: 2021-04-27
Payer: COMMERCIAL

## 2021-04-27 DIAGNOSIS — M14.671 CHARCOT'S JOINT OF RIGHT FOOT: ICD-10-CM

## 2021-04-27 DIAGNOSIS — S88.119A BELOW KNEE AMPUTATION (HCC): Primary | ICD-10-CM

## 2021-04-27 DIAGNOSIS — E11.69 TYPE 2 DIABETES MELLITUS WITH OTHER SPECIFIED COMPLICATION (HCC): ICD-10-CM

## 2021-04-27 PROCEDURE — 86140 C-REACTIVE PROTEIN: CPT | Performed by: INTERNAL MEDICINE

## 2021-04-27 PROCEDURE — 85652 RBC SED RATE AUTOMATED: CPT | Performed by: INTERNAL MEDICINE

## 2021-04-27 PROCEDURE — 99024 POSTOP FOLLOW-UP VISIT: CPT | Performed by: ORTHOPAEDIC SURGERY

## 2021-04-27 PROCEDURE — 80202 ASSAY OF VANCOMYCIN: CPT | Performed by: INTERNAL MEDICINE

## 2021-04-27 PROCEDURE — 80053 COMPREHEN METABOLIC PANEL: CPT | Performed by: INTERNAL MEDICINE

## 2021-04-27 PROCEDURE — 85025 COMPLETE CBC W/AUTO DIFF WBC: CPT | Performed by: INTERNAL MEDICINE

## 2021-04-27 NOTE — PROGRESS NOTES
NURSING INTAKE COMMENTS: Patient presents with: Follow - Up: Some redness and irritation to incision.  Scheduled this Friday for staple removal.       HPI: This 53year old male presents today with complaints of follow-up left below-knee amputation and rig breakfast. Take 60 Units with breakfast ) 40 pen 3   • metFORMIN HCl 1000 MG Oral Tab Take 1 tablet with breakfast and dinner 180 tablet 3   • lisinopril 5 MG Oral Tab Take 1 tablet (5 mg total) by mouth nightly.  TAKE 1 TABLET(5 MG) BY MOUTH DAILY 90 table Alcohol/week: 1.0 standard drinks        Types: 1 Standard drinks or equivalent per week      Drug use: No      Sexual activity: Not on file       Review of Systems:  GENERAL: feels generally well, no recent fevers or chills, no significant weight loss or osteopenia of the tarsal bones is appreciated. No suspicious osseous lesions are seen. There is mild left calcaneal enthesopathy. SOFT TISSUES: Circumferential soft tissue swelling is apparent. Negative for discernible radiopaque foreign body.  There appea normal marrow signal seen throughout all 5 of the metatarsal bases, cuboid, navicular and all 3 cuneiform bones. To a lesser extent, there is heterogeneous T1 marrow signal seen within the talar neck as well as throughout majority of the calcaneus.   The t CARD ECHO 2D DOPPLER CONTRAST (CPT=93306)    Result Date: 4/9/2021                    [facility]* -------------------------------------------------------------------      Transthoracic Echocardiography M-mode, complete 2D, complete well. There were no complications. Transthoracic echocardiography. M-mode, complete 2D, complete spectral Doppler, and color Doppler. Patient YOB: 1970. Patient is 50yr old. Gender: male. BMI: 31.2kg/m^2. BSA: 2.48m^2.   Patient status:  Arnel Alvarado study:  Image quality was poor. Systemic veins: Inferior vena cava: The vessel was normal in size.  The respirophasic diameter changes were in the normal range (= 50%), consistent with normal central venous pressure. ---------------------------------------- ---------  Aortic mean gradient, S                   8     mm Hg    ---------  Aortic peak gradient, S                   14    mm Hg    ---------  Aortic valve area, VTI                    2.77  cm^2     ---------  Velocity ratio, peak, LVOT/AV 04/09/2021 12:43 HenriqueOldhams Height           Lab Results   Component Value Date    WBC 8.6 04/19/2021    HGB 11.3 (L) 04/19/2021    .0 (H) 04/19/2021      Lab Results   Component Value Date    GLU 91 04/19/2021    BUN 24 (H) 04/19/2021    CREA

## 2021-05-03 ENCOUNTER — LAB REQUISITION (OUTPATIENT)
Dept: LAB | Facility: HOSPITAL | Age: 51
End: 2021-05-03
Payer: COMMERCIAL

## 2021-05-03 DIAGNOSIS — Z89.512 ACQUIRED ABSENCE OF LEFT LEG BELOW KNEE (HCC): ICD-10-CM

## 2021-05-03 PROCEDURE — 80202 ASSAY OF VANCOMYCIN: CPT | Performed by: INTERNAL MEDICINE

## 2021-05-03 PROCEDURE — 86140 C-REACTIVE PROTEIN: CPT | Performed by: INTERNAL MEDICINE

## 2021-05-03 PROCEDURE — 85025 COMPLETE CBC W/AUTO DIFF WBC: CPT | Performed by: INTERNAL MEDICINE

## 2021-05-03 PROCEDURE — 80053 COMPREHEN METABOLIC PANEL: CPT | Performed by: INTERNAL MEDICINE

## 2021-05-03 PROCEDURE — 85652 RBC SED RATE AUTOMATED: CPT | Performed by: INTERNAL MEDICINE

## 2021-05-14 ENCOUNTER — PATIENT MESSAGE (OUTPATIENT)
Dept: ORTHOPEDICS CLINIC | Facility: CLINIC | Age: 51
End: 2021-05-14

## 2021-05-14 DIAGNOSIS — Z89.519 STATUS POST BELOW KNEE AMPUTATION, UNSPECIFIED LATERALITY (HCC): Primary | ICD-10-CM

## 2021-05-17 NOTE — TELEPHONE ENCOUNTER
From: Kellie Francisco  To: Emely Silva MD  Sent: 5/14/2021 7:33 PM CDT  Subject: Referral Request    I am hoping you can help. My prosthetist has advised me to utilize the PT services of [facility] at [facility].  All the contact i

## 2021-06-02 NOTE — PAYOR COMM NOTE
--------------  DISCHARGE REVIEW    Payor: SAEID FOX  Subscriber #:  [id number]  Authorization Number: [id number]    [facility] AUDIT CLINICAL 4/10 TO 4/13    Admit date: 4/7/21  Admit time:   8:17 PM  Discharge Date: 4/13/2021  6:00 PM     Admitting Physician: navicular bone  - podiatry recommended L BKA, ortho consulted, s/p L BKA 4/10/21.      MRSA bacteremia  - continue vancomycin  - repeat blood cultures NGTD  - TTE without vegetation noted  - will need 4 weeks IV abx on discharge even after surgery, end of deficits.   Derm:  Warm, dry, free from rashes.     Lab Data Review:        Lab Results   Component Value Date     WBC 10.0 04/12/2021     WBC 10.0 04/12/2021     HGB 9.2 04/12/2021     HGB 9.2 04/12/2021     HCT 29.9 04/12/2021     HCT 29.9 04/12/2021     Temp 97.9 °F (36.6 °C) (Oral)   Resp 18   Ht 6' 3\" (1.905 m)   Wt 250 lb (113.4 kg)   SpO2 99%   BMI 31.25 kg/m²   GENERAL:  Awake, alert, oriented x3. Non-tox, non-septic and in NAD.   HEENT:  Normocephalic, Nonicteric, Conjunctiva pale, Oropharynx clear PACU.     Objective:     Allergies:  No Known Allergies     Medications:     Current Facility-Administered Medications:   •  [MAR Hold] scopolamine (TRANSDERM-SCOP) patch, 1 patch, Transdermal, Q72H  •  [MAR Hold] Cetirizine HCl (ZYRTEC) tab 5 mg, 5 mg, Or Nightly  •  gabapentin (NEURONTIN) cap 300 mg, 300 mg, Oral, BID  •  lisinopril tab 5 mg, 5 mg, Oral, Nightly  •  Rosuvastatin Calcium (CRESTOR) tab 10 mg, 10 mg, Oral, Nightly     Facility-Administered Medications Ordered in Other Encounters:   •  Dana  04/10/2021     CO2 29.0 04/10/2021     CO2 29.0 04/10/2021      04/10/2021      04/10/2021     CA 9.2 04/10/2021     CA 9.2 04/10/2021     ALB 2.1 04/10/2021     ALKPHO 239 04/10/2021     BILT 0.4 04/10/2021     TP 7.7 04/10/2021     A

## 2021-08-02 ENCOUNTER — MED REC SCAN ONLY (OUTPATIENT)
Dept: ORTHOPEDICS CLINIC | Facility: CLINIC | Age: 51
End: 2021-08-02

## 2021-08-17 PROBLEM — L03.119 CELLULITIS IN DIABETIC FOOT: Status: RESOLVED | Noted: 2021-04-07 | Resolved: 2021-08-17

## 2021-08-17 PROBLEM — L03.116 CELLULITIS OF LEFT FOOT: Status: RESOLVED | Noted: 2020-07-27 | Resolved: 2021-08-17

## 2021-08-17 PROBLEM — S91.302A OPEN WOUND OF LEFT FOOT: Status: RESOLVED | Noted: 2020-12-30 | Resolved: 2021-08-17

## 2021-08-17 PROBLEM — L03.119 CELLULITIS IN DIABETIC FOOT (HCC): Status: RESOLVED | Noted: 2021-04-07 | Resolved: 2021-08-17

## 2021-08-17 PROBLEM — E78.1 HYPERTRIGLYCERIDEMIA: Status: RESOLVED | Noted: 2019-10-07 | Resolved: 2021-08-17

## 2021-08-17 PROBLEM — M21.6X2 ACQUIRED CAVOVARUS FOOT DEFORMITY, LEFT: Status: RESOLVED | Noted: 2020-06-29 | Resolved: 2021-08-17

## 2021-08-17 PROBLEM — Z89.512 HISTORY OF LEFT BELOW KNEE AMPUTATION (HCC): Status: ACTIVE | Noted: 2021-08-17

## 2021-08-17 PROBLEM — M86.9 OSTEOMYELITIS OF LEFT FOOT, UNSPECIFIED TYPE (HCC): Status: RESOLVED | Noted: 2021-04-07 | Resolved: 2021-08-17

## 2021-08-17 PROBLEM — E11.628 CELLULITIS IN DIABETIC FOOT (HCC): Status: RESOLVED | Noted: 2021-04-07 | Resolved: 2021-08-17

## 2021-08-17 PROBLEM — E11.628 CELLULITIS IN DIABETIC FOOT: Status: RESOLVED | Noted: 2021-04-07 | Resolved: 2021-08-17

## 2021-08-23 ENCOUNTER — OFFICE VISIT (OUTPATIENT)
Dept: PODIATRY CLINIC | Facility: CLINIC | Age: 51
End: 2021-08-23
Payer: COMMERCIAL

## 2021-08-23 DIAGNOSIS — E11.40 TYPE 2 DIABETES, UNCONTROLLED, WITH NEUROPATHY (HCC): ICD-10-CM

## 2021-08-23 DIAGNOSIS — L60.0 ONYCHOCRYPTOSIS: Primary | ICD-10-CM

## 2021-08-23 DIAGNOSIS — E11.65 TYPE 2 DIABETES, UNCONTROLLED, WITH NEUROPATHY (HCC): ICD-10-CM

## 2021-08-23 DIAGNOSIS — L03.031 PARONYCHIA OF TOE OF RIGHT FOOT: ICD-10-CM

## 2021-08-23 PROCEDURE — 99213 OFFICE O/P EST LOW 20 MIN: CPT | Performed by: PODIATRIST

## 2021-08-23 RX ORDER — AMOXICILLIN AND CLAVULANATE POTASSIUM 875; 125 MG/1; MG/1
1 TABLET, FILM COATED ORAL 2 TIMES DAILY
Qty: 14 TABLET | Refills: 0 | Status: SHIPPED | OUTPATIENT
Start: 2021-08-23

## 2021-08-23 NOTE — PROGRESS NOTES
Oswaldo Valles is a 49year old male.  Patient presents with:  Toenail Care: right foot great toe, has been for 2 weeks now, denies any pain, mild drainage, FBS this am 111, last A1c was on 8/5 at 8.5        HPI:   Gentleman who is well-known to me venecia Oral Tab Take 1 tablet by mouth daily. • TURMERIC OR Take 1 capsule by mouth daily with dinner. • CHOLECALCIFEROL 5000 units Oral Tab Take 1 tablet by mouth daily.  Take 1 tablet daily      • FREESTYLE JOSE 14 DAY SENSOR Does not apply Misc 1 eac years: 45    Vaping Use      Vaping Use: Never used    Substance and Sexual Activity      Alcohol use: Not Currently        Alcohol/week: 1.0 standard drinks        Types: 1 Standard drinks or equivalent per week      Drug use: No    Other Topics      Conc soaking twice daily.   Because there was infection present I placed him on Augmentin twice daily and will soak his foot twice daily in warm soapy water apply antibiotic ointment as well as a Band-Aid and I will see him in 1 week for checkup but sooner if re

## 2021-08-31 ENCOUNTER — OFFICE VISIT (OUTPATIENT)
Dept: PODIATRY CLINIC | Facility: CLINIC | Age: 51
End: 2021-08-31
Payer: COMMERCIAL

## 2021-08-31 DIAGNOSIS — L60.0 ONYCHOCRYPTOSIS: Primary | ICD-10-CM

## 2021-08-31 DIAGNOSIS — L03.031 PARONYCHIA OF TOE OF RIGHT FOOT: ICD-10-CM

## 2021-08-31 PROCEDURE — 99212 OFFICE O/P EST SF 10 MIN: CPT | Performed by: PODIATRIST

## 2021-09-02 NOTE — PROGRESS NOTES
Leona Phillip is a 49year old male. Patient presents with: Follow - Up: follow up from in grown toe nail. no pain this visit. AM glucose 98. HPI:   This checkup on his I&D site on the lateral right hallux.   At today's visit reviewed nurse's hi (MULTI-VITAMIN/MINERALS) Oral Tab Take 1 tablet by mouth daily. • TURMERIC OR Take 1 capsule by mouth daily with dinner. • CHOLECALCIFEROL 5000 units Oral Tab Take 1 tablet by mouth daily.  Take 1 tablet daily         Past Medical History:   Diagn Other Topics      Concerns:        Seat Belt: Yes          REVIEW OF SYSTEMS:   Today reviewed systens as documented below  GENERAL HEALTH: feels well otherwise  SKIN: Refer to exam below  RESPIRATORY: denies shortness of breath with exertion  CARDIOVASCUL

## 2021-12-09 PROBLEM — Z89.512 HX OF BKA, LEFT (HCC): Status: ACTIVE | Noted: 2021-12-09

## 2021-12-09 PROBLEM — E78.5 HYPERLIPIDEMIA LDL GOAL <100: Status: ACTIVE | Noted: 2021-12-09

## 2022-03-02 ENCOUNTER — TELEPHONE (OUTPATIENT)
Dept: PODIATRY CLINIC | Facility: CLINIC | Age: 52
End: 2022-03-02

## 2022-03-02 ENCOUNTER — HOSPITAL ENCOUNTER (INPATIENT)
Facility: HOSPITAL | Age: 52
LOS: 1 days | Discharge: HOME | DRG: 638 | End: 2022-03-04
Attending: HOSPITALIST | Admitting: HOSPITALIST
Payer: COMMERCIAL

## 2022-03-02 ENCOUNTER — OFFICE VISIT (OUTPATIENT)
Dept: PODIATRY CLINIC | Facility: CLINIC | Age: 52
End: 2022-03-02
Payer: COMMERCIAL

## 2022-03-02 ENCOUNTER — APPOINTMENT (OUTPATIENT)
Dept: GENERAL RADIOLOGY | Facility: HOSPITAL | Age: 52
DRG: 638 | End: 2022-03-02
Attending: NURSE PRACTITIONER
Payer: COMMERCIAL

## 2022-03-02 DIAGNOSIS — L97.529 TYPE 2 DIABETES MELLITUS WITH LEFT DIABETIC FOOT ULCER (HCC): Primary | ICD-10-CM

## 2022-03-02 DIAGNOSIS — E11.621 TYPE 2 DIABETES MELLITUS WITH LEFT DIABETIC FOOT ULCER (HCC): Primary | ICD-10-CM

## 2022-03-02 DIAGNOSIS — E11.621 DIABETIC ULCER OF RIGHT MIDFOOT ASSOCIATED WITH TYPE 2 DIABETES MELLITUS, UNSPECIFIED ULCER STAGE (HCC): Primary | ICD-10-CM

## 2022-03-02 DIAGNOSIS — L97.419 DIABETIC ULCER OF RIGHT MIDFOOT ASSOCIATED WITH TYPE 2 DIABETES MELLITUS, UNSPECIFIED ULCER STAGE (HCC): Primary | ICD-10-CM

## 2022-03-02 LAB
ANION GAP SERPL CALC-SCNC: 8 MMOL/L (ref 0–18)
BASOPHILS # BLD AUTO: 0.04 X10(3) UL (ref 0–0.2)
BASOPHILS NFR BLD AUTO: 0.4 %
BUN BLD-MCNC: 30 MG/DL (ref 7–18)
BUN/CREAT SERPL: 24.2 (ref 10–20)
CALCIUM BLD-MCNC: 9.5 MG/DL (ref 8.5–10.1)
CHLORIDE SERPL-SCNC: 102 MMOL/L (ref 98–112)
CO2 SERPL-SCNC: 25 MMOL/L (ref 21–32)
CREAT BLD-MCNC: 1.24 MG/DL
DEPRECATED RDW RBC AUTO: 36.8 FL (ref 35.1–46.3)
EOSINOPHIL # BLD AUTO: 0.1 X10(3) UL (ref 0–0.7)
EOSINOPHIL NFR BLD AUTO: 0.9 %
ERYTHROCYTE [DISTWIDTH] IN BLOOD BY AUTOMATED COUNT: 12.2 % (ref 11–15)
GLUCOSE BLD-MCNC: 217 MG/DL (ref 70–99)
HCT VFR BLD AUTO: 41.5 %
HGB BLD-MCNC: 14.2 G/DL
IMM GRANULOCYTES # BLD AUTO: 0.06 X10(3) UL (ref 0–1)
IMM GRANULOCYTES NFR BLD: 0.6 %
LACTATE SERPL-SCNC: 1.3 MMOL/L (ref 0.4–2)
LYMPHOCYTES # BLD AUTO: 1.93 X10(3) UL (ref 1–4)
LYMPHOCYTES NFR BLD AUTO: 18.1 %
MCH RBC QN AUTO: 28.6 PG (ref 26–34)
MCHC RBC AUTO-ENTMCNC: 34.2 G/DL (ref 31–37)
MCV RBC AUTO: 83.7 FL
MONOCYTES # BLD AUTO: 0.87 X10(3) UL (ref 0.1–1)
MONOCYTES NFR BLD AUTO: 8.1 %
NEUTROPHILS # BLD AUTO: 7.69 X10 (3) UL (ref 1.5–7.7)
NEUTROPHILS # BLD AUTO: 7.69 X10(3) UL (ref 1.5–7.7)
NEUTROPHILS NFR BLD AUTO: 71.9 %
OSMOLALITY SERPL CALC.SUM OF ELEC: 293 MOSM/KG (ref 275–295)
PLATELET # BLD AUTO: 328 10(3)UL (ref 150–450)
POTASSIUM SERPL-SCNC: 4.1 MMOL/L (ref 3.5–5.1)
RBC # BLD AUTO: 4.96 X10(6)UL
SARS-COV-2 RNA RESP QL NAA+PROBE: NOT DETECTED
SODIUM SERPL-SCNC: 135 MMOL/L (ref 136–145)
WBC # BLD AUTO: 10.7 X10(3) UL (ref 4–11)

## 2022-03-02 PROCEDURE — 99213 OFFICE O/P EST LOW 20 MIN: CPT | Performed by: PODIATRIST

## 2022-03-02 PROCEDURE — 73630 X-RAY EXAM OF FOOT: CPT | Performed by: NURSE PRACTITIONER

## 2022-03-02 RX ORDER — VANCOMYCIN 2 GRAM/500 ML IN 0.9 % SODIUM CHLORIDE INTRAVENOUS
25 ONCE
Status: COMPLETED | OUTPATIENT
Start: 2022-03-02 | End: 2022-03-03

## 2022-03-02 NOTE — TELEPHONE ENCOUNTER
Pt called stating pt has a bleeding sore on the side of the right foot. Pt requesting an appointment today or tomorrow. No appointments available.   Please call

## 2022-03-03 PROBLEM — L97.419 DIABETIC ULCER OF RIGHT MIDFOOT ASSOCIATED WITH TYPE 2 DIABETES MELLITUS, UNSPECIFIED ULCER STAGE (HCC): Status: ACTIVE | Noted: 2022-03-03

## 2022-03-03 PROBLEM — E11.621 DIABETIC ULCER OF RIGHT MIDFOOT ASSOCIATED WITH TYPE 2 DIABETES MELLITUS, UNSPECIFIED ULCER STAGE (HCC): Status: ACTIVE | Noted: 2022-03-03

## 2022-03-03 LAB
ALBUMIN SERPL-MCNC: 3.3 G/DL (ref 3.4–5)
ALBUMIN/GLOB SERPL: 0.8 {RATIO} (ref 1–2)
ALP LIVER SERPL-CCNC: 82 U/L
ALT SERPL-CCNC: 44 U/L
ANION GAP SERPL CALC-SCNC: 7 MMOL/L (ref 0–18)
AST SERPL-CCNC: 29 U/L (ref 15–37)
BASOPHILS # BLD AUTO: 0.05 X10(3) UL (ref 0–0.2)
BASOPHILS NFR BLD AUTO: 0.6 %
BILIRUB SERPL-MCNC: 0.5 MG/DL (ref 0.1–2)
BUN BLD-MCNC: 23 MG/DL (ref 7–18)
BUN/CREAT SERPL: 22.3 (ref 10–20)
CALCIUM BLD-MCNC: 9.5 MG/DL (ref 8.5–10.1)
CHLORIDE SERPL-SCNC: 106 MMOL/L (ref 98–112)
CO2 SERPL-SCNC: 26 MMOL/L (ref 21–32)
CREAT BLD-MCNC: 1.03 MG/DL
DEPRECATED RDW RBC AUTO: 37.1 FL (ref 35.1–46.3)
EOSINOPHIL # BLD AUTO: 0.13 X10(3) UL (ref 0–0.7)
EOSINOPHIL NFR BLD AUTO: 1.7 %
ERYTHROCYTE [DISTWIDTH] IN BLOOD BY AUTOMATED COUNT: 12 % (ref 11–15)
EST. AVERAGE GLUCOSE BLD GHB EST-MCNC: 206 MG/DL (ref 68–126)
GLOBULIN PLAS-MCNC: 3.9 G/DL (ref 2.8–4.4)
GLUCOSE BLD-MCNC: 199 MG/DL (ref 70–99)
GLUCOSE BLDC GLUCOMTR-MCNC: 125 MG/DL (ref 70–99)
GLUCOSE BLDC GLUCOMTR-MCNC: 191 MG/DL (ref 70–99)
GLUCOSE BLDC GLUCOMTR-MCNC: 212 MG/DL (ref 70–99)
GLUCOSE BLDC GLUCOMTR-MCNC: 216 MG/DL (ref 70–99)
GLUCOSE BLDC GLUCOMTR-MCNC: 232 MG/DL (ref 70–99)
HBA1C MFR BLD: 8.8 % (ref ?–5.7)
HCT VFR BLD AUTO: 41.2 %
HGB BLD-MCNC: 14.1 G/DL
IMM GRANULOCYTES # BLD AUTO: 0.03 X10(3) UL (ref 0–1)
IMM GRANULOCYTES NFR BLD: 0.4 %
LYMPHOCYTES # BLD AUTO: 1.32 X10(3) UL (ref 1–4)
LYMPHOCYTES NFR BLD AUTO: 17 %
MCH RBC QN AUTO: 29.1 PG (ref 26–34)
MCHC RBC AUTO-ENTMCNC: 34.2 G/DL (ref 31–37)
MCV RBC AUTO: 84.9 FL
MONOCYTES # BLD AUTO: 0.84 X10(3) UL (ref 0.1–1)
MONOCYTES NFR BLD AUTO: 10.8 %
NEUTROPHILS # BLD AUTO: 5.39 X10 (3) UL (ref 1.5–7.7)
NEUTROPHILS # BLD AUTO: 5.39 X10(3) UL (ref 1.5–7.7)
NEUTROPHILS NFR BLD AUTO: 69.5 %
OSMOLALITY SERPL CALC.SUM OF ELEC: 297 MOSM/KG (ref 275–295)
PLATELET # BLD AUTO: 300 10(3)UL (ref 150–450)
POTASSIUM SERPL-SCNC: 4.3 MMOL/L (ref 3.5–5.1)
PROT SERPL-MCNC: 7.2 G/DL (ref 6.4–8.2)
RBC # BLD AUTO: 4.85 X10(6)UL
SODIUM SERPL-SCNC: 139 MMOL/L (ref 136–145)
WBC # BLD AUTO: 7.8 X10(3) UL (ref 4–11)

## 2022-03-03 PROCEDURE — 99253 IP/OBS CNSLTJ NEW/EST LOW 45: CPT | Performed by: PODIATRIST

## 2022-03-03 RX ORDER — HYDROCODONE BITARTRATE AND ACETAMINOPHEN 5; 325 MG/1; MG/1
1 TABLET ORAL EVERY 6 HOURS PRN
Status: DISCONTINUED | OUTPATIENT
Start: 2022-03-03 | End: 2022-03-04

## 2022-03-03 RX ORDER — ONDANSETRON 2 MG/ML
4 INJECTION INTRAMUSCULAR; INTRAVENOUS EVERY 6 HOURS PRN
Status: DISCONTINUED | OUTPATIENT
Start: 2022-03-03 | End: 2022-03-04

## 2022-03-03 RX ORDER — BISACODYL 10 MG
10 SUPPOSITORY, RECTAL RECTAL
Status: DISCONTINUED | OUTPATIENT
Start: 2022-03-03 | End: 2022-03-04

## 2022-03-03 RX ORDER — ACETAMINOPHEN 325 MG/1
650 TABLET ORAL EVERY 6 HOURS PRN
Status: DISCONTINUED | OUTPATIENT
Start: 2022-03-03 | End: 2022-03-04

## 2022-03-03 RX ORDER — DEXTROSE MONOHYDRATE 25 G/50ML
50 INJECTION, SOLUTION INTRAVENOUS
Status: DISCONTINUED | OUTPATIENT
Start: 2022-03-03 | End: 2022-03-04

## 2022-03-03 RX ORDER — GABAPENTIN 300 MG/1
300 CAPSULE ORAL 2 TIMES DAILY
Status: DISCONTINUED | OUTPATIENT
Start: 2022-03-03 | End: 2022-03-04

## 2022-03-03 RX ORDER — LISINOPRIL 5 MG/1
5 TABLET ORAL NIGHTLY
Status: DISCONTINUED | OUTPATIENT
Start: 2022-03-03 | End: 2022-03-04

## 2022-03-03 RX ORDER — SENNOSIDES 8.6 MG
17.2 TABLET ORAL NIGHTLY PRN
Status: DISCONTINUED | OUTPATIENT
Start: 2022-03-03 | End: 2022-03-04

## 2022-03-03 RX ORDER — MELATONIN
5000 DAILY
Status: DISCONTINUED | OUTPATIENT
Start: 2022-03-03 | End: 2022-03-04

## 2022-03-03 RX ORDER — NICOTINE POLACRILEX 4 MG
30 LOZENGE BUCCAL
Status: DISCONTINUED | OUTPATIENT
Start: 2022-03-03 | End: 2022-03-04

## 2022-03-03 RX ORDER — BUPROPION HYDROCHLORIDE 150 MG/1
300 TABLET ORAL DAILY
Status: DISCONTINUED | OUTPATIENT
Start: 2022-03-03 | End: 2022-03-04

## 2022-03-03 RX ORDER — ROSUVASTATIN CALCIUM 10 MG/1
10 TABLET, COATED ORAL NIGHTLY
Status: DISCONTINUED | OUTPATIENT
Start: 2022-03-03 | End: 2022-03-04

## 2022-03-03 RX ORDER — VANCOMYCIN HYDROCHLORIDE 125 MG/1
125 CAPSULE ORAL DAILY
Status: DISCONTINUED | OUTPATIENT
Start: 2022-03-03 | End: 2022-03-04

## 2022-03-03 RX ORDER — SODIUM PHOSPHATE, DIBASIC AND SODIUM PHOSPHATE, MONOBASIC 7; 19 G/133ML; G/133ML
1 ENEMA RECTAL ONCE AS NEEDED
Status: DISCONTINUED | OUTPATIENT
Start: 2022-03-03 | End: 2022-03-04

## 2022-03-03 RX ORDER — VANCOMYCIN HYDROCHLORIDE
15 EVERY 12 HOURS
Status: DISCONTINUED | OUTPATIENT
Start: 2022-03-03 | End: 2022-03-04

## 2022-03-03 RX ORDER — POLYETHYLENE GLYCOL 3350 17 G/17G
17 POWDER, FOR SOLUTION ORAL DAILY PRN
Status: DISCONTINUED | OUTPATIENT
Start: 2022-03-03 | End: 2022-03-04

## 2022-03-03 RX ORDER — ENOXAPARIN SODIUM 100 MG/ML
40 INJECTION SUBCUTANEOUS DAILY
Status: DISCONTINUED | OUTPATIENT
Start: 2022-03-03 | End: 2022-03-04

## 2022-03-03 RX ORDER — NICOTINE POLACRILEX 4 MG
15 LOZENGE BUCCAL
Status: DISCONTINUED | OUTPATIENT
Start: 2022-03-03 | End: 2022-03-04

## 2022-03-03 RX ORDER — PROCHLORPERAZINE EDISYLATE 5 MG/ML
5 INJECTION INTRAMUSCULAR; INTRAVENOUS EVERY 8 HOURS PRN
Status: DISCONTINUED | OUTPATIENT
Start: 2022-03-03 | End: 2022-03-04

## 2022-03-03 RX ORDER — MELATONIN
3 NIGHTLY PRN
Status: DISCONTINUED | OUTPATIENT
Start: 2022-03-03 | End: 2022-03-04

## 2022-03-03 NOTE — PROGRESS NOTES
[facility] Dosing Service    Initial Pharmacokinetic Consult for Vancomycin AUC Dosing    Wendy Wagoner is a 49year old patient who is being treated for cellulitis. Pharmacy has been asked to dose vancomycin by Sheridan Gan DO. Weights:  Ideal body weight: 84.5 kg (186 lb 4.6 oz)  Adjusted ideal body weight: 100.6 kg (221 lb 12.4 oz)  Actual weight:  124.7 kg (275 lb)    Labs:  Lab Results   Component Value Date    CREATSERUM 1.24 03/02/2022      CrCl:  Estimated Creatinine Clearance: 85.2 mL/min (based on SCr of 1.24 mg/dL). Based on the above:    1. This patient has received a loading dose of Vancomycin  2000 mg IVPB (25mg/kg, capped at 2000 mg) x 1 dose. This will be followed by 1500 mg Q 12 hours based upon adjusted body weight of 100.6 kg and renal function. 2. Vancomycin peak and trough will be obtained at steady state in order to calculate AUC24. Goal AUC24 is 400-600 mg-h/L.    3. Pharmacy will order SCr as clinically indicated while on vancomycin to assess renal function. 4. Pharmacy will follow and monitor renal function, toxicity and efficacy. We appreciate the opportunity to assist in the care of this patient.     Funmi Mazariegos, PharmD  3/3/2022  2:59 AM  [facility] Extension: [phone]

## 2022-03-03 NOTE — ED NOTES
Orders for admission, patient is aware of plan and ready to go upstairs. Any questions, please call ED RN Radha Frankel  at extension [phone].    Type of COVID test sent:rapid  COVID Suspicion level: Low    Titratable drug(s) infusing:n/a  Rate:n/a    LOC at time of transport:x4    Other pertinent information: n/a     CIWA score=n/a  NIH score=n/a

## 2022-03-03 NOTE — ED INITIAL ASSESSMENT (HPI)
Patient sent in by  for R diabetic foot ulcer. Patient has no other complaints no fevers, no weakness, and no pain. Ambulatory in triage.

## 2022-03-03 NOTE — CM/SW NOTE
03/03/22 1100   CM/SW Referral Data   Referral Source    Reason for Referral Discharge planning   Informant Patient   Pertinent Medical Hx   Does patient have an established PCP? Yes  ([facility])   Significant Past Medical/Mental Health Hx DM II   Patient Info   Patient's Current Mental Status at Time of Assessment Alert;Oriented   Patient's Home Environment House   Patient lives with Spouse/Significant other   Patient Status Prior to Admission   Independent with ADLs and Mobility Yes   Discharge Needs   Anticipated D/C needs To be determined     Pt discussed during nursing rounds. Dx diabetic foot ulcer, on IV abs, ID and wound consulted. From home w/spouse, independent and active prior to dx. Pt [facility]x w/[facility] w/IV abx for same dx. HH and IV infusion referrals sent in [address] [place] [address], [facility]. [facility] completed. ID and wound recommendations needed for dc planning. Plan: TBD    / to remain available for support and/or discharge planning.      YAYO Sweeney    [phone]

## 2022-03-03 NOTE — DIETARY NOTE
Brief Nutrition Note    Pt screened for A1C greater than 8.0. Most recent results from past admission; 9.7 on 12/9/21. Will monitor for updated results and provide education as needed.     César Garcia MS, [address] [place] [address], [address] [place] [address]  Clinical Nutrition  [phone]

## 2022-03-04 ENCOUNTER — TELEPHONE (OUTPATIENT)
Dept: PODIATRY CLINIC | Facility: CLINIC | Age: 52
End: 2022-03-04

## 2022-03-04 VITALS
WEIGHT: 275 LBS | RESPIRATION RATE: 18 BRPM | BODY MASS INDEX: 34.19 KG/M2 | DIASTOLIC BLOOD PRESSURE: 87 MMHG | HEIGHT: 75 IN | TEMPERATURE: 98 F | SYSTOLIC BLOOD PRESSURE: 144 MMHG | HEART RATE: 89 BPM | OXYGEN SATURATION: 96 %

## 2022-03-04 DIAGNOSIS — L97.529 TYPE 2 DIABETES MELLITUS WITH LEFT DIABETIC FOOT ULCER (HCC): Primary | ICD-10-CM

## 2022-03-04 DIAGNOSIS — E11.621 TYPE 2 DIABETES MELLITUS WITH LEFT DIABETIC FOOT ULCER (HCC): Primary | ICD-10-CM

## 2022-03-04 LAB
CRP SERPL-MCNC: 2.77 MG/DL (ref ?–0.3)
GLUCOSE BLDC GLUCOMTR-MCNC: 141 MG/DL (ref 70–99)
GLUCOSE BLDC GLUCOMTR-MCNC: 93 MG/DL (ref 70–99)

## 2022-03-04 PROCEDURE — 99232 SBSQ HOSP IP/OBS MODERATE 35: CPT | Performed by: PODIATRIST

## 2022-03-04 RX ORDER — CEFADROXIL 500 MG/1
500 CAPSULE ORAL 2 TIMES DAILY
Qty: 28 CAPSULE | Refills: 0 | Status: SHIPPED | OUTPATIENT
Start: 2022-03-04 | End: 2022-03-18

## 2022-03-04 NOTE — PLAN OF CARE
Cefepime and Vancomycin administered. No c/o pain. Voising well per urinal. Blood glucose covered per orders. Dressing changed this am to Left foot. Safety plan in place. Frequent rounding completed. Plan for US arterial of LLE Friday planned.      Problem: Patient Centered Care  Goal: Patient preferences are identified and integrated in the patient's plan of care  Description: Interventions:  - What would you like us to know as we care for you?   - Provide timely, complete, and accurate information to patient/family  - Incorporate patient and family knowledge, values, beliefs, and cultural backgrounds into the planning and delivery of care  - Encourage patient/family to participate in care and decision-making at the level they choose  - Honor patient and family perspectives and choices  Outcome: Progressing     Problem: PAIN - ADULT  Goal: Verbalizes/displays adequate comfort level or patient's stated pain goal  Description: INTERVENTIONS:  - Encourage pt to monitor pain and request assistance  - Assess pain using appropriate pain scale  - Administer analgesics based on type and severity of pain and evaluate response  - Implement non-pharmacological measures as appropriate and evaluate response  - Consider cultural and social influences on pain and pain management  - Manage/alleviate anxiety  - Utilize distraction and/or relaxation techniques  - Monitor for opioid side effects  - Notify MD/LIP if interventions unsuccessful or patient reports new pain  - Anticipate increased pain with activity and pre-medicate as appropriate  Outcome: Progressing     Problem: RISK FOR INFECTION - ADULT  Goal: Absence of fever/infection during anticipated neutropenic period  Description: INTERVENTIONS  - Monitor WBC  - Administer growth factors as ordered  - Implement neutropenic guidelines  Outcome: Progressing     Problem: SAFETY ADULT - FALL  Goal: Free from fall injury  Description: INTERVENTIONS:  - Assess pt frequently for physical needs  - Identify cognitive and physical deficits and behaviors that affect risk of falls.   - [place] fall precautions as indicated by assessment.  - Educate pt/family on patient safety including physical limitations  - Instruct pt to call for assistance with activity based on assessment  - Modify environment to reduce risk of injury  - Provide assistive devices as appropriate  - Consider OT/PT consult to assist with strengthening/mobility  - Encourage toileting schedule  Outcome: Progressing     Problem: SKIN/TISSUE INTEGRITY - ADULT  Goal: Incision(s), wounds(s) or drain site(s) healing without S/S of infection  Description: INTERVENTIONS:  - Assess and document risk factors for pressure ulcer development  - Assess and document skin integrity  - Assess and document dressing/incision, wound bed, drain sites and surrounding tissue  - Implement wound care per orders  - Initiate isolation precautions as appropriate  - Initiate Pressure Ulcer prevention bundle as indicated  Outcome: Progressing

## 2022-03-04 NOTE — CM/SW NOTE
03/04/22 1100   Discharge disposition   Expected discharge disposition Home or Self   Discharge transportation Private car     Pt discussed during nursing rounds. Pt is stable for dc today. MD dc order entered. Pt refusing HH, but will follow up w/[facility] if needed later. Pt's spouse will provide transport at [place]. Plan: Home w/spouse today. / to remain available for support and/or discharge planning.      FLOWER JosephN    [phone]

## 2022-03-04 NOTE — PLAN OF CARE
Discharge instructions given to patient. He verbalizes understanding. PIV removed and patient taken down via wheelchair to main lobby to meet wife. Problem: Patient Centered Care  Goal: Patient preferences are identified and integrated in the patient's plan of care  Description: Interventions:  - What would you like us to know as we care for you?   - Provide timely, complete, and accurate information to patient/family  - Incorporate patient and family knowledge, values, beliefs, and cultural backgrounds into the planning and delivery of care  - Encourage patient/family to participate in care and decision-making at the level they choose  - Honor patient and family perspectives and choices  Outcome: Progressing    Problem: PAIN - ADULT  Goal: Verbalizes/displays adequate comfort level or patient's stated pain goal  Description: INTERVENTIONS:  - Encourage pt to monitor pain and request assistance  - Assess pain using appropriate pain scale  - Administer analgesics based on type and severity of pain and evaluate response  - Implement non-pharmacological measures as appropriate and evaluate response  - Consider cultural and social influences on pain and pain management  - Manage/alleviate anxiety  - Utilize distraction and/or relaxation techniques  - Monitor for opioid side effects  - Notify MD/LIP if interventions unsuccessful or patient reports new pain  - Anticipate increased pain with activity and pre-medicate as appropriate  Outcome: Progressing     Problem: RISK FOR INFECTION - ADULT  Goal: Absence of fever/infection during anticipated neutropenic period  Description: INTERVENTIONS  - Monitor WBC  - Administer growth factors as ordered  - Implement neutropenic guidelines  Outcome: Progressing     Problem: SAFETY ADULT - FALL  Goal: Free from fall injury  Description: INTERVENTIONS:  - Assess pt frequently for physical needs  - Identify cognitive and physical deficits and behaviors that affect risk of falls.   - [place] fall precautions as indicated by assessment.  - Educate pt/family on patient safety including physical limitations  - Instruct pt to call for assistance with activity based on assessment  - Modify environment to reduce risk of injury  - Provide assistive devices as appropriate  - Consider OT/PT consult to assist with strengthening/mobility  - Encourage toileting schedule  Outcome: Progressing     Problem: DISCHARGE PLANNING  Goal: Discharge to home or other facility with appropriate resources  Description: INTERVENTIONS:  - Identify barriers to discharge w/pt and caregiver  - Include patient/family/discharge partner in discharge planning  - Arrange for needed discharge resources and transportation as appropriate  - Identify discharge learning needs (meds, wound care, etc)  - Arrange for interpreters to assist at discharge as needed  - Consider post-discharge preferences of patient/family/discharge partner  - Complete POLST form as appropriate  - Assess patient's ability to be responsible for managing their own health  - Refer to Case Management Department for coordinating discharge planning if the patient needs post-hospital services based on physician/LIP order or complex needs related to functional status, cognitive ability or social support system  Outcome: Progressing     Problem: SKIN/TISSUE INTEGRITY - ADULT  Goal: Incision(s), wounds(s) or drain site(s) healing without S/S of infection  Description: INTERVENTIONS:  - Assess and document risk factors for pressure ulcer development  - Assess and document skin integrity  - Assess and document dressing/incision, wound bed, drain sites and surrounding tissue  - Implement wound care per orders  - Initiate isolation precautions as appropriate  - Initiate Pressure Ulcer prevention bundle as indicated  Outcome: Progressing

## 2022-03-04 NOTE — HOME CARE LIAISON
Received referral via [facility] for [facility]. Spoke w/ pt - pt is discharging home on PO Abx and will be going to the wound clinic. Pt does not think HH is needed. Pt has hx of [facility] and will contact [facility] if pt thinks HH is needed. Notified NICK/[place].

## 2022-03-09 ENCOUNTER — OFFICE VISIT (OUTPATIENT)
Dept: WOUND CARE | Facility: HOSPITAL | Age: 52
End: 2022-03-09
Attending: NURSE PRACTITIONER
Payer: COMMERCIAL

## 2022-03-09 ENCOUNTER — PATIENT OUTREACH (OUTPATIENT)
Dept: CASE MANAGEMENT | Age: 52
End: 2022-03-09

## 2022-03-09 VITALS
TEMPERATURE: 97 F | HEART RATE: 95 BPM | DIASTOLIC BLOOD PRESSURE: 76 MMHG | SYSTOLIC BLOOD PRESSURE: 144 MMHG | HEIGHT: 75 IN | BODY MASS INDEX: 32.95 KG/M2 | WEIGHT: 265 LBS

## 2022-03-09 DIAGNOSIS — E11.621 TYPE 2 DIABETES MELLITUS WITH FOOT ULCER, UNSPECIFIED WHETHER LONG TERM INSULIN USE (HCC): ICD-10-CM

## 2022-03-09 DIAGNOSIS — L97.412 NON-PRESSURE CHRONIC ULCER OF RIGHT HEEL AND MIDFOOT WITH FAT LAYER EXPOSED (HCC): Primary | ICD-10-CM

## 2022-03-09 DIAGNOSIS — L97.509 TYPE 2 DIABETES MELLITUS WITH FOOT ULCER, UNSPECIFIED WHETHER LONG TERM INSULIN USE (HCC): ICD-10-CM

## 2022-03-09 PROCEDURE — 97597 DBRDMT OPN WND 1ST 20 CM/<: CPT | Performed by: NURSE PRACTITIONER

## 2022-03-09 PROCEDURE — 99215 OFFICE O/P EST HI 40 MIN: CPT | Performed by: NURSE PRACTITIONER

## 2022-03-09 NOTE — PROGRESS NOTES
.Weekly Wound Education Note    Teaching Provided To: Patient  Training Topics: Discharge instructions;Dressing;Cleasing and general instructions; Off-loading  Training Method: Explain/Verbal;Written  Training Response: Patient responds and understands      Patient to be fitted and order a CROW boot with [facility] this week. Felted offloading foam made for plantar surface of foot, x1 extra sent home with patient. Jenn Ag, hydrofera transfer, abd pad to wound. Patient to change daily.

## 2022-03-09 NOTE — PROGRESS NOTES
Patient ID: Komal Stockton is a 49year old male.     Debridement   Wound 03/09/22 #1 Right plantar foot Diabetic Ulcer Foot Right;Plantar    Consent obtained? verbal  Consent given by: patient    Performed by: provider  Debridement type: selective    Pre-debridement measurements  Length (cm): 1.9  Width (cm): 4.9  Depth (cm): 0.1  Surface Area (cm^2): 9.31  Volume (cm^3): 0.93    Post-debridement measurements  Length (cm): 6  Width (cm): 1.9  Depth (cm): 0.1  Percent debrided: 100%      Surface Area (cm^2): 11.4  Area debrided (cm^2): 11.4  Volume (cm^3): 1.14  Devitalized tissue debrided: biofilm and callus  Instrument(s) utilized: blade, forceps and scissors  Bleeding: small  Hemostasis obtained with: pressure  Procedural pain (0-10): 0  Post-procedural pain: 0   Response to treatment: procedure was tolerated well

## 2022-03-09 NOTE — PROGRESS NOTES
1St attempt at to review Dm Follow up Question       Diabetic Outreach D/C Follow Up Questions:     1. Did you receive the information provided during your hospitalization and at discharge about diabetes? yes    If No:  Would you like us to mail you the information? no   If Yes:  Was the information helpful? Yes     2. Were there any changes made to your medications? no            3. Do you have all of your diabetes pills and or insulin now that you are home? Yes  If yes:  do you understand the changes made? yes      4. Are you confident in managing your diabetes? yes     5. Did you make the necessary transportation arrangements to get to your diabetes follow-up appointment? Yes         If pt answers No to questions #3 and #4 Advise pt you will have a clinical person contact them to address.

## 2022-03-10 ENCOUNTER — APPOINTMENT (OUTPATIENT)
Dept: WOUND CARE | Facility: HOSPITAL | Age: 52
End: 2022-03-10
Attending: FAMILY MEDICINE
Payer: COMMERCIAL

## 2022-03-16 ENCOUNTER — OFFICE VISIT (OUTPATIENT)
Dept: WOUND CARE | Facility: HOSPITAL | Age: 52
End: 2022-03-16
Attending: NURSE PRACTITIONER
Payer: COMMERCIAL

## 2022-03-16 VITALS
HEART RATE: 91 BPM | DIASTOLIC BLOOD PRESSURE: 83 MMHG | SYSTOLIC BLOOD PRESSURE: 138 MMHG | RESPIRATION RATE: 18 BRPM | TEMPERATURE: 98 F

## 2022-03-16 DIAGNOSIS — L97.412 NON-PRESSURE CHRONIC ULCER OF RIGHT HEEL AND MIDFOOT WITH FAT LAYER EXPOSED (HCC): Primary | ICD-10-CM

## 2022-03-16 DIAGNOSIS — E11.621 TYPE 2 DIABETES MELLITUS WITH FOOT ULCER, UNSPECIFIED WHETHER LONG TERM INSULIN USE (HCC): ICD-10-CM

## 2022-03-16 DIAGNOSIS — L97.509 TYPE 2 DIABETES MELLITUS WITH FOOT ULCER, UNSPECIFIED WHETHER LONG TERM INSULIN USE (HCC): ICD-10-CM

## 2022-03-16 PROCEDURE — 99214 OFFICE O/P EST MOD 30 MIN: CPT | Performed by: NURSE PRACTITIONER

## 2022-03-16 NOTE — PROGRESS NOTES
.Weekly Wound Education Note    Teaching Provided To: Patient  Training Topics: Discharge instructions;Dressing;Off-loading;Cleasing and general instructions  Training Method: Explain/Verbal;Written  Training Response: Patient responds and understands           Patient states his CROW boot will be picked up next week from [facility]. New offloading pads made, Jenn Ag, hydrofera transfer, abd pad, medipore tape. Patient will change every other day.

## 2022-03-19 PROBLEM — Z89.422: Status: RESOLVED | Noted: 2020-07-13 | Resolved: 2022-03-19

## 2022-03-19 PROBLEM — S88.119A BELOW KNEE AMPUTATION (HCC): Status: RESOLVED | Noted: 2021-04-20 | Resolved: 2022-03-19

## 2022-03-19 PROBLEM — E11.621 DIABETIC FOOT ULCER WITH OSTEOMYELITIS (HCC): Status: RESOLVED | Noted: 2020-06-04 | Resolved: 2022-03-19

## 2022-03-19 PROBLEM — E11.69 DIABETIC FOOT ULCER WITH OSTEOMYELITIS (HCC): Status: RESOLVED | Noted: 2020-06-04 | Resolved: 2022-03-19

## 2022-03-19 PROBLEM — M86.9 DIABETIC FOOT ULCER WITH OSTEOMYELITIS (HCC): Status: RESOLVED | Noted: 2020-06-04 | Resolved: 2022-03-19

## 2022-03-19 PROBLEM — E13.621 DIABETIC ULCER OF LEFT FOOT ASSOCIATED WITH OTHER SPECIFIED DIABETES MELLITUS, UNSPECIFIED PART OF FOOT, UNSPECIFIED ULCER STAGE (HCC): Status: RESOLVED | Noted: 2020-06-04 | Resolved: 2022-03-19

## 2022-03-19 PROBLEM — F32.1 MODERATE MAJOR DEPRESSION (HCC): Status: ACTIVE | Noted: 2019-04-29

## 2022-03-19 PROBLEM — L97.509 DIABETIC FOOT ULCER WITH OSTEOMYELITIS (HCC): Status: RESOLVED | Noted: 2020-06-04 | Resolved: 2022-03-19

## 2022-03-19 PROBLEM — L97.529 DIABETIC ULCER OF LEFT FOOT ASSOCIATED WITH OTHER SPECIFIED DIABETES MELLITUS, UNSPECIFIED PART OF FOOT, UNSPECIFIED ULCER STAGE (HCC): Status: RESOLVED | Noted: 2020-06-04 | Resolved: 2022-03-19

## 2022-03-19 PROBLEM — Z89.512 HX OF BKA, LEFT (HCC): Status: RESOLVED | Noted: 2021-12-09 | Resolved: 2022-03-19

## 2022-03-23 ENCOUNTER — OFFICE VISIT (OUTPATIENT)
Dept: WOUND CARE | Facility: HOSPITAL | Age: 52
End: 2022-03-23
Attending: NURSE PRACTITIONER
Payer: COMMERCIAL

## 2022-03-23 VITALS
RESPIRATION RATE: 16 BRPM | DIASTOLIC BLOOD PRESSURE: 79 MMHG | SYSTOLIC BLOOD PRESSURE: 158 MMHG | TEMPERATURE: 98 F | HEART RATE: 96 BPM

## 2022-03-23 DIAGNOSIS — E11.621 TYPE 2 DIABETES MELLITUS WITH FOOT ULCER, UNSPECIFIED WHETHER LONG TERM INSULIN USE (HCC): ICD-10-CM

## 2022-03-23 DIAGNOSIS — Z86.14 PERSONAL HISTORY OF MRSA (METHICILLIN RESISTANT STAPHYLOCOCCUS AUREUS): ICD-10-CM

## 2022-03-23 DIAGNOSIS — L97.512 NON-PRESSURE CHRONIC ULCER OF OTHER PART OF RIGHT FOOT WITH FAT LAYER EXPOSED (HCC): Primary | ICD-10-CM

## 2022-03-23 DIAGNOSIS — L97.509 TYPE 2 DIABETES MELLITUS WITH FOOT ULCER, UNSPECIFIED WHETHER LONG TERM INSULIN USE (HCC): ICD-10-CM

## 2022-03-23 PROCEDURE — 99214 OFFICE O/P EST MOD 30 MIN: CPT | Performed by: NURSE PRACTITIONER

## 2022-03-23 RX ORDER — GENTAMICIN SULFATE 1 MG/G
1 CREAM TOPICAL 2 TIMES DAILY
Qty: 60 G | Refills: 0 | Status: SHIPPED | OUTPATIENT
Start: 2022-03-23 | End: 2022-04-22

## 2022-03-23 NOTE — PROGRESS NOTES
.Weekly Wound Education Note    Teaching Provided To: Patient  Training Topics: Off-loading; Discharge instructions;Dressing;Cleasing and general instructions  Training Method: Explain/Verbal;Written  Training Response: Patient responds and understands          Wound cultured this visit. Patient to start applying Gentamicin cream covered with adaptic twice daily. Offloading pad applied, pt will change out as needed.

## 2022-03-26 RX ORDER — SULFAMETHOXAZOLE AND TRIMETHOPRIM 800; 160 MG/1; MG/1
1 TABLET ORAL 2 TIMES DAILY
Qty: 20 TABLET | Refills: 0 | Status: SHIPPED | OUTPATIENT
Start: 2022-03-26 | End: 2022-04-05

## 2022-03-26 NOTE — PROGRESS NOTES
Please let patient know that his culture grew out two different bacteria, 1 of them being MRSA. He should stop the cefadroxil that he is currently on, and switch to bactrim. Continue with the topical gentamicin. I am also sending bactroban that he should apply into each nare twice daily for 5 days. Finally, continue with hibiclens showers.

## 2022-03-28 NOTE — PROGRESS NOTES
Culture results and all orders reviewed with patient. The only thing he needed was the Mupirocin - clarified with pharmacy and will get ready today.

## 2022-03-30 ENCOUNTER — TELEPHONE (OUTPATIENT)
Dept: WOUND CARE | Facility: HOSPITAL | Age: 52
End: 2022-03-30

## 2022-03-30 NOTE — TELEPHONE ENCOUNTER
Attempted to call patient, identifiable BUD. LVM informing him that the Hgb A1c result of 116 was removed from his chart and to call with any further questions.

## 2022-03-31 ENCOUNTER — OFFICE VISIT (OUTPATIENT)
Dept: WOUND CARE | Facility: HOSPITAL | Age: 52
End: 2022-03-31
Attending: NURSE PRACTITIONER
Payer: COMMERCIAL

## 2022-03-31 VITALS
DIASTOLIC BLOOD PRESSURE: 76 MMHG | SYSTOLIC BLOOD PRESSURE: 152 MMHG | HEART RATE: 90 BPM | RESPIRATION RATE: 16 BRPM | TEMPERATURE: 98 F

## 2022-03-31 DIAGNOSIS — L97.512 NON-PRESSURE CHRONIC ULCER OF OTHER PART OF RIGHT FOOT WITH FAT LAYER EXPOSED (HCC): Primary | ICD-10-CM

## 2022-03-31 DIAGNOSIS — L97.509 TYPE 2 DIABETES MELLITUS WITH FOOT ULCER, UNSPECIFIED WHETHER LONG TERM INSULIN USE (HCC): ICD-10-CM

## 2022-03-31 DIAGNOSIS — E11.621 TYPE 2 DIABETES MELLITUS WITH FOOT ULCER, UNSPECIFIED WHETHER LONG TERM INSULIN USE (HCC): ICD-10-CM

## 2022-03-31 DIAGNOSIS — B95.62 METHICILLIN RESISTANT STAPHYLOCOCCUS AUREUS INFECTION AS THE CAUSE OF DISEASES CLASSIFIED ELSEWHERE: ICD-10-CM

## 2022-03-31 PROCEDURE — 11042 DBRDMT SUBQ TIS 1ST 20SQCM/<: CPT | Performed by: NURSE PRACTITIONER

## 2022-03-31 NOTE — PROGRESS NOTES
Patient ID: Sridhar Ying is a 49year old male.     Debridement   Wound 03/09/22 #1 Right plantar foot Diabetic Ulcer Foot Right;Plantar    Consent obtained? verbal  Consent given by: patient    Performed by: provider  Debridement type: surgical  Level of debridement: subcutaneous tissue    Pre-debridement measurements  Length (cm): 3  Width (cm): 3.9  Depth (cm): 0.1  Surface Area (cm^2): 11.7  Volume (cm^3): 1.17    Post-debridement measurements  Length (cm): 3  Width (cm): 3.9  Depth (cm): 0.1  Percent debrided: 100%  Surface Area (cm^2): 11.7  Area debrided (cm^2): 11.7  Volume (cm^3): 1.17  Tissue and other material debrided: subcutaneous tissue  Devitalized tissue debrided: biofilm and callus  Instrument(s) utilized: blade  Bleeding: small  Hemostasis obtained with: pressure  Procedural pain (0-10): 0  Post-procedural pain: 0   Response to treatment: procedure was tolerated well

## 2022-03-31 NOTE — PROGRESS NOTES
.Weekly Wound Education Note    Teaching Provided To: Patient  Training Topics: Discharge instructions;Dressing;Cleasing and general instructions;Signs and symptoms of infection;Off-loading  Training Method: Explain/Verbal;Written  Training Response: Patient responds and understands           Patient to continue applying Gentamicin ointment to wound twice daily. Wear CROW boot. Finish oral antibiotic and Mupirocin to nares.

## 2022-04-08 ENCOUNTER — OFFICE VISIT (OUTPATIENT)
Dept: WOUND CARE | Facility: HOSPITAL | Age: 52
End: 2022-04-08
Attending: INTERNAL MEDICINE
Payer: COMMERCIAL

## 2022-04-08 VITALS
DIASTOLIC BLOOD PRESSURE: 62 MMHG | HEART RATE: 86 BPM | SYSTOLIC BLOOD PRESSURE: 155 MMHG | TEMPERATURE: 98 F | RESPIRATION RATE: 16 BRPM

## 2022-04-08 DIAGNOSIS — L97.512 NON-PRESSURE CHRONIC ULCER OF OTHER PART OF RIGHT FOOT WITH FAT LAYER EXPOSED (HCC): Primary | ICD-10-CM

## 2022-04-08 PROCEDURE — 99213 OFFICE O/P EST LOW 20 MIN: CPT

## 2022-04-14 ENCOUNTER — OFFICE VISIT (OUTPATIENT)
Dept: WOUND CARE | Facility: HOSPITAL | Age: 52
End: 2022-04-14
Attending: NURSE PRACTITIONER
Payer: COMMERCIAL

## 2022-04-14 VITALS
HEART RATE: 92 BPM | RESPIRATION RATE: 18 BRPM | DIASTOLIC BLOOD PRESSURE: 85 MMHG | SYSTOLIC BLOOD PRESSURE: 146 MMHG | TEMPERATURE: 98 F

## 2022-04-14 DIAGNOSIS — L97.509 TYPE 2 DIABETES MELLITUS WITH FOOT ULCER, UNSPECIFIED WHETHER LONG TERM INSULIN USE (HCC): ICD-10-CM

## 2022-04-14 DIAGNOSIS — E11.621 TYPE 2 DIABETES MELLITUS WITH FOOT ULCER, UNSPECIFIED WHETHER LONG TERM INSULIN USE (HCC): ICD-10-CM

## 2022-04-14 DIAGNOSIS — L97.512 NON-PRESSURE CHRONIC ULCER OF OTHER PART OF RIGHT FOOT WITH FAT LAYER EXPOSED (HCC): Primary | ICD-10-CM

## 2022-04-14 DIAGNOSIS — B95.62 METHICILLIN RESISTANT STAPHYLOCOCCUS AUREUS INFECTION AS THE CAUSE OF DISEASES CLASSIFIED ELSEWHERE: ICD-10-CM

## 2022-04-14 PROCEDURE — 99214 OFFICE O/P EST MOD 30 MIN: CPT

## 2022-04-14 PROCEDURE — 97597 DBRDMT OPN WND 1ST 20 CM/<: CPT | Performed by: NURSE PRACTITIONER

## 2022-04-14 RX ORDER — SEMAGLUTIDE 1.34 MG/ML
INJECTION, SOLUTION SUBCUTANEOUS
COMMUNITY

## 2022-04-14 NOTE — PROGRESS NOTES
Weekly Wound Education Note    Teaching Provided To: Patient  Training Topics: Cleasing and general instructions;Dressing; Discharge instructions; Off-loading  Training Method: Explain/Verbal  Training Response: Patient responds and understands        Notes: Switched to hydrofera transfer and bordered gauze, triad paste to periwound. CROW boot to be used, patient to wear 30-40mmhg compression stocking.

## 2022-04-21 ENCOUNTER — OFFICE VISIT (OUTPATIENT)
Dept: WOUND CARE | Facility: HOSPITAL | Age: 52
End: 2022-04-21
Attending: NURSE PRACTITIONER
Payer: COMMERCIAL

## 2022-04-21 VITALS
TEMPERATURE: 97 F | RESPIRATION RATE: 16 BRPM | SYSTOLIC BLOOD PRESSURE: 150 MMHG | DIASTOLIC BLOOD PRESSURE: 80 MMHG | HEART RATE: 93 BPM

## 2022-04-21 DIAGNOSIS — E11.621 TYPE 2 DIABETES MELLITUS WITH FOOT ULCER, UNSPECIFIED WHETHER LONG TERM INSULIN USE (HCC): ICD-10-CM

## 2022-04-21 DIAGNOSIS — L97.512 NON-PRESSURE CHRONIC ULCER OF OTHER PART OF RIGHT FOOT WITH FAT LAYER EXPOSED (HCC): Primary | ICD-10-CM

## 2022-04-21 DIAGNOSIS — B95.62 METHICILLIN RESISTANT STAPHYLOCOCCUS AUREUS INFECTION AS THE CAUSE OF DISEASES CLASSIFIED ELSEWHERE: ICD-10-CM

## 2022-04-21 DIAGNOSIS — L97.509 TYPE 2 DIABETES MELLITUS WITH FOOT ULCER, UNSPECIFIED WHETHER LONG TERM INSULIN USE (HCC): ICD-10-CM

## 2022-04-21 PROCEDURE — 99214 OFFICE O/P EST MOD 30 MIN: CPT

## 2022-04-21 PROCEDURE — 29581 APPL MULTLAYER CMPRN SYS LEG: CPT

## 2022-04-21 PROCEDURE — 15275 SKIN SUB GRAFT FACE/NK/HF/G: CPT | Performed by: NURSE PRACTITIONER

## 2022-04-21 NOTE — PROGRESS NOTES
Patient ID: Shanae Pacheco is a 49year old male. Cellular tissue product application    Date/Time: 4/21/2022 4:41 PM  Performed by: ELI Dc  Authorized by: ELI Dc   Associated Wounds:   Wound 03/09/22 #1 Right plantar foot Diabetic Ulcer Foot Right;Plantar    Consent:     Consent obtained:  Verbal    Consent given by:  Patient    Risks discussed:  Bleeding, infection and pain  Procedure details:     Location:  head/hands/feet/digits/genitalia    Product applied:  Epifix or epicord    Product lot #:  [id number]    Product expiration:  7/1/2026    Amount used (cm^2):  11    Secured: Yes      Secured with:  Silicone contact layer  Post-procedure details:     Patient tolerance of procedure: Tolerated well, no immediate complications  Comments:      Dressed with hydrofera transfer and kerramax.

## 2022-04-21 NOTE — PROGRESS NOTES
This is a recent snapshot of the patient's [facility] medical record.  For current drug dose and complete information and questions, call [phone] or In Basket pool, [facility] infusion ([id number])  CSN Number:  [id number]       Weekly Wound Education Note    Teaching Provided To: Patient  Training Topics: Cleasing and general instructions;Dressing; Discharge instructions; Compression;Edema control;Off-loading  Training Method: Explain/Verbal  Training Response: Reinforcement needed        Notes: Stable. Epifix mesh 5D6.6 applied, silicone contact layer, hydrofera transfer, kerramax, unna boot 30-40mmhg. per provider to be changed on monday and apply compression stocking. CAM boot applied - monitor for callous.

## 2022-04-28 ENCOUNTER — OFFICE VISIT (OUTPATIENT)
Dept: WOUND CARE | Facility: HOSPITAL | Age: 52
End: 2022-04-28
Attending: NURSE PRACTITIONER
Payer: COMMERCIAL

## 2022-04-28 ENCOUNTER — HOSPITAL ENCOUNTER (OUTPATIENT)
Dept: LAB | Facility: HOSPITAL | Age: 52
Discharge: HOME | End: 2022-04-28
Attending: NURSE PRACTITIONER
Payer: COMMERCIAL

## 2022-04-28 VITALS
SYSTOLIC BLOOD PRESSURE: 160 MMHG | DIASTOLIC BLOOD PRESSURE: 77 MMHG | TEMPERATURE: 97 F | HEART RATE: 92 BPM | RESPIRATION RATE: 17 BRPM

## 2022-04-28 DIAGNOSIS — L97.512 NON-PRESSURE CHRONIC ULCER OF OTHER PART OF RIGHT FOOT WITH FAT LAYER EXPOSED (HCC): Primary | ICD-10-CM

## 2022-04-28 DIAGNOSIS — L97.509 TYPE 2 DIABETES MELLITUS WITH FOOT ULCER, UNSPECIFIED WHETHER LONG TERM INSULIN USE (HCC): ICD-10-CM

## 2022-04-28 DIAGNOSIS — L97.512 NON-PRESSURE CHRONIC ULCER OF OTHER PART OF RIGHT FOOT WITH FAT LAYER EXPOSED (HCC): ICD-10-CM

## 2022-04-28 DIAGNOSIS — B95.62 METHICILLIN RESISTANT STAPHYLOCOCCUS AUREUS INFECTION AS THE CAUSE OF DISEASES CLASSIFIED ELSEWHERE: ICD-10-CM

## 2022-04-28 DIAGNOSIS — E11.621 TYPE 2 DIABETES MELLITUS WITH FOOT ULCER, UNSPECIFIED WHETHER LONG TERM INSULIN USE (HCC): ICD-10-CM

## 2022-04-28 LAB
ALBUMIN SERPL-MCNC: 3.7 G/DL (ref 3.4–5)
ALBUMIN/GLOB SERPL: 0.9 {RATIO} (ref 1–2)
ALP LIVER SERPL-CCNC: 100 U/L
ALT SERPL-CCNC: 64 U/L
ANION GAP SERPL CALC-SCNC: 2 MMOL/L (ref 0–18)
AST SERPL-CCNC: 40 U/L (ref 15–37)
BASOPHILS # BLD AUTO: 0.05 X10(3) UL (ref 0–0.2)
BASOPHILS NFR BLD AUTO: 0.5 %
BILIRUB SERPL-MCNC: 0.2 MG/DL (ref 0.1–2)
BUN BLD-MCNC: 23 MG/DL (ref 7–18)
CALCIUM BLD-MCNC: 9.9 MG/DL (ref 8.5–10.1)
CHLORIDE SERPL-SCNC: 106 MMOL/L (ref 98–112)
CO2 SERPL-SCNC: 27 MMOL/L (ref 21–32)
CREAT BLD-MCNC: 1.08 MG/DL
CRP SERPL-MCNC: 1.08 MG/DL (ref ?–0.3)
EOSINOPHIL # BLD AUTO: 0.16 X10(3) UL (ref 0–0.7)
EOSINOPHIL NFR BLD AUTO: 1.7 %
ERYTHROCYTE [DISTWIDTH] IN BLOOD BY AUTOMATED COUNT: 12.1 %
ERYTHROCYTE [SEDIMENTATION RATE] IN BLOOD: 27 MM/HR
FASTING STATUS PATIENT QL REPORTED: NO
GLOBULIN PLAS-MCNC: 3.9 G/DL (ref 2.8–4.4)
GLUCOSE BLD-MCNC: 210 MG/DL (ref 70–99)
HCT VFR BLD AUTO: 42.1 %
HGB BLD-MCNC: 14.3 G/DL
IMM GRANULOCYTES # BLD AUTO: 0.03 X10(3) UL (ref 0–1)
IMM GRANULOCYTES NFR BLD: 0.3 %
LYMPHOCYTES # BLD AUTO: 1.97 X10(3) UL (ref 1–4)
LYMPHOCYTES NFR BLD AUTO: 20.4 %
MCH RBC QN AUTO: 29.1 PG (ref 26–34)
MCHC RBC AUTO-ENTMCNC: 34 G/DL (ref 31–37)
MCV RBC AUTO: 85.6 FL
MONOCYTES # BLD AUTO: 0.7 X10(3) UL (ref 0.1–1)
MONOCYTES NFR BLD AUTO: 7.2 %
NEUTROPHILS # BLD AUTO: 6.75 X10 (3) UL (ref 1.5–7.7)
NEUTROPHILS # BLD AUTO: 6.75 X10(3) UL (ref 1.5–7.7)
NEUTROPHILS NFR BLD AUTO: 69.9 %
OSMOLALITY SERPL CALC.SUM OF ELEC: 290 MOSM/KG (ref 275–295)
PLATELET # BLD AUTO: 370 10(3)UL (ref 150–450)
POTASSIUM SERPL-SCNC: 4.2 MMOL/L (ref 3.5–5.1)
PROT SERPL-MCNC: 7.6 G/DL (ref 6.4–8.2)
RBC # BLD AUTO: 4.92 X10(6)UL
SODIUM SERPL-SCNC: 135 MMOL/L (ref 136–145)
WBC # BLD AUTO: 9.7 X10(3) UL (ref 4–11)

## 2022-04-28 PROCEDURE — 86140 C-REACTIVE PROTEIN: CPT

## 2022-04-28 PROCEDURE — 80053 COMPREHEN METABOLIC PANEL: CPT

## 2022-04-28 PROCEDURE — 36415 COLL VENOUS BLD VENIPUNCTURE: CPT

## 2022-04-28 PROCEDURE — 85025 COMPLETE CBC W/AUTO DIFF WBC: CPT

## 2022-04-28 PROCEDURE — 85652 RBC SED RATE AUTOMATED: CPT

## 2022-04-28 PROCEDURE — 99215 OFFICE O/P EST HI 40 MIN: CPT | Performed by: NURSE PRACTITIONER

## 2022-04-28 RX ORDER — SULFAMETHOXAZOLE AND TRIMETHOPRIM 800; 160 MG/1; MG/1
1 TABLET ORAL 2 TIMES DAILY
Qty: 20 TABLET | Refills: 0 | Status: SHIPPED | OUTPATIENT
Start: 2022-04-28 | End: 2022-05-08

## 2022-04-28 NOTE — PROGRESS NOTES
Weekly Wound Education Note    Teaching Provided To: Patient  Training Topics: Cleasing and general instructions; Compression; Discharge instructions;Dressing;Edema control;Off-loading  Training Method: Explain/Verbal  Training Response: Reinforcement needed        Notes: Wound cultured. Pt to start using gentamycin. Bacitracin, plain alginate, bordered gauze. Pressure noted to lateral foot - he states he was not in his CROW/CAM on saturday. Pt to see ortho.  - to discuss \"options\"

## 2022-05-02 RX ORDER — CEFDINIR 300 MG/1
300 CAPSULE ORAL 2 TIMES DAILY
Qty: 20 CAPSULE | Refills: 0 | Status: SHIPPED | OUTPATIENT
Start: 2022-05-02 | End: 2022-05-12

## 2022-05-02 NOTE — PROGRESS NOTES
Spoke with patient regarding culture results and went over providers orders and recommendations. He states he understands.

## 2022-05-05 ENCOUNTER — OFFICE VISIT (OUTPATIENT)
Dept: WOUND CARE | Facility: HOSPITAL | Age: 52
End: 2022-05-05
Attending: NURSE PRACTITIONER
Payer: COMMERCIAL

## 2022-05-05 VITALS
HEART RATE: 92 BPM | RESPIRATION RATE: 17 BRPM | DIASTOLIC BLOOD PRESSURE: 82 MMHG | TEMPERATURE: 98 F | SYSTOLIC BLOOD PRESSURE: 128 MMHG

## 2022-05-05 DIAGNOSIS — L97.512 NON-PRESSURE CHRONIC ULCER OF OTHER PART OF RIGHT FOOT WITH FAT LAYER EXPOSED (HCC): Primary | ICD-10-CM

## 2022-05-05 DIAGNOSIS — E11.621 TYPE 2 DIABETES MELLITUS WITH FOOT ULCER, UNSPECIFIED WHETHER LONG TERM INSULIN USE (HCC): ICD-10-CM

## 2022-05-05 DIAGNOSIS — L97.509 TYPE 2 DIABETES MELLITUS WITH FOOT ULCER, UNSPECIFIED WHETHER LONG TERM INSULIN USE (HCC): ICD-10-CM

## 2022-05-05 DIAGNOSIS — A49.8 ESCHERICHIA COLI INFECTION: ICD-10-CM

## 2022-05-05 DIAGNOSIS — B95.62 METHICILLIN RESISTANT STAPHYLOCOCCUS AUREUS INFECTION AS THE CAUSE OF DISEASES CLASSIFIED ELSEWHERE: ICD-10-CM

## 2022-05-05 PROCEDURE — 97597 DBRDMT OPN WND 1ST 20 CM/<: CPT | Performed by: NURSE PRACTITIONER

## 2022-05-05 NOTE — PROGRESS NOTES
Patient ID: Jaida Jarquin is a 49year old male. Debridement   Wound 03/09/22 #1 Right plantar foot Diabetic Ulcer Foot Right;Plantar    Consent obtained? verbal  Consent given by: patient  Risks discussed?  procedural risks discussed  Performed by: provider  Debridement type: selective  Pain control: lidocaine 4%  Pre-debridement measurements  Length (cm): 4  Width (cm): 5.4  Depth (cm): 0.1  Surface Area (cm^2): 21.6  Volume (cm^3): 2.16    Post-debridement measurements  Length (cm): 4  Width (cm): 6  Depth (cm): 0.1  Percent debrided: 50%  Surface Area (cm^2): 24  Area debrided (cm^2): 12  Volume (cm^3): 2.4  Devitalized tissue debrided: biofilm and callus  Instrument(s) utilized: blade and scissors  Bleeding: small  Hemostasis obtained with: pressure  Procedural pain (0-10): 0  Post-procedural pain: 0   Response to treatment: procedure was tolerated well

## 2022-05-05 NOTE — PROGRESS NOTES
Weekly Wound Education Note    Teaching Provided To: Patient  Training Topics: Cleasing and general instructions;Dressing; Discharge instructions; Off-loading  Training Method: Explain/Verbal  Training Response: Patient responds and understands        Notes: Stable, continue gentamycin, plain alginate, bordered gauze, CROW boot. Pt to make appointment with ID and ortho. MRI scheduled for late next monday.

## 2022-05-09 ENCOUNTER — HOSPITAL ENCOUNTER (OUTPATIENT)
Dept: MRI IMAGING | Facility: HOSPITAL | Age: 52
Discharge: HOME | End: 2022-05-09
Attending: NURSE PRACTITIONER
Payer: COMMERCIAL

## 2022-05-09 DIAGNOSIS — L97.512 NON-PRESSURE CHRONIC ULCER OF OTHER PART OF RIGHT FOOT WITH FAT LAYER EXPOSED (HCC): ICD-10-CM

## 2022-05-09 DIAGNOSIS — L97.509 TYPE 2 DIABETES MELLITUS WITH FOOT ULCER, UNSPECIFIED WHETHER LONG TERM INSULIN USE (HCC): ICD-10-CM

## 2022-05-09 DIAGNOSIS — E11.621 TYPE 2 DIABETES MELLITUS WITH FOOT ULCER, UNSPECIFIED WHETHER LONG TERM INSULIN USE (HCC): ICD-10-CM

## 2022-05-09 PROCEDURE — A9575 INJ GADOTERATE MEGLUMI 0.1ML: HCPCS | Performed by: NURSE PRACTITIONER

## 2022-05-09 PROCEDURE — 73723 MRI JOINT LWR EXTR W/O&W/DYE: CPT | Performed by: NURSE PRACTITIONER

## 2022-05-09 PROCEDURE — 73720 MRI LWR EXTREMITY W/O&W/DYE: CPT | Performed by: NURSE PRACTITIONER

## 2022-05-10 NOTE — PROGRESS NOTES
LVM on identifiable voice mail stating that the provider left a message in my chart regarding MRI results.

## 2022-05-12 ENCOUNTER — APPOINTMENT (OUTPATIENT)
Dept: WOUND CARE | Facility: HOSPITAL | Age: 52
End: 2022-05-12
Attending: NURSE PRACTITIONER
Payer: COMMERCIAL

## 2022-05-19 ENCOUNTER — OFFICE VISIT (OUTPATIENT)
Dept: WOUND CARE | Facility: HOSPITAL | Age: 52
End: 2022-05-19
Attending: NURSE PRACTITIONER
Payer: COMMERCIAL

## 2022-05-19 VITALS
HEART RATE: 84 BPM | SYSTOLIC BLOOD PRESSURE: 155 MMHG | DIASTOLIC BLOOD PRESSURE: 90 MMHG | TEMPERATURE: 99 F | RESPIRATION RATE: 18 BRPM

## 2022-05-19 DIAGNOSIS — E11.621 TYPE 2 DIABETES MELLITUS WITH FOOT ULCER, UNSPECIFIED WHETHER LONG TERM INSULIN USE (HCC): ICD-10-CM

## 2022-05-19 DIAGNOSIS — B95.62 METHICILLIN RESISTANT STAPHYLOCOCCUS AUREUS INFECTION AS THE CAUSE OF DISEASES CLASSIFIED ELSEWHERE: ICD-10-CM

## 2022-05-19 DIAGNOSIS — L97.509 TYPE 2 DIABETES MELLITUS WITH FOOT ULCER, UNSPECIFIED WHETHER LONG TERM INSULIN USE (HCC): ICD-10-CM

## 2022-05-19 DIAGNOSIS — L97.512 NON-PRESSURE CHRONIC ULCER OF OTHER PART OF RIGHT FOOT WITH FAT LAYER EXPOSED (HCC): Primary | ICD-10-CM

## 2022-05-19 PROCEDURE — 99215 OFFICE O/P EST HI 40 MIN: CPT | Performed by: NURSE PRACTITIONER

## 2022-05-19 NOTE — PROGRESS NOTES
Weekly Wound Education Note    Teaching Provided To: Patient  Training Topics: Cleasing and general instructions;Dressing; Discharge instructions; Off-loading  Training Method: Explain/Verbal  Training Response: Patient responds and understands        Notes: Stable. Silver algiante, bordered gauze.  Pt scheduled to see podiatrist.

## 2022-05-25 ENCOUNTER — TELEPHONE (OUTPATIENT)
Dept: WOUND CARE | Facility: HOSPITAL | Age: 52
End: 2022-05-25

## 2022-05-25 NOTE — TELEPHONE ENCOUNTER
Spoke to the patient today. He was tested positive for Covid on 5/20/22. Cancel his appointment tomorrow.  He has an appointment on 6/2/22 at 4:00 pm.

## 2022-05-26 ENCOUNTER — APPOINTMENT (OUTPATIENT)
Dept: WOUND CARE | Facility: HOSPITAL | Age: 52
End: 2022-05-26
Attending: NURSE PRACTITIONER
Payer: COMMERCIAL

## 2022-06-02 ENCOUNTER — OFFICE VISIT (OUTPATIENT)
Dept: WOUND CARE | Facility: HOSPITAL | Age: 52
End: 2022-06-02
Attending: NURSE PRACTITIONER
Payer: COMMERCIAL

## 2022-06-02 VITALS
HEART RATE: 83 BPM | RESPIRATION RATE: 16 BRPM | SYSTOLIC BLOOD PRESSURE: 126 MMHG | TEMPERATURE: 99 F | DIASTOLIC BLOOD PRESSURE: 84 MMHG

## 2022-06-02 DIAGNOSIS — L97.512 NON-PRESSURE CHRONIC ULCER OF OTHER PART OF RIGHT FOOT WITH FAT LAYER EXPOSED (HCC): Primary | ICD-10-CM

## 2022-06-02 DIAGNOSIS — L97.509 TYPE 2 DIABETES MELLITUS WITH FOOT ULCER, UNSPECIFIED WHETHER LONG TERM INSULIN USE (HCC): ICD-10-CM

## 2022-06-02 DIAGNOSIS — B95.62 METHICILLIN RESISTANT STAPHYLOCOCCUS AUREUS INFECTION AS THE CAUSE OF DISEASES CLASSIFIED ELSEWHERE: ICD-10-CM

## 2022-06-02 DIAGNOSIS — E11.621 TYPE 2 DIABETES MELLITUS WITH FOOT ULCER, UNSPECIFIED WHETHER LONG TERM INSULIN USE (HCC): ICD-10-CM

## 2022-06-02 PROCEDURE — 11042 DBRDMT SUBQ TIS 1ST 20SQCM/<: CPT | Performed by: NURSE PRACTITIONER

## 2022-06-02 NOTE — PROGRESS NOTES
Weekly Wound Education Note    Teaching Provided To: Patient  Training Topics: Dressing;Cleasing and general instructions; Discharge instructions; Off-loading  Training Method: Explain/Verbal  Training Response: Reinforcement needed        Notes: Stable. Continue silver alginate, bordered gauze. Going to [facility] for CROW adjustment. And going to see podiatry second opinion monday.

## 2022-06-02 NOTE — PROGRESS NOTES
Patient ID: Jean Santiago is a 49year old male. Debridement   Wound 03/09/22 #1 Right plantar foot Diabetic Ulcer Foot Right;Plantar    Consent obtained? verbal  Consent given by: patient  Risks discussed?  procedural risks discussed  Performed by: provider  Debridement type: surgical  Level of debridement: subcutaneous tissue    Pre-debridement measurements  Length (cm): 4.2  Width (cm): 4.2  Depth (cm): 0.2  Surface Area (cm^2): 17.64  Volume (cm^3): 3.53    Post-debridement measurements  Length (cm): 4.3  Width (cm): 4.5  Depth (cm): 0.2  Percent debrided: 35%  Surface Area (cm^2): 19.35  Area debrided (cm^2): 6.77  Volume (cm^3): 3.87  Tissue and other material debrided: subcutaneous tissue  Devitalized tissue debrided: biofilm and callus  Instrument(s) utilized: blade and forceps  Bleeding: medium  Hemostasis obtained with: pressure  Procedural pain (0-10): 0  Post-procedural pain: 0   Response to treatment: procedure was tolerated well

## 2022-06-09 ENCOUNTER — APPOINTMENT (OUTPATIENT)
Dept: WOUND CARE | Facility: HOSPITAL | Age: 52
End: 2022-06-09
Attending: NURSE PRACTITIONER
Payer: COMMERCIAL

## 2022-06-13 ENCOUNTER — OFFICE VISIT (OUTPATIENT)
Dept: PODIATRY CLINIC | Facility: CLINIC | Age: 52
End: 2022-06-13
Payer: COMMERCIAL

## 2022-06-13 DIAGNOSIS — L97.519 TYPE 2 DIABETES MELLITUS WITH RIGHT DIABETIC FOOT ULCER (HCC): Primary | ICD-10-CM

## 2022-06-13 DIAGNOSIS — E11.621 TYPE 2 DIABETES MELLITUS WITH RIGHT DIABETIC FOOT ULCER (HCC): Primary | ICD-10-CM

## 2022-06-13 PROCEDURE — 99213 OFFICE O/P EST LOW 20 MIN: CPT | Performed by: PODIATRIST

## 2022-06-16 ENCOUNTER — OFFICE VISIT (OUTPATIENT)
Dept: WOUND CARE | Facility: HOSPITAL | Age: 52
End: 2022-06-16
Attending: NURSE PRACTITIONER
Payer: COMMERCIAL

## 2022-06-16 VITALS
HEART RATE: 94 BPM | SYSTOLIC BLOOD PRESSURE: 117 MMHG | DIASTOLIC BLOOD PRESSURE: 78 MMHG | TEMPERATURE: 99 F | RESPIRATION RATE: 18 BRPM

## 2022-06-16 DIAGNOSIS — E11.621 TYPE 2 DIABETES MELLITUS WITH FOOT ULCER, UNSPECIFIED WHETHER LONG TERM INSULIN USE (HCC): ICD-10-CM

## 2022-06-16 DIAGNOSIS — L97.512 NON-PRESSURE CHRONIC ULCER OF OTHER PART OF RIGHT FOOT WITH FAT LAYER EXPOSED (HCC): Primary | ICD-10-CM

## 2022-06-16 DIAGNOSIS — Z86.14 PERSONAL HISTORY OF MRSA (METHICILLIN RESISTANT STAPHYLOCOCCUS AUREUS): ICD-10-CM

## 2022-06-16 DIAGNOSIS — L97.509 TYPE 2 DIABETES MELLITUS WITH FOOT ULCER, UNSPECIFIED WHETHER LONG TERM INSULIN USE (HCC): ICD-10-CM

## 2022-06-16 PROCEDURE — 99214 OFFICE O/P EST MOD 30 MIN: CPT | Performed by: NURSE PRACTITIONER

## 2022-06-16 NOTE — PROGRESS NOTES
Weekly Wound Education Note    Teaching Provided To: Patient  Training Topics: Cleasing and general instructions;Dressing; Discharge instructions; Off-loading  Training Method: Explain/Verbal  Training Response: Reinforcement needed        Notes: Stable. Silver alginate, bordered gauze.

## 2022-06-23 ENCOUNTER — HOSPITAL ENCOUNTER (OUTPATIENT)
Dept: LAB | Facility: HOSPITAL | Age: 52
Discharge: HOME | End: 2022-06-23
Attending: NURSE PRACTITIONER
Payer: COMMERCIAL

## 2022-06-23 ENCOUNTER — OFFICE VISIT (OUTPATIENT)
Dept: WOUND CARE | Facility: HOSPITAL | Age: 52
End: 2022-06-23
Attending: NURSE PRACTITIONER
Payer: COMMERCIAL

## 2022-06-23 VITALS
DIASTOLIC BLOOD PRESSURE: 76 MMHG | RESPIRATION RATE: 16 BRPM | TEMPERATURE: 99 F | SYSTOLIC BLOOD PRESSURE: 144 MMHG | HEART RATE: 90 BPM

## 2022-06-23 DIAGNOSIS — E11.621 TYPE 2 DIABETES MELLITUS WITH FOOT ULCER, UNSPECIFIED WHETHER LONG TERM INSULIN USE (HCC): ICD-10-CM

## 2022-06-23 DIAGNOSIS — L97.509 TYPE 2 DIABETES MELLITUS WITH FOOT ULCER, UNSPECIFIED WHETHER LONG TERM INSULIN USE (HCC): ICD-10-CM

## 2022-06-23 DIAGNOSIS — L97.512 NON-PRESSURE CHRONIC ULCER OF OTHER PART OF RIGHT FOOT WITH FAT LAYER EXPOSED (HCC): Primary | ICD-10-CM

## 2022-06-23 DIAGNOSIS — L97.512 NON-PRESSURE CHRONIC ULCER OF OTHER PART OF RIGHT FOOT WITH FAT LAYER EXPOSED (HCC): ICD-10-CM

## 2022-06-23 DIAGNOSIS — Z86.14 PERSONAL HISTORY OF MRSA (METHICILLIN RESISTANT STAPHYLOCOCCUS AUREUS): ICD-10-CM

## 2022-06-23 LAB
ALBUMIN SERPL-MCNC: 3.6 G/DL (ref 3.4–5)
ALBUMIN/GLOB SERPL: 0.9 {RATIO} (ref 1–2)
ALP LIVER SERPL-CCNC: 89 U/L
ALT SERPL-CCNC: 49 U/L
ANION GAP SERPL CALC-SCNC: 6 MMOL/L (ref 0–18)
AST SERPL-CCNC: 24 U/L (ref 15–37)
BASOPHILS # BLD AUTO: 0.05 X10(3) UL (ref 0–0.2)
BASOPHILS NFR BLD AUTO: 0.5 %
BILIRUB SERPL-MCNC: 0.3 MG/DL (ref 0.1–2)
BUN BLD-MCNC: 20 MG/DL (ref 7–18)
CALCIUM BLD-MCNC: 9.8 MG/DL (ref 8.5–10.1)
CHLORIDE SERPL-SCNC: 104 MMOL/L (ref 98–112)
CO2 SERPL-SCNC: 28 MMOL/L (ref 21–32)
CREAT BLD-MCNC: 0.98 MG/DL
CRP SERPL-MCNC: 4.41 MG/DL (ref ?–0.3)
EOSINOPHIL # BLD AUTO: 0.15 X10(3) UL (ref 0–0.7)
EOSINOPHIL NFR BLD AUTO: 1.5 %
ERYTHROCYTE [DISTWIDTH] IN BLOOD BY AUTOMATED COUNT: 12.7 %
ERYTHROCYTE [SEDIMENTATION RATE] IN BLOOD: 37 MM/HR
FASTING STATUS PATIENT QL REPORTED: NO
GLOBULIN PLAS-MCNC: 4.1 G/DL (ref 2.8–4.4)
GLUCOSE BLD-MCNC: 141 MG/DL (ref 70–99)
HCT VFR BLD AUTO: 41.5 %
HGB BLD-MCNC: 13.8 G/DL
IMM GRANULOCYTES # BLD AUTO: 0.04 X10(3) UL (ref 0–1)
IMM GRANULOCYTES NFR BLD: 0.4 %
LYMPHOCYTES # BLD AUTO: 2.05 X10(3) UL (ref 1–4)
LYMPHOCYTES NFR BLD AUTO: 20 %
MCH RBC QN AUTO: 28.3 PG (ref 26–34)
MCHC RBC AUTO-ENTMCNC: 33.3 G/DL (ref 31–37)
MCV RBC AUTO: 85.2 FL
MONOCYTES # BLD AUTO: 0.78 X10(3) UL (ref 0.1–1)
MONOCYTES NFR BLD AUTO: 7.6 %
NEUTROPHILS # BLD AUTO: 7.16 X10 (3) UL (ref 1.5–7.7)
NEUTROPHILS # BLD AUTO: 7.16 X10(3) UL (ref 1.5–7.7)
NEUTROPHILS NFR BLD AUTO: 70 %
OSMOLALITY SERPL CALC.SUM OF ELEC: 291 MOSM/KG (ref 275–295)
PLATELET # BLD AUTO: 324 10(3)UL (ref 150–450)
POTASSIUM SERPL-SCNC: 3.9 MMOL/L (ref 3.5–5.1)
PROT SERPL-MCNC: 7.7 G/DL (ref 6.4–8.2)
RBC # BLD AUTO: 4.87 X10(6)UL
SODIUM SERPL-SCNC: 138 MMOL/L (ref 136–145)
WBC # BLD AUTO: 10.2 X10(3) UL (ref 4–11)

## 2022-06-23 PROCEDURE — 80053 COMPREHEN METABOLIC PANEL: CPT

## 2022-06-23 PROCEDURE — 99215 OFFICE O/P EST HI 40 MIN: CPT | Performed by: NURSE PRACTITIONER

## 2022-06-23 PROCEDURE — 85652 RBC SED RATE AUTOMATED: CPT

## 2022-06-23 PROCEDURE — 86140 C-REACTIVE PROTEIN: CPT

## 2022-06-23 PROCEDURE — 85025 COMPLETE CBC W/AUTO DIFF WBC: CPT

## 2022-06-23 PROCEDURE — 36415 COLL VENOUS BLD VENIPUNCTURE: CPT

## 2022-06-23 RX ORDER — SULFAMETHOXAZOLE AND TRIMETHOPRIM 800; 160 MG/1; MG/1
1 TABLET ORAL 2 TIMES DAILY
Qty: 20 TABLET | Refills: 0 | Status: SHIPPED | OUTPATIENT
Start: 2022-06-23 | End: 2022-07-03

## 2022-06-23 RX ORDER — GENTAMICIN SULFATE 1 MG/G
1 CREAM TOPICAL 2 TIMES DAILY
Qty: 45 G | Refills: 0 | Status: SHIPPED | OUTPATIENT
Start: 2022-06-23 | End: 2022-07-07

## 2022-06-23 NOTE — PROGRESS NOTES
Weekly Wound Education Note    Teaching Provided To: Patient  Training Topics: Cleasing and general instructions;Dressing; Discharge instructions  Training Method: Explain/Verbal  Training Response: Patient responds and understands        Notes: Wound cultured, redness to leg outlined - pt to monitor. Oral and topical abx ordered today. Pt to use silver alginate and bordered gauze until gentamycin is picked-up.

## 2022-06-24 ENCOUNTER — TELEPHONE (OUTPATIENT)
Dept: WOUND CARE | Facility: HOSPITAL | Age: 52
End: 2022-06-24

## 2022-06-24 NOTE — TELEPHONE ENCOUNTER
I s/w patient regarding labs and need for f/u with ID. Patient states that the errythema that was marked yesterday has already receded. I let him know that because we are attempting to get him to the fall when he can undergo definitive care, the exposed bone, his inflammatory markers, and his luis risk for limb loss, he needs to f/u with ID and most likely will need to get a PICC line and receive iv abx. Patient verbalized understanding and said he will call today.

## 2022-06-30 ENCOUNTER — OFFICE VISIT (OUTPATIENT)
Dept: WOUND CARE | Facility: HOSPITAL | Age: 52
End: 2022-06-30
Attending: NURSE PRACTITIONER
Payer: COMMERCIAL

## 2022-06-30 VITALS
RESPIRATION RATE: 16 BRPM | SYSTOLIC BLOOD PRESSURE: 157 MMHG | TEMPERATURE: 99 F | DIASTOLIC BLOOD PRESSURE: 87 MMHG | HEART RATE: 98 BPM

## 2022-06-30 DIAGNOSIS — E11.621 TYPE 2 DIABETES MELLITUS WITH FOOT ULCER, UNSPECIFIED WHETHER LONG TERM INSULIN USE (HCC): ICD-10-CM

## 2022-06-30 DIAGNOSIS — B95.62 METHICILLIN RESISTANT STAPHYLOCOCCUS AUREUS INFECTION AS THE CAUSE OF DISEASES CLASSIFIED ELSEWHERE: ICD-10-CM

## 2022-06-30 DIAGNOSIS — L97.512 NON-PRESSURE CHRONIC ULCER OF OTHER PART OF RIGHT FOOT WITH FAT LAYER EXPOSED (HCC): Primary | ICD-10-CM

## 2022-06-30 DIAGNOSIS — L97.509 TYPE 2 DIABETES MELLITUS WITH FOOT ULCER, UNSPECIFIED WHETHER LONG TERM INSULIN USE (HCC): ICD-10-CM

## 2022-06-30 DIAGNOSIS — M86.172 OTHER ACUTE OSTEOMYELITIS, LEFT ANKLE AND FOOT (HCC): ICD-10-CM

## 2022-06-30 PROCEDURE — 97597 DBRDMT OPN WND 1ST 20 CM/<: CPT | Performed by: NURSE PRACTITIONER

## 2022-06-30 NOTE — PROGRESS NOTES
Patient ID: Komal Stockton is a 47year old male. Debridement   Wound 03/09/22 #1 Right plantar foot Diabetic Ulcer Foot Right;Plantar    Consent obtained? verbal  Consent given by: patient  Risks discussed?  procedural risks discussed  Performed by: provider  Debridement type: selective  Pain control: none  Pre-debridement measurements  Length (cm): 4.5  Width (cm): 3.1  Depth (cm): 0.3  Surface Area (cm^2): 13.95  Volume (cm^3): 4.19    Post-debridement measurements  Length (cm): 4.5  Width (cm): 3.1  Depth (cm): 0.3  Percent debrided: 100%  Surface Area (cm^2): 13.95  Area debrided (cm^2): 13.95  Volume (cm^3): 4.19  Devitalized tissue debrided: biofilm and callus  Instrument(s) utilized: blade  Bleeding: small  Hemostasis obtained with: pressure  Procedural pain (0-10): 0  Post-procedural pain: 0   Response to treatment: procedure was tolerated well

## 2022-06-30 NOTE — PROGRESS NOTES
Weekly Wound Education Note    Teaching Provided To: Patient  Training Topics: Cleasing and general instructions;Dressing; Discharge instructions; Off-loading  Training Method: Explain/Verbal  Training Response: Patient responds and understands        Notes: Stable. Continue gentamycin, applied bacitracin, plain alginate, bordered gauze.

## 2022-07-07 ENCOUNTER — OFFICE VISIT (OUTPATIENT)
Dept: WOUND CARE | Facility: HOSPITAL | Age: 52
End: 2022-07-07
Attending: NURSE PRACTITIONER
Payer: COMMERCIAL

## 2022-07-07 VITALS
DIASTOLIC BLOOD PRESSURE: 76 MMHG | RESPIRATION RATE: 16 BRPM | TEMPERATURE: 99 F | HEART RATE: 85 BPM | SYSTOLIC BLOOD PRESSURE: 117 MMHG

## 2022-07-07 DIAGNOSIS — B95.62 METHICILLIN RESISTANT STAPHYLOCOCCUS AUREUS INFECTION AS THE CAUSE OF DISEASES CLASSIFIED ELSEWHERE: ICD-10-CM

## 2022-07-07 DIAGNOSIS — E11.621 TYPE 2 DIABETES MELLITUS WITH FOOT ULCER, UNSPECIFIED WHETHER LONG TERM INSULIN USE (HCC): ICD-10-CM

## 2022-07-07 DIAGNOSIS — L97.512 NON-PRESSURE CHRONIC ULCER OF OTHER PART OF RIGHT FOOT WITH FAT LAYER EXPOSED (HCC): Primary | ICD-10-CM

## 2022-07-07 DIAGNOSIS — L97.509 TYPE 2 DIABETES MELLITUS WITH FOOT ULCER, UNSPECIFIED WHETHER LONG TERM INSULIN USE (HCC): ICD-10-CM

## 2022-07-07 PROCEDURE — 99213 OFFICE O/P EST LOW 20 MIN: CPT | Performed by: NURSE PRACTITIONER

## 2022-07-07 NOTE — PROGRESS NOTES
Weekly Wound Education Note    Teaching Provided To: Patient  Training Topics: Cleasing and general instructions;Dressing; Discharge instructions; Compression;Edema control  Training Method: Explain/Verbal  Training Response: Reinforcement needed        Notes: Improving, continue gentamycin, plain alginate, bordered gauze.

## 2022-07-14 ENCOUNTER — OFFICE VISIT (OUTPATIENT)
Dept: WOUND CARE | Facility: HOSPITAL | Age: 52
End: 2022-07-14
Attending: NURSE PRACTITIONER
Payer: COMMERCIAL

## 2022-07-14 VITALS
SYSTOLIC BLOOD PRESSURE: 123 MMHG | TEMPERATURE: 98 F | DIASTOLIC BLOOD PRESSURE: 72 MMHG | RESPIRATION RATE: 16 BRPM | HEART RATE: 94 BPM

## 2022-07-14 DIAGNOSIS — B95.62 METHICILLIN RESISTANT STAPHYLOCOCCUS AUREUS INFECTION AS THE CAUSE OF DISEASES CLASSIFIED ELSEWHERE: ICD-10-CM

## 2022-07-14 DIAGNOSIS — E11.621 TYPE 2 DIABETES MELLITUS WITH FOOT ULCER, UNSPECIFIED WHETHER LONG TERM INSULIN USE (HCC): ICD-10-CM

## 2022-07-14 DIAGNOSIS — L97.509 TYPE 2 DIABETES MELLITUS WITH FOOT ULCER, UNSPECIFIED WHETHER LONG TERM INSULIN USE (HCC): ICD-10-CM

## 2022-07-14 DIAGNOSIS — L97.512 NON-PRESSURE CHRONIC ULCER OF OTHER PART OF RIGHT FOOT WITH FAT LAYER EXPOSED (HCC): Primary | ICD-10-CM

## 2022-07-14 PROCEDURE — 11042 DBRDMT SUBQ TIS 1ST 20SQCM/<: CPT | Performed by: NURSE PRACTITIONER

## 2022-07-14 NOTE — PROGRESS NOTES
Weekly Wound Education Note    Teaching Provided To: Patient  Training Topics: Cleasing and general instructions;Dressing; Discharge instructions; Off-loading  Training Method: Explain/Verbal  Training Response: Reinforcement needed        Notes: Stable. Continue gentamycin, plain alginate, bordered gauze. Felted foam applied to insole of shoe.

## 2022-07-14 NOTE — PROGRESS NOTES
Patient ID: Linda Salmon is a 47year old male. Debridement   Wound 03/09/22 #1 Right plantar foot Diabetic Ulcer Foot Right;Plantar    Consent obtained? verbal  Consent given by: patient  Risks discussed?  procedural risks discussed  Performed by: provider  Debridement type: surgical  Level of debridement: subcutaneous tissue    Pre-debridement measurements  Length (cm): 4.5  Width (cm): 2.5  Depth (cm): 0.3  Surface Area (cm^2): 11.25  Volume (cm^3): 3.38    Post-debridement measurements  Length (cm): 4.6  Width (cm): 2.6  Depth (cm): 0.3  Percent debrided: 50%  Surface Area (cm^2): 11.96  Area debrided (cm^2): 5.98  Volume (cm^3): 3.59  Tissue and other material debrided: subcutaneous tissue  Devitalized tissue debrided: biofilm and callus  Instrument(s) utilized: blade  Bleeding: medium  Hemostasis obtained with: pressure  Procedural pain (0-10): 0  Post-procedural pain: 0   Response to treatment: procedure was tolerated well

## 2022-07-20 ENCOUNTER — OFFICE VISIT (OUTPATIENT)
Dept: WOUND CARE | Facility: HOSPITAL | Age: 52
End: 2022-07-20
Attending: INTERNAL MEDICINE
Payer: COMMERCIAL

## 2022-07-20 VITALS
TEMPERATURE: 98 F | HEART RATE: 88 BPM | DIASTOLIC BLOOD PRESSURE: 95 MMHG | RESPIRATION RATE: 18 BRPM | SYSTOLIC BLOOD PRESSURE: 160 MMHG

## 2022-07-20 DIAGNOSIS — T14.8XXA INFECTED WOUND: ICD-10-CM

## 2022-07-20 DIAGNOSIS — L08.9 INFECTED WOUND: ICD-10-CM

## 2022-07-20 DIAGNOSIS — E11.621 TYPE 2 DIABETES MELLITUS WITH FOOT ULCER, WITH LONG-TERM CURRENT USE OF INSULIN (HCC): ICD-10-CM

## 2022-07-20 DIAGNOSIS — L97.509 TYPE 2 DIABETES MELLITUS WITH FOOT ULCER, WITH LONG-TERM CURRENT USE OF INSULIN (HCC): ICD-10-CM

## 2022-07-20 DIAGNOSIS — Z79.4 TYPE 2 DIABETES MELLITUS WITH FOOT ULCER, WITH LONG-TERM CURRENT USE OF INSULIN (HCC): ICD-10-CM

## 2022-07-20 DIAGNOSIS — L97.512 NON-PRESSURE CHRONIC ULCER OF OTHER PART OF RIGHT FOOT WITH FAT LAYER EXPOSED (HCC): Primary | ICD-10-CM

## 2022-07-20 DIAGNOSIS — B95.62 METHICILLIN RESISTANT STAPHYLOCOCCUS AUREUS INFECTION AS THE CAUSE OF DISEASES CLASSIFIED ELSEWHERE: ICD-10-CM

## 2022-07-20 PROCEDURE — 11042 DBRDMT SUBQ TIS 1ST 20SQCM/<: CPT | Performed by: INTERNAL MEDICINE

## 2022-07-20 NOTE — PROGRESS NOTES
Weekly Wound Education Note    Teaching Provided To: Patient  Training Topics: Dressing;Off-loading; Discharge instructions;Cleasing and general instructions  Training Method: Explain/Verbal;Written  Training Response: Patient responds and understands           Alternate between honey gel and Gentamicin twice daily. Offloading insert in shoe. Patient states he will have foot surgery this fall.

## 2022-07-20 NOTE — PROGRESS NOTES
Patient ID: Charlesetta Bosworth is a 47year old male.     Debridement   Wound 03/09/22 #1 Right plantar foot Diabetic Ulcer Foot Right;Plantar    Consent obtained? verbal  Consent given by: patient    Performed by: provider  Debridement type: surgical  Level of debridement: subcutaneous tissue    Pre-debridement measurements  Length (cm): 5.1  Width (cm): 2  Depth (cm): 0.3  Surface Area (cm^2): 10.2  Volume (cm^3): 3.06    Post-debridement measurements  Length (cm): 5.1  Width (cm): 2  Depth (cm): 0.4  Percent debrided: 50%  Surface Area (cm^2): 10.2  Area debrided (cm^2): 5.1  Volume (cm^3): 4.08  Tissue and other material debrided: subcutaneous tissue  Devitalized tissue debrided: biofilm  Instrument(s) utilized: curette  Bleeding: small  Hemostasis obtained with: pressure  Procedural pain (0-10): 0  Post-procedural pain: 0   Response to treatment: procedure was tolerated well

## 2022-08-01 ENCOUNTER — OFFICE VISIT (OUTPATIENT)
Dept: WOUND CARE | Facility: HOSPITAL | Age: 52
End: 2022-08-01
Attending: INTERNAL MEDICINE
Payer: COMMERCIAL

## 2022-08-01 VITALS
RESPIRATION RATE: 16 BRPM | HEART RATE: 96 BPM | TEMPERATURE: 97 F | SYSTOLIC BLOOD PRESSURE: 145 MMHG | DIASTOLIC BLOOD PRESSURE: 80 MMHG

## 2022-08-01 DIAGNOSIS — Z79.4 TYPE 2 DIABETES MELLITUS WITH FOOT ULCER, WITH LONG-TERM CURRENT USE OF INSULIN (HCC): ICD-10-CM

## 2022-08-01 DIAGNOSIS — L97.512 NON-PRESSURE CHRONIC ULCER OF OTHER PART OF RIGHT FOOT WITH FAT LAYER EXPOSED (HCC): Primary | ICD-10-CM

## 2022-08-01 DIAGNOSIS — L97.509 TYPE 2 DIABETES MELLITUS WITH FOOT ULCER, WITH LONG-TERM CURRENT USE OF INSULIN (HCC): ICD-10-CM

## 2022-08-01 DIAGNOSIS — E11.621 TYPE 2 DIABETES MELLITUS WITH FOOT ULCER, WITH LONG-TERM CURRENT USE OF INSULIN (HCC): ICD-10-CM

## 2022-08-01 DIAGNOSIS — B95.62 METHICILLIN RESISTANT STAPHYLOCOCCUS AUREUS INFECTION AS THE CAUSE OF DISEASES CLASSIFIED ELSEWHERE: ICD-10-CM

## 2022-08-01 PROCEDURE — 11042 DBRDMT SUBQ TIS 1ST 20SQCM/<: CPT | Performed by: INTERNAL MEDICINE

## 2022-08-01 NOTE — PROGRESS NOTES
Patient ID: Jovanny Santo is a 47year old male.     Debridement   Wound 03/09/22 #1 Right plantar foot Diabetic Ulcer Foot Right;Plantar    Consent obtained? verbal  Consent given by: patient    Performed by: provider  Debridement type: surgical  Level of debridement: subcutaneous tissue    Pre-debridement measurements  Length (cm): 4.7  Width (cm): 1.5  Depth (cm): 0.3  Surface Area (cm^2): 7.05  Volume (cm^3): 2.12    Post-debridement measurements  Length (cm): 4.7  Width (cm): 1.5  Depth (cm): 0.4  Percent debrided: 80%  Surface Area (cm^2): 7.05  Area debrided (cm^2): 5.64  Volume (cm^3): 2.82  Tissue and other material debrided: subcutaneous tissue  Devitalized tissue debrided: biofilm and callus  Instrument(s) utilized: blade and forceps  Bleeding: medium  Hemostasis obtained with: silver nitrate and pressure  Procedural pain (0-10): 0  Post-procedural pain: 0   Response to treatment: procedure was tolerated well

## 2022-08-04 ENCOUNTER — APPOINTMENT (OUTPATIENT)
Dept: WOUND CARE | Facility: HOSPITAL | Age: 52
End: 2022-08-04
Attending: NURSE PRACTITIONER
Payer: COMMERCIAL

## 2022-08-18 ENCOUNTER — OFFICE VISIT (OUTPATIENT)
Dept: WOUND CARE | Facility: HOSPITAL | Age: 52
End: 2022-08-18
Attending: NURSE PRACTITIONER
Payer: COMMERCIAL

## 2022-08-18 VITALS
HEART RATE: 90 BPM | TEMPERATURE: 98 F | SYSTOLIC BLOOD PRESSURE: 148 MMHG | RESPIRATION RATE: 16 BRPM | DIASTOLIC BLOOD PRESSURE: 75 MMHG

## 2022-08-18 DIAGNOSIS — B95.62 METHICILLIN RESISTANT STAPHYLOCOCCUS AUREUS INFECTION AS THE CAUSE OF DISEASES CLASSIFIED ELSEWHERE: ICD-10-CM

## 2022-08-18 DIAGNOSIS — L97.512 NON-PRESSURE CHRONIC ULCER OF OTHER PART OF RIGHT FOOT WITH FAT LAYER EXPOSED (HCC): Primary | ICD-10-CM

## 2022-08-18 DIAGNOSIS — L97.509 TYPE 2 DIABETES MELLITUS WITH FOOT ULCER, WITH LONG-TERM CURRENT USE OF INSULIN (HCC): ICD-10-CM

## 2022-08-18 DIAGNOSIS — Z79.4 TYPE 2 DIABETES MELLITUS WITH FOOT ULCER, WITH LONG-TERM CURRENT USE OF INSULIN (HCC): ICD-10-CM

## 2022-08-18 DIAGNOSIS — E11.621 TYPE 2 DIABETES MELLITUS WITH FOOT ULCER, WITH LONG-TERM CURRENT USE OF INSULIN (HCC): ICD-10-CM

## 2022-08-18 PROCEDURE — 97597 DBRDMT OPN WND 1ST 20 CM/<: CPT | Performed by: NURSE PRACTITIONER

## 2022-08-18 NOTE — PROGRESS NOTES
Weekly Wound Education Note    Teaching Provided To: Patient  Training Topics: Cleasing and general instructions;Dressing; Discharge instructions; Off-loading  Training Method: Explain/Verbal  Training Response: Reinforcement needed        Notes: Stable. Switched to mupriocin and plain alginate, bordered gauze. Continue with felted foam in shoe.

## 2022-08-18 NOTE — PROGRESS NOTES
Patient ID: Hayden Dale is a 47year old male. Debridement   Wound 03/09/22 #1 Right plantar foot Diabetic Ulcer Foot Right;Plantar    Consent obtained? verbal  Consent given by: patient  Risks discussed?  procedural risks discussed  Performed by: provider  Debridement type: selective  Pain control: lidocaine 4%  Pre-debridement measurements  Length (cm): 4.2  Width (cm): 1.9  Depth (cm): 0.3  Surface Area (cm^2): 7.98  Volume (cm^3): 2.39    Post-debridement measurements  Length (cm): 4.2  Width (cm): 1.9  Depth (cm): 0.3  Percent debrided: 50%  Surface Area (cm^2): 7.98  Area debrided (cm^2): 3.99  Volume (cm^3): 2.39  Devitalized tissue debrided: biofilm and callus  Instrument(s) utilized: curette  Bleeding: small  Hemostasis obtained with: pressure  Procedural pain (0-10): 0  Post-procedural pain: 0   Response to treatment: procedure was tolerated well

## 2022-08-25 ENCOUNTER — APPOINTMENT (OUTPATIENT)
Dept: WOUND CARE | Facility: HOSPITAL | Age: 52
End: 2022-08-25
Attending: NURSE PRACTITIONER
Payer: COMMERCIAL

## 2022-09-01 ENCOUNTER — OFFICE VISIT (OUTPATIENT)
Dept: WOUND CARE | Facility: HOSPITAL | Age: 52
End: 2022-09-01
Attending: NURSE PRACTITIONER
Payer: COMMERCIAL

## 2022-09-01 VITALS
RESPIRATION RATE: 16 BRPM | TEMPERATURE: 98 F | DIASTOLIC BLOOD PRESSURE: 82 MMHG | HEART RATE: 88 BPM | SYSTOLIC BLOOD PRESSURE: 141 MMHG

## 2022-09-01 DIAGNOSIS — L97.509 TYPE 2 DIABETES MELLITUS WITH FOOT ULCER, WITH LONG-TERM CURRENT USE OF INSULIN (HCC): ICD-10-CM

## 2022-09-01 DIAGNOSIS — Z79.4 TYPE 2 DIABETES MELLITUS WITH FOOT ULCER, WITH LONG-TERM CURRENT USE OF INSULIN (HCC): ICD-10-CM

## 2022-09-01 DIAGNOSIS — L97.512 NON-PRESSURE CHRONIC ULCER OF OTHER PART OF RIGHT FOOT WITH FAT LAYER EXPOSED (HCC): Primary | ICD-10-CM

## 2022-09-01 DIAGNOSIS — E11.621 TYPE 2 DIABETES MELLITUS WITH FOOT ULCER, WITH LONG-TERM CURRENT USE OF INSULIN (HCC): ICD-10-CM

## 2022-09-01 DIAGNOSIS — B95.62 METHICILLIN RESISTANT STAPHYLOCOCCUS AUREUS INFECTION AS THE CAUSE OF DISEASES CLASSIFIED ELSEWHERE: ICD-10-CM

## 2022-09-01 PROCEDURE — 11042 DBRDMT SUBQ TIS 1ST 20SQCM/<: CPT | Performed by: NURSE PRACTITIONER

## 2022-09-15 ENCOUNTER — APPOINTMENT (OUTPATIENT)
Dept: WOUND CARE | Facility: HOSPITAL | Age: 52
End: 2022-09-15
Attending: NURSE PRACTITIONER
Payer: COMMERCIAL

## 2022-09-15 VITALS
SYSTOLIC BLOOD PRESSURE: 127 MMHG | RESPIRATION RATE: 16 BRPM | TEMPERATURE: 98 F | DIASTOLIC BLOOD PRESSURE: 81 MMHG | HEART RATE: 85 BPM

## 2022-09-15 DIAGNOSIS — E11.621 TYPE 2 DIABETES MELLITUS WITH FOOT ULCER, WITH LONG-TERM CURRENT USE OF INSULIN (HCC): ICD-10-CM

## 2022-09-15 DIAGNOSIS — Z79.4 TYPE 2 DIABETES MELLITUS WITH FOOT ULCER, WITH LONG-TERM CURRENT USE OF INSULIN (HCC): ICD-10-CM

## 2022-09-15 DIAGNOSIS — L97.509 TYPE 2 DIABETES MELLITUS WITH FOOT ULCER, WITH LONG-TERM CURRENT USE OF INSULIN (HCC): ICD-10-CM

## 2022-09-15 DIAGNOSIS — B95.62 METHICILLIN RESISTANT STAPHYLOCOCCUS AUREUS INFECTION AS THE CAUSE OF DISEASES CLASSIFIED ELSEWHERE: ICD-10-CM

## 2022-09-15 DIAGNOSIS — L97.512 NON-PRESSURE CHRONIC ULCER OF OTHER PART OF RIGHT FOOT WITH FAT LAYER EXPOSED (HCC): Primary | ICD-10-CM

## 2022-09-15 PROCEDURE — 11042 DBRDMT SUBQ TIS 1ST 20SQCM/<: CPT | Performed by: NURSE PRACTITIONER

## 2022-09-15 NOTE — PROGRESS NOTES
Patient ID: Amy James is a 47year old male. Debridement   Wound 03/09/22 #1 Right plantar foot Diabetic Ulcer Foot Right;Plantar    Consent obtained? verbal  Consent given by: patient  Risks discussed?  procedural risks discussed  Performed by: provider  Debridement type: surgical  Level of debridement: subcutaneous tissue    Pre-debridement measurements  Length (cm): 4.6  Width (cm): 1.8  Depth (cm): 0.2  Surface Area (cm^2): 8.28  Volume (cm^3): 1.66    Post-debridement measurements  Length (cm): 4.7  Width (cm): 1.9  Depth (cm): 0.2  Percent debrided: 50%  Surface Area (cm^2): 8.93  Area debrided (cm^2): 4.47  Volume (cm^3): 1.79  Tissue and other material debrided: subcutaneous tissue  Devitalized tissue debrided: biofilm and callus  Instrument(s) utilized: blade  Bleeding: medium  Hemostasis obtained with: pressure  Procedural pain (0-10): 0  Post-procedural pain: 1   Response to treatment: procedure was tolerated well

## 2022-09-15 NOTE — PROGRESS NOTES
.Weekly Wound Education Note    Teaching Provided To: Patient  Training Topics: Discharge instructions;Dressing;Cleasing and general instructions; Off-loading  Training Method: Explain/Verbal;Written  Training Response: Patient responds and understands        Notes: Wound stable. Continue mupriocin, plain alginate, and bordered gauze.

## 2022-09-29 ENCOUNTER — OFFICE VISIT (OUTPATIENT)
Dept: WOUND CARE | Facility: HOSPITAL | Age: 52
End: 2022-09-29
Attending: NURSE PRACTITIONER

## 2022-09-29 VITALS
SYSTOLIC BLOOD PRESSURE: 135 MMHG | DIASTOLIC BLOOD PRESSURE: 70 MMHG | HEART RATE: 90 BPM | TEMPERATURE: 97 F | RESPIRATION RATE: 16 BRPM

## 2022-09-29 DIAGNOSIS — L97.512 NON-PRESSURE CHRONIC ULCER OF OTHER PART OF RIGHT FOOT WITH FAT LAYER EXPOSED (HCC): Primary | ICD-10-CM

## 2022-09-29 DIAGNOSIS — E11.621 TYPE 2 DIABETES MELLITUS WITH FOOT ULCER, WITH LONG-TERM CURRENT USE OF INSULIN (HCC): ICD-10-CM

## 2022-09-29 DIAGNOSIS — B95.62 METHICILLIN RESISTANT STAPHYLOCOCCUS AUREUS INFECTION AS THE CAUSE OF DISEASES CLASSIFIED ELSEWHERE: ICD-10-CM

## 2022-09-29 DIAGNOSIS — Z79.4 TYPE 2 DIABETES MELLITUS WITH FOOT ULCER, WITH LONG-TERM CURRENT USE OF INSULIN (HCC): ICD-10-CM

## 2022-09-29 DIAGNOSIS — L97.509 TYPE 2 DIABETES MELLITUS WITH FOOT ULCER, WITH LONG-TERM CURRENT USE OF INSULIN (HCC): ICD-10-CM

## 2022-09-29 PROCEDURE — 11042 DBRDMT SUBQ TIS 1ST 20SQCM/<: CPT | Performed by: NURSE PRACTITIONER

## 2022-09-29 NOTE — PROGRESS NOTES
Patient ID: Shanae Pacheco is a 47year old male. Debridement   Wound 09/29/22 #2 Right Superior Foot Diabetic Ulcer Foot Right;Plantar    Consent obtained? verbal  Consent given by: patient  Risks discussed? procedural risks discussed  Performed by: provider  Debridement type: surgical  Level of debridement: subcutaneous tissue  Pain control: lidocaine 4%  Pre-debridement measurements  Length (cm): 1  Width (cm): 0.6  Depth (cm): 0.5  Surface Area (cm^2): 0.6  Volume (cm^3): 0.3    Post-debridement measurements  Length (cm): 1.2  Width (cm): 0.7  Depth (cm): 0.5  Percent debrided: 50%  Surface Area (cm^2): 0.84  Area debrided (cm^2): 0.42  Volume (cm^3): 0.42  Tissue and other material debrided: subcutaneous tissue  Devitalized tissue debrided: biofilm and callus  Instrument(s) utilized: blade  Bleeding: medium  Hemostasis obtained with: pressure  Procedural pain (0-10): 0  Post-procedural pain: 0   Response to treatment: procedure was tolerated well    Debridement   Wound 09/29/22 #3 Right Inferior Foot Diabetic Ulcer Foot Right;Plantar    Consent obtained? verbal  Consent given by: patient  Risks discussed?  procedural risks discussed  Performed by: provider  Debridement type: surgical  Level of debridement: subcutaneous tissue  Pain control: lidocaine 4%  Pre-debridement measurements  Length (cm): 1.4  Width (cm): 1.7  Depth (cm): 0.5  Surface Area (cm^2): 2.38  Volume (cm^3): 1.19    Post-debridement measurements  Length (cm): 1.5  Width (cm): 2.1  Depth (cm): 0.5  Percent debrided: 75%  Surface Area (cm^2): 3.15  Area debrided (cm^2): 2.36  Volume (cm^3): 1.58  Tissue and other material debrided: subcutaneous tissue  Devitalized tissue debrided: biofilm and callus  Instrument(s) utilized: curette  Bleeding: medium  Hemostasis obtained with: pressure  Procedural pain (0-10): 0  Post-procedural pain: 0   Response to treatment: procedure was tolerated well

## 2022-09-29 NOTE — PROGRESS NOTES
Weekly Wound Education Note    Teaching Provided To: Patient  Training Topics: Cleasing and general instructions;Dressing; Discharge instructions; Off-loading  Training Method: Explain/Verbal  Training Response: Patient responds and understands        Notes: Stable. Continue mupriocin, plain alginate, bordered gauze. Pt scheduled to see podiatry on 10/10.

## 2022-10-12 ENCOUNTER — APPOINTMENT (OUTPATIENT)
Dept: WOUND CARE | Facility: HOSPITAL | Age: 52
End: 2022-10-12
Attending: NURSE PRACTITIONER
Payer: COMMERCIAL

## 2022-10-18 ENCOUNTER — OFFICE VISIT (OUTPATIENT)
Dept: WOUND CARE | Facility: HOSPITAL | Age: 52
End: 2022-10-18
Attending: NURSE PRACTITIONER
Payer: COMMERCIAL

## 2022-10-18 VITALS
SYSTOLIC BLOOD PRESSURE: 147 MMHG | DIASTOLIC BLOOD PRESSURE: 82 MMHG | HEART RATE: 89 BPM | TEMPERATURE: 98 F | RESPIRATION RATE: 16 BRPM

## 2022-10-18 DIAGNOSIS — Z86.14 PERSONAL HISTORY OF MRSA (METHICILLIN RESISTANT STAPHYLOCOCCUS AUREUS): ICD-10-CM

## 2022-10-18 DIAGNOSIS — L97.512 NON-PRESSURE CHRONIC ULCER OF OTHER PART OF RIGHT FOOT WITH FAT LAYER EXPOSED (HCC): Primary | ICD-10-CM

## 2022-10-18 DIAGNOSIS — Z79.4 TYPE 2 DIABETES MELLITUS WITH FOOT ULCER, WITH LONG-TERM CURRENT USE OF INSULIN (HCC): ICD-10-CM

## 2022-10-18 DIAGNOSIS — L97.509 TYPE 2 DIABETES MELLITUS WITH FOOT ULCER, WITH LONG-TERM CURRENT USE OF INSULIN (HCC): ICD-10-CM

## 2022-10-18 DIAGNOSIS — E11.621 TYPE 2 DIABETES MELLITUS WITH FOOT ULCER, WITH LONG-TERM CURRENT USE OF INSULIN (HCC): ICD-10-CM

## 2022-10-18 LAB — GLUCOSE BLD-MCNC: 180 MG/DL (ref 70–99)

## 2022-10-18 PROCEDURE — 11042 DBRDMT SUBQ TIS 1ST 20SQCM/<: CPT | Performed by: NURSE PRACTITIONER

## 2022-10-18 NOTE — PROGRESS NOTES
Patient ID: Pool Ivy is a 47year old male. Debridement   Wound 09/29/22 #2 Right Superior Foot Diabetic Ulcer Foot Right;Plantar    Consent obtained? verbal  Consent given by: patient  Risks discussed? procedural risks discussed  Performed by: provider  Debridement type: surgical  Level of debridement: subcutaneous tissue    Pre-debridement measurements  Length (cm): 0.8  Width (cm): 0.4  Depth (cm): 0.5  Surface Area (cm^2): 0.32  Volume (cm^3): 0.16    Post-debridement measurements  Length (cm): 0.9  Width (cm): 0.5  Depth (cm): 0.5  Percent debrided: 50%  Surface Area (cm^2): 0.45  Area debrided (cm^2): 0.23  Volume (cm^3): 0.23  Tissue and other material debrided: subcutaneous tissue  Devitalized tissue debrided: biofilm and callus  Instrument(s) utilized: blade  Bleeding: medium  Hemostasis obtained with: pressure  Procedural pain (0-10): 0  Post-procedural pain: 0   Response to treatment: procedure was tolerated well    Debridement   Wound 09/29/22 #3 Right Inferior Foot Diabetic Ulcer Foot Right;Plantar    Consent obtained? verbal  Consent given by: patient  Risks discussed?  procedural risks discussed  Performed by: provider  Debridement type: surgical  Level of debridement: subcutaneous tissue    Pre-debridement measurements  Length (cm): 1.1  Width (cm): 1.8  Depth (cm): 0.4  Surface Area (cm^2): 1.98  Volume (cm^3): 0.79    Post-debridement measurements  Length (cm): 1.2  Width (cm): 1.9  Depth (cm): 0.4  Percent debrided: 50%  Surface Area (cm^2): 2.28  Area debrided (cm^2): 1.14  Volume (cm^3): 0.91  Tissue and other material debrided: subcutaneous tissue  Devitalized tissue debrided: biofilm and callus  Instrument(s) utilized: blade  Bleeding: medium  Hemostasis obtained with: pressure  Procedural pain (0-10): 0  Post-procedural pain: 0   Response to treatment: procedure was tolerated well

## 2022-10-18 NOTE — PROGRESS NOTES
.Weekly Wound Education Note    Teaching Provided To: Patient  Training Topics: Discharge instructions;Dressing;Cleasing and general instructions; Off-loading  Training Method: Explain/Verbal;Written  Training Response: Patient responds and understands        Notes: Wound stable.  Continue mupriocin, plain alginate, bordered gauze, and offloading felted foam.

## 2022-11-01 ENCOUNTER — OFFICE VISIT (OUTPATIENT)
Dept: WOUND CARE | Facility: HOSPITAL | Age: 52
End: 2022-11-01
Attending: NURSE PRACTITIONER
Payer: COMMERCIAL

## 2022-11-01 VITALS
TEMPERATURE: 98 F | SYSTOLIC BLOOD PRESSURE: 146 MMHG | DIASTOLIC BLOOD PRESSURE: 72 MMHG | HEART RATE: 88 BPM | RESPIRATION RATE: 16 BRPM

## 2022-11-01 DIAGNOSIS — Z79.4 TYPE 2 DIABETES MELLITUS WITH FOOT ULCER, WITH LONG-TERM CURRENT USE OF INSULIN (HCC): ICD-10-CM

## 2022-11-01 DIAGNOSIS — E11.621 TYPE 2 DIABETES MELLITUS WITH FOOT ULCER, WITH LONG-TERM CURRENT USE OF INSULIN (HCC): ICD-10-CM

## 2022-11-01 DIAGNOSIS — L97.512 NON-PRESSURE CHRONIC ULCER OF OTHER PART OF RIGHT FOOT WITH FAT LAYER EXPOSED (HCC): Primary | ICD-10-CM

## 2022-11-01 DIAGNOSIS — L97.509 TYPE 2 DIABETES MELLITUS WITH FOOT ULCER, WITH LONG-TERM CURRENT USE OF INSULIN (HCC): ICD-10-CM

## 2022-11-01 PROCEDURE — 11042 DBRDMT SUBQ TIS 1ST 20SQCM/<: CPT | Performed by: NURSE PRACTITIONER

## 2022-11-01 NOTE — PROGRESS NOTES
.Weekly Wound Education Note    Teaching Provided To: Patient  Training Topics: Discharge instructions;Dressing;Cleasing and general instructions  Training Method: Explain/Verbal;Written  Training Response: Patient responds and understands        Notes: Wounds stable. Dressing changed to hydrofera transfer, and bordered gauze. Extra supplies sent with pt.

## 2022-11-01 NOTE — PATIENT INSTRUCTIONS
Please return   2 weeks-call sooner with concerns      Patient discharge and wound care instructions  Nathaly Bowels  11/1/2022    You may shower with protection of the wound (ie a cast cover or similar). Changing your dressing:     every other day  Wash your hands with soap and water. Remove old dressing, discard into plastic bag and place into trash. Cleanse the wound with Normal Saline or specified wound cleanser prior to applying a clean dressing using gauze sponges, not tissues or cotton balls. Pat dry using gauze sponges, not tissue or cotton balls. APPLY Dressing:    Hydrofera transfer/classic>bordered gauze    Managing your edema:  Avoid prolonged standing in one place. It is better to have your calf muscles moving to pump fluid out of the legs      Elevate leg(s) above the level of the heart when sitting or as much as possible. Take you diuretics as directed by your physician. Do not skip doses or change doses unless instructed to do so by your physician. Decrease salt intake, follow recommended 2 grams daily. Offloading:  Felted foam in shoe/insert    Nutrition and blood sugar control:  Focus on the following:  Protein: Meats, beans, eggs, milk and yogurt particularly [place] yogurt), tofu, soy nuts, soy protein products (Follow the protein handout in your welcome folder)  Vitamin C: Citrus fruits and juices, strawberries, tomatoes, tomato juice, peppers, baked potatoes, spinach, broccoli, cauliflower, [place] sprouts, cabbage  Vitamin A: Dark green, leafy vegetables, orange or yellow vegetables, cantaloupe, fortified dairy products, liver, fortified cereals  Zinc: Fortified cereals, red meats, seafood  Consider supplementing with [employer] by [employer] (These are essential branch chain amino acids that help with tissue building and wound healing) and take 2 packets/day.  you can order online at [employer] or [facility]    If your blood sugar is consistently elevated your body can't heal and can't fight infection. Concerns:  Signs of infection may include the following:  Increase in redness  Red \"streaks\" from wound  Increase in swelling  Fever  Unusual odor  Change in the amount of wound drainage     Should you experience any significant changes in your wound(s) or have any questions regarding your home care instructions please contact the wound center [facility] @ [phone] If after regular business hours, please call your family doctor or local emergency room. The treatment plan has been discussed at length between you and your provider. Follow all instructions carefully, it is very important. If you do not follow all instructions you are at risk of your wound not healing, infection, possible loss of limb and even loss of life.

## 2022-11-01 NOTE — PROGRESS NOTES
Patient ID: Nickie Stover is a 47year old male. Debridement   Wound 09/29/22 #2 Right Superior Foot Diabetic Ulcer Foot Right;Plantar    Consent obtained? verbal  Consent given by: patient  Risks discussed? procedural risks discussed  Performed by: provider  Debridement type: surgical  Level of debridement: subcutaneous tissue    Pre-debridement measurements  Length (cm): 1.1  Width (cm): 0.4  Depth (cm): 0.3  Surface Area (cm^2): 0.44  Volume (cm^3): 0.13    Post-debridement measurements  Length (cm): 1.1  Width (cm): 0.5  Depth (cm): 0.3  Percent debrided: 100%  Surface Area (cm^2): 0.55  Area debrided (cm^2): 0.55  Volume (cm^3): 0.17  Tissue and other material debrided: subcutaneous tissue  Devitalized tissue debrided: biofilm and callus  Instrument(s) utilized: blade  Bleeding: medium  Hemostasis obtained with: pressure  Procedural pain (0-10): 0  Post-procedural pain: 0   Response to treatment: procedure was tolerated well    Debridement   Wound 09/29/22 #3 Right Inferior Foot Diabetic Ulcer Foot Right;Plantar    Consent obtained? verbal  Consent given by: patient  Risks discussed?  procedural risks discussed  Performed by: provider  Debridement type: surgical  Level of debridement: subcutaneous tissue    Pre-debridement measurements  Length (cm): 1  Width (cm): 1.5  Depth (cm): 0.2  Surface Area (cm^2): 1.5  Volume (cm^3): 0.3    Post-debridement measurements  Length (cm): 1.5  Width (cm): 1.9  Depth (cm): 0.3  Percent debrided: 100%  Surface Area (cm^2): 2.85  Area debrided (cm^2): 2.85  Volume (cm^3): 0.86  Tissue and other material debrided: subcutaneous tissue  Devitalized tissue debrided: biofilm and callus  Instrument(s) utilized: blade  Bleeding: medium  Hemostasis obtained with: pressure  Procedural pain (0-10): 0  Post-procedural pain: 0   Response to treatment: procedure was tolerated well

## 2022-11-15 ENCOUNTER — APPOINTMENT (OUTPATIENT)
Dept: WOUND CARE | Facility: HOSPITAL | Age: 52
End: 2022-11-15
Attending: NURSE PRACTITIONER
Payer: COMMERCIAL

## 2023-05-30 NOTE — PROGRESS NOTES
Patient ID: Minal Reyes is a 49year old male. Debridement   Consent obtained? verbal  Consent given by: patient  Risks discussed?  procedural risks discussed  Performed by: provider  Debridement type: selective    Pre-debridement measurements  Length (cm): 2  Width (cm): 3.5  Depth (cm): 0.1  Surface Area (cm^2): 7  Volume (cm^3): 0.7    Post-debridement measurements  Length (cm): 2  Width (cm): 3.5  Depth (cm): 1  Percent debrided: 50%  Surface Area (cm^2): 7  Area debrided (cm^2): 3.5  Volume (cm^3): 7  Devitalized tissue debrided: biofilm and callus  Instrument(s) utilized: curette  Bleeding: medium  Hemostasis obtained with: pressure  Procedural pain (0-10): 0  Post-procedural pain: 0   Response to treatment: procedure was tolerated well [Initial Visit] : an initial visit for [Interpreters_IDNumber] : [id number] [[id number]] : [place]

## 2023-06-18 ENCOUNTER — HOSPITAL ENCOUNTER (INPATIENT)
Facility: HOSPITAL | Age: 53
LOS: 3 days | Discharge: HOME HEALTH | End: 2023-06-21
Attending: STUDENT IN AN ORGANIZED HEALTH CARE EDUCATION/TRAINING PROGRAM | Admitting: HOSPITALIST
Payer: COMMERCIAL

## 2023-06-18 ENCOUNTER — APPOINTMENT (OUTPATIENT)
Dept: GENERAL RADIOLOGY | Facility: HOSPITAL | Age: 53
End: 2023-06-18
Attending: STUDENT IN AN ORGANIZED HEALTH CARE EDUCATION/TRAINING PROGRAM
Payer: COMMERCIAL

## 2023-06-18 DIAGNOSIS — A41.89 SEPSIS DUE TO OTHER ETIOLOGY (HCC): ICD-10-CM

## 2023-06-18 DIAGNOSIS — L08.9 DIABETIC FOOT INFECTION (HCC): Primary | ICD-10-CM

## 2023-06-18 DIAGNOSIS — E11.628 DIABETIC FOOT INFECTION (HCC): Primary | ICD-10-CM

## 2023-06-18 LAB
ANION GAP SERPL CALC-SCNC: 10 MMOL/L (ref 0–18)
BASOPHILS # BLD AUTO: 0.06 X10(3) UL (ref 0–0.2)
BASOPHILS NFR BLD AUTO: 0.5 %
BUN BLD-MCNC: 23 MG/DL (ref 7–18)
BUN/CREAT SERPL: 16.7 (ref 10–20)
CALCIUM BLD-MCNC: 9.8 MG/DL (ref 8.5–10.1)
CHLORIDE SERPL-SCNC: 97 MMOL/L (ref 98–112)
CO2 SERPL-SCNC: 25 MMOL/L (ref 21–32)
CREAT BLD-MCNC: 1.38 MG/DL
CRP SERPL-MCNC: 24.8 MG/DL (ref ?–0.3)
DEPRECATED RDW RBC AUTO: 36.6 FL (ref 35.1–46.3)
EOSINOPHIL # BLD AUTO: 0.05 X10(3) UL (ref 0–0.7)
EOSINOPHIL NFR BLD AUTO: 0.4 %
ERYTHROCYTE [DISTWIDTH] IN BLOOD BY AUTOMATED COUNT: 12 % (ref 11–15)
ERYTHROCYTE [SEDIMENTATION RATE] IN BLOOD: 49 MM/HR
EST. AVERAGE GLUCOSE BLD GHB EST-MCNC: 180 MG/DL (ref 68–126)
GFR SERPLBLD BASED ON 1.73 SQ M-ARVRAT: 62 ML/MIN/1.73M2 (ref 60–?)
GLUCOSE BLD-MCNC: 181 MG/DL (ref 70–99)
GLUCOSE BLDC GLUCOMTR-MCNC: 120 MG/DL (ref 70–99)
GLUCOSE BLDC GLUCOMTR-MCNC: 163 MG/DL (ref 70–99)
HBA1C MFR BLD: 7.9 % (ref ?–5.7)
HCT VFR BLD AUTO: 40.7 %
HGB BLD-MCNC: 13.7 G/DL
IMM GRANULOCYTES # BLD AUTO: 0.07 X10(3) UL (ref 0–1)
IMM GRANULOCYTES NFR BLD: 0.6 %
LACTATE SERPL-SCNC: 0.8 MMOL/L (ref 0.4–2)
LACTATE SERPL-SCNC: 2.2 MMOL/L (ref 0.4–2)
LYMPHOCYTES # BLD AUTO: 0.98 X10(3) UL (ref 1–4)
LYMPHOCYTES NFR BLD AUTO: 7.8 %
MCH RBC QN AUTO: 27.8 PG (ref 26–34)
MCHC RBC AUTO-ENTMCNC: 33.7 G/DL (ref 31–37)
MCV RBC AUTO: 82.6 FL
MONOCYTES # BLD AUTO: 1.04 X10(3) UL (ref 0.1–1)
MONOCYTES NFR BLD AUTO: 8.3 %
NEUTROPHILS # BLD AUTO: 10.35 X10 (3) UL (ref 1.5–7.7)
NEUTROPHILS # BLD AUTO: 10.35 X10(3) UL (ref 1.5–7.7)
NEUTROPHILS NFR BLD AUTO: 82.4 %
OSMOLALITY SERPL CALC.SUM OF ELEC: 282 MOSM/KG (ref 275–295)
PLATELET # BLD AUTO: 501 10(3)UL (ref 150–450)
POTASSIUM SERPL-SCNC: 4 MMOL/L (ref 3.5–5.1)
RBC # BLD AUTO: 4.93 X10(6)UL
SODIUM SERPL-SCNC: 132 MMOL/L (ref 136–145)
WBC # BLD AUTO: 12.6 X10(3) UL (ref 4–11)

## 2023-06-18 PROCEDURE — 87205 SMEAR GRAM STAIN: CPT | Performed by: STUDENT IN AN ORGANIZED HEALTH CARE EDUCATION/TRAINING PROGRAM

## 2023-06-18 PROCEDURE — 85652 RBC SED RATE AUTOMATED: CPT | Performed by: STUDENT IN AN ORGANIZED HEALTH CARE EDUCATION/TRAINING PROGRAM

## 2023-06-18 PROCEDURE — 87070 CULTURE OTHR SPECIMN AEROBIC: CPT | Performed by: STUDENT IN AN ORGANIZED HEALTH CARE EDUCATION/TRAINING PROGRAM

## 2023-06-18 PROCEDURE — 83605 ASSAY OF LACTIC ACID: CPT | Performed by: STUDENT IN AN ORGANIZED HEALTH CARE EDUCATION/TRAINING PROGRAM

## 2023-06-18 PROCEDURE — 80048 BASIC METABOLIC PNL TOTAL CA: CPT | Performed by: STUDENT IN AN ORGANIZED HEALTH CARE EDUCATION/TRAINING PROGRAM

## 2023-06-18 PROCEDURE — 93005 ELECTROCARDIOGRAM TRACING: CPT

## 2023-06-18 PROCEDURE — 87077 CULTURE AEROBIC IDENTIFY: CPT | Performed by: STUDENT IN AN ORGANIZED HEALTH CARE EDUCATION/TRAINING PROGRAM

## 2023-06-18 PROCEDURE — 85025 COMPLETE CBC W/AUTO DIFF WBC: CPT | Performed by: STUDENT IN AN ORGANIZED HEALTH CARE EDUCATION/TRAINING PROGRAM

## 2023-06-18 PROCEDURE — 82962 GLUCOSE BLOOD TEST: CPT

## 2023-06-18 PROCEDURE — 73630 X-RAY EXAM OF FOOT: CPT | Performed by: STUDENT IN AN ORGANIZED HEALTH CARE EDUCATION/TRAINING PROGRAM

## 2023-06-18 PROCEDURE — 87186 SC STD MICRODIL/AGAR DIL: CPT | Performed by: STUDENT IN AN ORGANIZED HEALTH CARE EDUCATION/TRAINING PROGRAM

## 2023-06-18 PROCEDURE — 87075 CULTR BACTERIA EXCEPT BLOOD: CPT | Performed by: STUDENT IN AN ORGANIZED HEALTH CARE EDUCATION/TRAINING PROGRAM

## 2023-06-18 PROCEDURE — 0QBL0ZZ EXCISION OF RIGHT TARSAL, OPEN APPROACH: ICD-10-PCS | Performed by: STUDENT IN AN ORGANIZED HEALTH CARE EDUCATION/TRAINING PROGRAM

## 2023-06-18 PROCEDURE — 83036 HEMOGLOBIN GLYCOSYLATED A1C: CPT | Performed by: HOSPITALIST

## 2023-06-18 PROCEDURE — 87150 DNA/RNA AMPLIFIED PROBE: CPT | Performed by: STUDENT IN AN ORGANIZED HEALTH CARE EDUCATION/TRAINING PROGRAM

## 2023-06-18 PROCEDURE — 86140 C-REACTIVE PROTEIN: CPT | Performed by: STUDENT IN AN ORGANIZED HEALTH CARE EDUCATION/TRAINING PROGRAM

## 2023-06-18 PROCEDURE — 87147 CULTURE TYPE IMMUNOLOGIC: CPT | Performed by: STUDENT IN AN ORGANIZED HEALTH CARE EDUCATION/TRAINING PROGRAM

## 2023-06-18 PROCEDURE — 87040 BLOOD CULTURE FOR BACTERIA: CPT | Performed by: STUDENT IN AN ORGANIZED HEALTH CARE EDUCATION/TRAINING PROGRAM

## 2023-06-18 RX ORDER — PROCHLORPERAZINE EDISYLATE 5 MG/ML
5 INJECTION INTRAMUSCULAR; INTRAVENOUS EVERY 8 HOURS PRN
Status: DISCONTINUED | OUTPATIENT
Start: 2023-06-18 | End: 2023-06-21

## 2023-06-18 RX ORDER — BUPROPION HYDROCHLORIDE 300 MG/1
300 TABLET ORAL DAILY
Status: DISCONTINUED | OUTPATIENT
Start: 2023-06-19 | End: 2023-06-21

## 2023-06-18 RX ORDER — NICOTINE POLACRILEX 4 MG
30 LOZENGE BUCCAL
Status: DISCONTINUED | OUTPATIENT
Start: 2023-06-18 | End: 2023-06-21

## 2023-06-18 RX ORDER — DEXTROSE MONOHYDRATE 25 G/50ML
50 INJECTION, SOLUTION INTRAVENOUS
Status: DISCONTINUED | OUTPATIENT
Start: 2023-06-18 | End: 2023-06-21

## 2023-06-18 RX ORDER — ACETAMINOPHEN 500 MG
1000 TABLET ORAL ONCE
Status: COMPLETED | OUTPATIENT
Start: 2023-06-18 | End: 2023-06-18

## 2023-06-18 RX ORDER — ACETAMINOPHEN 500 MG
500 TABLET ORAL EVERY 4 HOURS PRN
Status: DISCONTINUED | OUTPATIENT
Start: 2023-06-18 | End: 2023-06-21

## 2023-06-18 RX ORDER — ONDANSETRON 2 MG/ML
4 INJECTION INTRAMUSCULAR; INTRAVENOUS EVERY 6 HOURS PRN
Status: DISCONTINUED | OUTPATIENT
Start: 2023-06-18 | End: 2023-06-21

## 2023-06-18 RX ORDER — SODIUM HYPOCHLORITE 1.25 MG/ML
SOLUTION TOPICAL DAILY
Status: DISCONTINUED | OUTPATIENT
Start: 2023-06-18 | End: 2023-06-21

## 2023-06-18 RX ORDER — HEPARIN SODIUM 5000 [USP'U]/ML
5000 INJECTION, SOLUTION INTRAVENOUS; SUBCUTANEOUS EVERY 8 HOURS SCHEDULED
Status: DISCONTINUED | OUTPATIENT
Start: 2023-06-18 | End: 2023-06-21

## 2023-06-18 RX ORDER — VANCOMYCIN 2 GRAM/500 ML IN 0.9 % SODIUM CHLORIDE INTRAVENOUS
15 ONCE
Status: COMPLETED | OUTPATIENT
Start: 2023-06-18 | End: 2023-06-18

## 2023-06-18 RX ORDER — GABAPENTIN 300 MG/1
300 CAPSULE ORAL 2 TIMES DAILY
Status: DISCONTINUED | OUTPATIENT
Start: 2023-06-18 | End: 2023-06-21

## 2023-06-18 RX ORDER — SODIUM CHLORIDE 9 MG/ML
INJECTION, SOLUTION INTRAVENOUS CONTINUOUS
Status: DISCONTINUED | OUTPATIENT
Start: 2023-06-18 | End: 2023-06-19

## 2023-06-18 RX ORDER — NICOTINE POLACRILEX 4 MG
15 LOZENGE BUCCAL
Status: DISCONTINUED | OUTPATIENT
Start: 2023-06-18 | End: 2023-06-21

## 2023-06-18 NOTE — PLAN OF CARE
No acute changes. Temperature 103.2 upon arrival to floor, sepsis BPA fired. Dr. Daphnie Elizabeth notified, okay to give tylenol early. Wound cleansed and dressed with gauze saturated with dakins solution. Safety precautions in place. Problem: Patient Centered Care  Goal: Patient preferences are identified and integrated in the patient's plan of care  Description: Interventions:  - What would you like us to know as we care for you?  From home with family  - Provide timely, complete, and accurate information to patient/family  - Incorporate patient and family knowledge, values, beliefs, and cultural backgrounds into the planning and delivery of care  - Encourage patient/family to participate in care and decision-making at the level they choose  - Honor patient and family perspectives and choices  Outcome: Progressing     Problem: METABOLIC/FLUID AND ELECTROLYTES - ADULT  Goal: Glucose maintained within prescribed range  Description: INTERVENTIONS:  - Monitor Blood Glucose as ordered  - Assess for signs and symptoms of hyperglycemia and hypoglycemia  - Administer ordered medications to maintain glucose within target range  - Assess barriers to adequate nutritional intake and initiate nutrition consult as needed  - Instruct patient on self management of diabetes  Outcome: Progressing  Goal: Electrolytes maintained within normal limits  Description: INTERVENTIONS:  - Monitor labs and rhythm and assess patient for signs and symptoms of electrolyte imbalances  - Administer electrolyte replacement as ordered  - Monitor response to electrolyte replacements, including rhythm and repeat lab results as appropriate  - Fluid restriction as ordered  - Instruct patient on fluid and nutrition restrictions as appropriate  Outcome: Progressing     Problem: SKIN/TISSUE INTEGRITY - ADULT  Goal: Skin integrity remains intact  Description: INTERVENTIONS  - Assess and document risk factors for pressure ulcer development  - Assess and document skin integrity  - Monitor for areas of redness and/or skin breakdown  - Initiate interventions, skin care algorithm/standards of care as needed  Outcome: Progressing  Goal: Incision(s), wounds(s) or drain site(s) healing without S/S of infection  Description: INTERVENTIONS:  - Assess and document risk factors for pressure ulcer development  - Assess and document skin integrity  - Assess and document dressing/incision, wound bed, drain sites and surrounding tissue  - Implement wound care per orders  - Initiate isolation precautions as appropriate  - Initiate Pressure Ulcer prevention bundle as indicated  Outcome: Progressing     Problem: MUSCULOSKELETAL - ADULT  Goal: Return mobility to safest level of function  Description: INTERVENTIONS:  - Assess patient stability and activity tolerance for standing, transferring and ambulating w/ or w/o assistive devices  - Assist with transfers and ambulation using safe patient handling equipment as needed  - Ensure adequate protection for wounds/incisions during mobilization  - Obtain PT/OT consults as needed  - Advance activity as appropriate  - Communicate ordered activity level and limitations with patient/family  Outcome: Progressing  Goal: Maintain proper alignment of affected body part  Description: INTERVENTIONS:  - Support and protect limb and body alignment per provider's orders  - Instruct and reinforce with patient and family use of appropriate assistive device and precautions (e.g. spinal or hip dislocation precautions)  Outcome: Progressing

## 2023-06-18 NOTE — ED INITIAL ASSESSMENT (HPI)
- Creatinine 1.5 on admission, Cr today Estimated Creatinine Clearance: 25.5 mL/min (A) (based on SCr of 1.5 mg/dL (H)). (baseline 1.2)  - Suspect pt has prerenal azotemia 2/2 decreased PO intake  - UA with multiple hyaline casts   - Monitor BMP daily, replete electrolytes if necessary  - Renally adjust drugs to CrCl; avoid nephrotoxic drugs  - s/p IVFs   - Hold lasix, encourage PO intake  - Give NS at 50cc/hr x 10 hrs today  - Strict I/Os  - Consider renal U/S if no improvement in 24-48 hrs   C/o pain to right foot that radiates to ankle & leg. Pt has diabetic foot ulcer, reports hx of sepsis. Fever at home 101.8, no antipyretics at home today.

## 2023-06-18 NOTE — ED QUICK NOTES
Orders for admission, patient is aware of plan and ready to go upstairs. Any questions, please call ED RN gautam at extension [phone].      Patient Covid vaccination status: Fully vaccinated     COVID Test Ordered in ED: None    COVID Suspicion at Admission: N/A    Running Infusions:      Mental Status/LOC at time of transport: x4    Other pertinent information:   CIWA score: N/A   NIH score:  N/A

## 2023-06-19 ENCOUNTER — APPOINTMENT (OUTPATIENT)
Dept: NUCLEAR MEDICINE | Facility: HOSPITAL | Age: 53
End: 2023-06-19
Attending: STUDENT IN AN ORGANIZED HEALTH CARE EDUCATION/TRAINING PROGRAM
Payer: COMMERCIAL

## 2023-06-19 LAB
ANION GAP SERPL CALC-SCNC: 7 MMOL/L (ref 0–18)
ATRIAL RATE: 113 BPM
BASOPHILS # BLD AUTO: 0.05 X10(3) UL (ref 0–0.2)
BASOPHILS NFR BLD AUTO: 0.7 %
BUN BLD-MCNC: 20 MG/DL (ref 7–18)
BUN/CREAT SERPL: 18 (ref 10–20)
CALCIUM BLD-MCNC: 9.5 MG/DL (ref 8.5–10.1)
CHLORIDE SERPL-SCNC: 105 MMOL/L (ref 98–112)
CO2 SERPL-SCNC: 27 MMOL/L (ref 21–32)
CREAT BLD-MCNC: 1.11 MG/DL
DEPRECATED RDW RBC AUTO: 38 FL (ref 35.1–46.3)
EOSINOPHIL # BLD AUTO: 0.06 X10(3) UL (ref 0–0.7)
EOSINOPHIL NFR BLD AUTO: 0.8 %
ERYTHROCYTE [DISTWIDTH] IN BLOOD BY AUTOMATED COUNT: 12.3 % (ref 11–15)
EST. AVERAGE GLUCOSE BLD GHB EST-MCNC: 180 MG/DL (ref 68–126)
GFR SERPLBLD BASED ON 1.73 SQ M-ARVRAT: 80 ML/MIN/1.73M2 (ref 60–?)
GLUCOSE BLD-MCNC: 98 MG/DL (ref 70–99)
GLUCOSE BLDC GLUCOMTR-MCNC: 101 MG/DL (ref 70–99)
GLUCOSE BLDC GLUCOMTR-MCNC: 127 MG/DL (ref 70–99)
GLUCOSE BLDC GLUCOMTR-MCNC: 197 MG/DL (ref 70–99)
HBA1C MFR BLD: 7.9 % (ref ?–5.7)
HCT VFR BLD AUTO: 40.2 %
HGB BLD-MCNC: 12.9 G/DL
IMM GRANULOCYTES # BLD AUTO: 0.03 X10(3) UL (ref 0–1)
IMM GRANULOCYTES NFR BLD: 0.4 %
INR BLD: 1.23 (ref 0.85–1.16)
LYMPHOCYTES # BLD AUTO: 0.74 X10(3) UL (ref 1–4)
LYMPHOCYTES NFR BLD AUTO: 9.7 %
MCH RBC QN AUTO: 27.3 PG (ref 26–34)
MCHC RBC AUTO-ENTMCNC: 32.1 G/DL (ref 31–37)
MCV RBC AUTO: 85.2 FL
MONOCYTES # BLD AUTO: 0.72 X10(3) UL (ref 0.1–1)
MONOCYTES NFR BLD AUTO: 9.4 %
NEUTROPHILS # BLD AUTO: 6.06 X10 (3) UL (ref 1.5–7.7)
NEUTROPHILS # BLD AUTO: 6.06 X10(3) UL (ref 1.5–7.7)
NEUTROPHILS NFR BLD AUTO: 79 %
OSMOLALITY SERPL CALC.SUM OF ELEC: 291 MOSM/KG (ref 275–295)
P AXIS: 52 DEGREES
P-R INTERVAL: 166 MS
PLATELET # BLD AUTO: 448 10(3)UL (ref 150–450)
POTASSIUM SERPL-SCNC: 3.7 MMOL/L (ref 3.5–5.1)
PROTHROMBIN TIME: 15.4 SECONDS (ref 11.6–14.8)
Q-T INTERVAL: 350 MS
QRS DURATION: 94 MS
QTC CALCULATION (BEZET): 480 MS
R AXIS: 54 DEGREES
RBC # BLD AUTO: 4.72 X10(6)UL
SODIUM SERPL-SCNC: 139 MMOL/L (ref 136–145)
T AXIS: 61 DEGREES
VENTRICULAR RATE: 113 BPM
WBC # BLD AUTO: 7.7 X10(3) UL (ref 4–11)

## 2023-06-19 PROCEDURE — 85610 PROTHROMBIN TIME: CPT | Performed by: HOSPITALIST

## 2023-06-19 PROCEDURE — 78315 BONE IMAGING 3 PHASE: CPT | Performed by: STUDENT IN AN ORGANIZED HEALTH CARE EDUCATION/TRAINING PROGRAM

## 2023-06-19 PROCEDURE — 83036 HEMOGLOBIN GLYCOSYLATED A1C: CPT | Performed by: HOSPITALIST

## 2023-06-19 PROCEDURE — 80048 BASIC METABOLIC PNL TOTAL CA: CPT | Performed by: HOSPITALIST

## 2023-06-19 PROCEDURE — 87040 BLOOD CULTURE FOR BACTERIA: CPT | Performed by: INTERNAL MEDICINE

## 2023-06-19 PROCEDURE — 82962 GLUCOSE BLOOD TEST: CPT

## 2023-06-19 PROCEDURE — 85025 COMPLETE CBC W/AUTO DIFF WBC: CPT | Performed by: HOSPITALIST

## 2023-06-19 RX ORDER — VANCOMYCIN 1.75 GRAM/500 ML IN 0.9 % SODIUM CHLORIDE INTRAVENOUS
15 EVERY 12 HOURS
Status: DISCONTINUED | OUTPATIENT
Start: 2023-06-19 | End: 2023-06-20

## 2023-06-19 NOTE — WOUND PROGRESS NOTE
Pt is being treated by Dr. Iris Roberts. DPM note as follows:     \"Recommendations:  Wound Care: Continue Dakin's solution and wet to dry dressing, changing daily per nursing\"    Wound care signing off unless requested by DPM. Advised bedside RN

## 2023-06-19 NOTE — CM/SW NOTE
06/19/23 1800   CM/SW Referral Data   Referral Source    Reason for Referral Discharge planning   Informant Patient   Medical Hx   Does patient have an established PCP? Yes  (Per[facility])   Patient Info   Patient's Current Mental Status at Time of Assessment Alert;Oriented   Patient's [address]   Patient lives with Spouse/Significant other   Patient Status Prior to Admission   Independent with ADLs and Mobility Yes   Discharge Needs   Anticipated D/C needs Home health care; Infusion care   Choice of Post-Acute Provider   Informed patient of right to choose their preferred provider Yes     Pt discussed during nursing rounds. Dx diabetic foot infection, wound and ID consulted. From home w/spouse, independent w/o device at baseline. LT IV abx at dc per ID. Pt requesting [facility] [place] since he has hx with them. [facility] in [address] [place] [address] [address] [facility]. Infusion referrals sent in [address] [place] [address]. Final ID recommendation and script will be needed for cost and choice of provider. Plan: Home w/spouse with [facility] with homr infusion services pending [facility] acceptance, final ID recommendation, infusion provider choice, and medical clearance. / to remain available for support and/or discharge planning.      YAYO Vieira    [phone]

## 2023-06-19 NOTE — PLAN OF CARE
Received pt in stable condition. Tolerated all scheduled medications, no complications noted. 0.9 NS infusing continuously at 100 ml/hr. Pt on IV Cefepime Q 8 hrs. Last temp at 100.5, PRN extra strength Tylenol given, all other vital signs within stable ranges, on RA. Ice packs being used for elevated temp. Podiatry & ID consulted. Call light placed within pt's reach. All safety & fall precautions maintained. Will continue to monitor for any new acute changes. Problem: Patient Centered Care  Goal: Patient preferences are identified and integrated in the patient's plan of care  Description: Interventions:  - What would you like us to know as we care for you? I live at home with my wife and our children. 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    - Provide timely, complete, and accurate information to patient/family  - Incorporate patient and family knowledge, values, beliefs, and cultural backgrounds into the planning and delivery of care  - Encourage patient/family to participate in care and decision-making at the level they choose  - Honor patient and family perspectives and choices  Outcome: Progressing     Problem: METABOLIC/FLUID AND ELECTROLYTES - ADULT  Goal: Glucose maintained within prescribed range  Description: INTERVENTIONS:  - Monitor Blood Glucose as ordered  - Assess for signs and symptoms of hyperglycemia and hypoglycemia  - Administer ordered medications to maintain glucose within target range  - Assess barriers to adequate nutritional intake and initiate nutrition consult as needed  - Instruct patient on self management of diabetes  Outcome: Progressing  Goal: Electrolytes maintained within normal limits  Description: INTERVENTIONS:  - Monitor labs and rhythm and assess patient for signs and symptoms of electrolyte imbalances  - Administer electrolyte replacement as ordered  - Monitor response to electrolyte replacements, including rhythm and repeat lab results as appropriate  - Fluid restriction as ordered  - Instruct patient on fluid and nutrition restrictions as appropriate  Outcome: Progressing     Problem: SKIN/TISSUE INTEGRITY - ADULT  Goal: Skin integrity remains intact  Description: INTERVENTIONS  - Assess and document risk factors for pressure ulcer development  - Assess and document skin integrity  - Monitor for areas of redness and/or skin breakdown  - Initiate interventions, skin care algorithm/standards of care as needed  Outcome: Progressing  Goal: Incision(s), wounds(s) or drain site(s) healing without S/S of infection  Description: INTERVENTIONS:  - Assess and document risk factors for pressure ulcer development  - Assess and document skin integrity  - Assess and document dressing/incision, wound bed, drain sites and surrounding tissue  - Implement wound care per orders  - Initiate isolation precautions as appropriate  - Initiate Pressure Ulcer prevention bundle as indicated  Outcome: Progressing     Problem: MUSCULOSKELETAL - ADULT  Goal: Return mobility to safest level of function  Description: INTERVENTIONS:  - Assess patient stability and activity tolerance for standing, transferring and ambulating w/ or w/o assistive devices  - Assist with transfers and ambulation using safe patient handling equipment as needed  - Ensure adequate protection for wounds/incisions during mobilization  - Obtain PT/OT consults as needed  - Advance activity as appropriate  - Communicate ordered activity level and limitations with patient/family  Outcome: Progressing  Goal: Maintain proper alignment of affected body part  Description: INTERVENTIONS:  - Support and protect limb and body alignment per provider's orders  - Instruct and reinforce with patient and family use of appropriate assistive device and precautions (e.g. spinal or hip dislocation precautions)  Outcome: Progressing

## 2023-06-19 NOTE — RESPIRATORY THERAPY NOTE
JOE ASSESSMENT:    Pt does not have a previous diagnosis of JOE. Pt does not routinely use a CPAP device at home.

## 2023-06-19 NOTE — PLAN OF CARE
Pt had 3 phase bone scan done today. Echo to be done. Repeat blood cultures pending. Fevers up to 103.8; Tylenol given. Problem: Patient Centered Care  Goal: Patient preferences are identified and integrated in the patient's plan of care  Description: Interventions:  - What would you like us to know as we care for you? I live at home with my wife and our children. 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    - Provide timely, complete, and accurate information to patient/family  - Incorporate patient and family knowledge, values, beliefs, and cultural backgrounds into the planning and delivery of care  - Encourage patient/family to participate in care and decision-making at the level they choose  - Honor patient and family perspectives and choices  Outcome: Progressing     Problem: METABOLIC/FLUID AND ELECTROLYTES - ADULT  Goal: Glucose maintained within prescribed range  Description: INTERVENTIONS:  - Monitor Blood Glucose as ordered  - Assess for signs and symptoms of hyperglycemia and hypoglycemia  - Administer ordered medications to maintain glucose within target range  - Assess barriers to adequate nutritional intake and initiate nutrition consult as needed  - Instruct patient on self management of diabetes  Outcome: Not Progressing  Goal: Electrolytes maintained within normal limits  Description: INTERVENTIONS:  - Monitor labs and rhythm and assess patient for signs and symptoms of electrolyte imbalances  - Administer electrolyte replacement as ordered  - Monitor response to electrolyte replacements, including rhythm and repeat lab results as appropriate  - Fluid restriction as ordered  - Instruct patient on fluid and nutrition restrictions as appropriate  Outcome: Progressing     Problem: SKIN/TISSUE INTEGRITY - ADULT  Goal: Skin integrity remains intact  Description: INTERVENTIONS  - Assess and document risk factors for pressure ulcer development  - Assess and document skin integrity  - Monitor for areas of redness and/or skin breakdown  - Initiate interventions, skin care algorithm/standards of care as needed  Outcome: Not Progressing  Goal: Incision(s), wounds(s) or drain site(s) healing without S/S of infection  Description: INTERVENTIONS:  - Assess and document risk factors for pressure ulcer development  - Assess and document skin integrity  - Assess and document dressing/incision, wound bed, drain sites and surrounding tissue  - Implement wound care per orders  - Initiate isolation precautions as appropriate  - Initiate Pressure Ulcer prevention bundle as indicated  Outcome: Not Progressing     Problem: MUSCULOSKELETAL - ADULT  Goal: Return mobility to safest level of function  Description: INTERVENTIONS:  - Assess patient stability and activity tolerance for standing, transferring and ambulating w/ or w/o assistive devices  - Assist with transfers and ambulation using safe patient handling equipment as needed  - Ensure adequate protection for wounds/incisions during mobilization  - Obtain PT/OT consults as needed  - Advance activity as appropriate  - Communicate ordered activity level and limitations with patient/family  Outcome: Progressing  Goal: Maintain proper alignment of affected body part  Description: INTERVENTIONS:  - Support and protect limb and body alignment per provider's orders  - Instruct and reinforce with patient and family use of appropriate assistive device and precautions (e.g. spinal or hip dislocation precautions)  Outcome: Progressing

## 2023-06-20 ENCOUNTER — APPOINTMENT (OUTPATIENT)
Dept: PICC SERVICES | Facility: HOSPITAL | Age: 53
End: 2023-06-20
Attending: INTERNAL MEDICINE
Payer: COMMERCIAL

## 2023-06-20 ENCOUNTER — APPOINTMENT (OUTPATIENT)
Dept: CV DIAGNOSTICS | Facility: HOSPITAL | Age: 53
End: 2023-06-20
Attending: INTERNAL MEDICINE
Payer: COMMERCIAL

## 2023-06-20 LAB
ANION GAP SERPL CALC-SCNC: 8 MMOL/L (ref 0–18)
BUN BLD-MCNC: 16 MG/DL (ref 7–18)
BUN/CREAT SERPL: 15.2 (ref 10–20)
CALCIUM BLD-MCNC: 9 MG/DL (ref 8.5–10.1)
CHLORIDE SERPL-SCNC: 101 MMOL/L (ref 98–112)
CO2 SERPL-SCNC: 27 MMOL/L (ref 21–32)
CREAT BLD-MCNC: 1.05 MG/DL
DEPRECATED RDW RBC AUTO: 37.2 FL (ref 35.1–46.3)
ERYTHROCYTE [DISTWIDTH] IN BLOOD BY AUTOMATED COUNT: 12.2 % (ref 11–15)
GFR SERPLBLD BASED ON 1.73 SQ M-ARVRAT: 86 ML/MIN/1.73M2 (ref 60–?)
GLUCOSE BLD-MCNC: 188 MG/DL (ref 70–99)
GLUCOSE BLDC GLUCOMTR-MCNC: 149 MG/DL (ref 70–99)
GLUCOSE BLDC GLUCOMTR-MCNC: 176 MG/DL (ref 70–99)
GLUCOSE BLDC GLUCOMTR-MCNC: 271 MG/DL (ref 70–99)
HCT VFR BLD AUTO: 35.5 %
HGB BLD-MCNC: 11.8 G/DL
MCH RBC QN AUTO: 27.4 PG (ref 26–34)
MCHC RBC AUTO-ENTMCNC: 33.2 G/DL (ref 31–37)
MCV RBC AUTO: 82.6 FL
OSMOLALITY SERPL CALC.SUM OF ELEC: 288 MOSM/KG (ref 275–295)
PLATELET # BLD AUTO: 344 10(3)UL (ref 150–450)
POTASSIUM SERPL-SCNC: 4 MMOL/L (ref 3.5–5.1)
RBC # BLD AUTO: 4.3 X10(6)UL
SODIUM SERPL-SCNC: 136 MMOL/L (ref 136–145)
WBC # BLD AUTO: 4.1 X10(3) UL (ref 4–11)

## 2023-06-20 PROCEDURE — 85027 COMPLETE CBC AUTOMATED: CPT | Performed by: HOSPITALIST

## 2023-06-20 PROCEDURE — 80048 BASIC METABOLIC PNL TOTAL CA: CPT | Performed by: HOSPITALIST

## 2023-06-20 PROCEDURE — 93306 TTE W/DOPPLER COMPLETE: CPT | Performed by: INTERNAL MEDICINE

## 2023-06-20 PROCEDURE — 02HV33Z INSERTION OF INFUSION DEVICE INTO SUPERIOR VENA CAVA, PERCUTANEOUS APPROACH: ICD-10-PCS | Performed by: INTERNAL MEDICINE

## 2023-06-20 PROCEDURE — 82962 GLUCOSE BLOOD TEST: CPT

## 2023-06-20 PROCEDURE — 36569 INSJ PICC 5 YR+ W/O IMAGING: CPT

## 2023-06-20 RX ORDER — LIDOCAINE HYDROCHLORIDE 10 MG/ML
5 INJECTION, SOLUTION EPIDURAL; INFILTRATION; INTRACAUDAL; PERINEURAL
Status: COMPLETED | OUTPATIENT
Start: 2023-06-20 | End: 2023-06-20

## 2023-06-20 RX ORDER — CEFEPIME 1 G/50ML
1 INJECTION, SOLUTION INTRAVENOUS EVERY 8 HOURS
Qty: 6150 ML | Refills: 0 | Status: SHIPPED | OUTPATIENT
Start: 2023-06-20 | End: 2023-07-31

## 2023-06-20 NOTE — DISCHARGE INSTRUCTIONS
Sometimes managing your health at home requires assistance. The [facility] team has recognized your preference to use [facility]. They can be reached by phone at [phone]. The fax number for your reference is [phone]. A representative from the home health agency will contact you or your family to schedule your first visit.       [address]  [facility], [address]  Phone: [phone]  Fax: [phone]

## 2023-06-20 NOTE — HOME CARE LIAISON
Discussed with patient list of [facility] providers from [facility], patient choice is [facility]. Agency reserved in [facility] and contact information placed on AVS. Financial interest disclosure provided to patient. CHEKO Bergeron updated.

## 2023-06-20 NOTE — PLAN OF CARE
Patient's vitals have been stable at this moment in time. Call light within reach. Frequent rounding nursing staff. Problem: Patient Centered Care  Goal: Patient preferences are identified and integrated in the patient's plan of care  Description: Interventions:  - What would you like us to know as we care for you? I live at home with my wife and our children. 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    - Provide timely, complete, and accurate information to patient/family  - Incorporate patient and family knowledge, values, beliefs, and cultural backgrounds into the planning and delivery of care  - Encourage patient/family to participate in care and decision-making at the level they choose  - Honor patient and family perspectives and choices  6/20/2023 0801 by Christina Wray RN  Outcome: Progressing  6/20/2023 0613 by Christina Wray RN  Outcome: Progressing     Problem: METABOLIC/FLUID AND ELECTROLYTES - ADULT  Goal: Glucose maintained within prescribed range  Description: INTERVENTIONS:  - Monitor Blood Glucose as ordered  - Assess for signs and symptoms of hyperglycemia and hypoglycemia  - Administer ordered medications to maintain glucose within target range  - Assess barriers to adequate nutritional intake and initiate nutrition consult as needed  - Instruct patient on self management of diabetes  6/20/2023 0801 by Christina Wray RN  Outcome: Progressing  6/20/2023 0613 by Christina Wray RN  Outcome: Progressing  Goal: Electrolytes maintained within normal limits  Description: INTERVENTIONS:  - Monitor labs and rhythm and assess patient for signs and symptoms of electrolyte imbalances  - Administer electrolyte replacement as ordered  - Monitor response to electrolyte replacements, including rhythm and repeat lab results as appropriate  - Fluid restriction as ordered  - Instruct patient on fluid and nutrition restrictions as appropriate  6/20/2023 0801 by Betsy Basurto Nola Alvarado RN  Outcome: Progressing  6/20/2023 1133 by Arlin Scanlon RN  Outcome: Progressing     Problem: SKIN/TISSUE INTEGRITY - ADULT  Goal: Skin integrity remains intact  Description: INTERVENTIONS  - Assess and document risk factors for pressure ulcer development  - Assess and document skin integrity  - Monitor for areas of redness and/or skin breakdown  - Initiate interventions, skin care algorithm/standards of care as needed  6/20/2023 0801 by Arlin Scanlon RN  Outcome: Progressing  6/20/2023 0613 by Arlin Scanlon RN  Outcome: Progressing  Goal: Incision(s), wounds(s) or drain site(s) healing without S/S of infection  Description: INTERVENTIONS:  - Assess and document risk factors for pressure ulcer development  - Assess and document skin integrity  - Assess and document dressing/incision, wound bed, drain sites and surrounding tissue  - Implement wound care per orders  - Initiate isolation precautions as appropriate  - Initiate Pressure Ulcer prevention bundle as indicated  6/20/2023 0801 by Arlin Scanlon RN  Outcome: Progressing  6/20/2023 0613 by Arlin Scanlon RN  Outcome: Progressing     Problem: MUSCULOSKELETAL - ADULT  Goal: Return mobility to safest level of function  Description: INTERVENTIONS:  - Assess patient stability and activity tolerance for standing, transferring and ambulating w/ or w/o assistive devices  - Assist with transfers and ambulation using safe patient handling equipment as needed  - Ensure adequate protection for wounds/incisions during mobilization  - Obtain PT/OT consults as needed  - Advance activity as appropriate  - Communicate ordered activity level and limitations with patient/family  6/20/2023 0801 by Arlin Scanlon RN  Outcome: Progressing  6/20/2023 0613 by Arlin Scanlon RN  Outcome: Progressing  Goal: Maintain proper alignment of affected body part  Description: INTERVENTIONS:  - Support and protect limb and body alignment per provider's orders  - Instruct and reinforce with patient and family use of appropriate assistive device and precautions (e.g. spinal or hip dislocation precautions)  6/20/2023 0801 by Jr Severino RN  Outcome: Progressing  6/20/2023 0613 by Jr Severino, RN  Outcome: Progressing

## 2023-06-20 NOTE — PLAN OF CARE
Problem: Patient Centered Care  Goal: Patient preferences are identified and integrated in the patient's plan of care  Description: Interventions:  - What would you like us to know as we care for you? I live at home with my wife and our children. 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    - Provide timely, complete, and accurate information to patient/family  - Incorporate patient and family knowledge, values, beliefs, and cultural backgrounds into the planning and delivery of care  - Encourage patient/family to participate in care and decision-making at the level they choose  - Honor patient and family perspectives and choices  Outcome: Progressing     Problem: METABOLIC/FLUID AND ELECTROLYTES - ADULT  Goal: Glucose maintained within prescribed range  Description: INTERVENTIONS:  - Monitor Blood Glucose as ordered  - Assess for signs and symptoms of hyperglycemia and hypoglycemia  - Administer ordered medications to maintain glucose within target range  - Assess barriers to adequate nutritional intake and initiate nutrition consult as needed  - Instruct patient on self management of diabetes  Outcome: Progressing  Goal: Electrolytes maintained within normal limits  Description: INTERVENTIONS:  - Monitor labs and rhythm and assess patient for signs and symptoms of electrolyte imbalances  - Administer electrolyte replacement as ordered  - Monitor response to electrolyte replacements, including rhythm and repeat lab results as appropriate  - Fluid restriction as ordered  - Instruct patient on fluid and nutrition restrictions as appropriate  Outcome: Progressing     Problem: SKIN/TISSUE INTEGRITY - ADULT  Goal: Skin integrity remains intact  Description: INTERVENTIONS  - Assess and document risk factors for pressure ulcer development  - Assess and document skin integrity  - Monitor for areas of redness and/or skin breakdown  - Initiate interventions, skin care algorithm/standards of care as needed  Outcome: Progressing  Goal: Incision(s), wounds(s) or drain site(s) healing without S/S of infection  Description: INTERVENTIONS:  - Assess and document risk factors for pressure ulcer development  - Assess and document skin integrity  - Assess and document dressing/incision, wound bed, drain sites and surrounding tissue  - Implement wound care per orders  - Initiate isolation precautions as appropriate  - Initiate Pressure Ulcer prevention bundle as indicated  Outcome: Progressing     Problem: MUSCULOSKELETAL - ADULT  Goal: Return mobility to safest level of function  Description: INTERVENTIONS:  - Assess patient stability and activity tolerance for standing, transferring and ambulating w/ or w/o assistive devices  - Assist with transfers and ambulation using safe patient handling equipment as needed  - Ensure adequate protection for wounds/incisions during mobilization  - Obtain PT/OT consults as needed  - Advance activity as appropriate  - Communicate ordered activity level and limitations with patient/family  Outcome: Progressing  Goal: Maintain proper alignment of affected body part  Description: INTERVENTIONS:  - Support and protect limb and body alignment per provider's orders  - Instruct and reinforce with patient and family use of appropriate assistive device and precautions (e.g. spinal or hip dislocation precautions)  Outcome: Progressing     Patient is presently resting in the bed. Alert x 4. Vital signs taken and stable. Patient denied pain or discomfort at current time. Dressing was changed to right foot per doctor orders. Dressing to right foot is clean, dry and intact. Patient receives intravenous antibiotics per orders. Left BKA is present with prosthetic present at bedside. Call light within reach at all times.

## 2023-06-20 NOTE — CM/SW NOTE
Pt discussed in RN rounds. Pt needs IV ABX after discharge. Pt will be covered 100% under his insurance with [facility]. Pt is aware and wishes to proceed with [facility]. Final IV RX and ID note sent via aidin. [facility] reserved via aidin. Plan: Pending medical clearance, DC to Home with [facility], [facility] for IV ABX, Final RX uploaded     SW/CM to remain available for support and/or discharge planning.      Yobany Narayan, MSW, LSW   x [phone]

## 2023-06-21 VITALS
HEIGHT: 75 IN | HEART RATE: 83 BPM | TEMPERATURE: 99 F | OXYGEN SATURATION: 96 % | WEIGHT: 264.19 LBS | DIASTOLIC BLOOD PRESSURE: 81 MMHG | BODY MASS INDEX: 32.85 KG/M2 | SYSTOLIC BLOOD PRESSURE: 138 MMHG | RESPIRATION RATE: 18 BRPM

## 2023-06-21 LAB
ANION GAP SERPL CALC-SCNC: 7 MMOL/L (ref 0–18)
BUN BLD-MCNC: 17 MG/DL (ref 7–18)
BUN/CREAT SERPL: 18.1 (ref 10–20)
CALCIUM BLD-MCNC: 9.3 MG/DL (ref 8.5–10.1)
CHLORIDE SERPL-SCNC: 101 MMOL/L (ref 98–112)
CO2 SERPL-SCNC: 27 MMOL/L (ref 21–32)
CREAT BLD-MCNC: 0.94 MG/DL
DEPRECATED RDW RBC AUTO: 35.7 FL (ref 35.1–46.3)
ERYTHROCYTE [DISTWIDTH] IN BLOOD BY AUTOMATED COUNT: 11.9 % (ref 11–15)
GFR SERPLBLD BASED ON 1.73 SQ M-ARVRAT: 98 ML/MIN/1.73M2 (ref 60–?)
GLUCOSE BLD-MCNC: 197 MG/DL (ref 70–99)
GLUCOSE BLDC GLUCOMTR-MCNC: 181 MG/DL (ref 70–99)
GLUCOSE BLDC GLUCOMTR-MCNC: 199 MG/DL (ref 70–99)
HCT VFR BLD AUTO: 36.5 %
HGB BLD-MCNC: 12.4 G/DL
MCH RBC QN AUTO: 27.6 PG (ref 26–34)
MCHC RBC AUTO-ENTMCNC: 34 G/DL (ref 31–37)
MCV RBC AUTO: 81.3 FL
OSMOLALITY SERPL CALC.SUM OF ELEC: 287 MOSM/KG (ref 275–295)
PLATELET # BLD AUTO: 404 10(3)UL (ref 150–450)
POTASSIUM SERPL-SCNC: 4.2 MMOL/L (ref 3.5–5.1)
RBC # BLD AUTO: 4.49 X10(6)UL
SODIUM SERPL-SCNC: 135 MMOL/L (ref 136–145)
STAPHYLOCOCCUS AUREUS, NOT MRSA BY PCR: DETECTED
WBC # BLD AUTO: 5.1 X10(3) UL (ref 4–11)

## 2023-06-21 PROCEDURE — 82962 GLUCOSE BLOOD TEST: CPT

## 2023-06-21 PROCEDURE — 80048 BASIC METABOLIC PNL TOTAL CA: CPT | Performed by: HOSPITALIST

## 2023-06-21 PROCEDURE — 85027 COMPLETE CBC AUTOMATED: CPT | Performed by: HOSPITALIST

## 2023-06-21 NOTE — PLAN OF CARE
Patients vitals have been stable at this moment in time. Wound care was done. Call light within reach. Frequently rounded on by nursing staff. Problem: Patient Centered Care  Goal: Patient preferences are identified and integrated in the patient's plan of care  Description: Interventions:  - What would you like us to know as we care for you? I live at home with my wife and our children. 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    - Provide timely, complete, and accurate information to patient/family  - Incorporate patient and family knowledge, values, beliefs, and cultural backgrounds into the planning and delivery of care  - Encourage patient/family to participate in care and decision-making at the level they choose  - Honor patient and family perspectives and choices  Outcome: Progressing     Problem: METABOLIC/FLUID AND ELECTROLYTES - ADULT  Goal: Glucose maintained within prescribed range  Description: INTERVENTIONS:  - Monitor Blood Glucose as ordered  - Assess for signs and symptoms of hyperglycemia and hypoglycemia  - Administer ordered medications to maintain glucose within target range  - Assess barriers to adequate nutritional intake and initiate nutrition consult as needed  - Instruct patient on self management of diabetes  Outcome: Progressing  Goal: Electrolytes maintained within normal limits  Description: INTERVENTIONS:  - Monitor labs and rhythm and assess patient for signs and symptoms of electrolyte imbalances  - Administer electrolyte replacement as ordered  - Monitor response to electrolyte replacements, including rhythm and repeat lab results as appropriate  - Fluid restriction as ordered  - Instruct patient on fluid and nutrition restrictions as appropriate  Outcome: Progressing     Problem: SKIN/TISSUE INTEGRITY - ADULT  Goal: Skin integrity remains intact  Description: INTERVENTIONS  - Assess and document risk factors for pressure ulcer development  - Assess and document skin integrity  - Monitor for areas of redness and/or skin breakdown  - Initiate interventions, skin care algorithm/standards of care as needed  Outcome: Progressing  Goal: Incision(s), wounds(s) or drain site(s) healing without S/S of infection  Description: INTERVENTIONS:  - Assess and document risk factors for pressure ulcer development  - Assess and document skin integrity  - Assess and document dressing/incision, wound bed, drain sites and surrounding tissue  - Implement wound care per orders  - Initiate isolation precautions as appropriate  - Initiate Pressure Ulcer prevention bundle as indicated  Outcome: Progressing     Problem: MUSCULOSKELETAL - ADULT  Goal: Return mobility to safest level of function  Description: INTERVENTIONS:  - Assess patient stability and activity tolerance for standing, transferring and ambulating w/ or w/o assistive devices  - Assist with transfers and ambulation using safe patient handling equipment as needed  - Ensure adequate protection for wounds/incisions during mobilization  - Obtain PT/OT consults as needed  - Advance activity as appropriate  - Communicate ordered activity level and limitations with patient/family  Outcome: Progressing  Goal: Maintain proper alignment of affected body part  Description: INTERVENTIONS:  - Support and protect limb and body alignment per provider's orders  - Instruct and reinforce with patient and family use of appropriate assistive device and precautions (e.g. spinal or hip dislocation precautions)  Outcome: Progressing

## 2023-06-21 NOTE — PLAN OF CARE
DC per MD order. DC paperwork reviewed with pt; all questions answered. PIV removed, PICC remains in place for LT abx. Pt was upset about having to leave after the evening dose. 1800 dose will be administered at home per pt. He states [facility] will drop off supplies and he can give himself his 1800 dose. MD and NICK made aware and MD stated it was ok for the pt to leave and give dose at home. All belongings taken with pt. Pt stable at DC. Problem: Patient Centered Care  Goal: Patient preferences are identified and integrated in the patient's plan of care  Description: Interventions:  - What would you like us to know as we care for you? I live at home with my wife and our children. 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    - Provide timely, complete, and accurate information to patient/family  - Incorporate patient and family knowledge, values, beliefs, and cultural backgrounds into the planning and delivery of care  - Encourage patient/family to participate in care and decision-making at the level they choose  - Honor patient and family perspectives and choices  6/21/2023 1441 by Nir Monet RN  Outcome: Adequate for Discharge  6/21/2023 1006 by Nir Monet RN  Outcome: Progressing     Problem: METABOLIC/FLUID AND ELECTROLYTES - ADULT  Goal: Glucose maintained within prescribed range  Description: INTERVENTIONS:  - Monitor Blood Glucose as ordered  - Assess for signs and symptoms of hyperglycemia and hypoglycemia  - Administer ordered medications to maintain glucose within target range  - Assess barriers to adequate nutritional intake and initiate nutrition consult as needed  - Instruct patient on self management of diabetes  6/21/2023 1441 by Nir Monet RN  Outcome: Adequate for Discharge  6/21/2023 1006 by Nir Monet RN  Outcome: Progressing  Goal: Electrolytes maintained within normal limits  Description: INTERVENTIONS:  - Monitor labs and rhythm and assess patient for signs and symptoms of electrolyte imbalances  - Administer electrolyte replacement as ordered  - Monitor response to electrolyte replacements, including rhythm and repeat lab results as appropriate  - Fluid restriction as ordered  - Instruct patient on fluid and nutrition restrictions as appropriate  6/21/2023 1441 by Anna Marie Bahena RN  Outcome: Adequate for Discharge  6/21/2023 1006 by Anna Marie Bahena RN  Outcome: Progressing     Problem: SKIN/TISSUE INTEGRITY - ADULT  Goal: Skin integrity remains intact  Description: INTERVENTIONS  - Assess and document risk factors for pressure ulcer development  - Assess and document skin integrity  - Monitor for areas of redness and/or skin breakdown  - Initiate interventions, skin care algorithm/standards of care as needed  6/21/2023 1441 by Anna Marie Bahena RN  Outcome: Adequate for Discharge  6/21/2023 1006 by Anna Marie Bahena RN  Outcome: Progressing  Goal: Incision(s), wounds(s) or drain site(s) healing without S/S of infection  Description: INTERVENTIONS:  - Assess and document risk factors for pressure ulcer development  - Assess and document skin integrity  - Assess and document dressing/incision, wound bed, drain sites and surrounding tissue  - Implement wound care per orders  - Initiate isolation precautions as appropriate  - Initiate Pressure Ulcer prevention bundle as indicated  6/21/2023 1441 by Anna Marie Bahena RN  Outcome: Adequate for Discharge  6/21/2023 1006 by Anna Marie Bahena RN  Outcome: Progressing     Problem: MUSCULOSKELETAL - ADULT  Goal: Return mobility to safest level of function  Description: INTERVENTIONS:  - Assess patient stability and activity tolerance for standing, transferring and ambulating w/ or w/o assistive devices  - Assist with transfers and ambulation using safe patient handling equipment as needed  - Ensure adequate protection for wounds/incisions during mobilization  - Obtain PT/OT consults as needed  - Advance activity as appropriate  - Communicate ordered activity level and limitations with patient/family  6/21/2023 1441 by Casey Rincon RN  Outcome: Adequate for Discharge  6/21/2023 1006 by Casey Rincon RN  Outcome: Progressing  Goal: Maintain proper alignment of affected body part  Description: INTERVENTIONS:  - Support and protect limb and body alignment per provider's orders  - Instruct and reinforce with patient and family use of appropriate assistive device and precautions (e.g. spinal or hip dislocation precautions)  6/21/2023 1441 by Casey Rincon RN  Outcome: Adequate for Discharge  6/21/2023 1006 by Casey Rincon RN  Outcome: Progressing

## 2023-06-21 NOTE — CM/SW NOTE
06/21/23 1200   Discharge disposition   Expected discharge disposition Home or [facility] Provider Residential   DME/Infusion Providers [facility]   Discharge transportation Private car   Pt discussed in RN rounds. Pt received MDO for discharge. Pt will be discharge to home with [facility] and [facility] for ltc IV ABX. Chasidy Flores from [facility] made aware of DC for today. SW reached out to [facility] via aidin to confirm delivery for today. Pt will be getting last dose of IV ABX around 4pm.     147pm- SW was notified that pt wishes to DC to home prior to getting last dose of abx. [facility] will be delivering abx today around 4pm. Pt reports that he has self administered IV abx in the past, MD is aware and is okay with pt going. Chasidy Flores from [facility] made aware, and [facility] made aware as well via aidin. SW/CM to remain available for support and/or discharge planning.      Efren Hidalgo, MSW, LSW   x [phone]

## 2023-06-21 NOTE — CM/SW NOTE
Pt discussed in RN rounds. Pt is likely to be anticipated to DC today. SW notified Leighton Garcia from [facility]. Sw messaged [facility] via aidin for anticipation of discharge today. Plan: Pending medical clearance, DC to Home with [facility] for [place], and [facility] for antibiotics. SW/CM to remain available for support and/or discharge planning.      Sonu Oviedo, MSW, LSW   x [phone]

## 2023-06-27 PROBLEM — A41.01 SEPSIS DUE TO METHICILLIN SUSCEPTIBLE STAPHYLOCOCCUS AUREUS (HCC): Status: ACTIVE | Noted: 2023-06-27

## 2023-06-27 RX ORDER — HEPARIN SODIUM (PORCINE) LOCK FLUSH IV SOLN 100 UNIT/ML 100 UNIT/ML
5 SOLUTION INTRAVENOUS ONCE
OUTPATIENT
Start: 2023-06-28

## 2023-06-27 RX ORDER — SODIUM CHLORIDE 9 MG/ML
10 INJECTION INTRAVENOUS ONCE
OUTPATIENT
Start: 2023-06-28

## 2023-06-28 ENCOUNTER — NURSE ONLY (OUTPATIENT)
Dept: HEMATOLOGY/ONCOLOGY | Facility: HOSPITAL | Age: 53
End: 2023-06-28
Attending: INTERNAL MEDICINE
Payer: COMMERCIAL

## 2023-06-28 DIAGNOSIS — A41.89 SEPSIS DUE TO OTHER ETIOLOGY (HCC): ICD-10-CM

## 2023-06-28 DIAGNOSIS — A41.01 SEPSIS DUE TO METHICILLIN SUSCEPTIBLE STAPHYLOCOCCUS AUREUS (HCC): Primary | ICD-10-CM

## 2023-06-28 LAB
ALBUMIN SERPL-MCNC: 2.9 G/DL (ref 3.4–5)
ALBUMIN/GLOB SERPL: 0.6 {RATIO} (ref 1–2)
ALP LIVER SERPL-CCNC: 111 U/L
ALT SERPL-CCNC: 45 U/L
ANION GAP SERPL CALC-SCNC: 7 MMOL/L (ref 0–18)
AST SERPL-CCNC: 24 U/L (ref 15–37)
BASOPHILS # BLD AUTO: 0.09 X10(3) UL (ref 0–0.2)
BASOPHILS NFR BLD AUTO: 0.7 %
BILIRUB SERPL-MCNC: 0.2 MG/DL (ref 0.1–2)
BUN BLD-MCNC: 27 MG/DL (ref 7–18)
BUN/CREAT SERPL: 22 (ref 10–20)
CALCIUM BLD-MCNC: 9.1 MG/DL (ref 8.5–10.1)
CHLORIDE SERPL-SCNC: 107 MMOL/L (ref 98–112)
CO2 SERPL-SCNC: 23 MMOL/L (ref 21–32)
CREAT BLD-MCNC: 1.23 MG/DL
CRP SERPL-MCNC: 4.86 MG/DL (ref ?–0.3)
DEPRECATED RDW RBC AUTO: 37.9 FL (ref 35.1–46.3)
EOSINOPHIL # BLD AUTO: 0.29 X10(3) UL (ref 0–0.7)
EOSINOPHIL NFR BLD AUTO: 2.2 %
ERYTHROCYTE [DISTWIDTH] IN BLOOD BY AUTOMATED COUNT: 12.5 % (ref 11–15)
GFR SERPLBLD BASED ON 1.73 SQ M-ARVRAT: 71 ML/MIN/1.73M2 (ref 60–?)
GLOBULIN PLAS-MCNC: 4.8 G/DL (ref 2.8–4.4)
GLUCOSE BLD-MCNC: 273 MG/DL (ref 70–99)
HCT VFR BLD AUTO: 35.7 %
HGB BLD-MCNC: 11.9 G/DL
IMM GRANULOCYTES # BLD AUTO: 0.27 X10(3) UL (ref 0–1)
IMM GRANULOCYTES NFR BLD: 2 %
LYMPHOCYTES # BLD AUTO: 1.86 X10(3) UL (ref 1–4)
LYMPHOCYTES NFR BLD AUTO: 13.9 %
MCH RBC QN AUTO: 27.7 PG (ref 26–34)
MCHC RBC AUTO-ENTMCNC: 33.3 G/DL (ref 31–37)
MCV RBC AUTO: 83 FL
MONOCYTES # BLD AUTO: 0.94 X10(3) UL (ref 0.1–1)
MONOCYTES NFR BLD AUTO: 7 %
NEUTROPHILS # BLD AUTO: 9.89 X10 (3) UL (ref 1.5–7.7)
NEUTROPHILS # BLD AUTO: 9.89 X10(3) UL (ref 1.5–7.7)
NEUTROPHILS NFR BLD AUTO: 74.2 %
OSMOLALITY SERPL CALC.SUM OF ELEC: 299 MOSM/KG (ref 275–295)
PLATELET # BLD AUTO: 695 10(3)UL (ref 150–450)
POTASSIUM SERPL-SCNC: 4.5 MMOL/L (ref 3.5–5.1)
PROT SERPL-MCNC: 7.7 G/DL (ref 6.4–8.2)
RBC # BLD AUTO: 4.3 X10(6)UL
SODIUM SERPL-SCNC: 137 MMOL/L (ref 136–145)
WBC # BLD AUTO: 13.3 X10(3) UL (ref 4–11)

## 2023-06-28 PROCEDURE — 80053 COMPREHEN METABOLIC PANEL: CPT

## 2023-06-28 PROCEDURE — 86140 C-REACTIVE PROTEIN: CPT

## 2023-06-28 PROCEDURE — 85025 COMPLETE CBC W/AUTO DIFF WBC: CPT

## 2023-06-28 PROCEDURE — 36592 COLLECT BLOOD FROM PICC: CPT

## 2023-06-28 RX ORDER — HEPARIN SODIUM (PORCINE) LOCK FLUSH IV SOLN 100 UNIT/ML 100 UNIT/ML
5 SOLUTION INTRAVENOUS ONCE
OUTPATIENT
Start: 2023-07-05

## 2023-06-28 RX ORDER — SODIUM CHLORIDE 9 MG/ML
10 INJECTION INTRAVENOUS ONCE
OUTPATIENT
Start: 2023-07-05

## 2023-07-05 ENCOUNTER — NURSE ONLY (OUTPATIENT)
Dept: HEMATOLOGY/ONCOLOGY | Facility: HOSPITAL | Age: 53
End: 2023-07-05
Attending: INTERNAL MEDICINE
Payer: COMMERCIAL

## 2023-07-05 DIAGNOSIS — A41.89 SEPSIS DUE TO OTHER ETIOLOGY (HCC): ICD-10-CM

## 2023-07-05 DIAGNOSIS — A41.01 SEPSIS DUE TO METHICILLIN SUSCEPTIBLE STAPHYLOCOCCUS AUREUS (HCC): Primary | ICD-10-CM

## 2023-07-05 LAB
ALBUMIN SERPL-MCNC: 3.1 G/DL (ref 3.4–5)
ALBUMIN/GLOB SERPL: 0.7 {RATIO} (ref 1–2)
ALP LIVER SERPL-CCNC: 116 U/L
ALT SERPL-CCNC: 51 U/L
ANION GAP SERPL CALC-SCNC: 7 MMOL/L (ref 0–18)
AST SERPL-CCNC: 37 U/L (ref 15–37)
BASOPHILS # BLD AUTO: 0.08 X10(3) UL (ref 0–0.2)
BASOPHILS NFR BLD AUTO: 0.9 %
BILIRUB SERPL-MCNC: 0.2 MG/DL (ref 0.1–2)
BUN BLD-MCNC: 24 MG/DL (ref 7–18)
BUN/CREAT SERPL: 23.1 (ref 10–20)
CALCIUM BLD-MCNC: 9.7 MG/DL (ref 8.5–10.1)
CHLORIDE SERPL-SCNC: 103 MMOL/L (ref 98–112)
CO2 SERPL-SCNC: 25 MMOL/L (ref 21–32)
CREAT BLD-MCNC: 1.04 MG/DL
CRP SERPL-MCNC: 2.67 MG/DL (ref ?–0.3)
DEPRECATED RDW RBC AUTO: 37.5 FL (ref 35.1–46.3)
EOSINOPHIL # BLD AUTO: 0.26 X10(3) UL (ref 0–0.7)
EOSINOPHIL NFR BLD AUTO: 2.9 %
ERYTHROCYTE [DISTWIDTH] IN BLOOD BY AUTOMATED COUNT: 12.5 % (ref 11–15)
GFR SERPLBLD BASED ON 1.73 SQ M-ARVRAT: 86 ML/MIN/1.73M2 (ref 60–?)
GLOBULIN PLAS-MCNC: 4.7 G/DL (ref 2.8–4.4)
GLUCOSE BLD-MCNC: 203 MG/DL (ref 70–99)
HCT VFR BLD AUTO: 36.4 %
HGB BLD-MCNC: 12.2 G/DL
IMM GRANULOCYTES # BLD AUTO: 0.06 X10(3) UL (ref 0–1)
IMM GRANULOCYTES NFR BLD: 0.7 %
LYMPHOCYTES # BLD AUTO: 1.94 X10(3) UL (ref 1–4)
LYMPHOCYTES NFR BLD AUTO: 21.6 %
MCH RBC QN AUTO: 27.6 PG (ref 26–34)
MCHC RBC AUTO-ENTMCNC: 33.5 G/DL (ref 31–37)
MCV RBC AUTO: 82.4 FL
MONOCYTES # BLD AUTO: 0.57 X10(3) UL (ref 0.1–1)
MONOCYTES NFR BLD AUTO: 6.4 %
NEUTROPHILS # BLD AUTO: 6.06 X10 (3) UL (ref 1.5–7.7)
NEUTROPHILS # BLD AUTO: 6.06 X10(3) UL (ref 1.5–7.7)
NEUTROPHILS NFR BLD AUTO: 67.5 %
OSMOLALITY SERPL CALC.SUM OF ELEC: 290 MOSM/KG (ref 275–295)
PLATELET # BLD AUTO: 562 10(3)UL (ref 150–450)
POTASSIUM SERPL-SCNC: 4.3 MMOL/L (ref 3.5–5.1)
PROT SERPL-MCNC: 7.8 G/DL (ref 6.4–8.2)
RBC # BLD AUTO: 4.42 X10(6)UL
SODIUM SERPL-SCNC: 135 MMOL/L (ref 136–145)
WBC # BLD AUTO: 9 X10(3) UL (ref 4–11)

## 2023-07-05 PROCEDURE — 85025 COMPLETE CBC W/AUTO DIFF WBC: CPT

## 2023-07-05 PROCEDURE — 36592 COLLECT BLOOD FROM PICC: CPT

## 2023-07-05 PROCEDURE — 86140 C-REACTIVE PROTEIN: CPT

## 2023-07-05 PROCEDURE — 80053 COMPREHEN METABOLIC PANEL: CPT

## 2023-07-05 RX ORDER — SODIUM CHLORIDE 9 MG/ML
10 INJECTION INTRAVENOUS ONCE
OUTPATIENT
Start: 2023-07-12

## 2023-07-05 RX ORDER — HEPARIN SODIUM (PORCINE) LOCK FLUSH IV SOLN 100 UNIT/ML 100 UNIT/ML
5 SOLUTION INTRAVENOUS ONCE
OUTPATIENT
Start: 2023-07-12

## 2023-07-12 ENCOUNTER — NURSE ONLY (OUTPATIENT)
Dept: HEMATOLOGY/ONCOLOGY | Facility: HOSPITAL | Age: 53
End: 2023-07-12
Attending: INTERNAL MEDICINE
Payer: COMMERCIAL

## 2023-07-12 DIAGNOSIS — A41.89 SEPSIS DUE TO OTHER ETIOLOGY (HCC): ICD-10-CM

## 2023-07-12 DIAGNOSIS — A41.01 SEPSIS DUE TO METHICILLIN SUSCEPTIBLE STAPHYLOCOCCUS AUREUS (HCC): Primary | ICD-10-CM

## 2023-07-12 LAB
ALBUMIN SERPL-MCNC: 3.3 G/DL (ref 3.4–5)
ALBUMIN/GLOB SERPL: 0.7 {RATIO} (ref 1–2)
ALP LIVER SERPL-CCNC: 125 U/L
ALT SERPL-CCNC: 44 U/L
ANION GAP SERPL CALC-SCNC: 7 MMOL/L (ref 0–18)
AST SERPL-CCNC: 27 U/L (ref 15–37)
BASOPHILS # BLD AUTO: 0.05 X10(3) UL (ref 0–0.2)
BASOPHILS NFR BLD AUTO: 0.7 %
BILIRUB SERPL-MCNC: 0.2 MG/DL (ref 0.1–2)
BUN BLD-MCNC: 21 MG/DL (ref 7–18)
BUN/CREAT SERPL: 18.8 (ref 10–20)
CALCIUM BLD-MCNC: 9.9 MG/DL (ref 8.5–10.1)
CHLORIDE SERPL-SCNC: 106 MMOL/L (ref 98–112)
CO2 SERPL-SCNC: 24 MMOL/L (ref 21–32)
CREAT BLD-MCNC: 1.12 MG/DL
CRP SERPL-MCNC: 2.61 MG/DL (ref ?–0.3)
DEPRECATED RDW RBC AUTO: 39 FL (ref 35.1–46.3)
EOSINOPHIL # BLD AUTO: 0.23 X10(3) UL (ref 0–0.7)
EOSINOPHIL NFR BLD AUTO: 3.1 %
ERYTHROCYTE [DISTWIDTH] IN BLOOD BY AUTOMATED COUNT: 12.9 % (ref 11–15)
GFR SERPLBLD BASED ON 1.73 SQ M-ARVRAT: 79 ML/MIN/1.73M2 (ref 60–?)
GLOBULIN PLAS-MCNC: 4.5 G/DL (ref 2.8–4.4)
GLUCOSE BLD-MCNC: 188 MG/DL (ref 70–99)
HCT VFR BLD AUTO: 37.3 %
HGB BLD-MCNC: 12.3 G/DL
IMM GRANULOCYTES # BLD AUTO: 0.04 X10(3) UL (ref 0–1)
IMM GRANULOCYTES NFR BLD: 0.5 %
LYMPHOCYTES # BLD AUTO: 1.73 X10(3) UL (ref 1–4)
LYMPHOCYTES NFR BLD AUTO: 23 %
MCH RBC QN AUTO: 27.6 PG (ref 26–34)
MCHC RBC AUTO-ENTMCNC: 33 G/DL (ref 31–37)
MCV RBC AUTO: 83.8 FL
MONOCYTES # BLD AUTO: 0.55 X10(3) UL (ref 0.1–1)
MONOCYTES NFR BLD AUTO: 7.3 %
NEUTROPHILS # BLD AUTO: 4.91 X10 (3) UL (ref 1.5–7.7)
NEUTROPHILS # BLD AUTO: 4.91 X10(3) UL (ref 1.5–7.7)
NEUTROPHILS NFR BLD AUTO: 65.4 %
OSMOLALITY SERPL CALC.SUM OF ELEC: 292 MOSM/KG (ref 275–295)
PLATELET # BLD AUTO: 395 10(3)UL (ref 150–450)
POTASSIUM SERPL-SCNC: 4.3 MMOL/L (ref 3.5–5.1)
PROT SERPL-MCNC: 7.8 G/DL (ref 6.4–8.2)
RBC # BLD AUTO: 4.45 X10(6)UL
SODIUM SERPL-SCNC: 137 MMOL/L (ref 136–145)
WBC # BLD AUTO: 7.5 X10(3) UL (ref 4–11)

## 2023-07-12 PROCEDURE — 86140 C-REACTIVE PROTEIN: CPT

## 2023-07-12 PROCEDURE — 80053 COMPREHEN METABOLIC PANEL: CPT

## 2023-07-12 PROCEDURE — 85025 COMPLETE CBC W/AUTO DIFF WBC: CPT

## 2023-07-12 PROCEDURE — 36592 COLLECT BLOOD FROM PICC: CPT

## 2023-07-12 RX ORDER — SODIUM CHLORIDE 9 MG/ML
10 INJECTION INTRAVENOUS ONCE
OUTPATIENT
Start: 2023-07-19

## 2023-07-12 RX ORDER — HEPARIN SODIUM (PORCINE) LOCK FLUSH IV SOLN 100 UNIT/ML 100 UNIT/ML
5 SOLUTION INTRAVENOUS ONCE
OUTPATIENT
Start: 2023-07-19

## 2023-07-19 ENCOUNTER — NURSE ONLY (OUTPATIENT)
Dept: HEMATOLOGY/ONCOLOGY | Facility: HOSPITAL | Age: 53
End: 2023-07-19
Attending: INTERNAL MEDICINE
Payer: COMMERCIAL

## 2023-07-19 VITALS
DIASTOLIC BLOOD PRESSURE: 73 MMHG | SYSTOLIC BLOOD PRESSURE: 153 MMHG | OXYGEN SATURATION: 99 % | RESPIRATION RATE: 16 BRPM | HEART RATE: 88 BPM

## 2023-07-19 DIAGNOSIS — A41.01 SEPSIS DUE TO METHICILLIN SUSCEPTIBLE STAPHYLOCOCCUS AUREUS (HCC): Primary | ICD-10-CM

## 2023-07-19 DIAGNOSIS — A41.89 SEPSIS DUE TO OTHER ETIOLOGY (HCC): ICD-10-CM

## 2023-07-19 LAB
ALBUMIN SERPL-MCNC: 3.4 G/DL (ref 3.4–5)
ALBUMIN/GLOB SERPL: 0.8 {RATIO} (ref 1–2)
ALP LIVER SERPL-CCNC: 121 U/L
ALT SERPL-CCNC: 48 U/L
ANION GAP SERPL CALC-SCNC: 9 MMOL/L (ref 0–18)
AST SERPL-CCNC: 31 U/L (ref 15–37)
BASOPHILS # BLD AUTO: 0.05 X10(3) UL (ref 0–0.2)
BASOPHILS NFR BLD AUTO: 0.7 %
BILIRUB SERPL-MCNC: 0.2 MG/DL (ref 0.1–2)
BUN BLD-MCNC: 26 MG/DL (ref 7–18)
BUN/CREAT SERPL: 21.7 (ref 10–20)
CALCIUM BLD-MCNC: 9.2 MG/DL (ref 8.5–10.1)
CHLORIDE SERPL-SCNC: 104 MMOL/L (ref 98–112)
CO2 SERPL-SCNC: 24 MMOL/L (ref 21–32)
CREAT BLD-MCNC: 1.2 MG/DL
CRP SERPL-MCNC: 1.72 MG/DL (ref ?–0.3)
DEPRECATED RDW RBC AUTO: 41 FL (ref 35.1–46.3)
EOSINOPHIL # BLD AUTO: 0.18 X10(3) UL (ref 0–0.7)
EOSINOPHIL NFR BLD AUTO: 2.6 %
ERYTHROCYTE [DISTWIDTH] IN BLOOD BY AUTOMATED COUNT: 13.2 % (ref 11–15)
FASTING STATUS PATIENT QL REPORTED: NO
GFR SERPLBLD BASED ON 1.73 SQ M-ARVRAT: 73 ML/MIN/1.73M2 (ref 60–?)
GLOBULIN PLAS-MCNC: 4.3 G/DL (ref 2.8–4.4)
GLUCOSE BLD-MCNC: 192 MG/DL (ref 70–99)
HCT VFR BLD AUTO: 37.2 %
HGB BLD-MCNC: 12.2 G/DL
IMM GRANULOCYTES # BLD AUTO: 0.04 X10(3) UL (ref 0–1)
IMM GRANULOCYTES NFR BLD: 0.6 %
LYMPHOCYTES # BLD AUTO: 1.53 X10(3) UL (ref 1–4)
LYMPHOCYTES NFR BLD AUTO: 22.2 %
MCH RBC QN AUTO: 27.6 PG (ref 26–34)
MCHC RBC AUTO-ENTMCNC: 32.8 G/DL (ref 31–37)
MCV RBC AUTO: 84.2 FL
MONOCYTES # BLD AUTO: 0.6 X10(3) UL (ref 0.1–1)
MONOCYTES NFR BLD AUTO: 8.7 %
NEUTROPHILS # BLD AUTO: 4.49 X10 (3) UL (ref 1.5–7.7)
NEUTROPHILS # BLD AUTO: 4.49 X10(3) UL (ref 1.5–7.7)
NEUTROPHILS NFR BLD AUTO: 65.2 %
OSMOLALITY SERPL CALC.SUM OF ELEC: 294 MOSM/KG (ref 275–295)
PLATELET # BLD AUTO: 361 10(3)UL (ref 150–450)
POTASSIUM SERPL-SCNC: 4.5 MMOL/L (ref 3.5–5.1)
PROT SERPL-MCNC: 7.7 G/DL (ref 6.4–8.2)
RBC # BLD AUTO: 4.42 X10(6)UL
SODIUM SERPL-SCNC: 137 MMOL/L (ref 136–145)
WBC # BLD AUTO: 6.9 X10(3) UL (ref 4–11)

## 2023-07-19 PROCEDURE — 86140 C-REACTIVE PROTEIN: CPT

## 2023-07-19 PROCEDURE — 85025 COMPLETE CBC W/AUTO DIFF WBC: CPT

## 2023-07-19 PROCEDURE — 36592 COLLECT BLOOD FROM PICC: CPT

## 2023-07-19 PROCEDURE — 96523 IRRIG DRUG DELIVERY DEVICE: CPT

## 2023-07-19 PROCEDURE — 80053 COMPREHEN METABOLIC PANEL: CPT

## 2023-07-19 RX ORDER — SODIUM CHLORIDE 9 MG/ML
10 INJECTION INTRAVENOUS ONCE
OUTPATIENT
Start: 2023-07-26

## 2023-07-19 RX ORDER — HEPARIN SODIUM (PORCINE) LOCK FLUSH IV SOLN 100 UNIT/ML 100 UNIT/ML
5 SOLUTION INTRAVENOUS ONCE
OUTPATIENT
Start: 2023-07-26

## 2023-07-26 ENCOUNTER — APPOINTMENT (OUTPATIENT)
Dept: HEMATOLOGY/ONCOLOGY | Facility: HOSPITAL | Age: 53
End: 2023-07-26
Attending: INTERNAL MEDICINE
Payer: COMMERCIAL

## 2023-07-26 DIAGNOSIS — A41.89 SEPSIS DUE TO OTHER ETIOLOGY (HCC): ICD-10-CM

## 2023-07-26 DIAGNOSIS — A41.01 SEPSIS DUE TO METHICILLIN SUSCEPTIBLE STAPHYLOCOCCUS AUREUS (HCC): Primary | ICD-10-CM

## 2023-07-26 LAB
ALBUMIN SERPL-MCNC: 3.4 G/DL (ref 3.4–5)
ALBUMIN/GLOB SERPL: 0.9 {RATIO} (ref 1–2)
ALP LIVER SERPL-CCNC: 111 U/L
ALT SERPL-CCNC: 58 U/L
ANION GAP SERPL CALC-SCNC: 7 MMOL/L (ref 0–18)
AST SERPL-CCNC: 37 U/L (ref 15–37)
BASOPHILS # BLD AUTO: 0.07 X10(3) UL (ref 0–0.2)
BASOPHILS NFR BLD AUTO: 1 %
BILIRUB SERPL-MCNC: 0.1 MG/DL (ref 0.1–2)
BUN BLD-MCNC: 21 MG/DL (ref 7–18)
BUN/CREAT SERPL: 19.4 (ref 10–20)
CALCIUM BLD-MCNC: 9.5 MG/DL (ref 8.5–10.1)
CHLORIDE SERPL-SCNC: 106 MMOL/L (ref 98–112)
CO2 SERPL-SCNC: 25 MMOL/L (ref 21–32)
CREAT BLD-MCNC: 1.08 MG/DL
CRP SERPL-MCNC: 0.32 MG/DL (ref ?–0.3)
DEPRECATED RDW RBC AUTO: 39.4 FL (ref 35.1–46.3)
EGFRCR SERPLBLD CKD-EPI 2021: 83 ML/MIN/1.73M2 (ref 60–?)
EOSINOPHIL # BLD AUTO: 0.16 X10(3) UL (ref 0–0.7)
EOSINOPHIL NFR BLD AUTO: 2.3 %
ERYTHROCYTE [DISTWIDTH] IN BLOOD BY AUTOMATED COUNT: 13.2 % (ref 11–15)
GLOBULIN PLAS-MCNC: 4 G/DL (ref 2.8–4.4)
GLUCOSE BLD-MCNC: 240 MG/DL (ref 70–99)
HCT VFR BLD AUTO: 38.3 %
HGB BLD-MCNC: 13 G/DL
IMM GRANULOCYTES # BLD AUTO: 0.04 X10(3) UL (ref 0–1)
IMM GRANULOCYTES NFR BLD: 0.6 %
LYMPHOCYTES # BLD AUTO: 1.7 X10(3) UL (ref 1–4)
LYMPHOCYTES NFR BLD AUTO: 24.7 %
MCH RBC QN AUTO: 27.9 PG (ref 26–34)
MCHC RBC AUTO-ENTMCNC: 33.9 G/DL (ref 31–37)
MCV RBC AUTO: 82.2 FL
MONOCYTES # BLD AUTO: 0.48 X10(3) UL (ref 0.1–1)
MONOCYTES NFR BLD AUTO: 7 %
NEUTROPHILS # BLD AUTO: 4.43 X10 (3) UL (ref 1.5–7.7)
NEUTROPHILS # BLD AUTO: 4.43 X10(3) UL (ref 1.5–7.7)
NEUTROPHILS NFR BLD AUTO: 64.4 %
OSMOLALITY SERPL CALC.SUM OF ELEC: 297 MOSM/KG (ref 275–295)
PLATELET # BLD AUTO: 357 10(3)UL (ref 150–450)
POTASSIUM SERPL-SCNC: 4.2 MMOL/L (ref 3.5–5.1)
PROT SERPL-MCNC: 7.4 G/DL (ref 6.4–8.2)
RBC # BLD AUTO: 4.66 X10(6)UL
SODIUM SERPL-SCNC: 138 MMOL/L (ref 136–145)
WBC # BLD AUTO: 6.9 X10(3) UL (ref 4–11)

## 2023-07-26 PROCEDURE — 80053 COMPREHEN METABOLIC PANEL: CPT

## 2023-07-26 PROCEDURE — 36591 DRAW BLOOD OFF VENOUS DEVICE: CPT

## 2023-07-26 PROCEDURE — 86140 C-REACTIVE PROTEIN: CPT

## 2023-07-26 PROCEDURE — 85025 COMPLETE CBC W/AUTO DIFF WBC: CPT

## 2023-07-26 RX ORDER — HEPARIN SODIUM (PORCINE) LOCK FLUSH IV SOLN 100 UNIT/ML 100 UNIT/ML
5 SOLUTION INTRAVENOUS ONCE
OUTPATIENT
Start: 2023-08-02

## 2023-07-26 RX ORDER — SODIUM CHLORIDE 9 MG/ML
10 INJECTION INTRAVENOUS ONCE
OUTPATIENT
Start: 2023-08-02

## 2023-08-02 ENCOUNTER — NURSE ONLY (OUTPATIENT)
Dept: HEMATOLOGY/ONCOLOGY | Facility: HOSPITAL | Age: 53
End: 2023-08-02
Attending: INTERNAL MEDICINE
Payer: COMMERCIAL

## 2023-08-02 PROCEDURE — 99211 OFF/OP EST MAY X REQ PHY/QHP: CPT

## 2023-08-02 NOTE — PROGRESS NOTES
Plan of care explained for picc line removal.  Reclined, picc line removed easily. Length matches insertion notes for. Pressure held for several minutes. No bleeding noted. Pressure dressing placed. Observed for 5 minutes post removal of, then placed in sitting position. Discharged stable-may remove dressing in 24 hours.

## 2023-09-01 ENCOUNTER — HOSPITAL ENCOUNTER (INPATIENT)
Facility: HOSPITAL | Age: 53
LOS: 5 days | Discharge: HOME | End: 2023-09-06
Attending: EMERGENCY MEDICINE | Admitting: HOSPITALIST
Payer: COMMERCIAL

## 2023-09-01 DIAGNOSIS — E11.628 DIABETIC INFECTION OF RIGHT FOOT: Primary | ICD-10-CM

## 2023-09-01 DIAGNOSIS — L08.9 DIABETIC INFECTION OF RIGHT FOOT: Primary | ICD-10-CM

## 2023-09-01 LAB
ANION GAP SERPL CALC-SCNC: 8 MMOL/L (ref 0–18)
BASOPHILS # BLD AUTO: 0.06 X10(3) UL (ref 0–0.2)
BASOPHILS NFR BLD AUTO: 0.4 %
BUN BLD-MCNC: 19 MG/DL (ref 7–18)
BUN/CREAT SERPL: 16.7 (ref 10–20)
CALCIUM BLD-MCNC: 10 MG/DL (ref 8.5–10.1)
CHLORIDE SERPL-SCNC: 99 MMOL/L (ref 98–112)
CO2 SERPL-SCNC: 27 MMOL/L (ref 21–32)
CREAT BLD-MCNC: 1.14 MG/DL
DEPRECATED RDW RBC AUTO: 38.1 FL (ref 35.1–46.3)
EGFRCR SERPLBLD CKD-EPI 2021: 77 ML/MIN/1.73M2 (ref 60–?)
EOSINOPHIL # BLD AUTO: 0.03 X10(3) UL (ref 0–0.7)
EOSINOPHIL NFR BLD AUTO: 0.2 %
ERYTHROCYTE [DISTWIDTH] IN BLOOD BY AUTOMATED COUNT: 13 % (ref 11–15)
GLUCOSE BLD-MCNC: 181 MG/DL (ref 70–99)
GLUCOSE BLDC GLUCOMTR-MCNC: 129 MG/DL (ref 70–99)
GLUCOSE BLDC GLUCOMTR-MCNC: 169 MG/DL (ref 70–99)
GLUCOSE BLDC GLUCOMTR-MCNC: 269 MG/DL (ref 70–99)
HCT VFR BLD AUTO: 40.4 %
HGB BLD-MCNC: 13.5 G/DL
IMM GRANULOCYTES # BLD AUTO: 0.08 X10(3) UL (ref 0–1)
IMM GRANULOCYTES NFR BLD: 0.5 %
LACTATE SERPL-SCNC: 1.6 MMOL/L (ref 0.4–2)
LYMPHOCYTES # BLD AUTO: 1.08 X10(3) UL (ref 1–4)
LYMPHOCYTES NFR BLD AUTO: 7.4 %
MCH RBC QN AUTO: 27 PG (ref 26–34)
MCHC RBC AUTO-ENTMCNC: 33.4 G/DL (ref 31–37)
MCV RBC AUTO: 80.8 FL
MONOCYTES # BLD AUTO: 1.48 X10(3) UL (ref 0.1–1)
MONOCYTES NFR BLD AUTO: 10.1 %
NEUTROPHILS # BLD AUTO: 11.95 X10 (3) UL (ref 1.5–7.7)
NEUTROPHILS # BLD AUTO: 11.95 X10(3) UL (ref 1.5–7.7)
NEUTROPHILS NFR BLD AUTO: 81.4 %
OSMOLALITY SERPL CALC.SUM OF ELEC: 285 MOSM/KG (ref 275–295)
PLATELET # BLD AUTO: 423 10(3)UL (ref 150–450)
POTASSIUM SERPL-SCNC: 4.3 MMOL/L (ref 3.5–5.1)
RBC # BLD AUTO: 5 X10(6)UL
SODIUM SERPL-SCNC: 134 MMOL/L (ref 136–145)
WBC # BLD AUTO: 14.7 X10(3) UL (ref 4–11)

## 2023-09-01 PROCEDURE — 80048 BASIC METABOLIC PNL TOTAL CA: CPT | Performed by: EMERGENCY MEDICINE

## 2023-09-01 PROCEDURE — 83605 ASSAY OF LACTIC ACID: CPT | Performed by: EMERGENCY MEDICINE

## 2023-09-01 PROCEDURE — 82962 GLUCOSE BLOOD TEST: CPT

## 2023-09-01 PROCEDURE — 87040 BLOOD CULTURE FOR BACTERIA: CPT | Performed by: EMERGENCY MEDICINE

## 2023-09-01 PROCEDURE — 87150 DNA/RNA AMPLIFIED PROBE: CPT | Performed by: EMERGENCY MEDICINE

## 2023-09-01 PROCEDURE — 85025 COMPLETE CBC W/AUTO DIFF WBC: CPT | Performed by: EMERGENCY MEDICINE

## 2023-09-01 PROCEDURE — 87186 SC STD MICRODIL/AGAR DIL: CPT | Performed by: EMERGENCY MEDICINE

## 2023-09-01 RX ORDER — VANCOMYCIN HYDROCHLORIDE
10 ONCE
Status: COMPLETED | OUTPATIENT
Start: 2023-09-01 | End: 2023-09-01

## 2023-09-01 RX ORDER — BUPROPION HYDROCHLORIDE 150 MG/1
300 TABLET ORAL DAILY
Status: DISCONTINUED | OUTPATIENT
Start: 2023-09-02 | End: 2023-09-06

## 2023-09-01 RX ORDER — DEXTROSE MONOHYDRATE 25 G/50ML
50 INJECTION, SOLUTION INTRAVENOUS
Status: DISCONTINUED | OUTPATIENT
Start: 2023-09-01 | End: 2023-09-06

## 2023-09-01 RX ORDER — HEPARIN SODIUM 5000 [USP'U]/ML
5000 INJECTION, SOLUTION INTRAVENOUS; SUBCUTANEOUS EVERY 8 HOURS SCHEDULED
Status: DISCONTINUED | OUTPATIENT
Start: 2023-09-01 | End: 2023-09-06

## 2023-09-01 RX ORDER — SODIUM CHLORIDE 9 MG/ML
INJECTION, SOLUTION INTRAVENOUS CONTINUOUS
Status: DISCONTINUED | OUTPATIENT
Start: 2023-09-01 | End: 2023-09-06

## 2023-09-01 RX ORDER — NICOTINE POLACRILEX 4 MG
15 LOZENGE BUCCAL
Status: DISCONTINUED | OUTPATIENT
Start: 2023-09-01 | End: 2023-09-06

## 2023-09-01 RX ORDER — VANCOMYCIN 1.75 GRAM/500 ML IN 0.9 % SODIUM CHLORIDE INTRAVENOUS
15 EVERY 12 HOURS
Status: DISCONTINUED | OUTPATIENT
Start: 2023-09-02 | End: 2023-09-02

## 2023-09-01 RX ORDER — NICOTINE POLACRILEX 4 MG
30 LOZENGE BUCCAL
Status: DISCONTINUED | OUTPATIENT
Start: 2023-09-01 | End: 2023-09-06

## 2023-09-01 RX ORDER — GABAPENTIN 300 MG/1
300 CAPSULE ORAL 2 TIMES DAILY
Status: DISCONTINUED | OUTPATIENT
Start: 2023-09-01 | End: 2023-09-06

## 2023-09-01 RX ORDER — BISACODYL 10 MG
10 SUPPOSITORY, RECTAL RECTAL
Status: DISCONTINUED | OUTPATIENT
Start: 2023-09-01 | End: 2023-09-06

## 2023-09-01 RX ORDER — SENNOSIDES 8.6 MG
17.2 TABLET ORAL NIGHTLY PRN
Status: DISCONTINUED | OUTPATIENT
Start: 2023-09-01 | End: 2023-09-06

## 2023-09-01 RX ORDER — POLYETHYLENE GLYCOL 3350 17 G/17G
17 POWDER, FOR SOLUTION ORAL DAILY PRN
Status: DISCONTINUED | OUTPATIENT
Start: 2023-09-01 | End: 2023-09-06

## 2023-09-01 RX ORDER — PROCHLORPERAZINE EDISYLATE 5 MG/ML
5 INJECTION INTRAMUSCULAR; INTRAVENOUS EVERY 8 HOURS PRN
Status: DISCONTINUED | OUTPATIENT
Start: 2023-09-01 | End: 2023-09-06

## 2023-09-01 RX ORDER — ONDANSETRON 2 MG/ML
4 INJECTION INTRAMUSCULAR; INTRAVENOUS EVERY 6 HOURS PRN
Status: DISCONTINUED | OUTPATIENT
Start: 2023-09-01 | End: 2023-09-06

## 2023-09-01 RX ORDER — ACETAMINOPHEN 500 MG
500 TABLET ORAL EVERY 4 HOURS PRN
Status: DISCONTINUED | OUTPATIENT
Start: 2023-09-01 | End: 2023-09-06

## 2023-09-01 RX ORDER — ROSUVASTATIN CALCIUM 10 MG/1
10 TABLET, COATED ORAL NIGHTLY
Status: DISCONTINUED | OUTPATIENT
Start: 2023-09-01 | End: 2023-09-06

## 2023-09-01 NOTE — PLAN OF CARE
Problem: Patient Centered Care  Goal: Patient preferences are identified and integrated in the patient's plan of care  Description: Interventions:  - What would you like us to know as we care for you? I have had several foot infections and surgeries to fix them in the past.  - Provide timely, complete, and accurate information to patient/family  - Incorporate patient and family knowledge, values, beliefs, and cultural backgrounds into the planning and delivery of care  - Encourage patient/family to participate in care and decision-making at the level they choose  - Honor patient and family perspectives and choices  Outcome: Progressing     Problem: Patient/Family Goals  Goal: Patient/Family Long Term Goal  Description: Patient's Long Term Goal: to resolve foot infection. Interventions:  - antibiotic therapy  - See additional Care Plan goals for specific interventions  Outcome: Progressing  Goal: Patient/Family Short Term Goal  Description: Patient's Short Term Goal: to get fever under control    Interventions:   - tylenol given.   - See additional Care Plan goals for specific interventions  Outcome: Progressing     Problem: PAIN - ADULT  Goal: Verbalizes/displays adequate comfort level or patient's stated pain goal  Description: INTERVENTIONS:  - Encourage pt to monitor pain and request assistance  - Assess pain using appropriate pain scale  - Administer analgesics based on type and severity of pain and evaluate response  - Implement non-pharmacological measures as appropriate and evaluate response  - Consider cultural and social influences on pain and pain management  - Manage/alleviate anxiety  - Utilize distraction and/or relaxation techniques  - Monitor for opioid side effects  - Notify MD/LIP if interventions unsuccessful or patient reports new pain  - Anticipate increased pain with activity and pre-medicate as appropriate  Outcome: Progressing     Problem: RISK FOR INFECTION - ADULT  Goal: Absence of fever/infection during anticipated neutropenic period  Description: INTERVENTIONS  - Monitor WBC  - Administer growth factors as ordered  - Implement neutropenic guidelines  Outcome: Progressing     Problem: SAFETY ADULT - FALL  Goal: Free from fall injury  Description: INTERVENTIONS:  - Assess pt frequently for physical needs  - Identify cognitive and physical deficits and behaviors that affect risk of falls. - Gordon fall precautions as indicated by assessment.  - Educate pt/family on patient safety including physical limitations  - Instruct pt to call for assistance with activity based on assessment  - Modify environment to reduce risk of injury  - Provide assistive devices as appropriate  - Consider OT/PT consult to assist with strengthening/mobility  - Encourage toileting schedule  Outcome: Progressing     Problem: DISCHARGE PLANNING  Goal: Discharge to home or other facility with appropriate resources  Description: INTERVENTIONS:  - Identify barriers to discharge w/pt and caregiver  - Include patient/family/discharge partner in discharge planning  - Arrange for needed discharge resources and transportation as appropriate  - Identify discharge learning needs (meds, wound care, etc)  - Arrange for interpreters to assist at discharge as needed  - Consider post-discharge preferences of patient/family/discharge partner  - Complete POLST form as appropriate  - Assess patient's ability to be responsible for managing their own health  - Refer to Case Management Department for coordinating discharge planning if the patient needs post-hospital services based on physician/LIP order or complex needs related to functional status, cognitive ability or social support system  Outcome: Progressing   Patient was admitted. Was seen at bedside by Dr. Won Woods who resumed his home meds. IV antibiotics given. Voids per urinal. Tylenol PO given for fever.

## 2023-09-01 NOTE — CONSULTS
[facility] Podiatry Foot and Ankle Surgery  Report of Consultation    Ira Lopez Patient Status:  Inpatient    1970 MRN [id number]   Location [facility] 5SW/SE Attending Nanette Tracey MD   Hosp Day # 0 PCP Hiral Turk MD     Date of Admission:  2023  Date of Consult:  23    Reason for Consultation:  Right foot ulcer    History of Present Illness:  Ira Lopez is a a(n) 47year old male. Well known to service, patient of my partner, Dr. Marsha Corey, s/p multiple limb salvage procedures, known chronic OM of the right foot. Admitted for cellulitis of right leg. He plans to proceed with a BKA in the coming months and has been managing his wound chronically with [facility] and local wound care. Has a CROW boot. Reports increased redness, swelling, and febrile as of today and proceeded to the ED for admission. History:  Past Medical History:   Diagnosis Date    Diabetic neuropathy ([address]     of feet    Hypertension     Hypertriglyceridemia     Major depression in remission ([address]     Mixed hyperlipidemia     Obesity (BMI 30-39. 9)     BMI 32    Osteomyelitis ([address] ;     right foot in ; left foot in     PONV (postoperative nausea and vomiting)     Type 2 diabetes mellitus ([address]     Dx at age 41    Visual impairment     eyeglasses    Vitamin D deficiency      Past Surgical History:   Procedure Laterality Date    OTHER      right foot surgery    OTHER SURGICAL HISTORY Right 2019    foot surgery    OTHER SURGICAL HISTORY Left     BKA    SPINE CLASS  2020    TMA    WISDOM TEETH REMOVED       Family History   Problem Relation Age of Onset    Diabetes Father     Lipids Father     Diabetes Mother         Type 2 DM and had GDM before    Lipids Mother     Cancer Mother         breast    Heart Disorder Mother         atrial fib--resolved after cardioversion    Cancer Sister         skin    Other (autistic) Son       reports that he has quit smoking.  His smoking use included cigarettes. He has a 39.00 pack-year smoking history. He has never used smokeless tobacco. He reports that he does not currently use alcohol after a past usage of about 1.0 standard drink of alcohol per week. He reports that he does not use drugs.     Allergies:  No Known Allergies    Medications:    Current Facility-Administered Medications:     heparin (Porcine) 5000 UNIT/ML injection 5,000 Units, 5,000 Units, Subcutaneous, Q8H SAM    acetaminophen (Tylenol Extra Strength) tab 500 mg, 500 mg, Oral, Q4H PRN    polyethylene glycol (PEG 3350) (Miralax) 17 g oral packet 17 g, 17 g, Oral, Daily PRN    sennosides (Senokot) tab 17.2 mg, 17.2 mg, Oral, Nightly PRN    bisacodyl (Dulcolax) 10 MG rectal suppository 10 mg, 10 mg, Rectal, Daily PRN    ondansetron (Zofran) 4 MG/2ML injection 4 mg, 4 mg, Intravenous, Q6H PRN    prochlorperazine (Compazine) 10 MG/2ML injection 5 mg, 5 mg, Intravenous, Q8H PRN    ceFEPIme (Maxipime) 1 g in sodium chloride 0.9% 100 mL IVPB-MBP, 1 g, Intravenous, Q8H    sodium chloride 0.9% infusion, , Intravenous, Continuous    [START ON 9/2/2023] buPROPion ER (Wellbutrin XL) 24 hr tab 300 mg, 300 mg, Oral, Daily    rosuvastatin (Crestor) tab 10 mg, 10 mg, Oral, Nightly    gabapentin (Neurontin) cap 300 mg, 300 mg, Oral, BID    glucose (Dex4) 15 GM/59ML oral liquid 15 g, 15 g, Oral, Q15 Min PRN **OR** glucose (Glutose) 40% oral gel 15 g, 15 g, Oral, Q15 Min PRN **OR** glucose-vitamin C (Dex-4) chewable tab 4 tablet, 4 tablet, Oral, Q15 Min PRN **OR** dextrose 50% injection 50 mL, 50 mL, Intravenous, Q15 Min PRN **OR** glucose (Dex4) 15 GM/59ML oral liquid 30 g, 30 g, Oral, Q15 Min PRN **OR** glucose (Glutose) 40% oral gel 30 g, 30 g, Oral, Q15 Min PRN **OR** glucose-vitamin C (Dex-4) chewable tab 8 tablet, 8 tablet, Oral, Q15 Min PRN    insulin aspart (NovoLOG) 100 Units/mL FlexPen 1-11 Units, 1-11 Units, Subcutaneous, TID CC    insulin detemir (Levemir) 100 UNIT/ML FlexPen/FlexTouch 60 Units, 60 Units, Subcutaneous, Galo@Auto I.D.    [START ON 9/2/2023] vancomycin (Vancocin) 1.75 g in sodium chloride 0.9% 500mL IVPB premix, 15 mg/kg, Intravenous, Q12H    vancomycin (Vancocin) 500 mg in sodium chloride 0.9% 150 mL IVPB, 500 mg, Intravenous, Once    Review of Systems:  ROS reviewed from patient's intake forms and electronic medical record. Pertinent positives and negatives noted in the HPI. PHYSICAL EXAM  General: A&O x 3, NAD  Neuro: Grossly diminished   Vascular: Normal pedal pulses right foot  Integument: 2.5 x 2.2 cm ulceration on the plantar lateral aspect of the right midfoot. It is clean and granular however the right lower extremity is diffusely edematous and there is erythema noted to the dorsal lateral column of the right foot. No acute signs of infection. MSK: Collapse of the lateral arch. Mild varus deformity of the right foot and ankle. Below-knee amputation noted on the left side. Laboratory Data:  Recent Labs   Lab 09/01/23  1334   RBC 5.00   HGB 13.5   HCT 40.4   MCV 80.8   MCH 27.0   MCHC 33.4   RDW 13.0   NEPRELIM 11.95*   WBC 14.7*   .0       Assessment:  Chronic OM with Charcot arthropathy, s/p failed limb salvage procedures. Plan:  Patient seen and examined at bedside. Reviewed vitals and labs  Mild leukocytosis. Patient with no plans for surgical intervention. C/w local wound care, saline wet to dry dressings. Recommend ID consult for long term suppression abx until patient proceeds with BKA. Previous cultures from 6/18 multi-organism MSSA and pseudomonas. Patient has opted for a BKA to his right lower extremity. No podiatric surgical intervention warranted at this time. Podiatry will sign-off.     Hemant Oropeza DPM  Podiatry, Foot and Ankle Surgery  [facility]  9/1/2023  5:34 PM

## 2023-09-01 NOTE — ED QUICK NOTES
Orders for admission, patient is aware of plan and ready to go upstairs. Any questions, please call ED RN Danilo Silva at extension [phone].      Patient Covid vaccination status: Fully vaccinated     COVID Test Ordered in ED: None    COVID Suspicion at Admission: N/A    Running Infusions:  None    Mental Status/LOC at time of transport: x4    Other pertinent information: ambulatory  CIWA score: N/A   NIH score:  N/A

## 2023-09-02 LAB
ANION GAP SERPL CALC-SCNC: 5 MMOL/L (ref 0–18)
BASOPHILS # BLD AUTO: 0.05 X10(3) UL (ref 0–0.2)
BASOPHILS NFR BLD AUTO: 0.5 %
BUN BLD-MCNC: 15 MG/DL (ref 7–18)
BUN/CREAT SERPL: 13.6 (ref 10–20)
CALCIUM BLD-MCNC: 9.4 MG/DL (ref 8.5–10.1)
CHLORIDE SERPL-SCNC: 106 MMOL/L (ref 98–112)
CO2 SERPL-SCNC: 27 MMOL/L (ref 21–32)
CREAT BLD-MCNC: 1.1 MG/DL
DEPRECATED RDW RBC AUTO: 39 FL (ref 35.1–46.3)
EGFRCR SERPLBLD CKD-EPI 2021: 81 ML/MIN/1.73M2 (ref 60–?)
EOSINOPHIL # BLD AUTO: 0.04 X10(3) UL (ref 0–0.7)
EOSINOPHIL NFR BLD AUTO: 0.4 %
ERYTHROCYTE [DISTWIDTH] IN BLOOD BY AUTOMATED COUNT: 13.1 % (ref 11–15)
GLUCOSE BLD-MCNC: 96 MG/DL (ref 70–99)
GLUCOSE BLDC GLUCOMTR-MCNC: 161 MG/DL (ref 70–99)
GLUCOSE BLDC GLUCOMTR-MCNC: 183 MG/DL (ref 70–99)
GLUCOSE BLDC GLUCOMTR-MCNC: 287 MG/DL (ref 70–99)
GLUCOSE BLDC GLUCOMTR-MCNC: 99 MG/DL (ref 70–99)
HCT VFR BLD AUTO: 35 %
HGB BLD-MCNC: 11.6 G/DL
IMM GRANULOCYTES # BLD AUTO: 0.06 X10(3) UL (ref 0–1)
IMM GRANULOCYTES NFR BLD: 0.6 %
LYMPHOCYTES # BLD AUTO: 1.15 X10(3) UL (ref 1–4)
LYMPHOCYTES NFR BLD AUTO: 11.2 %
MCH RBC QN AUTO: 27 PG (ref 26–34)
MCHC RBC AUTO-ENTMCNC: 33.1 G/DL (ref 31–37)
MCV RBC AUTO: 81.4 FL
MONOCYTES # BLD AUTO: 1.37 X10(3) UL (ref 0.1–1)
MONOCYTES NFR BLD AUTO: 13.4 %
NEUTROPHILS # BLD AUTO: 7.56 X10 (3) UL (ref 1.5–7.7)
NEUTROPHILS # BLD AUTO: 7.56 X10(3) UL (ref 1.5–7.7)
NEUTROPHILS NFR BLD AUTO: 73.9 %
OSMOLALITY SERPL CALC.SUM OF ELEC: 287 MOSM/KG (ref 275–295)
PLATELET # BLD AUTO: 331 10(3)UL (ref 150–450)
POTASSIUM SERPL-SCNC: 4.4 MMOL/L (ref 3.5–5.1)
RBC # BLD AUTO: 4.3 X10(6)UL
SODIUM SERPL-SCNC: 138 MMOL/L (ref 136–145)
WBC # BLD AUTO: 10.2 X10(3) UL (ref 4–11)

## 2023-09-02 PROCEDURE — 85025 COMPLETE CBC W/AUTO DIFF WBC: CPT | Performed by: HOSPITALIST

## 2023-09-02 PROCEDURE — 80048 BASIC METABOLIC PNL TOTAL CA: CPT | Performed by: HOSPITALIST

## 2023-09-02 PROCEDURE — 87040 BLOOD CULTURE FOR BACTERIA: CPT | Performed by: INTERNAL MEDICINE

## 2023-09-02 PROCEDURE — 82962 GLUCOSE BLOOD TEST: CPT

## 2023-09-02 NOTE — PLAN OF CARE
Problem: Patient Centered Care  Goal: Patient preferences are identified and integrated in the patient's plan of care  Description: Interventions:  - What would you like us to know as we care for you? From home with wife  - Provide timely, complete, and accurate information to patient/family  - Incorporate patient and family knowledge, values, beliefs, and cultural backgrounds into the planning and delivery of care  - Encourage patient/family to participate in care and decision-making at the level they choose  - Honor patient and family perspectives and choices  Outcome: Progressing     Problem: Patient/Family Goals  Goal: Patient/Family Long Term Goal  Description: Patient's Long Term Goal: to resolve foot infection. Interventions:  - antibiotic therapy  - See additional Care Plan goals for specific interventions  Outcome: Progressing  Goal: Patient/Family Short Term Goal  Description: Patient's Short Term Goal: to get fever under control    Interventions:   - tylenol given.   - See additional Care Plan goals for specific interventions  Outcome: Progressing     Problem: PAIN - ADULT  Goal: Verbalizes/displays adequate comfort level or patient's stated pain goal  Description: INTERVENTIONS:  - Encourage pt to monitor pain and request assistance  - Assess pain using appropriate pain scale  - Administer analgesics based on type and severity of pain and evaluate response  - Implement non-pharmacological measures as appropriate and evaluate response  - Consider cultural and social influences on pain and pain management  - Manage/alleviate anxiety  - Utilize distraction and/or relaxation techniques  - Monitor for opioid side effects  - Notify MD/LIP if interventions unsuccessful or patient reports new pain  - Anticipate increased pain with activity and pre-medicate as appropriate  Outcome: Progressing     Problem: RISK FOR INFECTION - ADULT  Goal: Absence of fever/infection during anticipated neutropenic period  Description: INTERVENTIONS  - Monitor WBC  - Administer growth factors as ordered  - Implement neutropenic guidelines  Outcome: Progressing     Problem: SAFETY ADULT - FALL  Goal: Free from fall injury  Description: INTERVENTIONS:  - Assess pt frequently for physical needs  - Identify cognitive and physical deficits and behaviors that affect risk of falls.   - [place] fall precautions as indicated by assessment.  - Educate pt/family on patient safety including physical limitations  - Instruct pt to call for assistance with activity based on assessment  - Modify environment to reduce risk of injury  - Provide assistive devices as appropriate  - Consider OT/PT consult to assist with strengthening/mobility  - Encourage toileting schedule  Outcome: Progressing     Problem: DISCHARGE PLANNING  Goal: Discharge to home or other facility with appropriate resources  Description: INTERVENTIONS:  - Identify barriers to discharge w/pt and caregiver  - Include patient/family/discharge partner in discharge planning  - Arrange for needed discharge resources and transportation as appropriate  - Identify discharge learning needs (meds, wound care, etc)  - Arrange for interpreters to assist at discharge as needed  - Consider post-discharge preferences of patient/family/discharge partner  - Complete POLST form as appropriate  - Assess patient's ability to be responsible for managing their own health  - Refer to Case Management Department for coordinating discharge planning if the patient needs post-hospital services based on physician/LIP order or complex needs related to functional status, cognitive ability or social support system  Outcome: Progressing     Problem: Diabetes/Glucose Control  Goal: Glucose maintained within prescribed range  Description: INTERVENTIONS:  - Monitor Blood Glucose as ordered  - Assess for signs and symptoms of hyperglycemia and hypoglycemia  - Administer ordered medications to maintain glucose within target range  - Assess barriers to adequate nutritional intake and initiate nutrition consult as needed  - Instruct patient on self management of diabetes  Outcome: Progressing

## 2023-09-02 NOTE — PLAN OF CARE
Fever lessened overnight. Pt consumes much water, diet coke, and Ice chips. Continues with IVF, IV Vanco, and IV Cefepime. Has his L BKA prosthetic here with him. States his wife is supportive of him and their 13yr old son with Autism. Dressing from right foot remains intact without drainage strike through. Problem: Patient Centered Care  Goal: Patient preferences are identified and integrated in the patient's plan of care  Description: Interventions:  - What would you like us to know as we care for you? \"I will be getting a Right below the knee amputation too\". - Provide timely, complete, and accurate information to patient/family  - Incorporate patient and family knowledge, values, beliefs, and cultural backgrounds into the planning and delivery of care  - Encourage patient/family to participate in care and decision-making at the level they choose  - Honor patient and family perspectives and choices  Outcome: Progressing     Problem: Patient/Family Goals  Goal: Patient/Family Long Term Goal  Description: Patient's Long Term Goal: to resolve foot infection. Interventions:  - antibiotic therapy  - See additional Care Plan goals for specific interventions  Outcome: Progressing  Goal: Patient/Family Short Term Goal  Description: Patient's Short Term Goal: to get fever under control    Interventions:   - tylenol given.   - See additional Care Plan goals for specific interventions  Outcome: Progressing     Problem: PAIN - ADULT  Goal: Verbalizes/displays adequate comfort level or patient's stated pain goal  Description: INTERVENTIONS:  - Encourage pt to monitor pain and request assistance  - Assess pain using appropriate pain scale  - Administer analgesics based on type and severity of pain and evaluate response  - Implement non-pharmacological measures as appropriate and evaluate response  - Consider cultural and social influences on pain and pain management  - Manage/alleviate anxiety  - Utilize distraction and/or relaxation techniques  - Monitor for opioid side effects  - Notify MD/LIP if interventions unsuccessful or patient reports new pain  - Anticipate increased pain with activity and pre-medicate as appropriate  Outcome: Progressing     Problem: RISK FOR INFECTION - ADULT  Goal: Absence of fever/infection during anticipated neutropenic period  Description: INTERVENTIONS  - Monitor WBC  - Administer growth factors as ordered  - Implement neutropenic guidelines  Outcome: Not Progressing     Problem: SAFETY ADULT - FALL  Goal: Free from fall injury  Description: INTERVENTIONS:  - Assess pt frequently for physical needs  - Identify cognitive and physical deficits and behaviors that affect risk of falls.   - [place] fall precautions as indicated by assessment.  - Educate pt/family on patient safety including physical limitations  - Instruct pt to call for assistance with activity based on assessment  - Modify environment to reduce risk of injury  - Provide assistive devices as appropriate  - Consider OT/PT consult to assist with strengthening/mobility  - Encourage toileting schedule  Outcome: Progressing     Problem: Diabetes/Glucose Control  Goal: Glucose maintained within prescribed range  Description: INTERVENTIONS:  - Monitor Blood Glucose as ordered  - Assess for signs and symptoms of hyperglycemia and hypoglycemia  - Administer ordered medications to maintain glucose within target range  - Assess barriers to adequate nutritional intake and initiate nutrition consult as needed  - Instruct patient on self management of diabetes  Outcome: Not Progressing

## 2023-09-03 LAB
GLUCOSE BLDC GLUCOMTR-MCNC: 121 MG/DL (ref 70–99)
GLUCOSE BLDC GLUCOMTR-MCNC: 130 MG/DL (ref 70–99)
GLUCOSE BLDC GLUCOMTR-MCNC: 173 MG/DL (ref 70–99)
GLUCOSE BLDC GLUCOMTR-MCNC: 196 MG/DL (ref 70–99)

## 2023-09-03 PROCEDURE — 82962 GLUCOSE BLOOD TEST: CPT

## 2023-09-03 NOTE — PLAN OF CARE
Problem: Patient Centered Care  Goal: Patient preferences are identified and integrated in the patient's plan of care  Description: Interventions:  - What would you like us to know as we care for you? Home with wife   - Provide timely, complete, and accurate information to patient/family  - Incorporate patient and family knowledge, values, beliefs, and cultural backgrounds into the planning and delivery of care  - Encourage patient/family to participate in care and decision-making at the level they choose  - Honor patient and family perspectives and choices  9/3/2023 1856 by Jimmie Florez RN  Outcome: Progressing  9/3/2023 [address] by Jimmie Florez RN  Outcome: Progressing  9/3/2023 1658 by Jimmie Florez RN  Outcome: Progressing     Problem: Patient/Family Goals  Goal: Patient/Family Long Term Goal  Description: Patient's Long Term Goal: to resolve foot infection. Interventions:  - antibiotic therapy  - See additional Care Plan goals for specific interventions  9/3/2023 1856 by Jimmie Florez RN  Outcome: Progressing  9/3/2023 1659 by Jimmie Florez RN  Outcome: Progressing  9/3/2023 1658 by Jimmie Florez RN  Outcome: Progressing  Goal: Patient/Family Short Term Goal  Description: Patient's Short Term Goal: to get fever under control    Interventions:   - tylenol given.   - See additional Care Plan goals for specific interventions  9/3/2023 1856 by Jimmie Florez RN  Outcome: Progressing  9/3/2023 1659 by Jimmie Florez RN  Outcome: Progressing  9/3/2023 1658 by Jimmie Florez RN  Outcome: Progressing     Problem: PAIN - ADULT  Goal: Verbalizes/displays adequate comfort level or patient's stated pain goal  Description: INTERVENTIONS:  - Encourage pt to monitor pain and request assistance  - Assess pain using appropriate pain scale  - Administer analgesics based on type and severity of pain and evaluate response  - Implement non-pharmacological measures as appropriate and evaluate response  - Consider cultural and social influences on pain and pain management  - Manage/alleviate anxiety  - Utilize distraction and/or relaxation techniques  - Monitor for opioid side effects  - Notify MD/LIP if interventions unsuccessful or patient reports new pain  - Anticipate increased pain with activity and pre-medicate as appropriate  9/3/2023 1856 by Gricelda Martinez RN  Outcome: Progressing  9/3/2023 1659 by Gricelda Martinez RN  Outcome: Progressing  9/3/2023 1658 by Gricelda Martinez RN  Outcome: Progressing     Problem: RISK FOR INFECTION - ADULT  Goal: Absence of fever/infection during anticipated neutropenic period  Description: INTERVENTIONS  - Monitor WBC  - Administer growth factors as ordered  - Implement neutropenic guidelines  9/3/2023 1856 by Gricelda Martinez RN  Outcome: Progressing  9/3/2023 1659 by Gricelda Martinez RN  Outcome: Progressing  9/3/2023 1658 by Gricelda Martinez RN  Outcome: Progressing     Problem: SAFETY ADULT - FALL  Goal: Free from fall injury  Description: INTERVENTIONS:  - Assess pt frequently for physical needs  - Identify cognitive and physical deficits and behaviors that affect risk of falls.   - [place] fall precautions as indicated by assessment.  - Educate pt/family on patient safety including physical limitations  - Instruct pt to call for assistance with activity based on assessment  - Modify environment to reduce risk of injury  - Provide assistive devices as appropriate  - Consider OT/PT consult to assist with strengthening/mobility  - Encourage toileting schedule  9/3/2023 1856 by Gricelda Martinez RN  Outcome: Progressing  9/3/2023 1659 by Gricelda Martinez RN  Outcome: Progressing  9/3/2023 1658 by Gricelda Martinez RN  Outcome: Progressing     Problem: DISCHARGE PLANNING  Goal: Discharge to home or other facility with appropriate resources  Description: INTERVENTIONS:  - Identify barriers to discharge w/pt and caregiver  - Include patient/family/discharge partner in discharge planning  - Arrange for needed discharge resources and transportation as appropriate  - Identify discharge learning needs (meds, wound care, etc)  - Arrange for interpreters to assist at discharge as needed  - Consider post-discharge preferences of patient/family/discharge partner  - Complete POLST form as appropriate  - Assess patient's ability to be responsible for managing their own health  - Refer to Case Management Department for coordinating discharge planning if the patient needs post-hospital services based on physician/LIP order or complex needs related to functional status, cognitive ability or social support system  9/3/2023 1856 by Roxana Mosquera RN  Outcome: Progressing  9/3/2023 1659 by Roxana Mosquera RN  Outcome: Progressing  9/3/2023 1658 by Roxana Mosquera RN  Outcome: Progressing     Problem: Diabetes/Glucose Control  Goal: Glucose maintained within prescribed range  Description: INTERVENTIONS:  - Monitor Blood Glucose as ordered  - Assess for signs and symptoms of hyperglycemia and hypoglycemia  - Administer ordered medications to maintain glucose within target range  - Assess barriers to adequate nutritional intake and initiate nutrition consult as needed  - Instruct patient on self management of diabetes  9/3/2023 1856 by Roxana Mosquera RN  Outcome: Progressing  9/3/2023 1659 by Roxana Mosquera RN  Outcome: Progressing  9/3/2023 1658 by Roxana Mosquera RN  Outcome: Progressing

## 2023-09-03 NOTE — PLAN OF CARE
Pt slept well overnight. Tylenol ES helped reduce fever of 101.3 to 99.3. PT chews on ice chips and consumed ice eater and Diet Coke. Offered to change dressing on the right foot 3 times this shift. Pt declined the dressing changes each time. Current dressing with ACE wrap remains in place without any drainage strikethrough. Continues on IVF and IV Cefepime. Awaiting MRI of foot and MATTEO to help determine the next steps in the care of the right foot. Problem: Patient Centered Care  Goal: Patient preferences are identified and integrated in the patient's plan of care  Description: Interventions:  - What would you like us to know as we care for you? \"I am upset that I have to sit here in the hospital for 3 days because I need a MRI and MATTEO and they won't be done on the holiday weekend\". - Provide timely, complete, and accurate information to patient/family  - Incorporate patient and family knowledge, values, beliefs, and cultural backgrounds into the planning and delivery of care  - Encourage patient/family to participate in care and decision-making at the level they choose  - Honor patient and family perspectives and choices  Outcome: Progressing     Problem: Patient/Family Goals  Goal: Patient/Family Long Term Goal  Description: Patient's Long Term Goal: to resolve foot infection. Interventions:  - antibiotic therapy  - See additional Care Plan goals for specific interventions  Outcome: Progressing  Goal: Patient/Family Short Term Goal  Description: Patient's Short Term Goal: to get fever under control    Interventions:   - tylenol given.   - See additional Care Plan goals for specific interventions  Outcome: Progressing

## 2023-09-04 LAB
ANION GAP SERPL CALC-SCNC: 5 MMOL/L (ref 0–18)
BASOPHILS # BLD AUTO: 0.05 X10(3) UL (ref 0–0.2)
BASOPHILS NFR BLD AUTO: 0.7 %
BUN BLD-MCNC: 15 MG/DL (ref 7–18)
BUN/CREAT SERPL: 13.3 (ref 10–20)
CALCIUM BLD-MCNC: 9.4 MG/DL (ref 8.5–10.1)
CHLORIDE SERPL-SCNC: 105 MMOL/L (ref 98–112)
CO2 SERPL-SCNC: 29 MMOL/L (ref 21–32)
CREAT BLD-MCNC: 1.13 MG/DL
DEPRECATED RDW RBC AUTO: 38.4 FL (ref 35.1–46.3)
EGFRCR SERPLBLD CKD-EPI 2021: 78 ML/MIN/1.73M2 (ref 60–?)
EOSINOPHIL # BLD AUTO: 0.27 X10(3) UL (ref 0–0.7)
EOSINOPHIL NFR BLD AUTO: 3.6 %
ERYTHROCYTE [DISTWIDTH] IN BLOOD BY AUTOMATED COUNT: 12.8 % (ref 11–15)
GLUCOSE BLD-MCNC: 160 MG/DL (ref 70–99)
GLUCOSE BLDC GLUCOMTR-MCNC: 137 MG/DL (ref 70–99)
GLUCOSE BLDC GLUCOMTR-MCNC: 152 MG/DL (ref 70–99)
GLUCOSE BLDC GLUCOMTR-MCNC: 169 MG/DL (ref 70–99)
GLUCOSE BLDC GLUCOMTR-MCNC: 231 MG/DL (ref 70–99)
HCT VFR BLD AUTO: 35.6 %
HGB BLD-MCNC: 11.8 G/DL
IMM GRANULOCYTES # BLD AUTO: 0.05 X10(3) UL (ref 0–1)
IMM GRANULOCYTES NFR BLD: 0.7 %
LYMPHOCYTES # BLD AUTO: 1.01 X10(3) UL (ref 1–4)
LYMPHOCYTES NFR BLD AUTO: 13.4 %
MCH RBC QN AUTO: 27.1 PG (ref 26–34)
MCHC RBC AUTO-ENTMCNC: 33.1 G/DL (ref 31–37)
MCV RBC AUTO: 81.8 FL
MONOCYTES # BLD AUTO: 0.88 X10(3) UL (ref 0.1–1)
MONOCYTES NFR BLD AUTO: 11.7 %
NEUTROPHILS # BLD AUTO: 5.28 X10 (3) UL (ref 1.5–7.7)
NEUTROPHILS # BLD AUTO: 5.28 X10(3) UL (ref 1.5–7.7)
NEUTROPHILS NFR BLD AUTO: 69.9 %
OSMOLALITY SERPL CALC.SUM OF ELEC: 292 MOSM/KG (ref 275–295)
PLATELET # BLD AUTO: 402 10(3)UL (ref 150–450)
POTASSIUM SERPL-SCNC: 4.3 MMOL/L (ref 3.5–5.1)
RBC # BLD AUTO: 4.35 X10(6)UL
SODIUM SERPL-SCNC: 139 MMOL/L (ref 136–145)
WBC # BLD AUTO: 7.5 X10(3) UL (ref 4–11)

## 2023-09-04 PROCEDURE — 82962 GLUCOSE BLOOD TEST: CPT

## 2023-09-04 PROCEDURE — 85025 COMPLETE CBC W/AUTO DIFF WBC: CPT | Performed by: HOSPITALIST

## 2023-09-04 PROCEDURE — 80048 BASIC METABOLIC PNL TOTAL CA: CPT | Performed by: HOSPITALIST

## 2023-09-04 RX ORDER — VALACYCLOVIR HYDROCHLORIDE 500 MG/1
1000 TABLET, FILM COATED ORAL EVERY 12 HOURS SCHEDULED
Status: DISCONTINUED | OUTPATIENT
Start: 2023-09-04 | End: 2023-09-06

## 2023-09-04 RX ORDER — VALACYCLOVIR HYDROCHLORIDE 500 MG/1
1000 TABLET, FILM COATED ORAL EVERY 12 HOURS SCHEDULED
Status: DISCONTINUED | OUTPATIENT
Start: 2023-09-04 | End: 2023-09-04

## 2023-09-04 RX ORDER — LISINOPRIL 5 MG/1
5 TABLET ORAL NIGHTLY
Status: DISCONTINUED | OUTPATIENT
Start: 2023-09-04 | End: 2023-09-06

## 2023-09-04 RX ORDER — ASPIRIN 81 MG/1
81 TABLET, CHEWABLE ORAL DAILY
Status: DISCONTINUED | OUTPATIENT
Start: 2023-09-04 | End: 2023-09-06

## 2023-09-04 RX ORDER — VALACYCLOVIR HYDROCHLORIDE 500 MG/1
1000 TABLET, FILM COATED ORAL ONCE
Status: COMPLETED | OUTPATIENT
Start: 2023-09-04 | End: 2023-09-04

## 2023-09-04 NOTE — PLAN OF CARE
Problem: Patient Centered Care  Goal: Patient preferences are identified and integrated in the patient's plan of care  Description: Interventions:  - What would you like us to know as we care for you? From home with family   - Provide timely, complete, and accurate information to patient/family  - Incorporate patient and family knowledge, values, beliefs, and cultural backgrounds into the planning and delivery of care  - Encourage patient/family to participate in care and decision-making at the level they choose  - Honor patient and family perspectives and choices  Outcome: Progressing     Problem: Patient/Family Goals  Goal: Patient/Family Long Term Goal  Description: Patient's Long Term Goal: to resolve foot infection. Interventions:  - antibiotic therapy  - See additional Care Plan goals for specific interventions  Outcome: Progressing  Goal: Patient/Family Short Term Goal  Description: Patient's Short Term Goal: to get fever under control    Interventions:   - tylenol given.   - See additional Care Plan goals for specific interventions  Outcome: Progressing     Problem: PAIN - ADULT  Goal: Verbalizes/displays adequate comfort level or patient's stated pain goal  Description: INTERVENTIONS:  - Encourage pt to monitor pain and request assistance  - Assess pain using appropriate pain scale  - Administer analgesics based on type and severity of pain and evaluate response  - Implement non-pharmacological measures as appropriate and evaluate response  - Consider cultural and social influences on pain and pain management  - Manage/alleviate anxiety  - Utilize distraction and/or relaxation techniques  - Monitor for opioid side effects  - Notify MD/LIP if interventions unsuccessful or patient reports new pain  - Anticipate increased pain with activity and pre-medicate as appropriate  Outcome: Progressing     Problem: RISK FOR INFECTION - ADULT  Goal: Absence of fever/infection during anticipated neutropenic period  Description: INTERVENTIONS  - Monitor WBC  - Administer growth factors as ordered  - Implement neutropenic guidelines  Outcome: Progressing     Problem: SAFETY ADULT - FALL  Goal: Free from fall injury  Description: INTERVENTIONS:  - Assess pt frequently for physical needs  - Identify cognitive and physical deficits and behaviors that affect risk of falls.   - [place] fall precautions as indicated by assessment.  - Educate pt/family on patient safety including physical limitations  - Instruct pt to call for assistance with activity based on assessment  - Modify environment to reduce risk of injury  - Provide assistive devices as appropriate  - Consider OT/PT consult to assist with strengthening/mobility  - Encourage toileting schedule  Outcome: Progressing     Problem: DISCHARGE PLANNING  Goal: Discharge to home or other facility with appropriate resources  Description: INTERVENTIONS:  - Identify barriers to discharge w/pt and caregiver  - Include patient/family/discharge partner in discharge planning  - Arrange for needed discharge resources and transportation as appropriate  - Identify discharge learning needs (meds, wound care, etc)  - Arrange for interpreters to assist at discharge as needed  - Consider post-discharge preferences of patient/family/discharge partner  - Complete POLST form as appropriate  - Assess patient's ability to be responsible for managing their own health  - Refer to Case Management Department for coordinating discharge planning if the patient needs post-hospital services based on physician/LIP order or complex needs related to functional status, cognitive ability or social support system  Outcome: Progressing     Problem: Diabetes/Glucose Control  Goal: Glucose maintained within prescribed range  Description: INTERVENTIONS:  - Monitor Blood Glucose as ordered  - Assess for signs and symptoms of hyperglycemia and hypoglycemia  - Administer ordered medications to maintain glucose within target range  - Assess barriers to adequate nutritional intake and initiate nutrition consult as needed  - Instruct patient on self management of diabetes  Outcome: Progressing

## 2023-09-05 ENCOUNTER — APPOINTMENT (OUTPATIENT)
Dept: CV DIAGNOSTICS | Facility: HOSPITAL | Age: 53
End: 2023-09-05
Attending: INTERNAL MEDICINE
Payer: COMMERCIAL

## 2023-09-05 ENCOUNTER — APPOINTMENT (OUTPATIENT)
Dept: MRI IMAGING | Facility: HOSPITAL | Age: 53
End: 2023-09-05
Attending: INTERNAL MEDICINE
Payer: COMMERCIAL

## 2023-09-05 LAB
ANION GAP SERPL CALC-SCNC: 7 MMOL/L (ref 0–18)
BASOPHILS # BLD AUTO: 0.03 X10(3) UL (ref 0–0.2)
BASOPHILS NFR BLD AUTO: 0.4 %
BUN BLD-MCNC: 13 MG/DL (ref 7–18)
BUN/CREAT SERPL: 14 (ref 10–20)
CALCIUM BLD-MCNC: 9.4 MG/DL (ref 8.5–10.1)
CHLORIDE SERPL-SCNC: 105 MMOL/L (ref 98–112)
CO2 SERPL-SCNC: 26 MMOL/L (ref 21–32)
CREAT BLD-MCNC: 0.93 MG/DL
DEPRECATED RDW RBC AUTO: 37.8 FL (ref 35.1–46.3)
EGFRCR SERPLBLD CKD-EPI 2021: 99 ML/MIN/1.73M2 (ref 60–?)
EOSINOPHIL # BLD AUTO: 0.2 X10(3) UL (ref 0–0.7)
EOSINOPHIL NFR BLD AUTO: 2.8 %
ERYTHROCYTE [DISTWIDTH] IN BLOOD BY AUTOMATED COUNT: 12.9 % (ref 11–15)
GLUCOSE BLD-MCNC: 175 MG/DL (ref 70–99)
GLUCOSE BLDC GLUCOMTR-MCNC: 130 MG/DL (ref 70–99)
GLUCOSE BLDC GLUCOMTR-MCNC: 154 MG/DL (ref 70–99)
GLUCOSE BLDC GLUCOMTR-MCNC: 209 MG/DL (ref 70–99)
HCT VFR BLD AUTO: 37 %
HGB BLD-MCNC: 12.4 G/DL
IMM GRANULOCYTES # BLD AUTO: 0.07 X10(3) UL (ref 0–1)
IMM GRANULOCYTES NFR BLD: 1 %
LYMPHOCYTES # BLD AUTO: 1 X10(3) UL (ref 1–4)
LYMPHOCYTES NFR BLD AUTO: 14 %
MCH RBC QN AUTO: 27.2 PG (ref 26–34)
MCHC RBC AUTO-ENTMCNC: 33.5 G/DL (ref 31–37)
MCV RBC AUTO: 81.1 FL
MONOCYTES # BLD AUTO: 0.69 X10(3) UL (ref 0.1–1)
MONOCYTES NFR BLD AUTO: 9.7 %
NEUTROPHILS # BLD AUTO: 5.14 X10 (3) UL (ref 1.5–7.7)
NEUTROPHILS # BLD AUTO: 5.14 X10(3) UL (ref 1.5–7.7)
NEUTROPHILS NFR BLD AUTO: 72.1 %
OSMOLALITY SERPL CALC.SUM OF ELEC: 290 MOSM/KG (ref 275–295)
PLATELET # BLD AUTO: 456 10(3)UL (ref 150–450)
POTASSIUM SERPL-SCNC: 4.1 MMOL/L (ref 3.5–5.1)
RBC # BLD AUTO: 4.56 X10(6)UL
SODIUM SERPL-SCNC: 138 MMOL/L (ref 136–145)
WBC # BLD AUTO: 7.1 X10(3) UL (ref 4–11)

## 2023-09-05 PROCEDURE — 93306 TTE W/DOPPLER COMPLETE: CPT | Performed by: INTERNAL MEDICINE

## 2023-09-05 PROCEDURE — 80048 BASIC METABOLIC PNL TOTAL CA: CPT | Performed by: HOSPITALIST

## 2023-09-05 PROCEDURE — 85025 COMPLETE CBC W/AUTO DIFF WBC: CPT | Performed by: HOSPITALIST

## 2023-09-05 PROCEDURE — 82962 GLUCOSE BLOOD TEST: CPT

## 2023-09-05 PROCEDURE — 73718 MRI LOWER EXTREMITY W/O DYE: CPT | Performed by: INTERNAL MEDICINE

## 2023-09-05 RX ORDER — LIDOCAINE HYDROCHLORIDE 10 MG/ML
5 INJECTION, SOLUTION EPIDURAL; INFILTRATION; INTRACAUDAL; PERINEURAL
Status: DISCONTINUED | OUTPATIENT
Start: 2023-09-05 | End: 2023-09-06

## 2023-09-05 NOTE — PLAN OF CARE
Problem: Patient Centered Care  Goal: Patient preferences are identified and integrated in the patient's plan of care  Description: Interventions:  - What would you like us to know as we care for you?  Home with wife  - Provide timely, complete, and accurate information to patient/family  - Incorporate patient and family knowledge, values, beliefs, and cultural backgrounds into the planning and delivery of care  - Encourage patient/family to participate in care and decision-making at the level they choose  - Honor patient and family perspectives and choices  Outcome: Progressing

## 2023-09-05 NOTE — PLAN OF CARE
Problem: Patient Centered Care  Goal: Patient preferences are identified and integrated in the patient's plan of care  Description: Interventions:  - What would you like us to know as we care for you? \"I like a lot of ice\"  - Provide timely, complete, and accurate information to patient/family  - Incorporate patient and family knowledge, values, beliefs, and cultural backgrounds into the planning and delivery of care  - Encourage patient/family to participate in care and decision-making at the level they choose  - Honor patient and family perspectives and choices  Outcome: Progressing     Problem: Patient/Family Goals  Goal: Patient/Family Long Term Goal  Description: Patient's Long Term Goal: to resolve foot infection. Interventions:  - antibiotic therapy  - See additional Care Plan goals for specific interventions  Outcome: Progressing  Goal: Patient/Family Short Term Goal  Description: Patient's Short Term Goal: to get fever under control    Interventions:   - tylenol given.   - See additional Care Plan goals for specific interventions  Outcome: Progressing     Problem: PAIN - ADULT  Goal: Verbalizes/displays adequate comfort level or patient's stated pain goal  Description: INTERVENTIONS:  - Encourage pt to monitor pain and request assistance  - Assess pain using appropriate pain scale  - Administer analgesics based on type and severity of pain and evaluate response  - Implement non-pharmacological measures as appropriate and evaluate response  - Consider cultural and social influences on pain and pain management  - Manage/alleviate anxiety  - Utilize distraction and/or relaxation techniques  - Monitor for opioid side effects  - Notify MD/LIP if interventions unsuccessful or patient reports new pain  - Anticipate increased pain with activity and pre-medicate as appropriate  Outcome: Progressing     Problem: RISK FOR INFECTION - ADULT  Goal: Absence of fever/infection during anticipated neutropenic period  Description: INTERVENTIONS  - Monitor WBC  - Administer growth factors as ordered  - Implement neutropenic guidelines  Outcome: Progressing     Problem: SAFETY ADULT - FALL  Goal: Free from fall injury  Description: INTERVENTIONS:  - Assess pt frequently for physical needs  - Identify cognitive and physical deficits and behaviors that affect risk of falls.   - [place] fall precautions as indicated by assessment.  - Educate pt/family on patient safety including physical limitations  - Instruct pt to call for assistance with activity based on assessment  - Modify environment to reduce risk of injury  - Provide assistive devices as appropriate  - Consider OT/PT consult to assist with strengthening/mobility  - Encourage toileting schedule  Outcome: Progressing     Problem: DISCHARGE PLANNING  Goal: Discharge to home or other facility with appropriate resources  Description: INTERVENTIONS:  - Identify barriers to discharge w/pt and caregiver  - Include patient/family/discharge partner in discharge planning  - Arrange for needed discharge resources and transportation as appropriate  - Identify discharge learning needs (meds, wound care, etc)  - Arrange for interpreters to assist at discharge as needed  - Consider post-discharge preferences of patient/family/discharge partner  - Complete POLST form as appropriate  - Assess patient's ability to be responsible for managing their own health  - Refer to Case Management Department for coordinating discharge planning if the patient needs post-hospital services based on physician/LIP order or complex needs related to functional status, cognitive ability or social support system  Outcome: Progressing     Problem: Diabetes/Glucose Control  Goal: Glucose maintained within prescribed range  Description: INTERVENTIONS:  - Monitor Blood Glucose as ordered  - Assess for signs and symptoms of hyperglycemia and hypoglycemia  - Administer ordered medications to maintain glucose within target range  - Assess barriers to adequate nutritional intake and initiate nutrition consult as needed  - Instruct patient on self management of diabetes  Outcome: Progressing    Patient updated on place of care. MRI and Echo still pending. IVF per order. IV abx. Patient refused dressing change this shift- dressing clean, dry, intact. Monitoring vital signs- stable at this time. Monitored blood glucose level- scheduled levermir given. Safety and fall precautions maintained- bed alarm on, bed locked in lowest position, call light within reach.

## 2023-09-05 NOTE — PLAN OF CARE
PICC to be placed tomorrow. MRI and Echo done. No acute changes. Problem: Patient Centered Care  Goal: Patient preferences are identified and integrated in the patient's plan of care  Description: Interventions:  - What would you like us to know as we care for you? \"I like a lot of ice\"  - Provide timely, complete, and accurate information to patient/family  - Incorporate patient and family knowledge, values, beliefs, and cultural backgrounds into the planning and delivery of care  - Encourage patient/family to participate in care and decision-making at the level they choose  - Honor patient and family perspectives and choices  Outcome: Progressing     Problem: Patient/Family Goals  Goal: Patient/Family Long Term Goal  Description: Patient's Long Term Goal: to resolve foot infection. Interventions:  - antibiotic therapy  - See additional Care Plan goals for specific interventions  Outcome: Progressing  Goal: Patient/Family Short Term Goal  Description: Patient's Short Term Goal: to get fever under control    Interventions:   - tylenol given.   - See additional Care Plan goals for specific interventions  Outcome: Progressing     Problem: PAIN - ADULT  Goal: Verbalizes/displays adequate comfort level or patient's stated pain goal  Description: INTERVENTIONS:  - Encourage pt to monitor pain and request assistance  - Assess pain using appropriate pain scale  - Administer analgesics based on type and severity of pain and evaluate response  - Implement non-pharmacological measures as appropriate and evaluate response  - Consider cultural and social influences on pain and pain management  - Manage/alleviate anxiety  - Utilize distraction and/or relaxation techniques  - Monitor for opioid side effects  - Notify MD/LIP if interventions unsuccessful or patient reports new pain  - Anticipate increased pain with activity and pre-medicate as appropriate  Outcome: Progressing     Problem: RISK FOR INFECTION - ADULT  Goal: Absence of fever/infection during anticipated neutropenic period  Description: INTERVENTIONS  - Monitor WBC  - Administer growth factors as ordered  - Implement neutropenic guidelines  Outcome: Progressing     Problem: SAFETY ADULT - FALL  Goal: Free from fall injury  Description: INTERVENTIONS:  - Assess pt frequently for physical needs  - Identify cognitive and physical deficits and behaviors that affect risk of falls.   - [place] fall precautions as indicated by assessment.  - Educate pt/family on patient safety including physical limitations  - Instruct pt to call for assistance with activity based on assessment  - Modify environment to reduce risk of injury  - Provide assistive devices as appropriate  - Consider OT/PT consult to assist with strengthening/mobility  - Encourage toileting schedule  Outcome: Progressing     Problem: DISCHARGE PLANNING  Goal: Discharge to home or other facility with appropriate resources  Description: INTERVENTIONS:  - Identify barriers to discharge w/pt and caregiver  - Include patient/family/discharge partner in discharge planning  - Arrange for needed discharge resources and transportation as appropriate  - Identify discharge learning needs (meds, wound care, etc)  - Arrange for interpreters to assist at discharge as needed  - Consider post-discharge preferences of patient/family/discharge partner  - Complete POLST form as appropriate  - Assess patient's ability to be responsible for managing their own health  - Refer to Case Management Department for coordinating discharge planning if the patient needs post-hospital services based on physician/LIP order or complex needs related to functional status, cognitive ability or social support system  Outcome: Progressing     Problem: Diabetes/Glucose Control  Goal: Glucose maintained within prescribed range  Description: INTERVENTIONS:  - Monitor Blood Glucose as ordered  - Assess for signs and symptoms of hyperglycemia and hypoglycemia  - Administer ordered medications to maintain glucose within target range  - Assess barriers to adequate nutritional intake and initiate nutrition consult as needed  - Instruct patient on self management of diabetes  Outcome: Progressing

## 2023-09-06 ENCOUNTER — APPOINTMENT (OUTPATIENT)
Dept: PICC SERVICES | Facility: HOSPITAL | Age: 53
End: 2023-09-06
Attending: INTERNAL MEDICINE
Payer: COMMERCIAL

## 2023-09-06 VITALS
DIASTOLIC BLOOD PRESSURE: 81 MMHG | WEIGHT: 256.38 LBS | RESPIRATION RATE: 16 BRPM | TEMPERATURE: 98 F | SYSTOLIC BLOOD PRESSURE: 151 MMHG | OXYGEN SATURATION: 96 % | HEART RATE: 81 BPM | BODY MASS INDEX: 32 KG/M2

## 2023-09-06 DIAGNOSIS — L08.9 DIABETIC INFECTION OF RIGHT FOOT: Primary | ICD-10-CM

## 2023-09-06 DIAGNOSIS — E11.628 DIABETIC INFECTION OF RIGHT FOOT: Primary | ICD-10-CM

## 2023-09-06 LAB
ALBUMIN SERPL-MCNC: 2.5 G/DL (ref 3.4–5)
ALBUMIN/GLOB SERPL: 0.5 {RATIO} (ref 1–2)
ALP LIVER SERPL-CCNC: 134 U/L
ALT SERPL-CCNC: 51 U/L
ANION GAP SERPL CALC-SCNC: 7 MMOL/L (ref 0–18)
ANION GAP SERPL CALC-SCNC: 7 MMOL/L (ref 0–18)
AST SERPL-CCNC: 29 U/L (ref 15–37)
BASOPHILS # BLD AUTO: 0.06 X10(3) UL (ref 0–0.2)
BASOPHILS NFR BLD AUTO: 0.8 %
BILIRUB SERPL-MCNC: 0.2 MG/DL (ref 0.1–2)
BUN BLD-MCNC: 15 MG/DL (ref 7–18)
BUN BLD-MCNC: 15 MG/DL (ref 7–18)
BUN/CREAT SERPL: 16.3 (ref 10–20)
BUN/CREAT SERPL: 16.3 (ref 10–20)
CALCIUM BLD-MCNC: 9.2 MG/DL (ref 8.5–10.1)
CALCIUM BLD-MCNC: 9.2 MG/DL (ref 8.5–10.1)
CHLORIDE SERPL-SCNC: 107 MMOL/L (ref 98–112)
CHLORIDE SERPL-SCNC: 107 MMOL/L (ref 98–112)
CO2 SERPL-SCNC: 23 MMOL/L (ref 21–32)
CO2 SERPL-SCNC: 23 MMOL/L (ref 21–32)
CREAT BLD-MCNC: 0.92 MG/DL
CREAT BLD-MCNC: 0.92 MG/DL
DEPRECATED RDW RBC AUTO: 37.6 FL (ref 35.1–46.3)
EGFRCR SERPLBLD CKD-EPI 2021: 100 ML/MIN/1.73M2 (ref 60–?)
EGFRCR SERPLBLD CKD-EPI 2021: 100 ML/MIN/1.73M2 (ref 60–?)
EOSINOPHIL # BLD AUTO: 0.25 X10(3) UL (ref 0–0.7)
EOSINOPHIL NFR BLD AUTO: 3.3 %
ERYTHROCYTE [DISTWIDTH] IN BLOOD BY AUTOMATED COUNT: 12.9 % (ref 11–15)
GLOBULIN PLAS-MCNC: 4.6 G/DL (ref 2.8–4.4)
GLUCOSE BLD-MCNC: 261 MG/DL (ref 70–99)
GLUCOSE BLD-MCNC: 261 MG/DL (ref 70–99)
GLUCOSE BLDC GLUCOMTR-MCNC: 171 MG/DL (ref 70–99)
GLUCOSE BLDC GLUCOMTR-MCNC: 208 MG/DL (ref 70–99)
HCT VFR BLD AUTO: 37.3 %
HGB BLD-MCNC: 12.4 G/DL
IMM GRANULOCYTES # BLD AUTO: 0.1 X10(3) UL (ref 0–1)
IMM GRANULOCYTES NFR BLD: 1.3 %
LYMPHOCYTES # BLD AUTO: 1.13 X10(3) UL (ref 1–4)
LYMPHOCYTES NFR BLD AUTO: 14.8 %
MCH RBC QN AUTO: 26.9 PG (ref 26–34)
MCHC RBC AUTO-ENTMCNC: 33.2 G/DL (ref 31–37)
MCV RBC AUTO: 80.9 FL
MONOCYTES # BLD AUTO: 0.7 X10(3) UL (ref 0.1–1)
MONOCYTES NFR BLD AUTO: 9.2 %
NEUTROPHILS # BLD AUTO: 5.41 X10 (3) UL (ref 1.5–7.7)
NEUTROPHILS # BLD AUTO: 5.41 X10(3) UL (ref 1.5–7.7)
NEUTROPHILS NFR BLD AUTO: 70.6 %
OSMOLALITY SERPL CALC.SUM OF ELEC: 294 MOSM/KG (ref 275–295)
OSMOLALITY SERPL CALC.SUM OF ELEC: 294 MOSM/KG (ref 275–295)
PLATELET # BLD AUTO: 440 10(3)UL (ref 150–450)
POTASSIUM SERPL-SCNC: 4 MMOL/L (ref 3.5–5.1)
POTASSIUM SERPL-SCNC: 4 MMOL/L (ref 3.5–5.1)
PROT SERPL-MCNC: 7.1 G/DL (ref 6.4–8.2)
RBC # BLD AUTO: 4.61 X10(6)UL
SODIUM SERPL-SCNC: 137 MMOL/L (ref 136–145)
SODIUM SERPL-SCNC: 137 MMOL/L (ref 136–145)
WBC # BLD AUTO: 7.7 X10(3) UL (ref 4–11)

## 2023-09-06 PROCEDURE — 85025 COMPLETE CBC W/AUTO DIFF WBC: CPT | Performed by: HOSPITALIST

## 2023-09-06 PROCEDURE — 82962 GLUCOSE BLOOD TEST: CPT

## 2023-09-06 PROCEDURE — 36569 INSJ PICC 5 YR+ W/O IMAGING: CPT

## 2023-09-06 PROCEDURE — 80048 BASIC METABOLIC PNL TOTAL CA: CPT | Performed by: HOSPITALIST

## 2023-09-06 PROCEDURE — 80053 COMPREHEN METABOLIC PANEL: CPT | Performed by: HOSPITALIST

## 2023-09-06 PROCEDURE — 02HV33Z INSERTION OF INFUSION DEVICE INTO SUPERIOR VENA CAVA, PERCUTANEOUS APPROACH: ICD-10-PCS | Performed by: INTERNAL MEDICINE

## 2023-09-06 RX ORDER — ERTAPENEM 1 G/1
1 INJECTION, POWDER, LYOPHILIZED, FOR SOLUTION INTRAMUSCULAR; INTRAVENOUS DAILY
Qty: 36 EACH | Status: SHIPPED | OUTPATIENT
Start: 2023-09-06 | End: 2023-09-06

## 2023-09-06 RX ORDER — VALACYCLOVIR HYDROCHLORIDE 1 G/1
1000 TABLET, FILM COATED ORAL EVERY 12 HOURS SCHEDULED
Qty: 11 TABLET | Refills: 0 | Status: SHIPPED | OUTPATIENT
Start: 2023-09-06

## 2023-09-06 RX ORDER — HEPARIN SODIUM (PORCINE) LOCK FLUSH IV SOLN 100 UNIT/ML 100 UNIT/ML
5 SOLUTION INTRAVENOUS ONCE
OUTPATIENT
Start: 2023-09-06

## 2023-09-06 RX ORDER — SODIUM CHLORIDE 9 MG/ML
10 INJECTION INTRAVENOUS ONCE
OUTPATIENT
Start: 2023-09-06

## 2023-09-06 NOTE — PLAN OF CARE
Pt in stable condition. PICC in place. Discharge instructions discussed and PICC line/IV abx plan. Reinforced pt needs to follow up with podiatry and ID. Discharged in stable condition. All questions answered. Problem: Patient Centered Care  Goal: Patient preferences are identified and integrated in the patient's plan of care  Description: Interventions:  - What would you like us to know as we care for you? \"I like a lot of ice\"  - Provide timely, complete, and accurate information to patient/family  - Incorporate patient and family knowledge, values, beliefs, and cultural backgrounds into the planning and delivery of care  - Encourage patient/family to participate in care and decision-making at the level they choose  - Honor patient and family perspectives and choices  Outcome: Completed     Problem: Patient/Family Goals  Goal: Patient/Family Long Term Goal  Description: Patient's Long Term Goal: to resolve foot infection. Interventions:  - antibiotic therapy  - See additional Care Plan goals for specific interventions  Outcome: Completed  Goal: Patient/Family Short Term Goal  Description: Patient's Short Term Goal: to get fever under control    Interventions:   - tylenol given.   - See additional Care Plan goals for specific interventions  Outcome: Completed     Problem: PAIN - ADULT  Goal: Verbalizes/displays adequate comfort level or patient's stated pain goal  Description: INTERVENTIONS:  - Encourage pt to monitor pain and request assistance  - Assess pain using appropriate pain scale  - Administer analgesics based on type and severity of pain and evaluate response  - Implement non-pharmacological measures as appropriate and evaluate response  - Consider cultural and social influences on pain and pain management  - Manage/alleviate anxiety  - Utilize distraction and/or relaxation techniques  - Monitor for opioid side effects  - Notify MD/LIP if interventions unsuccessful or patient reports new pain  - Anticipate increased pain with activity and pre-medicate as appropriate  Outcome: Completed     Problem: RISK FOR INFECTION - ADULT  Goal: Absence of fever/infection during anticipated neutropenic period  Description: INTERVENTIONS  - Monitor WBC  - Administer growth factors as ordered  - Implement neutropenic guidelines  Outcome: Completed     Problem: SAFETY ADULT - FALL  Goal: Free from fall injury  Description: INTERVENTIONS:  - Assess pt frequently for physical needs  - Identify cognitive and physical deficits and behaviors that affect risk of falls.   - [place] fall precautions as indicated by assessment.  - Educate pt/family on patient safety including physical limitations  - Instruct pt to call for assistance with activity based on assessment  - Modify environment to reduce risk of injury  - Provide assistive devices as appropriate  - Consider OT/PT consult to assist with strengthening/mobility  - Encourage toileting schedule  Outcome: Completed     Problem: DISCHARGE PLANNING  Goal: Discharge to home or other facility with appropriate resources  Description: INTERVENTIONS:  - Identify barriers to discharge w/pt and caregiver  - Include patient/family/discharge partner in discharge planning  - Arrange for needed discharge resources and transportation as appropriate  - Identify discharge learning needs (meds, wound care, etc)  - Arrange for interpreters to assist at discharge as needed  - Consider post-discharge preferences of patient/family/discharge partner  - Complete POLST form as appropriate  - Assess patient's ability to be responsible for managing their own health  - Refer to Case Management Department for coordinating discharge planning if the patient needs post-hospital services based on physician/LIP order or complex needs related to functional status, cognitive ability or social support system  Outcome: Completed     Problem: Diabetes/Glucose Control  Goal: Glucose maintained within prescribed range  Description: INTERVENTIONS:  - Monitor Blood Glucose as ordered  - Assess for signs and symptoms of hyperglycemia and hypoglycemia  - Administer ordered medications to maintain glucose within target range  - Assess barriers to adequate nutritional intake and initiate nutrition consult as needed  - Instruct patient on self management of diabetes  Outcome: Completed

## 2023-09-06 NOTE — CM/SW NOTE
09/06/23 1100   Discharge disposition   Expected discharge disposition Home or Self   Outpatient services Infusion center  (APPT [email or web address])   DME/Infusion Providers [facility]  (DELIVERY AFTER 4PM TODAY- 9/6/23)   Patient Declines Recommended Services Yes  (DECLINED [facility])   Discharge transportation Private car     DC planner/CM to remain available for support and/or discharge planning.     Garett Petit RN, CCM    Ext.  [phone]

## 2023-09-06 NOTE — DISCHARGE SUMMARY
General Medicine Discharge Summary     Patient ID:  Amina Weaver  47year old  6/30/1970    Admit date: 9/1/2023    Discharge date and time: 9/6/2023 12:07 PM     Attending Physician: Arleen Ac: IP CONSULT TO PODIATRY  IP CONSULT TO INFECTIOUS DISEASE  IP CONSULT TO SOCIAL WORK  IP CONSULT TO PHARMACY  IP CONSULT TO PHARMACY  IP CONSULT TO VASCULAR SURGERY  IP CONSULT TO VASCULAR ACCESS TEAM    Primary Care Physician: Ayaan Gaming MD     Reason for admission: Sepsis due to recurrent right foot cellulitis and osteomyelitis    Risk For Readmission: High    Discharge Diagnoses: Diabetic infection of right foot  [E11.628, L08.9]  See Additional Discharge Diagnoses in Hospital Course    Discharged Condition: fair    Follow-up with labs/images appointments: Will need close follow-up with infectious disease service 1 week  -Patient needs to see podiatry in the next 24 to 48 hours to deal with a abscess on the right foot  -Patient needs follow-up as scheduled with vascular surgery for amputation on 20 September  Needs primary care follow-up within the next 2 weeks    Exam  Gen: No acute distress  Pulm: Lungs clear, normal respiratory effort  CV: Heart with regular rate and rhythm  Abd: Abdomen soft,   Left lower extremity with prosthetic  Lower extremity slightly warm at the forefoot fluctuant area on the base of the foot patient denies tenderness    HPI:   Per Dr. 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 Having intermittent fevers, states he has had the symptoms before. Knew what was happening. States is actually been doing okay last few weeks prior.   Then the last few days has progressed and he realized he needed to come in. No chest pain or shortness of breath. No nausea vomiting diarrhea. Hospital Course:   Admitted for recurrent fevers secondary to cellulitis and abscess with osteomyelitis of the right foot, ultimately found to have MSSA bacteremia, as well as an abscess on the right foot extensive discussions were undertaken during the hospital stay patient opted to have amputation of the right leg below the knee this was scheduled September 20. Due to competing interest in his private life he elected to leave the hospital on 9 6 prior to source control of abscess on the bottom of the right foot he understands the risk of making this decision be facilitated discharged with a PICC line and IV antibiotics. He will need to continue IV antibiotics until he has definitive amputation of the right lower extremity. He also needs to have follow-up with podiatry in the next 24 to 48 hours for aspiration of this abscess as I am worried about recurrent bacteremia and risk of endocarditis. He understands that untreated abscess could increase his risk of recurrent bacteremia and endocarditis and also increases risk of death. He was also treated with valacyclovir for oral herpes  Patient was discharged on IV antibiotics per infectious disease service    Operative Procedures:      Imaging: MRI FOOT, RIGHT ([id number])    Result Date: 9/5/2023  CONCLUSION:  Large soft tissue and skin ulceration within the plantar foot at the level of the 5th tarsometatarsal joint with a 1.6 cm deep subcutaneous abscess. There is a tract of fluid extending into the midfoot which abuts the cuboid with extensive osteomyelitis and pathologic fractures of the cuboid bone. Extensive changes of neuropathic arthropathy with extensive fragmentation of the midfoot which has significantly progressed since prior MRI but is unchanged since prior foot radiographs.   Limited evaluation of the calcaneus and great toe and navicular bone due to susceptibility artifact from hardware. Extensive dorsal foot and plantar foot cellulitis and plantar foot myositis. Dictated by (CST): Alyssa Zamora MD on 9/05/2023 at 10:26 AM     Finalized by (CST): Alyssa Zamora MD on 9/05/2023 at 10:37 AM           Disposition: home    Activity: activity as tolerated  Diet: regular diet  Wound Care: keep wound clean and dry  Code Status: Full Code  O2: none    Home Medication Changes:       Med li  st     Medication List        START taking these medications      valACYclovir 1 G Tabs  Commonly known as: Valtrex  Take 1 tablet (1,000 mg total) by mouth every 12 (twelve) hours. CONTINUE taking these medications      Souleymane Microlet Lancets Misc  Generic drug: Microlet Lancets  Check blood glucoses twice a day     buPROPion  MG Tb24  Commonly known as:  Wellbutrin XL  TAKE 1 TABLET(300 MG) BY MOUTH DAILY     Cholecalciferol 125 MCG (5000 UT) Tabs  Commonly known as: VITAMIN D-3     Contour Next Test Strp  Generic drug: Glucose Blood  Check blood glucoses twice a day     FreeStyle Rose 14 Day Sensor Misc  USE ONE SENSOR EVERY 14 DAYS AS DIRECTED.     gabapentin 300 MG Caps  Commonly known as: Neurontin  TAKE 1 CAPSULE(300 MG) BY MOUTH TWICE DAILY     lisinopril 5 MG Tabs  Commonly known as: Prinivil; Zestril  TAKE 1 TABLET(5 MG) BY MOUTH EVERY NIGHT     LOW-DOSE ASPIRIN OR     metFORMIN HCl 1000 MG Tabs  Commonly known as: GLUCOPHAGE  TAKE 1 TABLET BY MOUTH WITH BREAKFAST AND DINNER     Ozempic (1 MG/DOSE) 2 MG/1.5ML Sopn  Generic drug: semaglutide  Notes to patient: Resume home schedule     rosuvastatin 10 MG Tabs  Commonly known as: Crestor  TAKE 1 TABLET(10 MG) BY MOUTH EVERY NIGHT     Toujeo Max SoloStar 300 UNIT/ML Sopn  Generic drug: Insulin Glargine (2 Unit Dial)  Take 90 Units with breakfast and 80 Units at bedtime (10 pm)     TRUEplus Pen Needles 32G X 4 MM Misc  Generic drug: Insulin Pen Needle  For insulin use twice a day               Where to Get Your Medications        These medications were sent to [place] [id number] #[id number] - [address] [facility] [address] [facility], [phone], [address], [address] [address]      Phone: [phone]   valACYclovir 1 G Tabs         FU   Follow-up Information       Mejia Golden MD Follow up in 2 week(s). Specialty: Internal Medicine  Contact information:  [address] [facility]  [phone]               Rosemarie Self MD Follow up in 2 week(s). Specialty: INFECTIOUS DISEASES  Contact information:  [address] [place] [address] [facility]               Jerad Nails DPLILIA Follow up in 1 day(s). Specialty: PODIATRIST  Why: As soon as possible you need the abscess on your foot addressed  Contact information:  [address] [facility]               Jil Carey MD Follow up in 1 week(s). Specialty: SURGERY, VASCULAR  Why: To arrange follow-up for surgery  Contact information:  [place] [address]  [phone]                             [place] instructions: Other Discharge Instructions: We are discharging you home today but you will still need the abscess addressed on your foot as if this is not taken care of your infection will reoccur. Please take all antibiotics as directed     [facility] Accredited - delivery of antibiotic/supplies after 4pm 9/6/23  [address] [facility]  [facility], [address]  Phone: [phone]  Fax: [phone] infusion center-[facility] 9/11/2023 @ 8am  Located in: [facility]  Address: [address] [place] [address]  Phone: [phone]        I reconciled current and discharge medications on day of discharge, discussed changes with patient and noted changes above.        Total Time Coordinating Care: 35 minutes    Patient had opportunity to ask questions and state understand and agree with therapeutic plan as outlined    Thank You,    Alta Uribe, DO   Hospitalist with [facility]

## 2023-09-06 NOTE — DISCHARGE INSTRUCTIONS
We are discharging you home today but you will still need the abscess addressed on your foot as if this is not taken care of your infection will reoccur.  Please take all antibiotics as directed     [facility] Accredited - delivery of antibiotic/supplies after 4pm 9/6/23  [address] [facility]  [facility], [address]  Phone: [phone]  Fax: [phone] infusion center-[facility] 24 9/11/2023 @ 8am  Located in: [facility]  Address: [address], [facility]  Phone: [phone]

## 2023-09-06 NOTE — PLAN OF CARE
Problem: Patient Centered Care  Goal: Patient preferences are identified and integrated in the patient's plan of care  Description: Interventions:  - What would you like us to know as we care for you? \"I like a lot of ice\"  - Provide timely, complete, and accurate information to patient/family  - Incorporate patient and family knowledge, values, beliefs, and cultural backgrounds into the planning and delivery of care  - Encourage patient/family to participate in care and decision-making at the level they choose  - Honor patient and family perspectives and choices  Outcome: Progressing     Problem: Patient/Family Goals  Goal: Patient/Family Long Term Goal  Description: Patient's Long Term Goal: to resolve foot infection. Interventions:  - antibiotic therapy  - See additional Care Plan goals for specific interventions  Outcome: Progressing  Goal: Patient/Family Short Term Goal  Description: Patient's Short Term Goal: to get fever under control    Interventions:   - tylenol given.   - See additional Care Plan goals for specific interventions  Outcome: Progressing     Problem: PAIN - ADULT  Goal: Verbalizes/displays adequate comfort level or patient's stated pain goal  Description: INTERVENTIONS:  - Encourage pt to monitor pain and request assistance  - Assess pain using appropriate pain scale  - Administer analgesics based on type and severity of pain and evaluate response  - Implement non-pharmacological measures as appropriate and evaluate response  - Consider cultural and social influences on pain and pain management  - Manage/alleviate anxiety  - Utilize distraction and/or relaxation techniques  - Monitor for opioid side effects  - Notify MD/LIP if interventions unsuccessful or patient reports new pain  - Anticipate increased pain with activity and pre-medicate as appropriate  Outcome: Progressing     Problem: RISK FOR INFECTION - ADULT  Goal: Absence of fever/infection during anticipated neutropenic period  Description: INTERVENTIONS  - Monitor WBC  - Administer growth factors as ordered  - Implement neutropenic guidelines  Outcome: Progressing     Problem: SAFETY ADULT - FALL  Goal: Free from fall injury  Description: INTERVENTIONS:  - Assess pt frequently for physical needs  - Identify cognitive and physical deficits and behaviors that affect risk of falls.   - [place] fall precautions as indicated by assessment.  - Educate pt/family on patient safety including physical limitations  - Instruct pt to call for assistance with activity based on assessment  - Modify environment to reduce risk of injury  - Provide assistive devices as appropriate  - Consider OT/PT consult to assist with strengthening/mobility  - Encourage toileting schedule  Outcome: Progressing     Problem: DISCHARGE PLANNING  Goal: Discharge to home or other facility with appropriate resources  Description: INTERVENTIONS:  - Identify barriers to discharge w/pt and caregiver  - Include patient/family/discharge partner in discharge planning  - Arrange for needed discharge resources and transportation as appropriate  - Identify discharge learning needs (meds, wound care, etc)  - Arrange for interpreters to assist at discharge as needed  - Consider post-discharge preferences of patient/family/discharge partner  - Complete POLST form as appropriate  - Assess patient's ability to be responsible for managing their own health  - Refer to Case Management Department for coordinating discharge planning if the patient needs post-hospital services based on physician/LIP order or complex needs related to functional status, cognitive ability or social support system  Outcome: Progressing     Problem: Diabetes/Glucose Control  Goal: Glucose maintained within prescribed range  Description: INTERVENTIONS:  - Monitor Blood Glucose as ordered  - Assess for signs and symptoms of hyperglycemia and hypoglycemia  - Administer ordered medications to maintain glucose within target range  - Assess barriers to adequate nutritional intake and initiate nutrition consult as needed  - Instruct patient on self management of diabetes  Outcome: Progressing     Patient is alert and oriented x 4. He denies to be in any pain or discomfort at this time. Pt stated that he will stay overnight. Pt was accompanied to his wife's room at 469 by attending PCT. No acute changes at this time. Safety and fall precautions maintained. Call light within reach. Frequent rounding by nursing staff. Calm

## 2023-09-06 NOTE — PROGRESS NOTES
Pt requesting discharge for family emergency. Dr. Fabiola Urbano notified and spoke with pt on phone. ID notified and said pt would have to leave AMA d/t no safe discharge plan set up. Endorsed to night shift RN. If pt stays, PICC team and SW to be notified immediately to start process to get IV abx set up so pt can discharge.

## 2023-09-06 NOTE — CM/SW NOTE
Expected Discharge Plan:    Destination:  Home with outpt IV antibiotics  Referral accepted with [facility] for weekly PICC care and labs prn  [facility] infusion referral uploaded w/abx script order. 100% coverage per insurance with [facility]  CM spoke w/Eugenie who stated delivery will be after 4pm today. CM transferred call to pt as Rica Worrell will provide information to pt. Pt/Family aware of plan: pt want has hx of outpt IV abx and will self administer medication w/follow up as chosen with [facility] for PICC care/labs    [facility] Staff aware of dc plan: Patient discussed in care rounds. CM spoke w/Alix [facility] Adolph Floyd at [address] [place] [address] who verified pt has hx. In addition, notified pt will have PICC placed today and discharge home. Per Helena Rishabh will need script order entered in epic, unable to accept printed script faxed. CM sent via [employer] this to Dr Yuliya Tse MD/ID to enter orders in epic. RN & ANGÉLICA RN notified of above. Dr Vidhi Grayson responded, will have Dr Yuliya Tse enter PICC Alexandra Timbo orders in [employer]. [facility] provider for IV abx medication/supplies. Finalized appt w/[facility] infusion and time of abx delivery by infusion/pharmacy. RN/DC RN notified. DC planner/CM to remain available for support and/or discharge planning.     Carl Harris RN, [facility]    Ext.  [phone]

## 2023-09-07 LAB — STAPHYLOCOCCUS AUREUS, NOT MRSA BY PCR: DETECTED

## 2023-09-07 NOTE — PAYOR COMM NOTE
--------------  DISCHARGE REVIEW    Payor: Marcela Crocker POS/MCNP  Subscriber #:  [id number]  Authorization Number: [id number]    Admit date: 9/1/23  Admit time:   3:27 PM  Discharge Date: 9/6/2023 12:07 PM     Admitting Physician: Marybel Nair MD  Attending Physician:  No att. providers found  Primary Care Physician: Brittany Diaz MD          Discharge Summary Notes        Discharge Summary signed by Dilan Evangelista DO at 9/6/2023  3:13 PM       Author: Dilan Evangelista DO Specialty: HOSPITALIST Author Type: Physician    Filed: 9/6/2023  3:13 PM Date of Service: 9/6/2023  3:08 PM Status: Signed    : Dilan Evangelista DO (Physician)           General Medicine Discharge Summary     Patient ID:  Abby Atwood  47year old  6/30/1970    Admit date: 9/1/2023    Discharge date and time: 9/6/2023 12:07 PM     Attending Physician: Enid Baxter: IP CONSULT TO PODIATRY  IP CONSULT TO INFECTIOUS DISEASE  IP CONSULT TO SOCIAL WORK  IP CONSULT TO PHARMACY  IP CONSULT TO PHARMACY  IP CONSULT TO VASCULAR SURGERY  IP CONSULT TO Carissa Mcclelland TEAM    Primary Care Physician: Edwina Rubio MD     Reason for admission: Sepsis due to recurrent right foot cellulitis and osteomyelitis    Risk For Readmission: High    Discharge Diagnoses: Diabetic infection of right foot  [E11.628, L08.9]  See Additional Discharge Diagnoses in Hospital Course    Discharged Condition: fair    Follow-up with labs/images appointments:    Will need close follow-up with infectious disease service 1 week  -Patient needs to see podiatry in the next 24 to 48 hours to deal with a abscess on the right foot  -Patient needs follow-up as scheduled with vascular surgery for amputation on 20 September  Needs primary care follow-up within the next 2 weeks    Exam  Gen: No acute distress  Pulm: Lungs clear, normal respiratory effort  CV: Heart with regular rate and rhythm  Abd: Abdomen soft,   Left lower extremity with prosthetic  Lower extremity slightly warm at the forefoot fluctuant area on the base of the foot patient denies tenderness    HPI:   Per Dr. 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 Having intermittent fevers, states he has had the symptoms before. Knew what was happening. States is actually been doing okay last few weeks prior. Then the last few days has progressed and he realized he needed to come in. No chest pain or shortness of breath. No nausea vomiting diarrhea. Hospital Course:   Admitted for recurrent fevers secondary to cellulitis and abscess with osteomyelitis of the right foot, ultimately found to have MSSA bacteremia, as well as an abscess on the right foot extensive discussions were undertaken during the hospital stay patient opted to have amputation of the right leg below the knee this was scheduled September 20. Due to competing interest in his private life he elected to leave the hospital on 9 6 prior to source control of abscess on the bottom of the right foot he understands the risk of making this decision be facilitated discharged with a PICC line and IV antibiotics. He will need to continue IV antibiotics until he has definitive amputation of the right lower extremity. He also needs to have follow-up with podiatry in the next 24 to 48 hours for aspiration of this abscess as I am worried about recurrent bacteremia and risk of endocarditis. He understands that untreated abscess could increase his risk of recurrent bacteremia and endocarditis and also increases risk of death.    He was also treated with valacyclovir for oral herpes  Patient was discharged on IV antibiotics per infectious disease service    Operative Procedures: Imaging: MRI FOOT, RIGHT ([id number])    Result Date: 9/5/2023  CONCLUSION:  Large soft tissue and skin ulceration within the plantar foot at the level of the 5th tarsometatarsal joint with a 1.6 cm deep subcutaneous abscess. There is a tract of fluid extending into the midfoot which abuts the cuboid with extensive osteomyelitis and pathologic fractures of the cuboid bone. Extensive changes of neuropathic arthropathy with extensive fragmentation of the midfoot which has significantly progressed since prior MRI but is unchanged since prior foot radiographs. Limited evaluation of the calcaneus and great toe and navicular bone due to susceptibility artifact from hardware. Extensive dorsal foot and plantar foot cellulitis and plantar foot myositis. Dictated by (CST): Tomi Harman MD on 9/05/2023 at 10:26 AM     Finalized by (CST): Tomi Harman MD on 9/05/2023 at 10:37 AM           Disposition: home    Activity: activity as tolerated  Diet: regular diet  Wound Care: keep wound clean and dry  Code Status: Full Code  O2: none    Home Medication Changes:       Med li  st     Medication List        START taking these medications      valACYclovir 1 G Tabs  Commonly known as: Valtrex  Take 1 tablet (1,000 mg total) by mouth every 12 (twelve) hours. CONTINUE taking these medications      Souleymane Microlet Lancets Misc  Generic drug: Microlet Lancets  Check blood glucoses twice a day     buPROPion  MG Tb24  Commonly known as:  Wellbutrin XL  TAKE 1 TABLET(300 MG) BY MOUTH DAILY     Cholecalciferol 125 MCG (5000 UT) Tabs  Commonly known as: VITAMIN D-3     Contour Next Test Strp  Generic drug: Glucose Blood  Check blood glucoses twice a day     FreeStyle Orse 14 Day Sensor Misc  USE ONE SENSOR EVERY 14 DAYS AS DIRECTED.     gabapentin 300 MG Caps  Commonly known as: Neurontin  TAKE 1 CAPSULE(300 MG) BY MOUTH TWICE DAILY     lisinopril 5 MG Tabs  Commonly known as: Prinivil; Zestril  TAKE 1 TABLET(5 MG) BY MOUTH EVERY NIGHT     LOW-DOSE ASPIRIN OR     metFORMIN HCl 1000 MG Tabs  Commonly known as: GLUCOPHAGE  TAKE 1 TABLET BY MOUTH WITH BREAKFAST AND DINNER     Ozempic (1 MG/DOSE) 2 MG/1.5ML Sopn  Generic drug: semaglutide  Notes to patient: Resume home schedule     rosuvastatin 10 MG Tabs  Commonly known as: Crestor  TAKE 1 TABLET(10 MG) BY MOUTH EVERY NIGHT     [place] SoloStar 300 UNIT/ML Sopn  Generic drug: Insulin Glargine (2 Unit Dial)  Take 90 Units with breakfast and 80 Units at bedtime (10 pm)     TRUEplus Pen Needles 32G X 4 MM Misc  Generic drug: Insulin Pen Needle  For insulin use twice a day               Where to Get Your Medications        These medications were sent to [place] [address] #[id number] - [address] [facility], [address] [place] [address] AT [address] [facility], [phone], [phone] [address] [facility] Chandra Hernandez, [address] [facility] [address] [id number]      Phone: [phone]   valACYclovir 1 G Tabs         FU   Follow-up Information       Perry Perry MD Follow up in 2 week(s). Specialty: Internal Medicine  Contact information:  [address] [facility]  [phone]               Jerome Manzano MD Follow up in 2 week(s). Specialty: INFECTIOUS DISEASES  Contact information:  [address] [place] [address] [facility]               Chantal Torres DPM Follow up in 1 day(s). Specialty: PODIATRIST  Why: As soon as possible you need the abscess on your foot addressed  Contact information:  [address] [facility] [address]               Orquidea Denny MD Follow up in 1 week(s). Specialty: SURGERY, VASCULAR  Why: To arrange follow-up for surgery  Contact information:  [place] [address]  [phone]                             MS instructions: Other Discharge Instructions:          We are discharging you home today but you will still need the abscess addressed on your foot as if this is not taken care of your infection will reoccur. Please take all antibiotics as directed     [facility] Accredited - delivery of antibiotic/supplies after 4pm 9/6/23  [address] [facility]  [place], [address]  Phone: [phone]  Fax: [phone] infusion center-[facility] 24 9/11/2023 @ 8am  Located in: [facility]  Address: [address] [place] [address]., [address], [facility]  Phone: [phone]        I reconciled current and discharge medications on day of discharge, discussed changes with patient and noted changes above.        Total Time Coordinating Care: 35 minutes    Patient had opportunity to ask questions and state understand and agree with therapeutic plan as outlined    Thank You,    Miguel Mascorro DO   Hospitalist with [facility]         Electronically signed by Varinder Correa DO on 9/6/2023  3:13 PM         REVIEWER COMMENTS

## 2023-09-11 ENCOUNTER — HOSPITAL ENCOUNTER (EMERGENCY)
Facility: HOSPITAL | Age: 53
Discharge: HOME | End: 2023-09-11
Attending: EMERGENCY MEDICINE
Payer: COMMERCIAL

## 2023-09-11 ENCOUNTER — NURSE ONLY (OUTPATIENT)
Dept: HEMATOLOGY/ONCOLOGY | Facility: HOSPITAL | Age: 53
End: 2023-09-11
Attending: INTERNAL MEDICINE
Payer: COMMERCIAL

## 2023-09-11 VITALS
SYSTOLIC BLOOD PRESSURE: 152 MMHG | OXYGEN SATURATION: 100 % | RESPIRATION RATE: 18 BRPM | HEART RATE: 92 BPM | TEMPERATURE: 98 F | DIASTOLIC BLOOD PRESSURE: 84 MMHG

## 2023-09-11 DIAGNOSIS — L08.9 DIABETIC INFECTION OF RIGHT FOOT: ICD-10-CM

## 2023-09-11 DIAGNOSIS — E11.628 DIABETIC INFECTION OF RIGHT FOOT: ICD-10-CM

## 2023-09-11 DIAGNOSIS — L02.611 FOOT ABSCESS, RIGHT: Primary | ICD-10-CM

## 2023-09-11 LAB
ALBUMIN SERPL-MCNC: 3 G/DL (ref 3.4–5)
ALBUMIN/GLOB SERPL: 0.6 {RATIO} (ref 1–2)
ALP LIVER SERPL-CCNC: 134 U/L
ALT SERPL-CCNC: 50 U/L
ANION GAP SERPL CALC-SCNC: 6 MMOL/L (ref 0–18)
AST SERPL-CCNC: 28 U/L (ref 15–37)
BASOPHILS # BLD AUTO: 0.05 X10(3) UL (ref 0–0.2)
BASOPHILS NFR BLD AUTO: 0.5 %
BILIRUB SERPL-MCNC: <0.1 MG/DL (ref 0.1–2)
BUN BLD-MCNC: 22 MG/DL (ref 7–18)
BUN/CREAT SERPL: 21.6 (ref 10–20)
CALCIUM BLD-MCNC: 9.4 MG/DL (ref 8.5–10.1)
CHLORIDE SERPL-SCNC: 107 MMOL/L (ref 98–112)
CO2 SERPL-SCNC: 25 MMOL/L (ref 21–32)
CREAT BLD-MCNC: 1.02 MG/DL
CRP SERPL-MCNC: 1.33 MG/DL (ref ?–0.3)
DEPRECATED RDW RBC AUTO: 39.4 FL (ref 35.1–46.3)
EGFRCR SERPLBLD CKD-EPI 2021: 88 ML/MIN/1.73M2 (ref 60–?)
EOSINOPHIL # BLD AUTO: 0.19 X10(3) UL (ref 0–0.7)
EOSINOPHIL NFR BLD AUTO: 1.9 %
ERYTHROCYTE [DISTWIDTH] IN BLOOD BY AUTOMATED COUNT: 13.2 % (ref 11–15)
GLOBULIN PLAS-MCNC: 4.8 G/DL (ref 2.8–4.4)
GLUCOSE BLD-MCNC: 66 MG/DL (ref 70–99)
GLUCOSE BLDC GLUCOMTR-MCNC: 76 MG/DL (ref 70–99)
GLUCOSE BLDC GLUCOMTR-MCNC: 85 MG/DL (ref 70–99)
HCT VFR BLD AUTO: 39.5 %
HGB BLD-MCNC: 12.9 G/DL
IMM GRANULOCYTES # BLD AUTO: 0.09 X10(3) UL (ref 0–1)
IMM GRANULOCYTES NFR BLD: 0.9 %
LYMPHOCYTES # BLD AUTO: 2.06 X10(3) UL (ref 1–4)
LYMPHOCYTES NFR BLD AUTO: 21.1 %
MCH RBC QN AUTO: 27.1 PG (ref 26–34)
MCHC RBC AUTO-ENTMCNC: 32.7 G/DL (ref 31–37)
MCV RBC AUTO: 83 FL
MONOCYTES # BLD AUTO: 0.64 X10(3) UL (ref 0.1–1)
MONOCYTES NFR BLD AUTO: 6.6 %
NEUTROPHILS # BLD AUTO: 6.73 X10 (3) UL (ref 1.5–7.7)
NEUTROPHILS # BLD AUTO: 6.73 X10(3) UL (ref 1.5–7.7)
NEUTROPHILS NFR BLD AUTO: 69 %
OSMOLALITY SERPL CALC.SUM OF ELEC: 288 MOSM/KG (ref 275–295)
PLATELET # BLD AUTO: 768 10(3)UL (ref 150–450)
POTASSIUM SERPL-SCNC: 3.9 MMOL/L (ref 3.5–5.1)
PROT SERPL-MCNC: 7.8 G/DL (ref 6.4–8.2)
RBC # BLD AUTO: 4.76 X10(6)UL
SODIUM SERPL-SCNC: 138 MMOL/L (ref 136–145)
WBC # BLD AUTO: 9.8 X10(3) UL (ref 4–11)

## 2023-09-11 PROCEDURE — 36592 COLLECT BLOOD FROM PICC: CPT

## 2023-09-11 PROCEDURE — 85060 BLOOD SMEAR INTERPRETATION: CPT

## 2023-09-11 PROCEDURE — 99284 EMERGENCY DEPT VISIT MOD MDM: CPT

## 2023-09-11 PROCEDURE — 86140 C-REACTIVE PROTEIN: CPT

## 2023-09-11 PROCEDURE — 85025 COMPLETE CBC W/AUTO DIFF WBC: CPT

## 2023-09-11 PROCEDURE — 82962 GLUCOSE BLOOD TEST: CPT

## 2023-09-11 PROCEDURE — 80053 COMPREHEN METABOLIC PANEL: CPT

## 2023-09-11 PROCEDURE — 99283 EMERGENCY DEPT VISIT LOW MDM: CPT

## 2023-09-11 NOTE — PROGRESS NOTES
Patient here for weekly labs and picc line dressing change today. States he is doing daily antibiotics at home until 10/11 when he will have right below the knee amputation. Patient states he was inpatient, while he was inpatient his wife became ill and needed gallbladder surgery. States he requested a PICC line then discharge he needed to get discharged because they have a severly autistic child at home that needs care. Patient states he already finished today antibiotic and heparinized catheter, did not want me to heparize catheter today. I saline locked PICC line. States he is going to ER after his visit here, states he has abscess on right heel that has to be drained. Patient aware of future appointments made.

## 2023-09-11 NOTE — ED INITIAL ASSESSMENT (HPI)
Patient presents with right heel abscess that needs I&D. Patient with [address] picc line left this hospital on Wednesday for a family emergency. Receiving @ home IV antibiotics.

## 2023-09-18 ENCOUNTER — NURSE ONLY (OUTPATIENT)
Dept: HEMATOLOGY/ONCOLOGY | Facility: HOSPITAL | Age: 53
End: 2023-09-18
Attending: INTERNAL MEDICINE
Payer: COMMERCIAL

## 2023-09-18 DIAGNOSIS — E11.628 DIABETIC INFECTION OF RIGHT FOOT: ICD-10-CM

## 2023-09-18 DIAGNOSIS — L08.9 DIABETIC INFECTION OF RIGHT FOOT: ICD-10-CM

## 2023-09-18 LAB
ALBUMIN SERPL-MCNC: 3 G/DL (ref 3.4–5)
ALBUMIN/GLOB SERPL: 0.7 {RATIO} (ref 1–2)
ALP LIVER SERPL-CCNC: 150 U/L
ALT SERPL-CCNC: 85 U/L
ANION GAP SERPL CALC-SCNC: 8 MMOL/L (ref 0–18)
AST SERPL-CCNC: 49 U/L (ref 15–37)
BASOPHILS # BLD AUTO: 0.06 X10(3) UL (ref 0–0.2)
BASOPHILS NFR BLD AUTO: 0.7 %
BILIRUB SERPL-MCNC: 0.2 MG/DL (ref 0.1–2)
BUN BLD-MCNC: 17 MG/DL (ref 7–18)
BUN/CREAT SERPL: 18.7 (ref 10–20)
CALCIUM BLD-MCNC: 9 MG/DL (ref 8.5–10.1)
CHLORIDE SERPL-SCNC: 106 MMOL/L (ref 98–112)
CO2 SERPL-SCNC: 26 MMOL/L (ref 21–32)
CREAT BLD-MCNC: 0.91 MG/DL
CRP SERPL-MCNC: 0.93 MG/DL (ref ?–0.3)
DEPRECATED RDW RBC AUTO: 40.3 FL (ref 35.1–46.3)
EGFRCR SERPLBLD CKD-EPI 2021: 101 ML/MIN/1.73M2 (ref 60–?)
EOSINOPHIL # BLD AUTO: 0.19 X10(3) UL (ref 0–0.7)
EOSINOPHIL NFR BLD AUTO: 2.2 %
ERYTHROCYTE [DISTWIDTH] IN BLOOD BY AUTOMATED COUNT: 13.4 % (ref 11–15)
GLOBULIN PLAS-MCNC: 4.4 G/DL (ref 2.8–4.4)
GLUCOSE BLD-MCNC: 195 MG/DL (ref 70–99)
HCT VFR BLD AUTO: 39.1 %
HGB BLD-MCNC: 12.9 G/DL
IMM GRANULOCYTES # BLD AUTO: 0.04 X10(3) UL (ref 0–1)
IMM GRANULOCYTES NFR BLD: 0.5 %
LYMPHOCYTES # BLD AUTO: 1.94 X10(3) UL (ref 1–4)
LYMPHOCYTES NFR BLD AUTO: 22.5 %
MCH RBC QN AUTO: 27.3 PG (ref 26–34)
MCHC RBC AUTO-ENTMCNC: 33 G/DL (ref 31–37)
MCV RBC AUTO: 82.7 FL
MONOCYTES # BLD AUTO: 0.47 X10(3) UL (ref 0.1–1)
MONOCYTES NFR BLD AUTO: 5.4 %
NEUTROPHILS # BLD AUTO: 5.94 X10 (3) UL (ref 1.5–7.7)
NEUTROPHILS # BLD AUTO: 5.94 X10(3) UL (ref 1.5–7.7)
NEUTROPHILS NFR BLD AUTO: 68.7 %
OSMOLALITY SERPL CALC.SUM OF ELEC: 297 MOSM/KG (ref 275–295)
PLATELET # BLD AUTO: 557 10(3)UL (ref 150–450)
POTASSIUM SERPL-SCNC: 4 MMOL/L (ref 3.5–5.1)
PROT SERPL-MCNC: 7.4 G/DL (ref 6.4–8.2)
RBC # BLD AUTO: 4.73 X10(6)UL
SODIUM SERPL-SCNC: 140 MMOL/L (ref 136–145)
WBC # BLD AUTO: 8.6 X10(3) UL (ref 4–11)

## 2023-09-18 PROCEDURE — 80053 COMPREHEN METABOLIC PANEL: CPT

## 2023-09-18 PROCEDURE — 36592 COLLECT BLOOD FROM PICC: CPT

## 2023-09-18 PROCEDURE — 86140 C-REACTIVE PROTEIN: CPT

## 2023-09-18 PROCEDURE — 85025 COMPLETE CBC W/AUTO DIFF WBC: CPT

## 2023-09-25 ENCOUNTER — NURSE ONLY (OUTPATIENT)
Dept: HEMATOLOGY/ONCOLOGY | Facility: HOSPITAL | Age: 53
End: 2023-09-25
Attending: INTERNAL MEDICINE
Payer: COMMERCIAL

## 2023-09-25 DIAGNOSIS — L08.9 DIABETIC INFECTION OF RIGHT FOOT: Primary | ICD-10-CM

## 2023-09-25 DIAGNOSIS — E11.628 DIABETIC INFECTION OF RIGHT FOOT: Primary | ICD-10-CM

## 2023-09-25 LAB
ALBUMIN SERPL-MCNC: 3.4 G/DL (ref 3.4–5)
ALBUMIN/GLOB SERPL: 0.7 {RATIO} (ref 1–2)
ALP LIVER SERPL-CCNC: 135 U/L
ALT SERPL-CCNC: 73 U/L
ANION GAP SERPL CALC-SCNC: 7 MMOL/L (ref 0–18)
AST SERPL-CCNC: 41 U/L (ref 15–37)
BASOPHILS # BLD AUTO: 0.05 X10(3) UL (ref 0–0.2)
BASOPHILS NFR BLD AUTO: 0.6 %
BILIRUB SERPL-MCNC: 0.3 MG/DL (ref 0.1–2)
BUN BLD-MCNC: 17 MG/DL (ref 7–18)
BUN/CREAT SERPL: 16.5 (ref 10–20)
CALCIUM BLD-MCNC: 9.9 MG/DL (ref 8.5–10.1)
CHLORIDE SERPL-SCNC: 105 MMOL/L (ref 98–112)
CO2 SERPL-SCNC: 27 MMOL/L (ref 21–32)
CREAT BLD-MCNC: 1.03 MG/DL
CRP SERPL-MCNC: 1.24 MG/DL (ref ?–0.3)
DEPRECATED RDW RBC AUTO: 38.9 FL (ref 35.1–46.3)
EGFRCR SERPLBLD CKD-EPI 2021: 87 ML/MIN/1.73M2 (ref 60–?)
EOSINOPHIL # BLD AUTO: 0.22 X10(3) UL (ref 0–0.7)
EOSINOPHIL NFR BLD AUTO: 2.8 %
ERYTHROCYTE [DISTWIDTH] IN BLOOD BY AUTOMATED COUNT: 13.3 % (ref 11–15)
GLOBULIN PLAS-MCNC: 4.6 G/DL (ref 2.8–4.4)
GLUCOSE BLD-MCNC: 131 MG/DL (ref 70–99)
HCT VFR BLD AUTO: 40.5 %
HGB BLD-MCNC: 13.5 G/DL
IMM GRANULOCYTES # BLD AUTO: 0.02 X10(3) UL (ref 0–1)
IMM GRANULOCYTES NFR BLD: 0.3 %
LYMPHOCYTES # BLD AUTO: 1.84 X10(3) UL (ref 1–4)
LYMPHOCYTES NFR BLD AUTO: 23.5 %
MCH RBC QN AUTO: 26.9 PG (ref 26–34)
MCHC RBC AUTO-ENTMCNC: 33.3 G/DL (ref 31–37)
MCV RBC AUTO: 80.8 FL
MONOCYTES # BLD AUTO: 0.5 X10(3) UL (ref 0.1–1)
MONOCYTES NFR BLD AUTO: 6.4 %
NEUTROPHILS # BLD AUTO: 5.2 X10 (3) UL (ref 1.5–7.7)
NEUTROPHILS # BLD AUTO: 5.2 X10(3) UL (ref 1.5–7.7)
NEUTROPHILS NFR BLD AUTO: 66.4 %
OSMOLALITY SERPL CALC.SUM OF ELEC: 291 MOSM/KG (ref 275–295)
PLATELET # BLD AUTO: 377 10(3)UL (ref 150–450)
POTASSIUM SERPL-SCNC: 4 MMOL/L (ref 3.5–5.1)
PROT SERPL-MCNC: 8 G/DL (ref 6.4–8.2)
RBC # BLD AUTO: 5.01 X10(6)UL
SODIUM SERPL-SCNC: 139 MMOL/L (ref 136–145)
WBC # BLD AUTO: 7.8 X10(3) UL (ref 4–11)

## 2023-09-25 PROCEDURE — 86140 C-REACTIVE PROTEIN: CPT

## 2023-09-25 PROCEDURE — 36592 COLLECT BLOOD FROM PICC: CPT

## 2023-09-25 PROCEDURE — 85025 COMPLETE CBC W/AUTO DIFF WBC: CPT

## 2023-09-25 PROCEDURE — 80053 COMPREHEN METABOLIC PANEL: CPT

## 2023-09-25 RX ORDER — HEPARIN SODIUM (PORCINE) LOCK FLUSH IV SOLN 100 UNIT/ML 100 UNIT/ML
5 SOLUTION INTRAVENOUS ONCE
OUTPATIENT
Start: 2023-09-25

## 2023-09-25 RX ORDER — SODIUM CHLORIDE 9 MG/ML
10 INJECTION INTRAVENOUS ONCE
OUTPATIENT
Start: 2023-09-25

## 2023-10-02 ENCOUNTER — NURSE ONLY (OUTPATIENT)
Dept: HEMATOLOGY/ONCOLOGY | Facility: HOSPITAL | Age: 53
End: 2023-10-02
Attending: INTERNAL MEDICINE
Payer: COMMERCIAL

## 2023-10-02 DIAGNOSIS — E11.628 DIABETIC INFECTION OF RIGHT FOOT: ICD-10-CM

## 2023-10-02 DIAGNOSIS — L08.9 DIABETIC INFECTION OF RIGHT FOOT: ICD-10-CM

## 2023-10-02 LAB
ALBUMIN SERPL-MCNC: 3.3 G/DL (ref 3.4–5)
ALBUMIN/GLOB SERPL: 0.7 {RATIO} (ref 1–2)
ALP LIVER SERPL-CCNC: 127 U/L
ALT SERPL-CCNC: 58 U/L
ANION GAP SERPL CALC-SCNC: 8 MMOL/L (ref 0–18)
AST SERPL-CCNC: 33 U/L (ref 15–37)
BASOPHILS # BLD AUTO: 0.04 X10(3) UL (ref 0–0.2)
BASOPHILS NFR BLD AUTO: 0.6 %
BILIRUB SERPL-MCNC: 0.3 MG/DL (ref 0.1–2)
BUN BLD-MCNC: 19 MG/DL (ref 7–18)
BUN/CREAT SERPL: 20 (ref 10–20)
CALCIUM BLD-MCNC: 9.2 MG/DL (ref 8.5–10.1)
CHLORIDE SERPL-SCNC: 107 MMOL/L (ref 98–112)
CO2 SERPL-SCNC: 26 MMOL/L (ref 21–32)
CREAT BLD-MCNC: 0.95 MG/DL
CRP SERPL-MCNC: 1.92 MG/DL (ref ?–0.3)
DEPRECATED RDW RBC AUTO: 39.4 FL (ref 35.1–46.3)
EGFRCR SERPLBLD CKD-EPI 2021: 96 ML/MIN/1.73M2 (ref 60–?)
EOSINOPHIL # BLD AUTO: 0.19 X10(3) UL (ref 0–0.7)
EOSINOPHIL NFR BLD AUTO: 2.7 %
ERYTHROCYTE [DISTWIDTH] IN BLOOD BY AUTOMATED COUNT: 13.4 % (ref 11–15)
GLOBULIN PLAS-MCNC: 4.7 G/DL (ref 2.8–4.4)
GLUCOSE BLD-MCNC: 149 MG/DL (ref 70–99)
HCT VFR BLD AUTO: 40.8 %
HGB BLD-MCNC: 13.5 G/DL
IMM GRANULOCYTES # BLD AUTO: 0.06 X10(3) UL (ref 0–1)
IMM GRANULOCYTES NFR BLD: 0.9 %
LYMPHOCYTES # BLD AUTO: 1.9 X10(3) UL (ref 1–4)
LYMPHOCYTES NFR BLD AUTO: 27.2 %
MCH RBC QN AUTO: 26.8 PG (ref 26–34)
MCHC RBC AUTO-ENTMCNC: 33.1 G/DL (ref 31–37)
MCV RBC AUTO: 81 FL
MONOCYTES # BLD AUTO: 0.77 X10(3) UL (ref 0.1–1)
MONOCYTES NFR BLD AUTO: 11 %
NEUTROPHILS # BLD AUTO: 4.02 X10 (3) UL (ref 1.5–7.7)
NEUTROPHILS # BLD AUTO: 4.02 X10(3) UL (ref 1.5–7.7)
NEUTROPHILS NFR BLD AUTO: 57.6 %
OSMOLALITY SERPL CALC.SUM OF ELEC: 297 MOSM/KG (ref 275–295)
PLATELET # BLD AUTO: 342 10(3)UL (ref 150–450)
POTASSIUM SERPL-SCNC: 4.1 MMOL/L (ref 3.5–5.1)
PROT SERPL-MCNC: 8 G/DL (ref 6.4–8.2)
RBC # BLD AUTO: 5.04 X10(6)UL
SODIUM SERPL-SCNC: 141 MMOL/L (ref 136–145)
WBC # BLD AUTO: 7 X10(3) UL (ref 4–11)

## 2023-10-02 PROCEDURE — 85025 COMPLETE CBC W/AUTO DIFF WBC: CPT

## 2023-10-02 PROCEDURE — 80053 COMPREHEN METABOLIC PANEL: CPT

## 2023-10-02 PROCEDURE — 36592 COLLECT BLOOD FROM PICC: CPT

## 2023-10-02 PROCEDURE — 86140 C-REACTIVE PROTEIN: CPT

## 2023-10-05 ENCOUNTER — LAB ENCOUNTER (OUTPATIENT)
Dept: LAB | Facility: HOSPITAL | Age: 53
End: 2023-10-05
Attending: SURGERY
Payer: COMMERCIAL

## 2023-10-05 DIAGNOSIS — Z01.818 PRE-OP TESTING: ICD-10-CM

## 2023-10-05 LAB
ANTIBODY SCREEN: NEGATIVE
RH BLOOD TYPE: POSITIVE

## 2023-10-05 PROCEDURE — 36415 COLL VENOUS BLD VENIPUNCTURE: CPT

## 2023-10-05 PROCEDURE — 87641 MR-STAPH DNA AMP PROBE: CPT

## 2023-10-05 PROCEDURE — 86901 BLOOD TYPING SEROLOGIC RH(D): CPT

## 2023-10-05 PROCEDURE — 86900 BLOOD TYPING SEROLOGIC ABO: CPT

## 2023-10-05 PROCEDURE — 86850 RBC ANTIBODY SCREEN: CPT

## 2023-10-06 LAB — MRSA DNA SPEC QL NAA+PROBE: POSITIVE

## 2023-10-09 ENCOUNTER — NURSE ONLY (OUTPATIENT)
Dept: HEMATOLOGY/ONCOLOGY | Facility: HOSPITAL | Age: 53
End: 2023-10-09
Attending: INTERNAL MEDICINE
Payer: COMMERCIAL

## 2023-10-09 DIAGNOSIS — L08.9 DIABETIC INFECTION OF RIGHT FOOT: Primary | ICD-10-CM

## 2023-10-09 DIAGNOSIS — E11.628 DIABETIC INFECTION OF RIGHT FOOT: Primary | ICD-10-CM

## 2023-10-09 LAB
ALBUMIN SERPL-MCNC: 3.3 G/DL (ref 3.4–5)
ALBUMIN/GLOB SERPL: 0.7 {RATIO} (ref 1–2)
ALP LIVER SERPL-CCNC: 126 U/L
ALT SERPL-CCNC: 51 U/L
ANION GAP SERPL CALC-SCNC: 8 MMOL/L (ref 0–18)
AST SERPL-CCNC: 23 U/L (ref 15–37)
BASOPHILS # BLD AUTO: 0.06 X10(3) UL (ref 0–0.2)
BASOPHILS NFR BLD AUTO: 0.6 %
BILIRUB SERPL-MCNC: 0.3 MG/DL (ref 0.1–2)
BUN BLD-MCNC: 14 MG/DL (ref 7–18)
BUN/CREAT SERPL: 14.1 (ref 10–20)
CALCIUM BLD-MCNC: 9.4 MG/DL (ref 8.5–10.1)
CHLORIDE SERPL-SCNC: 107 MMOL/L (ref 98–112)
CO2 SERPL-SCNC: 25 MMOL/L (ref 21–32)
CREAT BLD-MCNC: 0.99 MG/DL
CRP SERPL-MCNC: 1.45 MG/DL (ref ?–0.3)
DEPRECATED RDW RBC AUTO: 37.2 FL (ref 35.1–46.3)
EGFRCR SERPLBLD CKD-EPI 2021: 92 ML/MIN/1.73M2 (ref 60–?)
EOSINOPHIL # BLD AUTO: 0.19 X10(3) UL (ref 0–0.7)
EOSINOPHIL NFR BLD AUTO: 2 %
ERYTHROCYTE [DISTWIDTH] IN BLOOD BY AUTOMATED COUNT: 13.1 % (ref 11–15)
GLOBULIN PLAS-MCNC: 4.5 G/DL (ref 2.8–4.4)
GLUCOSE BLD-MCNC: 183 MG/DL (ref 70–99)
HCT VFR BLD AUTO: 39.4 %
HGB BLD-MCNC: 13.5 G/DL
IMM GRANULOCYTES # BLD AUTO: 0.04 X10(3) UL (ref 0–1)
IMM GRANULOCYTES NFR BLD: 0.4 %
LYMPHOCYTES # BLD AUTO: 1.96 X10(3) UL (ref 1–4)
LYMPHOCYTES NFR BLD AUTO: 20.1 %
MCH RBC QN AUTO: 27.3 PG (ref 26–34)
MCHC RBC AUTO-ENTMCNC: 34.3 G/DL (ref 31–37)
MCV RBC AUTO: 79.8 FL
MONOCYTES # BLD AUTO: 0.62 X10(3) UL (ref 0.1–1)
MONOCYTES NFR BLD AUTO: 6.4 %
NEUTROPHILS # BLD AUTO: 6.86 X10 (3) UL (ref 1.5–7.7)
NEUTROPHILS # BLD AUTO: 6.86 X10(3) UL (ref 1.5–7.7)
NEUTROPHILS NFR BLD AUTO: 70.5 %
OSMOLALITY SERPL CALC.SUM OF ELEC: 295 MOSM/KG (ref 275–295)
PLATELET # BLD AUTO: 417 10(3)UL (ref 150–450)
POTASSIUM SERPL-SCNC: 3.9 MMOL/L (ref 3.5–5.1)
PROT SERPL-MCNC: 7.8 G/DL (ref 6.4–8.2)
RBC # BLD AUTO: 4.94 X10(6)UL
SODIUM SERPL-SCNC: 140 MMOL/L (ref 136–145)
WBC # BLD AUTO: 9.7 X10(3) UL (ref 4–11)

## 2023-10-09 PROCEDURE — 85025 COMPLETE CBC W/AUTO DIFF WBC: CPT

## 2023-10-09 PROCEDURE — 36592 COLLECT BLOOD FROM PICC: CPT

## 2023-10-09 PROCEDURE — 86140 C-REACTIVE PROTEIN: CPT

## 2023-10-09 PROCEDURE — 80053 COMPREHEN METABOLIC PANEL: CPT

## 2023-10-09 RX ORDER — HEPARIN SODIUM (PORCINE) LOCK FLUSH IV SOLN 100 UNIT/ML 100 UNIT/ML
5 SOLUTION INTRAVENOUS ONCE
OUTPATIENT
Start: 2023-10-09

## 2023-10-09 RX ORDER — SODIUM CHLORIDE 9 MG/ML
10 INJECTION INTRAVENOUS ONCE
OUTPATIENT
Start: 2023-10-09

## 2023-10-10 ENCOUNTER — ANESTHESIA EVENT (OUTPATIENT)
Dept: SURGERY | Facility: HOSPITAL | Age: 53
End: 2023-10-10
Payer: COMMERCIAL

## 2023-10-11 ENCOUNTER — ANESTHESIA (OUTPATIENT)
Dept: SURGERY | Facility: HOSPITAL | Age: 53
End: 2023-10-11
Payer: COMMERCIAL

## 2023-10-11 ENCOUNTER — HOSPITAL ENCOUNTER (INPATIENT)
Facility: HOSPITAL | Age: 53
LOS: 2 days | Discharge: HOME HEALTH | End: 2023-10-13
Attending: SURGERY | Admitting: SURGERY
Payer: COMMERCIAL

## 2023-10-11 DIAGNOSIS — Z01.818 PRE-OP TESTING: Primary | ICD-10-CM

## 2023-10-11 PROBLEM — M86.9 OSTEOMYELITIS (HCC): Status: ACTIVE | Noted: 2023-10-11

## 2023-10-11 LAB
GLUCOSE BLDC GLUCOMTR-MCNC: 127 MG/DL (ref 70–99)
GLUCOSE BLDC GLUCOMTR-MCNC: 158 MG/DL (ref 70–99)
GLUCOSE BLDC GLUCOMTR-MCNC: 235 MG/DL (ref 70–99)
GLUCOSE BLDC GLUCOMTR-MCNC: 252 MG/DL (ref 70–99)
GLUCOSE BLDC GLUCOMTR-MCNC: 274 MG/DL (ref 70–99)

## 2023-10-11 PROCEDURE — 88311 DECALCIFY TISSUE: CPT | Performed by: SURGERY

## 2023-10-11 PROCEDURE — 82962 GLUCOSE BLOOD TEST: CPT

## 2023-10-11 PROCEDURE — 0Y6H0Z2 DETACHMENT AT RIGHT LOWER LEG, MID, OPEN APPROACH: ICD-10-PCS | Performed by: SURGERY

## 2023-10-11 PROCEDURE — 88307 TISSUE EXAM BY PATHOLOGIST: CPT | Performed by: SURGERY

## 2023-10-11 RX ORDER — ACETAMINOPHEN 500 MG
1000 TABLET ORAL ONCE
Status: COMPLETED | OUTPATIENT
Start: 2023-10-11 | End: 2023-10-11

## 2023-10-11 RX ORDER — ACETAMINOPHEN 325 MG/1
650 TABLET ORAL EVERY 4 HOURS PRN
Status: DISCONTINUED | OUTPATIENT
Start: 2023-10-11 | End: 2023-10-13

## 2023-10-11 RX ORDER — FAMOTIDINE 20 MG/1
20 TABLET, FILM COATED ORAL ONCE
Status: COMPLETED | OUTPATIENT
Start: 2023-10-11 | End: 2023-10-11

## 2023-10-11 RX ORDER — HYDROMORPHONE HYDROCHLORIDE 1 MG/ML
INJECTION, SOLUTION INTRAMUSCULAR; INTRAVENOUS; SUBCUTANEOUS AS NEEDED
Status: DISCONTINUED | OUTPATIENT
Start: 2023-10-11 | End: 2023-10-11 | Stop reason: SURG

## 2023-10-11 RX ORDER — HYDROCODONE BITARTRATE AND ACETAMINOPHEN 5; 325 MG/1; MG/1
1 TABLET ORAL EVERY 4 HOURS PRN
Status: DISCONTINUED | OUTPATIENT
Start: 2023-10-11 | End: 2023-10-13

## 2023-10-11 RX ORDER — HEPARIN SODIUM 5000 [USP'U]/ML
5000 INJECTION, SOLUTION INTRAVENOUS; SUBCUTANEOUS EVERY 8 HOURS SCHEDULED
Status: DISCONTINUED | OUTPATIENT
Start: 2023-10-11 | End: 2023-10-13

## 2023-10-11 RX ORDER — LIDOCAINE HYDROCHLORIDE 10 MG/ML
INJECTION, SOLUTION EPIDURAL; INFILTRATION; INTRACAUDAL; PERINEURAL AS NEEDED
Status: DISCONTINUED | OUTPATIENT
Start: 2023-10-11 | End: 2023-10-11 | Stop reason: SURG

## 2023-10-11 RX ORDER — BUPROPION HYDROCHLORIDE 150 MG/1
300 TABLET ORAL DAILY
Status: DISCONTINUED | OUTPATIENT
Start: 2023-10-12 | End: 2023-10-13

## 2023-10-11 RX ORDER — HYDROMORPHONE HYDROCHLORIDE 1 MG/ML
0.4 INJECTION, SOLUTION INTRAMUSCULAR; INTRAVENOUS; SUBCUTANEOUS EVERY 5 MIN PRN
Status: DISCONTINUED | OUTPATIENT
Start: 2023-10-11 | End: 2023-10-11 | Stop reason: HOSPADM

## 2023-10-11 RX ORDER — MORPHINE SULFATE 2 MG/ML
2 INJECTION, SOLUTION INTRAMUSCULAR; INTRAVENOUS EVERY 2 HOUR PRN
Status: DISCONTINUED | OUTPATIENT
Start: 2023-10-11 | End: 2023-10-13

## 2023-10-11 RX ORDER — HYDROMORPHONE HYDROCHLORIDE 1 MG/ML
0.6 INJECTION, SOLUTION INTRAMUSCULAR; INTRAVENOUS; SUBCUTANEOUS EVERY 5 MIN PRN
Status: DISCONTINUED | OUTPATIENT
Start: 2023-10-11 | End: 2023-10-11 | Stop reason: HOSPADM

## 2023-10-11 RX ORDER — SCOLOPAMINE TRANSDERMAL SYSTEM 1 MG/1
1 PATCH, EXTENDED RELEASE TRANSDERMAL
Status: DISCONTINUED | OUTPATIENT
Start: 2023-10-11 | End: 2023-10-11 | Stop reason: HOSPADM

## 2023-10-11 RX ORDER — NICOTINE POLACRILEX 4 MG
15 LOZENGE BUCCAL
Status: DISCONTINUED | OUTPATIENT
Start: 2023-10-11 | End: 2023-10-11 | Stop reason: HOSPADM

## 2023-10-11 RX ORDER — HYDROCODONE BITARTRATE AND ACETAMINOPHEN 5; 325 MG/1; MG/1
2 TABLET ORAL EVERY 4 HOURS PRN
Status: DISCONTINUED | OUTPATIENT
Start: 2023-10-11 | End: 2023-10-13

## 2023-10-11 RX ORDER — ONDANSETRON 2 MG/ML
4 INJECTION INTRAMUSCULAR; INTRAVENOUS EVERY 6 HOURS PRN
Status: DISCONTINUED | OUTPATIENT
Start: 2023-10-11 | End: 2023-10-11 | Stop reason: HOSPADM

## 2023-10-11 RX ORDER — NALOXONE HYDROCHLORIDE 0.4 MG/ML
0.08 INJECTION, SOLUTION INTRAMUSCULAR; INTRAVENOUS; SUBCUTANEOUS AS NEEDED
Status: DISCONTINUED | OUTPATIENT
Start: 2023-10-11 | End: 2023-10-11 | Stop reason: HOSPADM

## 2023-10-11 RX ORDER — DEXTROSE MONOHYDRATE 25 G/50ML
50 INJECTION, SOLUTION INTRAVENOUS
Status: DISCONTINUED | OUTPATIENT
Start: 2023-10-11 | End: 2023-10-13

## 2023-10-11 RX ORDER — NICOTINE POLACRILEX 4 MG
15 LOZENGE BUCCAL
Status: DISCONTINUED | OUTPATIENT
Start: 2023-10-11 | End: 2023-10-13

## 2023-10-11 RX ORDER — INSULIN ASPART 100 [IU]/ML
INJECTION, SOLUTION INTRAVENOUS; SUBCUTANEOUS ONCE
Status: DISCONTINUED | OUTPATIENT
Start: 2023-10-11 | End: 2023-10-11 | Stop reason: HOSPADM

## 2023-10-11 RX ORDER — NICOTINE POLACRILEX 4 MG
30 LOZENGE BUCCAL
Status: DISCONTINUED | OUTPATIENT
Start: 2023-10-11 | End: 2023-10-11 | Stop reason: HOSPADM

## 2023-10-11 RX ORDER — METOCLOPRAMIDE HYDROCHLORIDE 5 MG/ML
INJECTION INTRAMUSCULAR; INTRAVENOUS AS NEEDED
Status: DISCONTINUED | OUTPATIENT
Start: 2023-10-11 | End: 2023-10-11 | Stop reason: SURG

## 2023-10-11 RX ORDER — PROCHLORPERAZINE EDISYLATE 5 MG/ML
5 INJECTION INTRAMUSCULAR; INTRAVENOUS EVERY 8 HOURS PRN
Status: DISCONTINUED | OUTPATIENT
Start: 2023-10-11 | End: 2023-10-11 | Stop reason: HOSPADM

## 2023-10-11 RX ORDER — SODIUM CHLORIDE, SODIUM LACTATE, POTASSIUM CHLORIDE, CALCIUM CHLORIDE 600; 310; 30; 20 MG/100ML; MG/100ML; MG/100ML; MG/100ML
INJECTION, SOLUTION INTRAVENOUS CONTINUOUS
Status: DISCONTINUED | OUTPATIENT
Start: 2023-10-11 | End: 2023-10-12

## 2023-10-11 RX ORDER — LISINOPRIL 5 MG/1
5 TABLET ORAL NIGHTLY
Status: DISCONTINUED | OUTPATIENT
Start: 2023-10-11 | End: 2023-10-13

## 2023-10-11 RX ORDER — MORPHINE SULFATE 4 MG/ML
2 INJECTION, SOLUTION INTRAMUSCULAR; INTRAVENOUS EVERY 10 MIN PRN
Status: DISCONTINUED | OUTPATIENT
Start: 2023-10-11 | End: 2023-10-11 | Stop reason: HOSPADM

## 2023-10-11 RX ORDER — MEPERIDINE HYDROCHLORIDE 25 MG/ML
12.5 INJECTION INTRAMUSCULAR; INTRAVENOUS; SUBCUTANEOUS AS NEEDED
Status: DISCONTINUED | OUTPATIENT
Start: 2023-10-11 | End: 2023-10-11 | Stop reason: HOSPADM

## 2023-10-11 RX ORDER — DEXTROSE MONOHYDRATE 25 G/50ML
50 INJECTION, SOLUTION INTRAVENOUS
Status: DISCONTINUED | OUTPATIENT
Start: 2023-10-11 | End: 2023-10-11 | Stop reason: HOSPADM

## 2023-10-11 RX ORDER — ROSUVASTATIN CALCIUM 10 MG/1
10 TABLET, COATED ORAL NIGHTLY
Status: DISCONTINUED | OUTPATIENT
Start: 2023-10-11 | End: 2023-10-13

## 2023-10-11 RX ORDER — MORPHINE SULFATE 4 MG/ML
4 INJECTION, SOLUTION INTRAMUSCULAR; INTRAVENOUS EVERY 10 MIN PRN
Status: DISCONTINUED | OUTPATIENT
Start: 2023-10-11 | End: 2023-10-11 | Stop reason: HOSPADM

## 2023-10-11 RX ORDER — ONDANSETRON 2 MG/ML
INJECTION INTRAMUSCULAR; INTRAVENOUS AS NEEDED
Status: DISCONTINUED | OUTPATIENT
Start: 2023-10-11 | End: 2023-10-11 | Stop reason: SURG

## 2023-10-11 RX ORDER — MORPHINE SULFATE 2 MG/ML
1 INJECTION, SOLUTION INTRAMUSCULAR; INTRAVENOUS EVERY 2 HOUR PRN
Status: DISCONTINUED | OUTPATIENT
Start: 2023-10-11 | End: 2023-10-13

## 2023-10-11 RX ORDER — MORPHINE SULFATE 10 MG/ML
6 INJECTION, SOLUTION INTRAMUSCULAR; INTRAVENOUS EVERY 10 MIN PRN
Status: DISCONTINUED | OUTPATIENT
Start: 2023-10-11 | End: 2023-10-11 | Stop reason: HOSPADM

## 2023-10-11 RX ORDER — CEFAZOLIN SODIUM/WATER 2 G/20 ML
2 SYRINGE (ML) INTRAVENOUS ONCE
Status: COMPLETED | OUTPATIENT
Start: 2023-10-11 | End: 2023-10-11

## 2023-10-11 RX ORDER — SODIUM CHLORIDE, SODIUM LACTATE, POTASSIUM CHLORIDE, CALCIUM CHLORIDE 600; 310; 30; 20 MG/100ML; MG/100ML; MG/100ML; MG/100ML
INJECTION, SOLUTION INTRAVENOUS CONTINUOUS
Status: DISCONTINUED | OUTPATIENT
Start: 2023-10-11 | End: 2023-10-11

## 2023-10-11 RX ORDER — HYDROMORPHONE HYDROCHLORIDE 1 MG/ML
0.2 INJECTION, SOLUTION INTRAMUSCULAR; INTRAVENOUS; SUBCUTANEOUS EVERY 5 MIN PRN
Status: DISCONTINUED | OUTPATIENT
Start: 2023-10-11 | End: 2023-10-11 | Stop reason: HOSPADM

## 2023-10-11 RX ORDER — NICOTINE POLACRILEX 4 MG
30 LOZENGE BUCCAL
Status: DISCONTINUED | OUTPATIENT
Start: 2023-10-11 | End: 2023-10-13

## 2023-10-11 RX ORDER — ONDANSETRON 2 MG/ML
4 INJECTION INTRAMUSCULAR; INTRAVENOUS EVERY 6 HOURS PRN
Status: DISCONTINUED | OUTPATIENT
Start: 2023-10-11 | End: 2023-10-13

## 2023-10-11 RX ORDER — VANCOMYCIN HYDROCHLORIDE 1 G/20ML
INJECTION, POWDER, LYOPHILIZED, FOR SOLUTION INTRAVENOUS AS NEEDED
Status: DISCONTINUED | OUTPATIENT
Start: 2023-10-11 | End: 2023-10-11 | Stop reason: HOSPADM

## 2023-10-11 RX ORDER — CEFAZOLIN SODIUM/WATER 2 G/20 ML
2 SYRINGE (ML) INTRAVENOUS EVERY 8 HOURS
Qty: 300 ML | Refills: 0 | Status: DISCONTINUED | OUTPATIENT
Start: 2023-10-11 | End: 2023-10-13

## 2023-10-11 RX ORDER — DEXAMETHASONE SODIUM PHOSPHATE 4 MG/ML
VIAL (ML) INJECTION AS NEEDED
Status: DISCONTINUED | OUTPATIENT
Start: 2023-10-11 | End: 2023-10-11 | Stop reason: SURG

## 2023-10-11 RX ORDER — SODIUM CHLORIDE, SODIUM LACTATE, POTASSIUM CHLORIDE, CALCIUM CHLORIDE 600; 310; 30; 20 MG/100ML; MG/100ML; MG/100ML; MG/100ML
INJECTION, SOLUTION INTRAVENOUS CONTINUOUS
Status: DISCONTINUED | OUTPATIENT
Start: 2023-10-11 | End: 2023-10-11 | Stop reason: HOSPADM

## 2023-10-11 RX ADMIN — LIDOCAINE HYDROCHLORIDE 50 MG: 10 INJECTION, SOLUTION EPIDURAL; INFILTRATION; INTRACAUDAL; PERINEURAL at 07:32:00

## 2023-10-11 RX ADMIN — CEFAZOLIN SODIUM/WATER 2 G: 2 G/20 ML SYRINGE (ML) INTRAVENOUS at 07:34:00

## 2023-10-11 RX ADMIN — ONDANSETRON 4 MG: 2 INJECTION INTRAMUSCULAR; INTRAVENOUS at 09:26:00

## 2023-10-11 RX ADMIN — HYDROMORPHONE HYDROCHLORIDE 0.5 MG: 1 INJECTION, SOLUTION INTRAMUSCULAR; INTRAVENOUS; SUBCUTANEOUS at 08:41:00

## 2023-10-11 RX ADMIN — METOCLOPRAMIDE HYDROCHLORIDE 10 MG: 5 INJECTION INTRAMUSCULAR; INTRAVENOUS at 07:42:00

## 2023-10-11 RX ADMIN — HYDROMORPHONE HYDROCHLORIDE 0.5 MG: 1 INJECTION, SOLUTION INTRAMUSCULAR; INTRAVENOUS; SUBCUTANEOUS at 07:44:00

## 2023-10-11 RX ADMIN — DEXAMETHASONE SODIUM PHOSPHATE 10 MG: 4 MG/ML VIAL (ML) INJECTION at 07:38:00

## 2023-10-11 RX ADMIN — SODIUM CHLORIDE, SODIUM LACTATE, POTASSIUM CHLORIDE, CALCIUM CHLORIDE: 600; 310; 30; 20 INJECTION, SOLUTION INTRAVENOUS at 07:29:00

## 2023-10-11 NOTE — ANESTHESIA PROCEDURE NOTES
Airway  Date/Time: 10/11/2023 7:33 AM  Urgency: elective      General Information and Staff    Patient location during procedure: OR  Anesthesiologist: Vashti Woods MD  Performed: anesthesiologist   Performed by: Vashti Woods MD  Authorized by: Vashti Woods MD      Indications and Patient Condition  Indications for airway management: anesthesia  Sedation level: deep  Preoxygenated: yes  Patient position: sniffing  Mask difficulty assessment: 0 - not attempted    Final Airway Details  Final airway type: supraglottic airway      Successful airway: classic  Size 5       Number of attempts at approach: 1    Additional Comments  Eyes taped closed prior to airway management.

## 2023-10-11 NOTE — PLAN OF CARE
Patient is Aox4, vitals stable on room air and meds given as ordered. Upgraded patient meal this shift. No nausea noted. Frequent rounding done on pt and needs attended to. Problem: Patient Centered Care  Goal: Patient preferences are identified and integrated in the patient's plan of care  Description: Interventions:  - Provide timely, complete, and accurate information to patient/family  - Incorporate patient and family knowledge, values, beliefs, and cultural backgrounds into the planning and delivery of care  - Encourage patient/family to participate in care and decision-making at the level they choose  - Honor patient and family perspectives and choices  Outcome: Progressing     Problem: Diabetes/Glucose Control  Goal: Glucose maintained within prescribed range  Description: INTERVENTIONS:  - Monitor Blood Glucose as ordered  - Assess for signs and symptoms of hyperglycemia and hypoglycemia  - Administer ordered medications to maintain glucose within target range  - Assess barriers to adequate nutritional intake and initiate nutrition consult as needed  - Instruct patient on self management of diabetes  Outcome: Progressing     Problem: CARDIOVASCULAR - ADULT  Goal: Maintains optimal cardiac output and hemodynamic stability  Description: INTERVENTIONS:  - Monitor vital signs, rhythm, and trends  - Monitor for bleeding, hypotension and signs of decreased cardiac output  - Evaluate effectiveness of vasoactive medications to optimize hemodynamic stability  - Monitor arterial and/or venous puncture sites for bleeding and/or hematoma  - Assess quality of pulses, skin color and temperature  - Assess for signs of decreased coronary artery perfusion - ex.  Angina  - Evaluate fluid balance, assess for edema, trend weights  Outcome: Progressing  Goal: Absence of cardiac arrhythmias or at baseline  Description: INTERVENTIONS:  - Continuous cardiac monitoring, monitor vital signs, obtain 12 lead EKG if indicated  - Evaluate effectiveness of antiarrhythmic and heart rate control medications as ordered  - Initiate emergency measures for life threatening arrhythmias  - Monitor electrolytes and administer replacement therapy as ordered  Outcome: Progressing     Problem: GASTROINTESTINAL - ADULT  Goal: Minimal or absence of nausea and vomiting  Description: INTERVENTIONS:  - Maintain adequate hydration with IV or PO as ordered and tolerated  - Nasogastric tube to low intermittent suction as ordered  - Evaluate effectiveness of ordered antiemetic medications  - Provide nonpharmacologic comfort measures as appropriate  - Advance diet as tolerated, if ordered  - Obtain nutritional consult as needed  - Evaluate fluid balance  Outcome: Progressing  Goal: Maintains or returns to baseline bowel function  Description: INTERVENTIONS:  - Assess bowel function  - Maintain adequate hydration with IV or PO as ordered and tolerated  - Evaluate effectiveness of GI medications  - Encourage mobilization and activity  - Obtain nutritional consult as needed  - Establish a toileting routine/schedule  - Consider collaborating with pharmacy to review patient's medication profile  Outcome: Progressing  Goal: Maintains adequate nutritional intake (undernourished)  Description: INTERVENTIONS:  - Monitor percentage of each meal consumed  - Identify factors contributing to decreased intake, treat as appropriate  - Assist with meals as needed  - Monitor I&O, WT and lab values  - Obtain nutritional consult as needed  - Optimize oral hygiene and moisture  - Encourage food from home; allow for food preferences  - Enhance eating environment  Outcome: Progressing  Goal: Achieves appropriate nutritional intake (bariatric)  Description: INTERVENTIONS:  - Monitor for over-consumption  - Identify factors contributing to increased intake, treat as appropriate  - Monitor I&O, WT and lab values  - Obtain nutritional consult as needed  - Evaluate psychosocial factors contributing to over-consumption  Outcome: Progressing     Problem: METABOLIC/FLUID AND ELECTROLYTES - ADULT  Goal: Glucose maintained within prescribed range  Description: INTERVENTIONS:  - Monitor Blood Glucose as ordered  - Assess for signs and symptoms of hyperglycemia and hypoglycemia  - Administer ordered medications to maintain glucose within target range  - Assess barriers to adequate nutritional intake and initiate nutrition consult as needed  - Instruct patient on self management of diabetes  Outcome: Progressing  Goal: Electrolytes maintained within normal limits  Description: INTERVENTIONS:  - Monitor labs and rhythm and assess patient for signs and symptoms of electrolyte imbalances  - Administer electrolyte replacement as ordered  - Monitor response to electrolyte replacements, including rhythm and repeat lab results as appropriate  - Fluid restriction as ordered  - Instruct patient on fluid and nutrition restrictions as appropriate  Outcome: Progressing  Goal: Hemodynamic stability and optimal renal function maintained  Description: INTERVENTIONS:  - Monitor labs and assess for signs and symptoms of volume excess or deficit  - Monitor intake, output and patient weight  - Monitor urine specific gravity, serum osmolarity and serum sodium as indicated or ordered  - Monitor response to interventions for patient's volume status, including labs, urine output, blood pressure (other measures as available)  - Encourage oral intake as appropriate  - Instruct patient on fluid and nutrition restrictions as appropriate  Outcome: Progressing     Problem: Impaired Functional Mobility  Goal: Achieve highest/safest level of mobility/gait  Description: Interventions:  - Assess patient's functional ability and stability  - Promote increasing activity/tolerance for mobility and gait  - Educate and engage patient/family in tolerated activity level and precautions    Outcome: Progressing     Problem: Impaired Activities of Daily Living  Goal: Achieve highest/safest level of independence in self care  Description: Interventions:  - Assess ability and encourage patient to participate in ADLs to maximize function  - Promote sitting position while performing ADLs such as feeding, grooming, and bathing  - Educate and encourage patient/family in tolerated functional activity level and precautions during self-care    Outcome: Progressing

## 2023-10-11 NOTE — ANESTHESIA POSTPROCEDURE EVALUATION
Patient: Rhys King    Procedure Summary       Date: 10/11/23 Room / Location: [address] [place] [address] MAIN OR 17 / [address] [place] [address] MAIN OR    Anesthesia Start: 4184 Anesthesia Stop: 8230    Procedure: Right below knee amputation (Right: Lower Leg) Diagnosis: (Charcot foot due to diabetes mellitus)    Surgeons: Lacey Fraser MD Anesthesiologist: Mary Pedro MD    Anesthesia Type: general ASA Status: 3            Anesthesia Type: general    Vitals Value Taken Time   /83 10/11/23 0940   Temp 97.5 10/11/23 0943   Pulse 90 10/11/23 0941   Resp 23 10/11/23 0941   SpO2 95% 10/11/23 0941   Vitals shown include unfiled device data. [address] [place] [address] AN Post Evaluation:   Patient Evaluated in PACU  Level of Consciousness: Post-procedure mental status: restless.   Pain Management: adequate  Airway Patency:patent  Dental exam unchanged from preop  Yes    Cardiovascular Status: acceptable  Respiratory Status: nasal cannula and acceptable  Postoperative Hydration acceptable  Comments: Glc 158      Rakel Randle MD  10/11/2023 9:43 AM

## 2023-10-12 LAB
ANION GAP SERPL CALC-SCNC: 5 MMOL/L (ref 0–18)
BUN BLD-MCNC: 16 MG/DL (ref 7–18)
BUN/CREAT SERPL: 16.8 (ref 10–20)
CALCIUM BLD-MCNC: 9.1 MG/DL (ref 8.5–10.1)
CHLORIDE SERPL-SCNC: 107 MMOL/L (ref 98–112)
CO2 SERPL-SCNC: 28 MMOL/L (ref 21–32)
CREAT BLD-MCNC: 0.95 MG/DL
DEPRECATED RDW RBC AUTO: 37.4 FL (ref 35.1–46.3)
EGFRCR SERPLBLD CKD-EPI 2021: 96 ML/MIN/1.73M2 (ref 60–?)
ERYTHROCYTE [DISTWIDTH] IN BLOOD BY AUTOMATED COUNT: 13.1 % (ref 11–15)
GLUCOSE BLD-MCNC: 136 MG/DL (ref 70–99)
GLUCOSE BLDC GLUCOMTR-MCNC: 126 MG/DL (ref 70–99)
GLUCOSE BLDC GLUCOMTR-MCNC: 179 MG/DL (ref 70–99)
GLUCOSE BLDC GLUCOMTR-MCNC: 221 MG/DL (ref 70–99)
GLUCOSE BLDC GLUCOMTR-MCNC: 246 MG/DL (ref 70–99)
HCT VFR BLD AUTO: 35.3 %
HGB BLD-MCNC: 12.1 G/DL
MCH RBC QN AUTO: 27.1 PG (ref 26–34)
MCHC RBC AUTO-ENTMCNC: 34.3 G/DL (ref 31–37)
MCV RBC AUTO: 79.1 FL
OSMOLALITY SERPL CALC.SUM OF ELEC: 293 MOSM/KG (ref 275–295)
PLATELET # BLD AUTO: 395 10(3)UL (ref 150–450)
POTASSIUM SERPL-SCNC: 4.1 MMOL/L (ref 3.5–5.1)
RBC # BLD AUTO: 4.46 X10(6)UL
SODIUM SERPL-SCNC: 140 MMOL/L (ref 136–145)
WBC # BLD AUTO: 9.8 X10(3) UL (ref 4–11)

## 2023-10-12 PROCEDURE — 97530 THERAPEUTIC ACTIVITIES: CPT

## 2023-10-12 PROCEDURE — 80048 BASIC METABOLIC PNL TOTAL CA: CPT | Performed by: SURGERY

## 2023-10-12 PROCEDURE — 85027 COMPLETE CBC AUTOMATED: CPT | Performed by: SURGERY

## 2023-10-12 PROCEDURE — 82962 GLUCOSE BLOOD TEST: CPT

## 2023-10-12 PROCEDURE — 97165 OT EVAL LOW COMPLEX 30 MIN: CPT

## 2023-10-12 PROCEDURE — 97162 PT EVAL MOD COMPLEX 30 MIN: CPT

## 2023-10-12 RX ORDER — FOLIC ACID 1 MG/1
1 TABLET ORAL DAILY
Status: DISCONTINUED | OUTPATIENT
Start: 2023-10-12 | End: 2023-10-12

## 2023-10-12 NOTE — CM/SW NOTE
10/12/23 1500   CM/SW Referral Data   Referral Source Social Work (self-referral)   Reason for Referral Discharge planning   Informant Patient   Medical Hx   Does patient have an established PCP? Yes  ([facility])   Patient Info   Patient's Current Mental Status at Time of Assessment Alert;Oriented   Patient's [address]   Patient lives with Spouse/Significant other   Patient Status Prior to Admission   Independent with ADLs and Mobility Yes   Services in place prior to admission DME/Supplies at home   Type of DME/Supplies Wheeled Walker;Stair Lift   Discharge Needs   Anticipated D/C needs Home health care       SW initiated self referral for discharge planning. SW met with pt, who is alert and oriented at time of assessment. Pt provided above information. Pt has hx of [facility], [facility], and [facility] infusion center. Pt is aware of PT recs for [facility]. Pt is in agreement to [facility]. Plan: Pending medical clearance, DC to Home with HH, f2f placed, *list/choice    SW/CM to remain available for support and/or discharge planning.      Rahat Mc, MSW, LSW   x [phone]

## 2023-10-12 NOTE — PLAN OF CARE
Patient alert and oriented x4, resting in bed at this time. Surgical dressing to right knee in place, clean dry intact, no visible drainage. Patient reports pain is well managed by medication regimen. Patient got up to chair with PT with walker and prosthetic. Safety and fall precautions in place, call light within reach, patient instructed to call if he wishes to move to bed, patient agreeable and verbalized understanding. Patient cooperative with plan of care and safety measures. Vital signs stable, no acute changes. Frequent rounding performed by nursing staff, all needs attended. Problem: Patient Centered Care  Goal: Patient preferences are identified and integrated in the patient's plan of care  Description: Interventions:  - What would you like us to know as we care for you?  Patient has bilateral BKA  - Provide timely, complete, and accurate information to patient/family  - Incorporate patient and family knowledge, values, beliefs, and cultural backgrounds into the planning and delivery of care  - Encourage patient/family to participate in care and decision-making at the level they choose  - Honor patient and family perspectives and choices  Outcome: Progressing     Problem: Diabetes/Glucose Control  Goal: Glucose maintained within prescribed range  Description: INTERVENTIONS:  - Monitor Blood Glucose as ordered  - Assess for signs and symptoms of hyperglycemia and hypoglycemia  - Administer ordered medications to maintain glucose within target range  - Assess barriers to adequate nutritional intake and initiate nutrition consult as needed  - Instruct patient on self management of diabetes  Outcome: Progressing     Problem: Patient/Family Goals  Goal: Patient/Family Long Term Goal  Description: Patient's Long Term Goal: discharge    Interventions:  - Monitor labs/vitals, adhere to medication and treatment regimen  - See additional Care Plan goals for specific interventions  Outcome: Progressing  Goal: Patient/Family Short Term Goal  Description: Patient's Short Term Goal: feel better    Interventions:   - Monitor labs/vitals, adhere to medication and treatment regimen  - See additional Care Plan goals for specific interventions  Outcome: Progressing     Problem: CARDIOVASCULAR - ADULT  Goal: Maintains optimal cardiac output and hemodynamic stability  Description: INTERVENTIONS:  - Monitor vital signs, rhythm, and trends  - Monitor for bleeding, hypotension and signs of decreased cardiac output  - Evaluate effectiveness of vasoactive medications to optimize hemodynamic stability  - Monitor arterial and/or venous puncture sites for bleeding and/or hematoma  - Assess quality of pulses, skin color and temperature  - Assess for signs of decreased coronary artery perfusion - ex.  Angina  - Evaluate fluid balance, assess for edema, trend weights  Outcome: Progressing  Goal: Absence of cardiac arrhythmias or at baseline  Description: INTERVENTIONS:  - Continuous cardiac monitoring, monitor vital signs, obtain 12 lead EKG if indicated  - Evaluate effectiveness of antiarrhythmic and heart rate control medications as ordered  - Initiate emergency measures for life threatening arrhythmias  - Monitor electrolytes and administer replacement therapy as ordered  Outcome: Progressing     Problem: GASTROINTESTINAL - ADULT  Goal: Minimal or absence of nausea and vomiting  Description: INTERVENTIONS:  - Maintain adequate hydration with IV or PO as ordered and tolerated  - Nasogastric tube to low intermittent suction as ordered  - Evaluate effectiveness of ordered antiemetic medications  - Provide nonpharmacologic comfort measures as appropriate  - Advance diet as tolerated, if ordered  - Obtain nutritional consult as needed  - Evaluate fluid balance  Outcome: Progressing  Goal: Maintains or returns to baseline bowel function  Description: INTERVENTIONS:  - Assess bowel function  - Maintain adequate hydration with IV or PO as ordered and tolerated  - Evaluate effectiveness of GI medications  - Encourage mobilization and activity  - Obtain nutritional consult as needed  - Establish a toileting routine/schedule  - Consider collaborating with pharmacy to review patient's medication profile  Outcome: Progressing  Goal: Maintains adequate nutritional intake (undernourished)  Description: INTERVENTIONS:  - Monitor percentage of each meal consumed  - Identify factors contributing to decreased intake, treat as appropriate  - Assist with meals as needed  - Monitor I&O, WT and lab values  - Obtain nutritional consult as needed  - Optimize oral hygiene and moisture  - Encourage food from home; allow for food preferences  - Enhance eating environment  Outcome: Progressing  Goal: Achieves appropriate nutritional intake (bariatric)  Description: INTERVENTIONS:  - Monitor for over-consumption  - Identify factors contributing to increased intake, treat as appropriate  - Monitor I&O, WT and lab values  - Obtain nutritional consult as needed  - Evaluate psychosocial factors contributing to over-consumption  Outcome: Progressing     Problem: METABOLIC/FLUID AND ELECTROLYTES - ADULT  Goal: Glucose maintained within prescribed range  Description: INTERVENTIONS:  - Monitor Blood Glucose as ordered  - Assess for signs and symptoms of hyperglycemia and hypoglycemia  - Administer ordered medications to maintain glucose within target range  - Assess barriers to adequate nutritional intake and initiate nutrition consult as needed  - Instruct patient on self management of diabetes  Outcome: Progressing  Goal: Electrolytes maintained within normal limits  Description: INTERVENTIONS:  - Monitor labs and rhythm and assess patient for signs and symptoms of electrolyte imbalances  - Administer electrolyte replacement as ordered  - Monitor response to electrolyte replacements, including rhythm and repeat lab results as appropriate  - Fluid restriction as ordered  - Instruct patient on fluid and nutrition restrictions as appropriate  Outcome: Progressing  Goal: Hemodynamic stability and optimal renal function maintained  Description: INTERVENTIONS:  - Monitor labs and assess for signs and symptoms of volume excess or deficit  - Monitor intake, output and patient weight  - Monitor urine specific gravity, serum osmolarity and serum sodium as indicated or ordered  - Monitor response to interventions for patient's volume status, including labs, urine output, blood pressure (other measures as available)  - Encourage oral intake as appropriate  - Instruct patient on fluid and nutrition restrictions as appropriate  Outcome: Progressing     Problem: Impaired Functional Mobility  Goal: Achieve highest/safest level of mobility/gait  Description: Interventions:  - Assess patient's functional ability and stability  - Promote increasing activity/tolerance for mobility and gait  - Educate and engage patient/family in tolerated activity level and precautions  - Recommend use of chair position in bed 3 times per day  Outcome: Progressing     Problem: Impaired Activities of Daily Living  Goal: Achieve highest/safest level of independence in self care  Description: Interventions:  - Assess ability and encourage patient to participate in ADLs to maximize function  - Promote sitting position while performing ADLs such as feeding, grooming, and bathing  - Educate and encourage patient/family in tolerated functional activity level and precautions during self-care    Outcome: Progressing

## 2023-10-12 NOTE — PLAN OF CARE
Problem: Patient Centered Care  Goal: Patient preferences are identified and integrated in the patient's plan of care  Description: Interventions:  - What would you like us to know as we care for you? This amputation surgery was elective. - Provide timely, complete, and accurate information to patient/family  - Incorporate patient and family knowledge, values, beliefs, and cultural backgrounds into the planning and delivery of care  - Encourage patient/family to participate in care and decision-making at the level they choose  - Honor patient and family perspectives and choices  Outcome: Progressing     Problem: Diabetes/Glucose Control  Goal: Glucose maintained within prescribed range  Description: INTERVENTIONS:  - Monitor Blood Glucose as ordered  - Assess for signs and symptoms of hyperglycemia and hypoglycemia  - Administer ordered medications to maintain glucose within target range  - Assess barriers to adequate nutritional intake and initiate nutrition consult as needed  - Instruct patient on self management of diabetes  Outcome: Progressing     Problem: CARDIOVASCULAR - ADULT  Goal: Maintains optimal cardiac output and hemodynamic stability  Description: INTERVENTIONS:  - Monitor vital signs, rhythm, and trends  - Monitor for bleeding, hypotension and signs of decreased cardiac output  - Evaluate effectiveness of vasoactive medications to optimize hemodynamic stability  - Monitor arterial and/or venous puncture sites for bleeding and/or hematoma  - Assess quality of pulses, skin color and temperature  - Assess for signs of decreased coronary artery perfusion - ex.  Angina  - Evaluate fluid balance, assess for edema, trend weights  Outcome: Progressing  Goal: Absence of cardiac arrhythmias or at baseline  Description: INTERVENTIONS:  - Continuous cardiac monitoring, monitor vital signs, obtain 12 lead EKG if indicated  - Evaluate effectiveness of antiarrhythmic and heart rate control medications as ordered  - Initiate emergency measures for life threatening arrhythmias  - Monitor electrolytes and administer replacement therapy as ordered  Outcome: Progressing     Problem: GASTROINTESTINAL - ADULT  Goal: Minimal or absence of nausea and vomiting  Description: INTERVENTIONS:  - Maintain adequate hydration with IV or PO as ordered and tolerated  - Nasogastric tube to low intermittent suction as ordered  - Evaluate effectiveness of ordered antiemetic medications  - Provide nonpharmacologic comfort measures as appropriate  - Advance diet as tolerated, if ordered  - Obtain nutritional consult as needed  - Evaluate fluid balance  Outcome: Progressing  Goal: Maintains or returns to baseline bowel function  Description: INTERVENTIONS:  - Assess bowel function  - Maintain adequate hydration with IV or PO as ordered and tolerated  - Evaluate effectiveness of GI medications  - Encourage mobilization and activity  - Obtain nutritional consult as needed  - Establish a toileting routine/schedule  - Consider collaborating with pharmacy to review patient's medication profile  Outcome: Progressing  Goal: Maintains adequate nutritional intake (undernourished)  Description: INTERVENTIONS:  - Monitor percentage of each meal consumed  - Identify factors contributing to decreased intake, treat as appropriate  - Assist with meals as needed  - Monitor I&O, WT and lab values  - Obtain nutritional consult as needed  - Optimize oral hygiene and moisture  - Encourage food from home; allow for food preferences  - Enhance eating environment  Outcome: Progressing  Goal: Achieves appropriate nutritional intake (bariatric)  Description: INTERVENTIONS:  - Monitor for over-consumption  - Identify factors contributing to increased intake, treat as appropriate  - Monitor I&O, WT and lab values  - Obtain nutritional consult as needed  - Evaluate psychosocial factors contributing to over-consumption  Outcome: Progressing     Problem: METABOLIC/FLUID AND ELECTROLYTES - ADULT  Goal: Glucose maintained within prescribed range  Description: INTERVENTIONS:  - Monitor Blood Glucose as ordered  - Assess for signs and symptoms of hyperglycemia and hypoglycemia  - Administer ordered medications to maintain glucose within target range  - Assess barriers to adequate nutritional intake and initiate nutrition consult as needed  - Instruct patient on self management of diabetes  Outcome: Progressing  Goal: Electrolytes maintained within normal limits  Description: INTERVENTIONS:  - Monitor labs and rhythm and assess patient for signs and symptoms of electrolyte imbalances  - Administer electrolyte replacement as ordered  - Monitor response to electrolyte replacements, including rhythm and repeat lab results as appropriate  - Fluid restriction as ordered  - Instruct patient on fluid and nutrition restrictions as appropriate  Outcome: Progressing  Goal: Hemodynamic stability and optimal renal function maintained  Description: INTERVENTIONS:  - Monitor labs and assess for signs and symptoms of volume excess or deficit  - Monitor intake, output and patient weight  - Monitor urine specific gravity, serum osmolarity and serum sodium as indicated or ordered  - Monitor response to interventions for patient's volume status, including labs, urine output, blood pressure (other measures as available)  - Encourage oral intake as appropriate  - Instruct patient on fluid and nutrition restrictions as appropriate  Outcome: Progressing

## 2023-10-12 NOTE — PHYSICAL THERAPY NOTE
PHYSICAL THERAPY EVALUATION - INPATIENT     Room Number: 570/570-A  Evaluation Date: 10/12/2023  Type of Evaluation: Initial   Physician Order: PT Eval and Treat    Presenting Problem: s/p R below knee amputation on 10/11  Co-Morbidities : L BKA in April 2021  Reason for Therapy: Mobility Dysfunction and Discharge Planning    PHYSICAL THERAPY ASSESSMENT     Patient is a 47year old male admitted 10/11/2023 for R below knee amputation. Patient's current functional deficits include pain, weakness, impaired balance and functional mobility, which are below the patient's pre-admission status. Patient will benefit from continued IP PT services to address these deficits in preparation for discharge. RN approved participation with physical therapy. Pt was received resting in bed and agreeable to activity. Family at bedside. Educated on role of therapy, benefits of oob mobility, goals for this session and PT plan of care. Pt verbalized understanding. C/o R stump and phantom limb pain. Bed mobility: mod I supine>sit and scooting to EOB. Independently donned LLE prosthesis while sitting at EOB. Transfers: CGA for STS with RW   Gait: 3 ft with RW and min A, hops on LLE to chair, slightly unsteady and tremulous but no LOB. Pt was left sitting in chair with needs within reach, BLE elevated, handoff to RN complete. The patient's Approx Degree of Impairment: 35.83% has been calculated based on documentation in the [facility] '6 clicks' Inpatient Basic Mobility Short Form. Research supports that patients with this level of impairment may benefit from home with [facility] PT.    DISCHARGE RECOMMENDATIONS  PT Discharge Recommendations: 24 hour care/supervision;Home with home health PT (initial [facility] PT then progress to OP PT when cleared by MD)    PLAN  PT Treatment Plan: Bed mobility; Body mechanics; Energy conservation; Endurance; Patient education;Gait training;Strengthening;Stair training;Transfer training;Balance training  Rehab Potential : Good  Frequency (Obs): 5x/week    PHYSICAL THERAPY MEDICAL/SOCIAL HISTORY     Problem List  Active Problems:    Osteomyelitis ([address]    HOME SITUATION  Home Situation  Type of Home: House  Home Layout: Two level;Stairlift;Bed/bath upstairs  Lives With: Family (spouse and son with autism)  Drives: Yes  Patient Owned Equipment: Rolling walker;Crutches;Cane;Wheelchair  Patient Regularly Uses: Glasses     Prior Level of Bartholomew: independent with ADLs and mobility without assistive device, working, and driving. SUBJECTIVE  \"I own a landscaping business\"     PHYSICAL THERAPY EXAMINATION     OBJECTIVE  Precautions: Limb alert - left;Bed/chair alarm  Fall Risk: High fall risk    WEIGHT BEARING RESTRICTION  R Lower Extremity: Non-Weight Bearing    PAIN ASSESSMENT  Rating:  (did not rate)  Location: E  Management Techniques: Activity promotion; Body mechanics;Repositioning    COGNITION  Overall Cognitive Status:  WFL - within functional limits    RANGE OF MOTION AND STRENGTH ASSESSMENT  Upper extremity ROM and strength are within functional limits. Lower extremity ROM is within functional limits. Lower extremity strength is within functional limits. BALANCE  Static Sitting: Good  Dynamic Sitting: Fair +  Static Standing: Fair  Dynamic Standing: Poor +    ACTIVITY TOLERANCE  Pulse: 88  Heart Rate Source: Monitor  BP: 137/74  BP Location: Right arm  BP Method: Automatic  Patient Position: Sitting    O2 WALK  Oxygen Therapy  SPO2% Ambulation on Room Air: 689    AM-PAC '6-Clicks' INPATIENT SHORT FORM - BASIC MOBILITY  How much difficulty does the patient currently have. ..   Patient Difficulty: Turning over in bed (including adjusting bedclothes, sheets and blankets)?: None   Patient Difficulty: Sitting down on and standing up from a chair with arms (e.g., wheelchair, bedside commode, etc.): A Little   Patient Difficulty: Moving from lying on back to sitting on the side of the bed?: None   How much help from another person does the patient currently need. .. Help from Another: Moving to and from a bed to a chair (including a wheelchair)?: A Little   Help from Another: Need to walk in hospital room?: A Little   Help from Another: Climbing 3-5 steps with a railing?: A Little     AM-PAC Score:  Raw Score: 20   Approx Degree of Impairment: 35.83%   Standardized Score (AM-PAC Scale): 47.67   CMS Modifier (G-Code): CJ    FUNCTIONAL ABILITY STATUS  Functional Mobility/Gait Assessment  Gait Assistance: Minimum assistance  Distance (ft): 3  Assistive Device: Rolling walker  Pattern:  (hopping on LLE prosthesis)    Bed Mobility: mod I     Transfers: CGA STS, min A steps to chair     Exercise/Education Provided:  Bed mobility  Body mechanics  Energy conservation  Functional activity tolerated  Gait training  Posture  Transfer training    Patient End of Session: Up in chair;Needs met;Call light within reach;RN aware of session/findings; All patient questions and concerns addressed; Alarm set; Family present    CURRENT GOALS    Goals to be met by: 10/18/23  Patient Goal Patient's self-stated goal is: go home   Goal #1    Goal #1   Current Status    Goal #2 Patient is able to demonstrate transfers Sit to/from Stand at assistance level: modified independent with walker - rolling     Goal #2  Current Status    Goal #3 Patient is able to ambulate 25 feet with assist device: walker - rolling at assistance level: modified independent   Goal #3   Current Status    Goal #4    Goal #4   Current Status    Goal #5 Patient to demonstrate independence with home activity/exercise instructions provided to patient in preparation for discharge.    Goal #5   Current Status    Goal #6    Goal #6  Current Status      Patient Evaluation Complexity Level:  History Moderate - 1 or 2 personal factors and/or co-morbidities   Examination of body systems Moderate - addressing a total of 3 or more elements   Clinical Presentation Moderate - Evolving Clinical Decision Making Moderate Complexity       Therapeutic Activity: 15 minutes

## 2023-10-13 VITALS
HEART RATE: 77 BPM | RESPIRATION RATE: 17 BRPM | OXYGEN SATURATION: 97 % | SYSTOLIC BLOOD PRESSURE: 141 MMHG | TEMPERATURE: 98 F | DIASTOLIC BLOOD PRESSURE: 72 MMHG | WEIGHT: 250.69 LBS | BODY MASS INDEX: 31.17 KG/M2 | HEIGHT: 75 IN

## 2023-10-13 LAB
GLUCOSE BLDC GLUCOMTR-MCNC: 100 MG/DL (ref 70–99)
GLUCOSE BLDC GLUCOMTR-MCNC: 162 MG/DL (ref 70–99)

## 2023-10-13 PROCEDURE — 82962 GLUCOSE BLOOD TEST: CPT

## 2023-10-13 NOTE — PLAN OF CARE
Patient alert and oriented x4, wife at bedside. Surgeon came and evaluated amputation site/incision, no complications. Surgeon Daniella Becker cleared for discharge, [facility] came for patient and dressed the wound. ID NP came to evaluate patient, cleared patient to discharge with no antibiotics. MD Jeter evaluated patient at bedside and gave orders for discharge. PICC line pulled by discharge RN per MD order, no complications, catheter tip intact. Discharge, medication, incision care and follow up instructions and education provided to patient verbally and in writing, patient and wife verbalized understanding and demonstrate sufficient necessary skills for self-management. AVS and discharge paperwork given to patient, all belongings packed with patient, nothing missing per patient. No further questions at discharge. Patient escorted down by staff with wife to car in wheelchair. Problem: Patient Centered Care  Goal: Patient preferences are identified and integrated in the patient's plan of care  Description: Interventions:  - What would you like us to know as we care for you?  Patient is bilateral BKA  - Provide timely, complete, and accurate information to patient/family  - Incorporate patient and family knowledge, values, beliefs, and cultural backgrounds into the planning and delivery of care  - Encourage patient/family to participate in care and decision-making at the level they choose  - Honor patient and family perspectives and choices  Outcome: Completed     Problem: Diabetes/Glucose Control  Goal: Glucose maintained within prescribed range  Description: INTERVENTIONS:  - Monitor Blood Glucose as ordered  - Assess for signs and symptoms of hyperglycemia and hypoglycemia  - Administer ordered medications to maintain glucose within target range  - Assess barriers to adequate nutritional intake and initiate nutrition consult as needed  - Instruct patient on self management of diabetes  Outcome: Completed     Problem: Patient/Family Goals  Goal: Patient/Family Long Term Goal  Description: Patient's Long Term Goal: discharge    Interventions:  - monitor labs/vitals, adhere to medication and treatment regimen  - See additional Care Plan goals for specific interventions  Outcome: Completed  Goal: Patient/Family Short Term Goal  Description: Patient's Short Term Goal: feel better    Interventions:   - monitor labs/vitals, adhere to medication and treatment regimen  - See additional Care Plan goals for specific interventions  Outcome: Completed     Problem: CARDIOVASCULAR - ADULT  Goal: Maintains optimal cardiac output and hemodynamic stability  Description: INTERVENTIONS:  - Monitor vital signs, rhythm, and trends  - Monitor for bleeding, hypotension and signs of decreased cardiac output  - Evaluate effectiveness of vasoactive medications to optimize hemodynamic stability  - Monitor arterial and/or venous puncture sites for bleeding and/or hematoma  - Assess quality of pulses, skin color and temperature  - Assess for signs of decreased coronary artery perfusion - ex.  Angina  - Evaluate fluid balance, assess for edema, trend weights  Outcome: Completed  Goal: Absence of cardiac arrhythmias or at baseline  Description: INTERVENTIONS:  - Continuous cardiac monitoring, monitor vital signs, obtain 12 lead EKG if indicated  - Evaluate effectiveness of antiarrhythmic and heart rate control medications as ordered  - Initiate emergency measures for life threatening arrhythmias  - Monitor electrolytes and administer replacement therapy as ordered  Outcome: Completed     Problem: GASTROINTESTINAL - ADULT  Goal: Minimal or absence of nausea and vomiting  Description: INTERVENTIONS:  - Maintain adequate hydration with IV or PO as ordered and tolerated  - Nasogastric tube to low intermittent suction as ordered  - Evaluate effectiveness of ordered antiemetic medications  - Provide nonpharmacologic comfort measures as appropriate  - Advance diet as tolerated, if ordered  - Obtain nutritional consult as needed  - Evaluate fluid balance  Outcome: Completed  Goal: Maintains or returns to baseline bowel function  Description: INTERVENTIONS:  - Assess bowel function  - Maintain adequate hydration with IV or PO as ordered and tolerated  - Evaluate effectiveness of GI medications  - Encourage mobilization and activity  - Obtain nutritional consult as needed  - Establish a toileting routine/schedule  - Consider collaborating with pharmacy to review patient's medication profile  Outcome: Completed  Goal: Maintains adequate nutritional intake (undernourished)  Description: INTERVENTIONS:  - Monitor percentage of each meal consumed  - Identify factors contributing to decreased intake, treat as appropriate  - Assist with meals as needed  - Monitor I&O, WT and lab values  - Obtain nutritional consult as needed  - Optimize oral hygiene and moisture  - Encourage food from home; allow for food preferences  - Enhance eating environment  Outcome: Completed  Goal: Achieves appropriate nutritional intake (bariatric)  Description: INTERVENTIONS:  - Monitor for over-consumption  - Identify factors contributing to increased intake, treat as appropriate  - Monitor I&O, WT and lab values  - Obtain nutritional consult as needed  - Evaluate psychosocial factors contributing to over-consumption  Outcome: Completed     Problem: METABOLIC/FLUID AND ELECTROLYTES - ADULT  Goal: Glucose maintained within prescribed range  Description: INTERVENTIONS:  - Monitor Blood Glucose as ordered  - Assess for signs and symptoms of hyperglycemia and hypoglycemia  - Administer ordered medications to maintain glucose within target range  - Assess barriers to adequate nutritional intake and initiate nutrition consult as needed  - Instruct patient on self management of diabetes  Outcome: Completed  Goal: Electrolytes maintained within normal limits  Description: INTERVENTIONS:  - Monitor labs and rhythm and assess patient for signs and symptoms of electrolyte imbalances  - Administer electrolyte replacement as ordered  - Monitor response to electrolyte replacements, including rhythm and repeat lab results as appropriate  - Fluid restriction as ordered  - Instruct patient on fluid and nutrition restrictions as appropriate  Outcome: Completed  Goal: Hemodynamic stability and optimal renal function maintained  Description: INTERVENTIONS:  - Monitor labs and assess for signs and symptoms of volume excess or deficit  - Monitor intake, output and patient weight  - Monitor urine specific gravity, serum osmolarity and serum sodium as indicated or ordered  - Monitor response to interventions for patient's volume status, including labs, urine output, blood pressure (other measures as available)  - Encourage oral intake as appropriate  - Instruct patient on fluid and nutrition restrictions as appropriate  Outcome: Completed     Problem: Impaired Functional Mobility  Goal: Achieve highest/safest level of mobility/gait  Description: Interventions:  - Assess patient's functional ability and stability  - Promote increasing activity/tolerance for mobility and gait  - Educate and engage patient/family in tolerated activity level and precautions  - Recommend use of chair position in bed 3 times per day  Outcome: Completed     Problem: Impaired Activities of Daily Living  Goal: Achieve highest/safest level of independence in self care  Description: Interventions:  - Assess ability and encourage patient to participate in ADLs to maximize function  - Promote sitting position while performing ADLs such as feeding, grooming, and bathing  - Educate and encourage patient/family in tolerated functional activity level and precautions during self-care  - Encourage patient to incorporate impaired side during daily activities to promote function  Outcome: Completed

## 2023-10-13 NOTE — DISCHARGE INSTRUCTIONS
Sometimes managing your health at home requires assistance. The [facility] team has recognized your preference to use [facility]. They can be reached by phone at [phone]. The fax number for your reference is [phone]. A representative from the home health agency will contact you or your family to schedule your first visit.

## 2023-10-13 NOTE — CM/SW NOTE
Pt discussed in RN rounds. [facility]. [facility] reserved via [facility]. [facility] liaison made aware. 10/13/23 1100   Discharge disposition   Expected discharge disposition Home or Self   Additional Home Care/Hospice Provider [facility]   Discharge transportation Private car       Plan:  DC to [facility] with [facility]    SW/CM to remain available for support and/or discharge planning.      Marcial Joseph, MSW, LSW   x [phone]

## 2023-10-13 NOTE — PROGRESS NOTES
Vascular Surgery Progress Note    No acute events overnight. Pain well controlled on current regimen. Patient doing well and tolerating diet. Has walked with PT using walker and prosthetic on the other leg. Dressing removed from the right leg. Incision intact with staples in place. No necrosis, erythema or bleeding. Stump is warm and soft. Ok to apply daily dry dressing and stump . Patient ok for discharge from a surgical standpoint. Follow-up visit is already scheduled. Will need final recommendations from ID for antibiotic use.      Preeti Willis MD  10/13/23  7:33 AM

## 2023-10-13 NOTE — HOME CARE LIAISON
Referral received from SW/CM team via [employer]. Discussed with patient the recommendation for home health services, patient agreeable, and all questions answered. Updated CHEKO Bergeron.

## 2023-10-13 NOTE — PLAN OF CARE
Problem: Patient Centered Care  Goal: Patient preferences are identified and integrated in the patient's plan of care  Description: Interventions:  - What would you like us to know as we care for you? Pt is from home with his wife and Autistic son  - Provide timely, complete, and accurate information to patient/family  - Incorporate patient and family knowledge, values, beliefs, and cultural backgrounds into the planning and delivery of care  - Encourage patient/family to participate in care and decision-making at the level they choose  - Honor patient and family perspectives and choices  Outcome: Progressing     Problem: CARDIOVASCULAR - ADULT  Goal: Maintains optimal cardiac output and hemodynamic stability  Description: INTERVENTIONS:  - Monitor vital signs, rhythm, and trends  - Monitor for bleeding, hypotension and signs of decreased cardiac output  - Evaluate effectiveness of vasoactive medications to optimize hemodynamic stability  - Monitor arterial and/or venous puncture sites for bleeding and/or hematoma  - Assess quality of pulses, skin color and temperature  - Assess for signs of decreased coronary artery perfusion - ex.  Angina  - Evaluate fluid balance, assess for edema, trend weights  Outcome: Progressing  Goal: Absence of cardiac arrhythmias or at baseline  Description: INTERVENTIONS:  - Continuous cardiac monitoring, monitor vital signs, obtain 12 lead EKG if indicated  - Evaluate effectiveness of antiarrhythmic and heart rate control medications as ordered  - Initiate emergency measures for life threatening arrhythmias  - Monitor electrolytes and administer replacement therapy as ordered  Outcome: Progressing     Problem: GASTROINTESTINAL - ADULT  Goal: Minimal or absence of nausea and vomiting  Description: INTERVENTIONS:  - Maintain adequate hydration with IV or PO as ordered and tolerated  - Nasogastric tube to low intermittent suction as ordered  - Evaluate effectiveness of ordered antiemetic medications  - Provide nonpharmacologic comfort measures as appropriate  - Advance diet as tolerated, if ordered  - Obtain nutritional consult as needed  - Evaluate fluid balance  Outcome: Progressing  Goal: Maintains or returns to baseline bowel function  Description: INTERVENTIONS:  - Assess bowel function  - Maintain adequate hydration with IV or PO as ordered and tolerated  - Evaluate effectiveness of GI medications  - Encourage mobilization and activity  - Obtain nutritional consult as needed  - Establish a toileting routine/schedule  - Consider collaborating with pharmacy to review patient's medication profile  Outcome: Progressing  Goal: Maintains adequate nutritional intake (undernourished)  Description: INTERVENTIONS:  - Monitor percentage of each meal consumed  - Identify factors contributing to decreased intake, treat as appropriate  - Assist with meals as needed  - Monitor I&O, WT and lab values  - Obtain nutritional consult as needed  - Optimize oral hygiene and moisture  - Encourage food from home; allow for food preferences  - Enhance eating environment  Outcome: Progressing  Goal: Achieves appropriate nutritional intake (bariatric)  Description: INTERVENTIONS:  - Monitor for over-consumption  - Identify factors contributing to increased intake, treat as appropriate  - Monitor I&O, WT and lab values  - Obtain nutritional consult as needed  - Evaluate psychosocial factors contributing to over-consumption  Outcome: Progressing     Problem: METABOLIC/FLUID AND ELECTROLYTES - ADULT  Goal: Electrolytes maintained within normal limits  Description: INTERVENTIONS:  - Monitor labs and rhythm and assess patient for signs and symptoms of electrolyte imbalances  - Administer electrolyte replacement as ordered  - Monitor response to electrolyte replacements, including rhythm and repeat lab results as appropriate  - Fluid restriction as ordered  - Instruct patient on fluid and nutrition restrictions as appropriate  Outcome: Progressing  Goal: Hemodynamic stability and optimal renal function maintained  Description: INTERVENTIONS:  - Monitor labs and assess for signs and symptoms of volume excess or deficit  - Monitor intake, output and patient weight  - Monitor urine specific gravity, serum osmolarity and serum sodium as indicated or ordered  - Monitor response to interventions for patient's volume status, including labs, urine output, blood pressure (other measures as available)  - Encourage oral intake as appropriate  - Instruct patient on fluid and nutrition restrictions as appropriate  Outcome: Progressing     Problem: Diabetes/Glucose Control  Goal: Glucose maintained within prescribed range  Description: INTERVENTIONS:  - Monitor Blood Glucose as ordered  - Assess for signs and symptoms of hyperglycemia and hypoglycemia  - Administer ordered medications to maintain glucose within target range  - Assess barriers to adequate nutritional intake and initiate nutrition consult as needed  - Instruct patient on self management of diabetes  Outcome: Not Progressing

## 2023-11-20 NOTE — PROGRESS NOTES
[facility] AND [facility] Infectious Disease  Progress Note    Hannah Sera Patient Status:  Inpatient    1970 MRN [id number]   Location [facility] 5SW/SE Attending Razia Martin MD   Hosp Day # 6 PCP Radha Mccollum MD     Subjective:  Keyur Sadler hammertoe deformities.     Assessment and Plan:     1.  Complicated L diabetic foot infection with necrosis and foul smell  - MRI c/w OM of the 4th metatarsal head  - Dr. Erica Church following - suspect surgery + long course IV Rx  - Cultures with e.coli and s 4

## 2023-11-22 NOTE — PROGRESS NOTES
Chief Complaint  This information was obtained from the patient  Patient is here for a wound care follow up. Patients pain comes and go.     Allergies  NKA    HPI  This information was obtained from the patient    12/11/20- Wound stable  MRI pending  ID c [facility] order in the GI workqueue requested on 5/24/23  by Yani Zarate MD has been removed as, unable to contact.   Ordering provider has been notified.     Please contact patient, if further communication is needed.   will discharge from clinic today to care of surgeon postoperatively. 10/28/20 - Wound improving - with increased epithelium but not yet closed. blister on the lateral plantar foot oozing - hence unroofed  - no s/o infection.   10/21/20 - Wound much impr Will do a vac break this week. edema and periwound redness decreased significantly. Fully offload with crutches. 8/10/20- Wound followup appointment: Wound looks stable - not much improvement - increased maceration +  callus trimmed today.  leg not swo Hyperbaric oxygen therapy is being considered as adjunctive therapy to current plan of care which includes complete offloading of the wound, IV antibiotics, intense blood sugar control, negative pressure wound therapy with collagen dressings on the wound b Alcohol use: Not Currently     Alcohol/week: 1.0 standard drinks     Types: 1 Standard drinks or equivalent per week   Drug use: No    General Notes:  Patient to schedule MRI today.  Syd    Review of Systems (ROS)  This information was obtained from Wound #4 Left, Proximal Foot is a [place] Grade 0 Diabetic Ulcer and has received a status of Not Healed. Subsequent wound encounter measurements are 1.5cm length x 0.5cm width with no measurable depth, with an area of 0.75 sq cm .  No tunneling has been not Wound #3 (Diabetic Ulcer) is located on the left, lateral, plantar foot.  A skin/subcutaneous tissue level excisional/surgical debridement with a total area debrided of 5.425 sq cm was performed by Rafael Mckeon MD. Subcutaneous was removed along wi Sulfamethoxazole-TMP -160 MG Oral Tab per tablet          Sig: Take 1 tablet by mouth 2 (two) times daily for 7 days.           Dispense:  14 tablet          Refill:  0      furosemide 40 MG Oral Tab          Sig: Take 1 tablet (40 mg total) by mo

## 2024-05-06 NOTE — ED QUICK NOTES
Pt to ED with concerns for worsening pain/redness/swelling to right foot. Pt with chronic right midfoot diabetic ulcer. Pt states saw Podiatrist and vascular surgeon recently this month. Pt states plan for right BKA in January. Pt states +fever at home. Pt denies cough, diarrhea, or vomiting. Pt denies injury to foot. Pt skin hot and dry. Pt abdomen soft and non distended. Pt is alert and oriented x4. Bilateral lungs clear. No respiratory distress noted. Pt with hx of diabetes and states blood sugar 98 this am. Pt with right upper arm CGM in place. IV established to RAC #20G-SL. Awaiting labs and imaging. Will continue to monitor. Sepsis Criteria were met:

## 2025-03-20 NOTE — PROGRESS NOTES
Assessment/Plan:   Calcified coronary artery disease: The patient has no chest pain or shortness of breath on exertion.  Her coronary CT angiogram in March 2024 was reported total coronary calcium score 584, mild LAD and RCA stenosis, no obstructive lesion.  Echocardiogram was reported normal LV size and wall motion, no obvious valvular disease.  Continue lifestyle modification and risk factor control.      Benign essential hypertension: Her blood pressure is controlled with hydrochlorothiazide 25 mg daily, losartan 25 mg daily.  Dyslipidemia: LDL was well controlled with rosuvastatin 20 mg at bedtime.  Bilateral leg edema: The patient has mild right leg edema after right knee surgery.  She has been using elastic support socks.  She has been taking hydrochlorothiazide, use Lasix as needed.  Obesity, acute pulmonary embolism: We discussed a low-salt diet, lifestyle modification.  The patient is on Eliquis for pulmonary embolism.    Thank you for the opportunity to be involved in the care of Dandy Mejia. If you have any questions, please feel free to contact me.  I will see the patient again in 12 months and as needed.    Much or all of the text in this note was generated through the use of Dragon Dictate voice-to-text software. Errors in spelling or words which seem out of context are unintentional. Sound alike errors, in particular, may have escaped editing.       History of Present Illness:   It is my pleasure to see Dandy Mejia at the [facility] for routine cardiology follow up.  Dandy Mejia is a 73 year old female with a medical history of calcified coronary artery disease, benign essential hypertension, dyslipidemia, obesity, obstructive sleep apnea, pulmonary embolism.    The patient states that she has been doing quite well over the last year from cardiology standpoint.  She denies chest pain, shortness of breath on exertion, palpitations, dizziness, orthopnea, PND.   Chief Complaint  This information was obtained from the patient  Patient is here for a wound care follow up. He denies any pain or new wound concerns complains the cast is causing his right hip to experience discomfort.     Allergies  NKA    HPI  This inf "She has mild leg edema which has been controlled very well.  Her blood pressure and heart rate are well-controlled.  Last LDL was 25.    Past Medical History:     Patient Active Problem List   Diagnosis    Obstructive sleep apnea    Thrombophlebitis Of The Left Deep Femoral Vein    Post-gastric Bypass For Obesity    Nephrolithiasis    Adenomatous colon polyp (02/2014)    Essential hypertension    Dyspnea on exertion    Acute pulmonary embolism with acute cor pulmonale, unspecified pulmonary embolism type (H)    Class 2 severe obesity due to excess calories with serious comorbidity in adult (H)    Dyslipidemia, goal LDL below 70    Bilateral leg edema    Coronary artery calcification seen on CAT scan    Postmenopausal atrophic vaginitis    Acquired absence of organ, genital organs       Past Surgical History:     Past Surgical History:   Procedure Laterality Date    CHOLECYSTECTOMY      GASTRIC BYPASS  1980    HYSTERECTOMY TOTAL ABDOMINAL  1993    RELEASE CARPAL TUNNEL Right     TONSILLECTOMY      TUBAL LIGATION         Family History:     Family History   Problem Relation Age of Onset    Heart Failure Mother     Atrial fibrillation Mother     Macular Degeneration Mother     Diabetes Type 2  Father         Older-age onset of diabetes for multiple family members in father's side    Cancer Sister         \"Female cancer wih lymph node removal\"    Glaucoma Sister         Being watched for glaucoma    Depression Sister     Fibromyalgia Sister     Glaucoma Sister         Social History:    reports that she has never smoked. She has been exposed to tobacco smoke. She has never used smokeless tobacco. She reports current alcohol use of about 7.0 standard drinks of alcohol per week. She reports that she does not use drugs.    Review of Systems:   12 systems are reviewed negative except for in HPI.    Meds:     Current Outpatient Medications:     amoxicillin (AMOXIL) 500 MG capsule, Take 500 mg by mouth. Before Dental appt., " thursday. Appt with podiatrist coming up. HBO ongoing - no issues. offloading- 100% with crutches. Wound vac in place    8/3/20: HYPERBARIC OXYGEN THERAPY CONSULT NOTE:    16-year-old  male here for hyperbaric oxygen therapy consultation.   Fernando "Disp: , Rfl:     apixaban ANTICOAGULANT (ELIQUIS ANTICOAGULANT) 5 MG tablet, Take 1 tablet (5 mg) by mouth 2 times daily., Disp: 180 tablet, Rfl: 3    Calcium Carbonate-Vit D-Min (CALCIUM 1200 PO), Take 1 tablet by mouth 2 times daily, Disp: , Rfl:     cholecalciferol, vitamin D3, 5,000 unit Tab, [CHOLECALCIFEROL, VITAMIN D3, 5,000 UNIT TAB] Take 1 tablet by mouth daily. , Disp: , Rfl:     famotidine (PEPCID) 20 MG tablet, Take 1 tablet (20 mg) by mouth as needed (heartburn), Disp: , Rfl:     ferrous sulfate 325 (65 FE) MG tablet, [FERROUS SULFATE 325 (65 FE) MG TABLET] Take 325 mg by mouth., Disp: , Rfl:     furosemide (LASIX) 40 MG tablet, Take 1 tablet (40 mg) by mouth as needed (leeg edema), Disp: 30 tablet, Rfl: 1    hydrochlorothiazide (HYDRODIURIL) 25 MG tablet, TAKE ONE TABLET BY MOUTH EVERY DAY, Disp: 90 tablet, Rfl: 2    losartan (COZAAR) 25 MG tablet, Take 1 tablet (25 mg) by mouth daily., Disp: 90 tablet, Rfl: 1    Probiotic Product (DAILY DIGESTIVE PROBIOTIC PO), Take 1 capsule by mouth daily, Disp: , Rfl:     rosuvastatin (CRESTOR) 20 MG tablet, TAKE ONE TABLET BY MOUTH EVERY DAY, Disp: 90 tablet, Rfl: 1    triamcinolone (KENALOG) 0.1 % cream, [TRIAMCINOLONE (KENALOG) 0.1 % CREAM] Apply to affected areas 3 times a day for 2 weeks, Disp: 45 g, Rfl: 1    Current Facility-Administered Medications:     cyanocobalamin injection 1,000 mcg, 1,000 mcg, Intramuscular, Q30 Days, Suman Doshi MD, 1,000 mcg at 03/05/25 1305    cyanocobalamin injection 1,000 mcg, 1,000 mcg, Intramuscular, Q30 Days, Suman Doshi MD, 1,000 mcg at 01/29/25 1104    Allergies:   Patient has no known allergies.      Objective:      Physical Exam  86.6 kg (191 lb)  1.556 m (5' 1.25\")  Body mass index is 35.8 kg/m .  /73 (BP Location: Left arm, Patient Position: Sitting, Cuff Size: Adult Regular)   Pulse 62   Resp 16   Ht 1.556 m (5' 1.25\")   Wt 86.6 kg (191 lb)   LMP  (LMP Unknown)   SpO2 98%   BMI 35.80 kg/m  " dorsalis pedis pulses on the left lower extremity. Last hemoglobin A1c done last month is 7.6.     Past Medical History:  Diagnosis Date  • Depression  • Diabetes ([facility] [address]  • Diabetic neuropathy ([facility] [address]  • High blood pressure  • High cholesterol  • Hyperlipid     General Appearance:   Awake, Alert, No acute distress.   HEENT:  Pupil equal and reactive to light. No scleral icterus; the mucous membranes were moist.   Neck: No cervical bruits. No JVD. No thyromegaly.     Chest: The spine was straight. The chest was symmetric.   Lungs:   Respirations unlabored; Lungs are clear to auscultation. No crackles. No wheezing.   Cardiovascular:   Regular rhythm and rate, normal first and second heart sounds with no murmurs. No rubs or gallops.    Abdomen:  Obese. Soft. No tenderness. Non-distended. Bowels sounds are present   Extremities: Equal tibial pulses. Mild bilateral leg edema.   Skin: No rashes or ulcers. Warm, Dry.   Musculoskeletal: No tenderness. No deformity.   Neurologic: Mood and affect are appropriate. No focal deficits.         EKG: Personally reviewed  Sinus rhythm   Normal ECG   No previous ECGs available     Cardiac Imaging Studies  ECHO on 12-:  Left ventricular size, wall motion and function are normal. The ejection  fraction is 60-65%.  Normal right ventricle size and systolic function.  Right ventricle systolic pressure estimate normal  No hemodynamically significant valvular abnormalities on 2D or color flow  imaging.    Coronary CT angiogram on March 21, 2024:    The total Agatston score is 584. A calcium score in this range places the individual in the 93rd percentile when compared to an age and gender matched control group.    Nonobstructive coronary artery disease comprised primarily of calcific plaque with an overall moderate burden of atherosclerosis.    Lab Review   Lab Results   Component Value Date     01/29/2024    CO2 31 01/29/2024    CO2 27 10/21/2021    BUN 9.2 01/29/2024    BUN 8 10/21/2021     Lab Results   Component Value Date    WBC 6.3 01/29/2024    HGB 13.0 01/29/2024    HCT 39.3 01/29/2024    MCV 98 01/29/2024     01/29/2024     Lab Results   Component Value Date    CHOL 95 08/28/2024    CHOL 113 04/05/2024    CHOL 195  Yes        Objective    Wound Assessment(s)  Wound #1 Left, Plantar Foot is a chronic Full Thickness Surgical Wound and has received a status of Not Healed.  Subsequent wound encounter measurements are 1.4cm length x 1.1cm width with no measurable depth, wi "08/17/2023     Lab Results   Component Value Date    HDL 53 08/28/2024    HDL 61 04/05/2024    HDL 71 08/17/2023     No components found for: \"LDLCALC\"  Lab Results   Component Value Date    TRIG 85 08/28/2024    TRIG 114 04/05/2024    TRIG 113 08/17/2023     Lab Results   Component Value Date    TSH 1.70 12/22/2023             " involvement without evidence of necrosis  (Encounter Diagnosis) I10 - Essential (primary) hypertension        Procedures    Wound #1  Wound #1 (Surgical Wound) is located on the left, plantar foot.  A TCC Application procedure was performed for the lower le

## (undated) DIAGNOSIS — A49.8 ESCHERICHIA COLI INFECTION: Primary | ICD-10-CM

## (undated) DEVICE — FIBERWIRE 2.0 AR-7220

## (undated) DEVICE — BLADE SW 29MM 58MM THK.64MM LG

## (undated) DEVICE — X-RAY DETECTABLE SPONGES,16 PLY: Brand: VISTEC

## (undated) DEVICE — TOURNIQUET CUFF 34 STR

## (undated) DEVICE — SUTURE VICRYL 2-0 FS-1

## (undated) DEVICE — TRAY SKIN PREP PVP-1

## (undated) DEVICE — GAMMEX® NON-LATEX PI ORTHO SIZE 7.5, STERILE POLYISOPRENE POWDER-FREE SURGICAL GLOVE: Brand: GAMMEX

## (undated) DEVICE — 1010 S-DRAPE TOWEL DRAPE 10/BX: Brand: STERI-DRAPE™

## (undated) DEVICE — SYRINGE MNJCT 10ML LF STRL REG

## (undated) DEVICE — DRAPE SRG 70X60IN SPLT U IMPRV

## (undated) DEVICE — WIRE K SYNT 2.0 292.20: Type: IMPLANTABLE DEVICE

## (undated) DEVICE — WEBRIL COTTON UNDERCAST PADDING: Brand: WEBRIL

## (undated) DEVICE — CHLORAPREP 26ML APPLICATOR

## (undated) DEVICE — BANDAGE ROLL,100% COTTON, 6 PLY, LARGE: Brand: KERLIX

## (undated) DEVICE — INTENDED FOR TISSUE SEPARATION, AND OTHER PROCEDURES THAT REQUIRE A SHARP SURGICAL BLADE TO PUNCTURE OR CUT.: Brand: BARD-PARKER ® STAINLESS STEEL BLADES

## (undated) DEVICE — LOWER EXTREMITY: Brand: MEDLINE INDUSTRIES, INC.

## (undated) DEVICE — SUTURE SILK 0 FSL

## (undated) DEVICE — GAUZE SPONGES,12 PLY: Brand: CURITY

## (undated) DEVICE — DRILL BIT SYNT 3.2X145 310.31

## (undated) DEVICE — ZIMMER® STERILE DISPOSABLE TOURNIQUET CUFF WITH PLC, DUAL PORT, SINGLE BLADDER, 18 IN. (46 CM)

## (undated) DEVICE — 3M™ IOBAN™ 2 ANTIMICROBIAL INCISE DRAPE 6650EZ: Brand: IOBAN™ 2

## (undated) DEVICE — BLADE SAW THK.64MM THK.99MM 70

## (undated) DEVICE — SOL  .9 1000ML BTL

## (undated) DEVICE — PADDING CAST WYTEX 2\" STER

## (undated) DEVICE — SUCTION CANISTER, 3000CC,SAFELINER: Brand: DEROYAL

## (undated) DEVICE — SPLINT PLASTER 5

## (undated) DEVICE — SUTURE ETHILON 2-0 FS

## (undated) DEVICE — GOWN SURG AERO BLUE PERF XLG

## (undated) DEVICE — DRESSING CRTY CNFRM 3IN

## (undated) DEVICE — DRAPE C-ARM UNIVERSAL

## (undated) DEVICE — SHEET,DRAPE,70X100,STERILE: Brand: MEDLINE

## (undated) DEVICE — TOWEL OR BLU 16X26 STRL

## (undated) DEVICE — MEDI-VAC NON-CONDUCTIVE SUCTION TUBING: Brand: CARDINAL HEALTH

## (undated) DEVICE — OCCLUSIVE GAUZE STRIP OVERWRAP,3% BISMUTH TRIBROMOPHENATE IN PETROLATUM BLEND: Brand: XEROFORM

## (undated) DEVICE — SUTURE VICRYL 0 CP-1

## (undated) DEVICE — ENCORE® LATEX MICRO SIZE 7.5, STERILE LATEX POWDER-FREE SURGICAL GLOVE: Brand: ENCORE

## (undated) DEVICE — VERSAJET II HYDROSURGERY SYSTEM HANDSET, 45DEG 8MM EXACT: Brand: VERSAJET

## (undated) DEVICE — SOLUTION IRRIG 1000ML 0.9% NACL USP BTL

## (undated) DEVICE — BIT DRL 150MM 2.9MM SS QC CNN

## (undated) DEVICE — SUTURE SILK 0

## (undated) DEVICE — ENCORE® LATEX ACCLAIM SIZE 7.5, STERILE LATEX POWDER-FREE SURGICAL GLOVE: Brand: ENCORE

## (undated) DEVICE — PRECISION (5.5 X 0.51 X 18.0MM)

## (undated) DEVICE — SUTURE PROLENE 2-0 SH

## (undated) DEVICE — SPONGE LAP 18X18 XRAY STRL

## (undated) DEVICE — SUTURE SILK 2-0 SH

## (undated) DEVICE — OR TOWEL, 17" X 26" STERILE, BLUE: Brand: PREMIERPRO

## (undated) DEVICE — ENCORE® LATEX ACCLAIM SIZE 8, STERILE LATEX POWDER-FREE SURGICAL GLOVE: Brand: ENCORE

## (undated) DEVICE — SUTURE ETHILON 3-0 669H

## (undated) DEVICE — SUTURE PROLENE 3-0 8687H

## (undated) DEVICE — FAN SPRAY KIT: Brand: PULSAVAC®

## (undated) DEVICE — VIOLET BRAIDED (POLYGLACTIN 910), SYNTHETIC ABSORBABLE SUTURE: Brand: COATED VICRYL

## (undated) DEVICE — SOL  .9 3000ML

## (undated) DEVICE — GAUZE,PACKING STRIP,IODOFORM,1/2"X5YD,ST: Brand: CURAD

## (undated) DEVICE — SUPER SPONGES,MEDIUM: Brand: KERLIX

## (undated) DEVICE — PRECISION (7.0 X 0.51 X 18.5MM)

## (undated) DEVICE — PADDING 4YDX6IN CTTN STRL WBRL

## (undated) DEVICE — CLIP SM W INTNL HRZN TI TPE LF

## (undated) DEVICE — BIT DRL 160MM 4MM SS QC

## (undated) DEVICE — SUTURE PERMA- SZ 2-0 L30IN NONABSORBABLE

## (undated) DEVICE — STERILE TETRA-FLEX CF, ELASTIC BANDAGE, 4" X 5.5YD: Brand: TETRA-FLEX™CF

## (undated) DEVICE — BLADE 20 SHRP BP SS SRG STRL

## (undated) DEVICE — STERILE TETRA-FLEX CF, ELASTIC BANDAGE, 2" X 5.5YD: Brand: TETRA-FLEX™CF

## (undated) DEVICE — SUTURE ETHIBOND 1-0 CX30D

## (undated) DEVICE — SOL H2O 1000ML BTL

## (undated) DEVICE — DRAPE MN CARM

## (undated) DEVICE — SPONGE LAP 18X18IN 7IN LOOP

## (undated) DEVICE — ENCORE® LATEX MICRO SIZE 8.5, STERILE LATEX POWDER-FREE SURGICAL GLOVE: Brand: ENCORE

## (undated) DEVICE — SUTURE VCRL SZ 2-0 L36IN ABSRB UD L36MM CT-1

## (undated) DEVICE — SUTURE VICRYL 0 J340H

## (undated) DEVICE — COTTON UNDERCAST PADDING,REGULAR FINISH: Brand: WEBRIL

## (undated) DEVICE — ABSORBABLE HEMOSTAT (OXIDIZED REGENERATED CELLULOSE, U.S.P.): Brand: SURGICEL

## (undated) DEVICE — GAMMEX® PI HYBRID SIZE 8, STERILE POWDER-FREE SURGICAL GLOVE, POLYISOPRENE AND NEOPRENE BLEND: Brand: GAMMEX

## (undated) DEVICE — CONTAINER SPEC STR 4OZ GRY LID

## (undated) DEVICE — SUTURE PERMAHAND SZ 3-0 L30IN NONABSORBABLE .

## (undated) DEVICE — SKIN PREP TRAY 4 COMPARTM TRAY: Brand: MEDLINE INDUSTRIES, INC.

## (undated) DEVICE — SUTURE PROLENE 0 CT-1

## (undated) DEVICE — 3.2MM ROUND FAST CUTTING BUR

## (undated) DEVICE — UNDYED BRAIDED (POLYGLACTIN 910), SYNTHETIC ABSORBABLE SUTURE: Brand: COATED VICRYL

## (undated) DEVICE — C-ARM: Brand: UNBRANDED

## (undated) DEVICE — PRECISION (9.0 X 0.51 X 31.0MM)

## (undated) DEVICE — BATTERY

## (undated) DEVICE — ZIMMER® STERILE DISPOSABLE TOURNIQUET CUFF WITH PLC, DUAL PORT, SINGLE BLADDER, 30 IN. (76 CM)

## (undated) DEVICE — Device

## (undated) DEVICE — GAMMEX® PI HYBRID SIZE 7.5, STERILE POWDER-FREE SURGICAL GLOVE, POLYISOPRENE AND NEOPRENE BLEND: Brand: GAMMEX

## (undated) DEVICE — PROXIMATE SKIN STAPLERS (35 WIDE) CONTAINS 35 STAINLESS STEEL STAPLES (FIXED HEAD): Brand: PROXIMATE

## (undated) DEVICE — DRILL BIT SYNT 2.0X125 310.21

## (undated) DEVICE — WOUND VAC DRESSING MEDIUM

## (undated) DEVICE — CANISTER WND DRN VAC GEL TBG

## (undated) DEVICE — PAD,ABDOMINAL,8"X7.5",STERILE,LF,1/PK: Brand: MEDLINE

## (undated) DEVICE — IMMOBILIZER KNEE AD L16IN UNIV FOAM LAM COMPR

## (undated) DEVICE — SPLINT PRECUT SYNTH 5X30

## (undated) DEVICE — CLIP LG INTNL HMCLP TNTLM ESCP

## (undated) DEVICE — SOL  .9 1000ML BAG

## (undated) DEVICE — NON-ADHERENT STRIPS,OIL EMULSION: Brand: CURITY

## (undated) DEVICE — ZIMMER® STERILE DISPOSABLE TOURNIQUET CUFF WITH PLC, DUAL PORT, SINGLE BLADDER, 24 IN. (61 CM)

## (undated) DEVICE — DRAPE SHEET LG

## (undated) DEVICE — GAUZE,SPONGE,FLUFF,6"X6.75",STRL,5/TRAY: Brand: MEDLINE

## (undated) DEVICE — SUTURE PERMAHAND SZ 2-0 L12X18IN

## (undated) DEVICE — GAMMEX® PI HYBRID SIZE 6, STERILE POWDER-FREE SURGICAL GLOVE, POLYISOPRENE AND NEOPRENE BLEND: Brand: GAMMEX

## (undated) DEVICE — ABDOMINAL PAD: Brand: CURITY

## (undated) DEVICE — CLIP LIG M BLU TI HRT SHP WRE HORZ 600 PER BX

## (undated) DEVICE — SUT SLK 0 SH BLK BRD 30 75CM

## (undated) DEVICE — SUTURE VCRL SZ 2-0 L27IN ABSRB VLT L36MM CT-1

## (undated) DEVICE — GAMMEX® PI HYBRID SIZE 6.5, STERILE POWDER-FREE SURGICAL GLOVE, POLYISOPRENE AND NEOPRENE BLEND: Brand: GAMMEX

## (undated) DEVICE — MATTRESS PT HOVERMATT 1/2L 34W

## (undated) DEVICE — HEWSON SUTURE RETRIEVER: Brand: HEWSON SUTURE RETRIEVER

## (undated) DEVICE — TOURNIQUET CUFF 18 STR

## (undated) DEVICE — BANDAGE COHESIVE W4INXL5YD TAN E POR SLF ADH

## (undated) DEVICE — WAX BNE 2.5GM BEESWAX PAR AND ISO PALMITATE

## (undated) DEVICE — SUTURE PROLENE 2-0 CT-1

## (undated) DEVICE — SUTURE MONOCRYL 3-0 Y936H

## (undated) DEVICE — PRECISION OFFSET (9.0 X 0.51 X 25.0MM)

## (undated) DEVICE — PROXIMATE RH ROTATING HEAD SKIN STAPLERS (35 WIDE) CONTAINS 35 STAINLESS STEEL STAPLES: Brand: PROXIMATE

## (undated) NOTE — MR AVS SNAPSHOT
After Visit Summary   6/13/2020    Sharon Krishnan    MRN: [id number]           Visit Information     Date & Time  6/13/2020  9:30 AM Provider  [facility] INFRN 2 Department  [facility] - Infusion Dept.  Phone  [phone]      Your CHOLECALCIFEROL 5000 units Oral Tab Take 1 tablet by mouth daily.  Take 1 tablet daily       Diagnoses for This Visit    Diabetic foot ulcer with osteomyelitis   [020220]  -  Primary           Future Appointments        Provider Department    6/14/2020 8:1 7/6/2020 2:00 PM EM [facility] INFRN [address] - Infusion    7/7/2020 12:00 PM [facility] INFRN [address] - Infusion    7/8/2020 2:00 PM EM [facility] INFRN [address] - Infusion    7/9/2020 2:00 PM EM [facility] INF Average cost  $35*    e-VISTS  Average cost  $35*     SAME DAY APPOINTMENTS   Available at primary care offices    AFTER HOURS CARE  [place]  OFFICE VISIT   Primary Care Providers  Treatment for mild illness or injury that does not require immediate attent

## (undated) NOTE — LETTER
Al Cummings [address], [place], [place]  [address]  INFORMED CONSENT FOR TRANSFUSION OF BLOOD OR BLOOD PRODUCTS  My physician has informed me of the nature, purpose, benefits and risks of transfusion for blood and blood components that ______________________________________________  (Signature of Patient)                                                            (Responsible party in case of Minor,

## (undated) NOTE — MR AVS SNAPSHOT
After Visit Summary   6/14/2020    Delilah Sentlakesha    MRN: [id number]           Visit Information     Date & Time  6/14/2020  8:15 AM Provider  [facility] [address] - Infusion Dept.  Phone  [phone]      Your CHOLECALCIFEROL 5000 units Oral Tab Take 1 tablet by mouth daily.  Take 1 tablet daily       Diagnoses for This Visit    Diabetic foot ulcer with osteomyelitis   [368771]  -  Primary           Future Appointments        Provider Department    6/15/2020 8:0 7/7/2020 12:00 PM [facility] INFRN [address] - Infusion    7/8/2020 2:00 PM [facility] INFRN [address] - Infusion    7/9/2020 2:00 PM [facility] INFRN [address] - Infusion    7/10/2020 2:00 PM EM CC IN Available at primary care offices    AFTER HOURS CARE  [place]  OFFICE VISIT   Primary Care Providers  Treatment for mild illness or injury that does not require immediate attention.  Average cost  $70*   [facility] – [address] [place] [address],[address]

## (undated) NOTE — LETTER
[address] [facility]     I agree to have a Peripherally Inserted Central Catheter (PICC) placed in my arm.    1. The PICC insertion procedure, care, maintenance, risks, benefits, and complications have been explained to me b including risks, benefits, and side effects related to the alternatives and risks related to not receiving this procedure. 8.  I have expressed any questions about this procedure to my physician or the PICC Proceduralist and he/she has answered them.   I

## (undated) NOTE — LETTER
[address] [facility]     I agree to have a Peripherally Inserted Central Catheter (PICC) placed in my arm.    1. The PICC insertion procedure, care, maintenance, risks, benefits, and complications have been explained to me b 7. The person performing this procedure has discussed the potential benefits, risks, and side effects of the PICC; the likelihood of achieving goals; and potential problems that might occur during recuperation.  They also discussed reasonable alternatives t

## (undated) NOTE — MR AVS SNAPSHOT
After Visit Summary   12/4/2020    Kellie Francisco    MRN: [id number]           Visit Information     Date & Time  12/4/2020  3:30 PM Provider  [address] [facility] Dept.  Phone  [phone]      Allergies as of 12 Rosuvastatin Calcium 10 MG Oral Tab TAKE 1 TABLET(10 MG) BY MOUTH EVERY NIGHT    CHOLECALCIFEROL 5000 units Oral Tab Take 1 tablet by mouth daily.  Take 1 tablet daily       Diagnoses for This Visit    Diabetic foot ulcer with osteomyelitis   [565004]  - [facility] now offers Video Visits through [address] for adult and pediatric patients. Video Visits are available Monday - Friday for many common conditions such as allergies, colds, cough, fever, rash, sore throat, headache and pink eye.   The cost for a Video Vi [address]   Monday – Friday  4:00 pm – 10:00 pm   Saturday – Sunday  10:00 am – 4:00 pm  WALK-IN CARE  Emergency Medicine Providers  Conditions needing urgent attention, but are   non-life-threatening.     Also available by appointment Average cost  $120*

## (undated) NOTE — LETTER
Al Cummings [address]    CONSENT FOR EXAMINATION AND DISPOSITION OF AMPUTATED MEMBERS      I have already consented to the amputation of my _______________________________                                                                                    (Please print)    To be performed by: ____________________________________ on _______________. (Please print)     I hereby remedios permission to [facility], to perform a pathological examination of the amputated member. After examination the specimen is to be:       [   ] Disposed of as determined by the authorities of [address] [place] [address];     [   ] Delivered to ___________________________________________________________                             (Name and address of Undertaker)                              ________________________________________  __________________                            (Patient Signature)                                                            (Date)    If the patient is a minor or subject to a guardianship, I have signed my name below on behalf of the patient and myself.         Name of patient's parent or legal guardian:   ______________________________________                                                                            (Please print Name of Legal Guardian)                                                                            ______________________________________                                                                           (Parent or Legal Guardian Signature/Date)      I  have witnessed the signature on this form: ______________________________________                                                                           (Please print Name of Witness)                                                                            _____________________________________ (Witness Signature/Date)     Patient Name: Og Lopez     : 1970                 Printed: 10/10/2023      Medical Record #: [id number]                                              Page 1 of 1

## (undated) NOTE — LETTER
[facility], [place]  Authorization for Invasive Procedures  1.  I hereby authorize Dr. Alease Boxer , my physician and whomever may be designated as the doctor's assistant, to perform the following operation and/or procedure: LEFT BELO occur: fever and allergic reactions, hemolytic reactions, transmission of disease such as hepatitis, AIDS, cytomegalovirus (CMV), and flluid overload.  In the event that I wish to have autologous transfusions of my own blood, or a directed donor transfusion Signature of Patient:  ________________________________________________ Date: _________Time: _________    Responsible person in case of minor or unconscious: _____________________________Relationship: ____________     Witness Signature: _______________